# Patient Record
Sex: FEMALE | Race: WHITE | NOT HISPANIC OR LATINO | Employment: OTHER | URBAN - METROPOLITAN AREA
[De-identification: names, ages, dates, MRNs, and addresses within clinical notes are randomized per-mention and may not be internally consistent; named-entity substitution may affect disease eponyms.]

---

## 2017-01-03 RX ORDER — METFORMIN HYDROCHLORIDE 500 MG/1
TABLET, EXTENDED RELEASE ORAL
Qty: 90 TABLET | Refills: 0 | Status: SHIPPED | OUTPATIENT
Start: 2017-01-03 | End: 2017-04-01 | Stop reason: SDUPTHER

## 2017-01-18 ENCOUNTER — DOCUMENTATION ONLY (OUTPATIENT)
Dept: FAMILY MEDICINE | Facility: CLINIC | Age: 82
End: 2017-01-18

## 2017-01-18 NOTE — PROGRESS NOTES
Pre-Visit Chart Review  For Appointment Scheduled on 1-23-17    Health Maintenance Due   Topic Date Due    Influenza Vaccine  08/01/2016    Pneumococcal (65+) (2 of 2 - PPSV23) 01/08/2017    Foot Exam  01/08/2017    Lipid Panel  01/08/2017    Hemoglobin A1c  01/25/2017

## 2017-01-20 DIAGNOSIS — R10.13 DYSPEPSIA: ICD-10-CM

## 2017-01-20 DIAGNOSIS — R12 HEARTBURN: ICD-10-CM

## 2017-01-20 RX ORDER — PANTOPRAZOLE SODIUM 40 MG/1
TABLET, DELAYED RELEASE ORAL
Qty: 90 TABLET | Refills: 1 | Status: SHIPPED | OUTPATIENT
Start: 2017-01-20 | End: 2017-07-24 | Stop reason: SDUPTHER

## 2017-01-20 NOTE — TELEPHONE ENCOUNTER
Please inform patient that I refilled the protonix prescription and that she is due for a follow-up appointment (last visit was over a year ago).  Thanks  LIAM

## 2017-01-23 ENCOUNTER — OFFICE VISIT (OUTPATIENT)
Dept: FAMILY MEDICINE | Facility: CLINIC | Age: 82
End: 2017-01-23
Payer: MEDICARE

## 2017-01-23 ENCOUNTER — LAB VISIT (OUTPATIENT)
Dept: LAB | Facility: HOSPITAL | Age: 82
End: 2017-01-23
Attending: FAMILY MEDICINE
Payer: MEDICARE

## 2017-01-23 VITALS
SYSTOLIC BLOOD PRESSURE: 124 MMHG | WEIGHT: 142.19 LBS | DIASTOLIC BLOOD PRESSURE: 74 MMHG | RESPIRATION RATE: 12 BRPM | OXYGEN SATURATION: 97 % | HEIGHT: 63 IN | HEART RATE: 75 BPM | BODY MASS INDEX: 25.2 KG/M2 | TEMPERATURE: 98 F

## 2017-01-23 DIAGNOSIS — J01.00 ACUTE MAXILLARY SINUSITIS, RECURRENCE NOT SPECIFIED: Primary | ICD-10-CM

## 2017-01-23 DIAGNOSIS — E11.9 CONTROLLED TYPE 2 DIABETES MELLITUS WITHOUT COMPLICATION, UNSPECIFIED LONG TERM INSULIN USE STATUS: ICD-10-CM

## 2017-01-23 DIAGNOSIS — I10 GOOD HYPERTENSION CONTROL: ICD-10-CM

## 2017-01-23 DIAGNOSIS — E11.59 HYPERTENSION ASSOCIATED WITH DIABETES: ICD-10-CM

## 2017-01-23 DIAGNOSIS — E78.5 HYPERLIPIDEMIA ASSOCIATED WITH TYPE 2 DIABETES MELLITUS: ICD-10-CM

## 2017-01-23 DIAGNOSIS — I15.2 HYPERTENSION ASSOCIATED WITH DIABETES: ICD-10-CM

## 2017-01-23 DIAGNOSIS — E78.5 HYPERLIPIDEMIA, UNSPECIFIED HYPERLIPIDEMIA TYPE: ICD-10-CM

## 2017-01-23 DIAGNOSIS — E11.69 HYPERLIPIDEMIA ASSOCIATED WITH TYPE 2 DIABETES MELLITUS: ICD-10-CM

## 2017-01-23 LAB
ANION GAP SERPL CALC-SCNC: 11 MMOL/L
BUN SERPL-MCNC: 13 MG/DL
CALCIUM SERPL-MCNC: 9.4 MG/DL
CHLORIDE SERPL-SCNC: 99 MMOL/L
CHOLEST/HDLC SERPL: 2.8 {RATIO}
CO2 SERPL-SCNC: 27 MMOL/L
CREAT SERPL-MCNC: 0.8 MG/DL
EST. GFR  (AFRICAN AMERICAN): >60 ML/MIN/1.73 M^2
EST. GFR  (NON AFRICAN AMERICAN): >60 ML/MIN/1.73 M^2
GLUCOSE SERPL-MCNC: 139 MG/DL
HDL/CHOLESTEROL RATIO: 35.5 %
HDLC SERPL-MCNC: 138 MG/DL
HDLC SERPL-MCNC: 49 MG/DL
LDLC SERPL CALC-MCNC: 67.6 MG/DL
NONHDLC SERPL-MCNC: 89 MG/DL
POTASSIUM SERPL-SCNC: 4.3 MMOL/L
SODIUM SERPL-SCNC: 137 MMOL/L
TRIGL SERPL-MCNC: 107 MG/DL

## 2017-01-23 PROCEDURE — 80061 LIPID PANEL: CPT

## 2017-01-23 PROCEDURE — 80048 BASIC METABOLIC PNL TOTAL CA: CPT

## 2017-01-23 PROCEDURE — 83036 HEMOGLOBIN GLYCOSYLATED A1C: CPT

## 2017-01-23 PROCEDURE — 36415 COLL VENOUS BLD VENIPUNCTURE: CPT | Mod: PO

## 2017-01-23 PROCEDURE — 99999 PR PBB SHADOW E&M-EST. PATIENT-LVL III: CPT | Mod: PBBFAC,,, | Performed by: FAMILY MEDICINE

## 2017-01-23 PROCEDURE — 99214 OFFICE O/P EST MOD 30 MIN: CPT | Mod: S$PBB,,, | Performed by: FAMILY MEDICINE

## 2017-01-23 RX ORDER — NITROFURANTOIN 25; 75 MG/1; MG/1
100 CAPSULE ORAL 2 TIMES DAILY
COMMUNITY
Start: 2017-01-16 | End: 2017-01-26

## 2017-01-23 RX ORDER — AZITHROMYCIN 250 MG/1
TABLET, FILM COATED ORAL
Qty: 6 TABLET | Refills: 0 | Status: SHIPPED | OUTPATIENT
Start: 2017-01-23 | End: 2017-01-28

## 2017-01-23 NOTE — PROGRESS NOTES
Subjective:       Patient ID: Genny Watson is a 81 y.o. female.    Chief Complaint: Diabetes; Hypertension; Sinus Problem; and Cough    HPI Comments: 81 year old female in for hypertension, diabetes, hyperlipidemia check.  She was seen in the urgent care last week with a uti and given an injection of probably rocephin and followed by macrobid which she is still taking.  She subsequently developed a cough and congestion with left maxillary and jaw pain and pressure and ears stopped up.  She has no fever but feels flushed and ill.  Her cough is non productive but she is taking mucinex.    Past Medical History:    ALLERGIC RHINITIS                               4/30/2012     Arthritis                                                     Breast cancer                                                   Comment:right, s/p right mastectomy>20yr ago    Cataract                                                      Diabetes mellitus type II                                     Diverticulitis                                                Diverticulosis                                                Esophageal mass                                                 Comment:hiatal hernia with testing    Fuchs' corneal dystrophy                                      Glaucoma                                                      Hernia, hiatal                                                Hyperlipidemia                                                Hypertension                                                  Pulmonary embolism                                            Seizures                                                      Skin cancer of face                             12/2014         Comment:Dr M Weil     UTI (lower urinary tract infection)                           Past Surgical History:    HYSTERECTOMY                                                   MASTECTOMY                                                        Comment:right >20 years ago    APPENDECTOMY                                                   BREAST SURGERY                                                 EYE SURGERY                                                    CORNEAL TRANSPLANT                                             angeogram                                                      cardio stent                                     1/2015          Comment:Dr Dodson    COLONOSCOPY                                      around 20*    COLONOSCOPY                                     N/A 1/13/2016       Comment:Procedure: COLONOSCOPY;  Surgeon: Mario Bhakta MD;  Location: [facility];  Service:                Endoscopy;  Laterality: N/A;    Pneumococcal (65+)(2 of 2 - PPSV23) due on 01/08/2017-DEFERRED DUE TO ACUTE ILLNESS  Foot Exam due on 01/08/2017  Lipid Panel due on 01/08/2017  Hemoglobin A1c due on 01/25/2017      Diabetes   Associated symptoms include fatigue. Pertinent negatives for diabetes include no chest pain.   Hypertension   Pertinent negatives include no chest pain, palpitations or shortness of breath.   Sinus Problem   Associated symptoms include congestion, coughing, ear pain, sinus pressure and a sore throat. Pertinent negatives include no chills, diaphoresis or shortness of breath.   Cough   Associated symptoms include ear pain, postnasal drip, rhinorrhea and a sore throat. Pertinent negatives include no chest pain, chills, fever or shortness of breath.     Review of Systems   Constitutional: Positive for fatigue. Negative for chills, diaphoresis and fever.   HENT: Positive for congestion, ear pain, hearing loss, postnasal drip, rhinorrhea, sinus pressure and sore throat. Negative for ear discharge.    Respiratory: Positive for cough. Negative for chest tightness and shortness of breath.    Cardiovascular: Negative for chest pain and palpitations.   Genitourinary: Negative for dysuria (resolved now), frequency and urgency.        Objective:      Physical Exam   Constitutional: She is oriented to person, place, and time. She appears well-developed and well-nourished. No distress.   Good blood pressure control  Patient is normal weight with bmi of 25.2.   Weight is increased by 0.2lb since last visit of 7/25/16.     HENT:   Head: Normocephalic and atraumatic.   Right Ear: Hearing and external ear normal. Tympanic membrane is bulging. Tympanic membrane is not injected and not erythematous. Tympanic membrane mobility is abnormal.   Left Ear: Hearing, external ear and ear canal normal. Tympanic membrane is bulging. Tympanic membrane is not injected and not erythematous. Tympanic membrane mobility is abnormal.   Nose: Mucosal edema and rhinorrhea present. Right sinus exhibits no maxillary sinus tenderness and no frontal sinus tenderness. Left sinus exhibits maxillary sinus tenderness. Left sinus exhibits no frontal sinus tenderness.   Mouth/Throat: Posterior oropharyngeal edema and posterior oropharyngeal erythema present. No oropharyngeal exudate or tonsillar abscesses.   Cardiovascular: Normal rate, regular rhythm and normal heart sounds.  Exam reveals no gallop and no friction rub.    No murmur heard.  Pulses:       Dorsalis pedis pulses are 2+ on the right side, and 2+ on the left side.        Posterior tibial pulses are 2+ on the right side, and 2+ on the left side.   Pulmonary/Chest: Effort normal and breath sounds normal. No respiratory distress. She has no wheezes. She has no rales. She exhibits no tenderness.   Musculoskeletal:        Right foot: There is normal range of motion and no deformity.        Left foot: There is deformity (slight hammertoe of second toe-old fracture). There is normal range of motion.   Feet:   Right Foot:   Protective Sensation: 10 sites tested. 10 sites sensed.   Skin Integrity: Negative for ulcer, blister, skin breakdown, erythema, warmth, callus or dry skin.   Left Foot:   Protective Sensation: 10 sites  tested. 10 sites sensed.   Skin Integrity: Negative for ulcer, blister, skin breakdown, erythema, warmth, callus or dry skin.   Neurological: She is alert and oriented to person, place, and time.   Skin: She is not diaphoretic.   Psychiatric: She has a normal mood and affect. Her behavior is normal. Judgment and thought content normal.   Nursing note and vitals reviewed.      Assessment:       1. Acute maxillary sinusitis, recurrence not specified    2. Good hypertension control    3. Controlled type 2 diabetes mellitus without complication, unspecified long term insulin use status    4. Hyperlipidemia, unspecified hyperlipidemia type    5. Hypertension associated with diabetes    6. Hyperlipidemia associated with type 2 diabetes mellitus    7. BMI 25.0-25.9,adult        Plan:       1. Acute maxillary sinusitis, recurrence not specified  - azithromycin (Z-KASSANDRA) 250 MG tablet; Take 2 tablets by mouth on day 1; Take 1 tablet by mouth on days 2-5  Dispense: 6 tablet; Refill: 0    2. Good hypertension control  No changes needed    3. Controlled type 2 diabetes mellitus without complication, unspecified long term insulin use status  Await lab2  - Hemoglobin A1c; Future  - Basic metabolic panel; Future    4. Hyperlipidemia, unspecified hyperlipidemia type  Await lab   - Lipid panel; Future    5. Hypertension associated with diabetes    6. Hyperlipidemia associated with type 2 diabetes mellitus    7. BMI 25.0-25.9,adult  No changes needed

## 2017-01-23 NOTE — MR AVS SNAPSHOT
[facility]  [address]  [place] [address]  Phone: [phone]  Fax: [phone]                  Genny Watson   2017 11:00 AM   Office Visit    Description:  Female : 1935   Provider:  Josh Berman MD   Department:  [place] [facility]           Reason for Visit     Diabetes     Hypertension     Sinus Problem     Cough           Diagnoses this Visit        Comments    Acute maxillary sinusitis, recurrence not specified    -  Primary     Good hypertension control         Controlled type 2 diabetes mellitus without complication, unspecified long term insulin use status         Hyperlipidemia, unspecified hyperlipidemia type                To Do List           Future Appointments        Provider Department Dept Phone    2017 2:30 PM LAB, [place] [facility] - [facility] [phone]    3[facility]30[facility]2017 [facility]:00 [facility] [phone]      Goals (5 Years of Data)     None       These Medications        Disp Refills Start End    azithromycin (Z-KASSANDRA) 250 MG tablet 6 tablet 0 2017    Take 2 tablets by mouth on day 1; Take 1 tablet by mouth on days 2-5    Pharmacy: [id number] - [place], [place] - [address] [facility] #: [phone]         [facility]     [facility] On Call Nurse Care Line -  Assistance  Registered nurses in the [facility] On Call Center provide clinical advisement, health education, appointment booking, and other advisory services.  Call for this free service at [phone].             Medications           Message regarding Medications     Verify the changes and/or additions to your medication regime listed below are the same as discussed with your clinician today.  If any of these changes or additions are incorrect, please notify your healthcare provider.        START taking these NEW medications        Refills    azithromycin (Z-KASSANDRA) 250 MG tablet 0    Sig: Take 2 tablets by  mouth on day 1; Take 1 tablet by mouth on days 2-5    Class: Normal      STOP taking these medications     blood sugar diagnostic Strp 1 strip by Misc.(Non-Drug; Combo Route) route once daily. One Touch Ultra test strips    cloNIDine (CATAPRES) 0.1 MG tablet Take 1 tablet (0.1 mg total) by mouth every 4 (four) hours as needed (blood pressure over 180/105).    docusate sodium (COLACE) 100 MG capsule Take 100 mg by mouth daily as needed for Constipation.    sodium chloride 5% (MOUNA 128) 5 % ophthalmic solution Place 1 drop into the left eye as needed.           Verify that the below list of medications is an accurate representation of the medications you are currently taking.  If none reported, the list may be blank. If incorrect, please contact your healthcare provider. Carry this list with you in case of emergency.           Current Medications     amlodipine (NORVASC) 10 MG tablet Take 1 tablet (10 mg total) by mouth once daily.    aspirin (ECOTRIN) 81 MG EC tablet Take 81 mg by mouth once daily.    atorvastatin (LIPITOR) 80 MG tablet TAKE 1 TABLET DAILY    bimatoprost (LUMIGAN) 0.01 % Drop Place 1 drop into both eyes every evening.     clopidogrel (PLAVIX) 75 mg tablet TAKE 1 TABLET DAILY    dextromethorphan-guaifenesin (MUCINEX DM) 60-1,200 mg TM12 Take 1 tablet by mouth 2 (two) times daily.    Lactobacillus acidophilus 10 billion cell Cap Take 1 capsule by mouth once daily.    lisinopril (PRINIVIL,ZESTRIL) 40 MG tablet TAKE 1 TABLET DAILY    metformin (GLUCOPHAGE-XR) 500 MG 24 hr tablet TAKE 1 TABLET EVERY EVENING    metoprolol succinate (TOPROL-XL) 100 MG 24 hr tablet TAKE 1 TABLET DAILY    nitrofurantoin, macrocrystal-monohydrate, (MACROBID) 100 MG capsule Take 100 mg by mouth 2 (two) times daily.     pantoprazole (PROTONIX) 40 MG tablet TAKE 1 TABLET DAILY    prednisoLONE acetate (PRED FORTE) 1 % DrpS Place 1 drop into the right eye once daily.     sodium chloride (OCEAN) 0.65 % nasal spray 2 sprays by Nasal  "route as needed for Congestion.    timolol 0.5 % ophthalmic solution Place 1 drop into the right eye 2 (two) times daily.    azithromycin (Z-KASSANDRA) 250 MG tablet Take 2 tablets by mouth on day 1; Take 1 tablet by mouth on days 2-5           Clinical Reference Information           Vital Signs - Last Recorded  Most recent update: 1/23/2017 12:18 PM by Josh Berman MD    BP Pulse Temp Resp Ht Wt    124/74 (BP Location: Left arm, Patient Position: Sitting, BP Method: Manual) 75 98.3 °F (36.8 °C) (Oral) 12 5' 3" (1.6 m) 64.5 kg (142 lb 3.2 oz)    SpO2 BMI             97% 25.19 kg/m2         Blood Pressure          Most Recent Value    BP  124/74      Allergies as of 1/23/2017     Sulfa (Sulfonamide Antibiotics)      Immunizations Administered on Date of Encounter - 1/23/2017     None      Orders Placed During Today's Visit     Future Labs/Procedures Expected by Expires    Basic metabolic panel  1/23/2017 1/24/2018    Hemoglobin A1c  1/23/2017 3/24/2018    Lipid panel  1/23/2017 3/24/2018      "

## 2017-01-24 LAB
ESTIMATED AVG GLUCOSE: 134 MG/DL
HBA1C MFR BLD HPLC: 6.3 %

## 2017-02-22 DIAGNOSIS — I10 ESSENTIAL HYPERTENSION: ICD-10-CM

## 2017-02-22 RX ORDER — AMLODIPINE BESYLATE 10 MG/1
TABLET ORAL
Qty: 90 TABLET | Refills: 0 | Status: SHIPPED | OUTPATIENT
Start: 2017-02-22 | End: 2017-05-22 | Stop reason: SDUPTHER

## 2017-03-06 RX ORDER — ATORVASTATIN CALCIUM 80 MG/1
TABLET, FILM COATED ORAL
Qty: 90 TABLET | Refills: 2 | Status: SHIPPED | OUTPATIENT
Start: 2017-03-06 | End: 2017-12-01 | Stop reason: SDUPTHER

## 2017-03-20 DIAGNOSIS — I10 ESSENTIAL HYPERTENSION: ICD-10-CM

## 2017-03-20 RX ORDER — LISINOPRIL 40 MG/1
TABLET ORAL
Qty: 90 TABLET | Refills: 2 | Status: SHIPPED | OUTPATIENT
Start: 2017-03-20 | End: 2017-12-15 | Stop reason: SDUPTHER

## 2017-03-30 ENCOUNTER — OFFICE VISIT (OUTPATIENT)
Dept: CARDIOLOGY | Facility: CLINIC | Age: 82
End: 2017-03-30
Payer: MEDICARE

## 2017-03-30 VITALS
BODY MASS INDEX: 25.98 KG/M2 | WEIGHT: 146.63 LBS | SYSTOLIC BLOOD PRESSURE: 174 MMHG | HEART RATE: 80 BPM | DIASTOLIC BLOOD PRESSURE: 83 MMHG | RESPIRATION RATE: 18 BRPM | HEIGHT: 63 IN | OXYGEN SATURATION: 98 %

## 2017-03-30 DIAGNOSIS — E11.59 TYPE 2 DIABETES MELLITUS WITH OTHER CIRCULATORY COMPLICATION, UNSPECIFIED LONG TERM INSULIN USE STATUS: ICD-10-CM

## 2017-03-30 DIAGNOSIS — R94.39 ABNORMAL NUCLEAR STRESS TEST: ICD-10-CM

## 2017-03-30 DIAGNOSIS — I10 ESSENTIAL HYPERTENSION: ICD-10-CM

## 2017-03-30 DIAGNOSIS — R94.31 ABNORMAL EKG: ICD-10-CM

## 2017-03-30 DIAGNOSIS — Z95.5 S/P DRUG ELUTING CORONARY STENT PLACEMENT: Primary | ICD-10-CM

## 2017-03-30 DIAGNOSIS — E78.00 PURE HYPERCHOLESTEROLEMIA: ICD-10-CM

## 2017-03-30 PROBLEM — M41.115 JUVENILE IDIOPATHIC SCOLIOSIS OF THORACOLUMBAR REGION: Status: ACTIVE | Noted: 2017-03-30

## 2017-03-30 PROCEDURE — 99213 OFFICE O/P EST LOW 20 MIN: CPT | Mod: PBBFAC,PO | Performed by: INTERNAL MEDICINE

## 2017-03-30 PROCEDURE — 99999 PR PBB SHADOW E&M-EST. PATIENT-LVL III: CPT | Mod: PBBFAC,,, | Performed by: INTERNAL MEDICINE

## 2017-03-30 PROCEDURE — 99214 OFFICE O/P EST MOD 30 MIN: CPT | Mod: S$PBB,,, | Performed by: INTERNAL MEDICINE

## 2017-03-30 RX ORDER — CEFDINIR 300 MG/1
300 CAPSULE ORAL 2 TIMES DAILY
COMMUNITY
Start: 2017-03-28 | End: 2017-07-18 | Stop reason: ALTCHOICE
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
## 2017-03-30 NOTE — MR AVS SNAPSHOT
[place] [facility]   [address]  [place] [address]  Phone: [phone]                  Genny Watson   3/30/2017 1:00 PM   Office Visit    Description:  Female : 1935   Provider:  Zoran Sloan MD   Department:  [place] [facility]           Reason for Visit     Follow-up           Diagnoses this Visit        Comments    S/P drug eluting coronary stent placement    -  Primary     Pure hypercholesterolemia         Type 2 diabetes mellitus with other circulatory complication, unspecified long term insulin use status         Essential hypertension         Abnormal EKG         Abnormal nuclear stress test         BMI 25.0-25.9,adult                To Do List           Goals (5 Years of Data)     None      Follow-Up and Disposition     Return in about 1 year (around 3/30/2018).    Follow-up and Disposition History      [facility] On Call     [facility] On Call Nurse Care Line -  Assistance  Unless otherwise directed by your provider, please contact [facility], our nurse care line that is available for  assistance.     Registered nurses in the [facility] On Call Center provide: appointment scheduling, clinical advisement, health education, and other advisory services.  Call: [phone] (toll free)               Medications           Message regarding Medications     Verify the changes and/or additions to your medication regime listed below are the same as discussed with your clinician today.  If any of these changes or additions are incorrect, please notify your healthcare provider.        STOP taking these medications     sodium chloride (OCEAN) 0.65 % nasal spray 2 sprays by Nasal route as needed for Congestion.           Verify that the below list of medications is an accurate representation of the medications you are currently taking.  If none reported, the list may be blank. If incorrect, please contact your healthcare provider. Carry this list with you  "in case of emergency.           Current Medications     amlodipine (NORVASC) 10 MG tablet TAKE 1 TABLET DAILY    aspirin (ECOTRIN) 81 MG EC tablet Take 81 mg by mouth once daily.    atorvastatin (LIPITOR) 80 MG tablet TAKE 1 TABLET DAILY    bimatoprost (LUMIGAN) 0.01 % Drop Place 1 drop into both eyes every evening.     cefdinir (OMNICEF) 300 MG capsule Take 300 mg by mouth 2 (two) times daily.    clopidogrel (PLAVIX) 75 mg tablet TAKE 1 TABLET DAILY    Lactobacillus acidophilus 10 billion cell Cap Take 1 capsule by mouth once daily.    lisinopril (PRINIVIL,ZESTRIL) 40 MG tablet TAKE 1 TABLET DAILY    metformin (GLUCOPHAGE-XR) 500 MG 24 hr tablet TAKE 1 TABLET EVERY EVENING    metoprolol succinate (TOPROL-XL) 100 MG 24 hr tablet TAKE 1 TABLET DAILY    pantoprazole (PROTONIX) 40 MG tablet TAKE 1 TABLET DAILY    prednisoLONE acetate (PRED FORTE) 1 % DrpS Place 1 drop into the right eye once daily.     timolol 0.5 % ophthalmic solution Place 1 drop into the right eye 2 (two) times daily.    dextromethorphan-guaifenesin (MUCINEX DM) 60-1,200 mg TM12 Take 1 tablet by mouth 2 (two) times daily.           Clinical Reference Information           Your Vitals Were     BP Pulse Resp Height Weight SpO2    174/83 80 18 5' 3" (1.6 m) 66.5 kg (146 lb 9.7 oz) 98%    BMI                25.97 kg/m2          Blood Pressure          Most Recent Value    Left Arm BP - Sitting  174/83    Reason for not completing BP on both arms  mastectomy    BP  (!)  174/83      Allergies as of 3/30/2017     Sulfa (Sulfonamide Antibiotics)      Immunizations Administered on Date of Encounter - 3/30/2017     None      Language Assistance Services     ATTENTION: Language assistance services are available, free of charge. Please call [phone].      ATENCIÓN: Si gela autumn, tiene a longoria disposición servicios gratuitos de asistencia lingüística. Llame al [phone].     ADIA Ý: N?u b?n nói Ti?ng Vi?t, có các d?ch v? h? tr? ngôn ng? mi?n phí dành " sally wahl?n. G?i s? [phone].         [facility] complies with applicable Federal civil rights laws and does not discriminate on the basis of race, color, national origin, age, disability, or sex.

## 2017-04-01 RX ORDER — METFORMIN HYDROCHLORIDE 500 MG/1
TABLET, EXTENDED RELEASE ORAL
Qty: 90 TABLET | Refills: 0 | Status: SHIPPED | OUTPATIENT
Start: 2017-04-01 | End: 2017-06-30 | Stop reason: SDUPTHER

## 2017-04-08 RX ORDER — CLOPIDOGREL BISULFATE 75 MG/1
TABLET ORAL
Qty: 90 TABLET | Refills: 3 | Status: SHIPPED | OUTPATIENT
Start: 2017-04-08 | End: 2018-04-03 | Stop reason: SDUPTHER

## 2017-05-22 DIAGNOSIS — I10 ESSENTIAL HYPERTENSION: ICD-10-CM

## 2017-05-22 RX ORDER — AMLODIPINE BESYLATE 10 MG/1
TABLET ORAL
Qty: 90 TABLET | Refills: 3 | Status: SHIPPED | OUTPATIENT
Start: 2017-05-22 | End: 2018-05-17 | Stop reason: SDUPTHER

## 2017-06-22 ENCOUNTER — DOCUMENTATION ONLY (OUTPATIENT)
Dept: FAMILY MEDICINE | Facility: CLINIC | Age: 82
End: 2017-06-22

## 2017-06-22 NOTE — PROGRESS NOTES
Pre-Visit Chart Review  For Appointment Scheduled on 7-18-17    Health Maintenance Due   Topic Date Due    Zoster Vaccine  09/19/1997    Pneumococcal (65+) (2 of 2 - PPSV23) 01/08/2017

## 2017-06-30 RX ORDER — METFORMIN HYDROCHLORIDE 500 MG/1
TABLET, EXTENDED RELEASE ORAL
Qty: 90 TABLET | Refills: 0 | Status: SHIPPED | OUTPATIENT
Start: 2017-06-30 | End: 2017-09-28 | Stop reason: SDUPTHER

## 2017-07-18 ENCOUNTER — LAB VISIT (OUTPATIENT)
Dept: LAB | Facility: HOSPITAL | Age: 82
End: 2017-07-18
Attending: FAMILY MEDICINE
Payer: MEDICARE

## 2017-07-18 ENCOUNTER — OFFICE VISIT (OUTPATIENT)
Dept: FAMILY MEDICINE | Facility: CLINIC | Age: 82
End: 2017-07-18
Payer: MEDICARE

## 2017-07-18 VITALS
BODY MASS INDEX: 26.17 KG/M2 | HEIGHT: 63 IN | TEMPERATURE: 48 F | WEIGHT: 147.69 LBS | OXYGEN SATURATION: 95 % | DIASTOLIC BLOOD PRESSURE: 86 MMHG | HEART RATE: 80 BPM | RESPIRATION RATE: 16 BRPM | SYSTOLIC BLOOD PRESSURE: 138 MMHG

## 2017-07-18 DIAGNOSIS — E11.9 CONTROLLED TYPE 2 DIABETES MELLITUS WITHOUT COMPLICATION, WITHOUT LONG-TERM CURRENT USE OF INSULIN: ICD-10-CM

## 2017-07-18 DIAGNOSIS — I10 GOOD HYPERTENSION CONTROL: Primary | ICD-10-CM

## 2017-07-18 DIAGNOSIS — Z23 IMMUNIZATION DUE: ICD-10-CM

## 2017-07-18 DIAGNOSIS — I15.2 HYPERTENSION ASSOCIATED WITH DIABETES: ICD-10-CM

## 2017-07-18 DIAGNOSIS — E11.59 HYPERTENSION ASSOCIATED WITH DIABETES: ICD-10-CM

## 2017-07-18 DIAGNOSIS — S80.01XA CONTUSION OF RIGHT KNEE, INITIAL ENCOUNTER: ICD-10-CM

## 2017-07-18 LAB
ANION GAP SERPL CALC-SCNC: 10 MMOL/L
BUN SERPL-MCNC: 13 MG/DL
CALCIUM SERPL-MCNC: 9.8 MG/DL
CHLORIDE SERPL-SCNC: 102 MMOL/L
CO2 SERPL-SCNC: 29 MMOL/L
CREAT SERPL-MCNC: 0.8 MG/DL
EST. GFR  (AFRICAN AMERICAN): >60 ML/MIN/1.73 M^2
EST. GFR  (NON AFRICAN AMERICAN): >60 ML/MIN/1.73 M^2
GLUCOSE SERPL-MCNC: 124 MG/DL
POTASSIUM SERPL-SCNC: 4.1 MMOL/L
SODIUM SERPL-SCNC: 141 MMOL/L

## 2017-07-18 PROCEDURE — 1159F MED LIST DOCD IN RCRD: CPT | Mod: ,,, | Performed by: FAMILY MEDICINE

## 2017-07-18 PROCEDURE — 36415 COLL VENOUS BLD VENIPUNCTURE: CPT | Mod: PO

## 2017-07-18 PROCEDURE — 99214 OFFICE O/P EST MOD 30 MIN: CPT | Mod: S$PBB,,, | Performed by: FAMILY MEDICINE

## 2017-07-18 PROCEDURE — 80048 BASIC METABOLIC PNL TOTAL CA: CPT

## 2017-07-18 PROCEDURE — 99999 PR PBB SHADOW E&M-EST. PATIENT-LVL IV: CPT | Mod: PBBFAC,,, | Performed by: FAMILY MEDICINE

## 2017-07-18 PROCEDURE — 1125F AMNT PAIN NOTED PAIN PRSNT: CPT | Mod: ,,, | Performed by: FAMILY MEDICINE

## 2017-07-18 PROCEDURE — 83036 HEMOGLOBIN GLYCOSYLATED A1C: CPT

## 2017-07-18 NOTE — PROGRESS NOTES
Subjective:       Patient ID: Genny Watson is a 81 y.o. female.    Chief Complaint: Follow-up (6m)    81-year-old female coming in for a follow-up on diabetes and hypertension.  Her lipid levels are up to date but she is due for a BMP and A1c level.  She is also due for a Pneumovax 23 which she has agreed to take but declines to do a DEXA and zoster.  She had a fall 3 days ago coming out of a restaurant when her foot caught on a role in the rug causing her to land on her right knee.  She bruised the knee but has no significant injuries had no head injury and had no loss of consciousness.  She otherwise is feeling well and had no complaints.    Past Medical History:  4/30/2012: ALLERGIC RHINITIS  No date: Arthritis  No date: Breast cancer      Comment: right, s/p right mastectomy>20yr ago  No date: Cataract  No date: Diabetes mellitus type II  No date: Diverticulitis  No date: Diverticulosis  No date: Esophageal mass      Comment: hiatal hernia with testing  No date: Fuchs' corneal dystrophy  No date: Glaucoma  No date: Hernia, hiatal  No date: Hyperlipidemia  No date: Hypertension  No date: Macular degeneration  No date: Pulmonary embolism  No date: Seizures  12/2014: Skin cancer of face      Comment: Dr M Weil   No date: UTI (lower urinary tract infection)    Past Surgical History:  No date: angeogram  No date: APPENDECTOMY  No date: BREAST SURGERY  1/2015: cardio stent       Comment: Dr Dodson  around 2003: COLONOSCOPY  1/13/2016: COLONOSCOPY N/A      Comment: Procedure: COLONOSCOPY;  Surgeon: Mario Bhakta MD;  Location: [facility];  Service:                Endoscopy;  Laterality: N/A;  No date: CORNEAL TRANSPLANT  No date: EYE SURGERY  No date: HYSTERECTOMY  No date: MASTECTOMY      Comment: right >20 years ago      Current Outpatient Prescriptions:     amlodipine (NORVASC) 10 MG tablet, TAKE 1 TABLET DAILY (NEED APPOINTMENT FOR REFILLS), Disp: 90 tablet, Rfl: 3    aspirin  (ECOTRIN) 81 MG EC tablet, Take 81 mg by mouth once daily., Disp: , Rfl:     atorvastatin (LIPITOR) 80 MG tablet, TAKE 1 TABLET DAILY, Disp: 90 tablet, Rfl: 2    bimatoprost (LUMIGAN) 0.01 % Drop, Place 1 drop into both eyes every evening. , Disp: , Rfl:     clopidogrel (PLAVIX) 75 mg tablet, TAKE 1 TABLET DAILY, Disp: 90 tablet, Rfl: 3    Lactobacillus acidophilus 10 billion cell Cap, Take 1 capsule by mouth once daily. 100 million active cultures, Disp: , Rfl:     lisinopril (PRINIVIL,ZESTRIL) 40 MG tablet, TAKE 1 TABLET DAILY, Disp: 90 tablet, Rfl: 2    metformin (GLUCOPHAGE-XR) 500 MG 24 hr tablet, TAKE 1 TABLET EVERY EVENING, Disp: 90 tablet, Rfl: 0    metoprolol succinate (TOPROL-XL) 100 MG 24 hr tablet, TAKE 1 TABLET DAILY, Disp: 90 tablet, Rfl: 3    pantoprazole (PROTONIX) 40 MG tablet, TAKE 1 TABLET DAILY, Disp: 90 tablet, Rfl: 1    prednisoLONE acetate (PRED FORTE) 1 % DrpS, Place 1 drop into the right eye once daily. , Disp: , Rfl:     timolol 0.5 % ophthalmic solution, Place 1 drop into the right eye 2 (two) times daily., Disp: , Rfl:     VIT C/E/ZN/COPPR/LUTEIN/ZEAXAN (PRESERVISION AREDS 2 ORAL), Take 1 capsule by mouth 2 (two) times daily., Disp: , Rfl:     Zoster Vaccine due on 09/19/1997  DEXA SCAN due on 03/05/2016  Pneumococcal (65+)(2 of 2 - PPSV23) due on 01/08/2017  Hemoglobin A1c due on 07/23/2017        Review of Systems   Eyes: Negative for visual disturbance.   Respiratory: Negative for chest tightness and shortness of breath.    Cardiovascular: Negative for chest pain and palpitations.   Endocrine: Negative for polydipsia and polyuria.   Musculoskeletal: Positive for arthralgias.       Objective:      Physical Exam   Constitutional: She is oriented to person, place, and time. She appears well-developed and well-nourished. No distress.   Good blood pressure control  Normal weight with BMI 26.2.  She is increased 5.5 pounds from her last visit January 23, 2017   HENT:   Head:  Normocephalic and atraumatic.   Right Ear: External ear normal.   Left Ear: External ear normal.   Nose: Nose normal.   Mouth/Throat: Oropharynx is clear and moist. No oropharyngeal exudate.   Eyes: EOM are normal. No scleral icterus.   Neck: Normal range of motion. Neck supple. No JVD present. No tracheal deviation present. No thyromegaly present.   Cardiovascular: Normal rate, regular rhythm and normal heart sounds.  Exam reveals no gallop and no friction rub.    No murmur heard.  Pulses:       Dorsalis pedis pulses are 2+ on the right side, and 2+ on the left side.        Posterior tibial pulses are 2+ on the right side, and 2+ on the left side.   Pulmonary/Chest: Effort normal and breath sounds normal. No respiratory distress. She has no wheezes. She has no rales. She exhibits no tenderness.   Abdominal: Soft. Bowel sounds are normal. She exhibits no distension and no mass. There is no tenderness. There is no rebound and no guarding. No hernia.   Musculoskeletal:        Right knee: Tenderness found. Medial joint line and lateral joint line tenderness noted.        Left knee: Normal.        Right foot: There is normal range of motion and no deformity.        Left foot: There is normal range of motion and no deformity.   Bruising over her anterior right knee with no significant effusion or swelling and no decrease range of motion.  Mildly tender over the bruised area   Feet:   Right Foot:   Protective Sensation: 10 sites tested. 10 sites sensed.   Skin Integrity: Negative for ulcer, blister, skin breakdown, erythema, warmth, callus or dry skin.   Left Foot:   Protective Sensation: 10 sites tested. 10 sites sensed.   Skin Integrity: Negative for ulcer, blister, skin breakdown, erythema, warmth, callus or dry skin.   Lymphadenopathy:     She has no cervical adenopathy.   Neurological: She is alert and oriented to person, place, and time.   Skin: She is not diaphoretic.        Psychiatric: She has a normal mood and  affect. Her behavior is normal.   Nursing note and vitals reviewed.      Assessment:       1. Good hypertension control    2. Controlled type 2 diabetes mellitus without complication, without long-term current use of insulin    3. Immunization due    4. Hypertension associated with diabetes    5. Contusion of right knee, initial encounter    6. BMI 26.0-26.9,adult        Plan:       1. Good hypertension control  No changes needed    2. Controlled type 2 diabetes mellitus without complication, without long-term current use of insulin  - Hemoglobin A1c; Future  - Basic metabolic panel; Future    3. Immunization due  - (In Office Administered) Pneumococcal Polysaccharide Vaccine (23 Valent) (SQ/IM)    4. Hypertension associated with diabetes    5. Contusion of right knee, initial encounter    6. BMI 26.0-26.9,adult           Patient readiness: acceptance and barriers:none    During the course of the visit the patient was educated and counseled about the following:     Diabetes:  Discussed general issues about diabetes pathophysiology and management.  Addressed ADA diet.  Discussed foot care.  Hypertension:   Medication: no change.  Dietary sodium restriction.  Regular aerobic exercise.    Goals: Diabetes: Maintain Hemoglobin A1C below 7 and Hypertension: Reduce Blood Pressure    Did patient meet goals/outcomes: Yes    The following self management tools provided: blood pressure log  blood glucose log    Patient Instructions (the written plan) was given to the patient/family.     Time spent with patient: 30 minutes

## 2017-07-19 LAB
ESTIMATED AVG GLUCOSE: 128 MG/DL
HBA1C MFR BLD HPLC: 6.1 %

## 2017-07-24 DIAGNOSIS — R12 HEARTBURN: ICD-10-CM

## 2017-07-24 DIAGNOSIS — R10.13 DYSPEPSIA: ICD-10-CM

## 2017-07-24 RX ORDER — METOPROLOL SUCCINATE 100 MG/1
TABLET, EXTENDED RELEASE ORAL
Qty: 90 TABLET | Refills: 3 | Status: SHIPPED | OUTPATIENT
Start: 2017-07-24 | End: 2018-01-22 | Stop reason: SDUPTHER

## 2017-07-24 RX ORDER — PANTOPRAZOLE SODIUM 40 MG/1
TABLET, DELAYED RELEASE ORAL
Qty: 90 TABLET | Refills: 0 | Status: SHIPPED | OUTPATIENT
Start: 2017-07-24 | End: 2017-10-22 | Stop reason: SDUPTHER

## 2017-07-24 NOTE — TELEPHONE ENCOUNTER
Please inform the patient that I refilled the prescription for protonix and she is due for a follow-up visit (last seen over a year ago) for continued evaluation and management.  Thanks  LIAM

## 2017-09-28 RX ORDER — METFORMIN HYDROCHLORIDE 500 MG/1
TABLET, EXTENDED RELEASE ORAL
Qty: 90 TABLET | Refills: 0 | Status: SHIPPED | OUTPATIENT
Start: 2017-09-28 | End: 2017-12-21 | Stop reason: SDUPTHER

## 2017-10-22 ENCOUNTER — TELEPHONE (OUTPATIENT)
Dept: GASTROENTEROLOGY | Facility: CLINIC | Age: 82
End: 2017-10-22

## 2017-10-22 DIAGNOSIS — R12 HEARTBURN: ICD-10-CM

## 2017-10-22 DIAGNOSIS — R10.13 DYSPEPSIA: ICD-10-CM

## 2017-10-23 RX ORDER — PANTOPRAZOLE SODIUM 40 MG/1
TABLET, DELAYED RELEASE ORAL
Qty: 30 TABLET | Refills: 0 | Status: SHIPPED | OUTPATIENT
Start: 2017-10-23 | End: 2017-10-24 | Stop reason: SDUPTHER

## 2017-10-23 NOTE — TELEPHONE ENCOUNTER
Please inform the patient that I did sent in an one time 30 day refill for protonix. Patient is due for a follow-up visit (last seen in 2015) for continued evaluation and management. Similar letter was sent to the patient on 1/20/17.  Thanks  LIAM

## 2017-10-24 ENCOUNTER — LAB VISIT (OUTPATIENT)
Dept: LAB | Facility: HOSPITAL | Age: 82
End: 2017-10-24
Attending: NURSE PRACTITIONER
Payer: MEDICARE

## 2017-10-24 ENCOUNTER — OFFICE VISIT (OUTPATIENT)
Dept: GASTROENTEROLOGY | Facility: CLINIC | Age: 82
End: 2017-10-24
Payer: MEDICARE

## 2017-10-24 VITALS
WEIGHT: 144.38 LBS | HEART RATE: 84 BPM | DIASTOLIC BLOOD PRESSURE: 93 MMHG | BODY MASS INDEX: 25.58 KG/M2 | HEIGHT: 63 IN | SYSTOLIC BLOOD PRESSURE: 169 MMHG

## 2017-10-24 DIAGNOSIS — Z51.81 ENCOUNTER FOR MONITORING LONG-TERM PROTON PUMP INHIBITOR THERAPY: ICD-10-CM

## 2017-10-24 DIAGNOSIS — R03.0 ELEVATED BLOOD PRESSURE READING: ICD-10-CM

## 2017-10-24 DIAGNOSIS — Z79.899 ENCOUNTER FOR MONITORING LONG-TERM PROTON PUMP INHIBITOR THERAPY: ICD-10-CM

## 2017-10-24 DIAGNOSIS — Z51.81 ENCOUNTER FOR MONITORING LONG-TERM PROTON PUMP INHIBITOR THERAPY: Primary | ICD-10-CM

## 2017-10-24 DIAGNOSIS — Z87.19 HISTORY OF DIVERTICULOSIS: ICD-10-CM

## 2017-10-24 DIAGNOSIS — Z87.19 HISTORY OF HEMORRHOIDS: ICD-10-CM

## 2017-10-24 DIAGNOSIS — K44.9 HIATAL HERNIA: ICD-10-CM

## 2017-10-24 DIAGNOSIS — K22.70 BARRETT'S ESOPHAGUS WITHOUT DYSPLASIA: ICD-10-CM

## 2017-10-24 DIAGNOSIS — Z79.01 ANTICOAGULANT LONG-TERM USE: ICD-10-CM

## 2017-10-24 DIAGNOSIS — K62.89 RECTAL PAIN: ICD-10-CM

## 2017-10-24 DIAGNOSIS — Z79.899 ENCOUNTER FOR MONITORING LONG-TERM PROTON PUMP INHIBITOR THERAPY: Primary | ICD-10-CM

## 2017-10-24 LAB
ALBUMIN SERPL BCP-MCNC: 4.1 G/DL
ALP SERPL-CCNC: 94 U/L
ALT SERPL W/O P-5'-P-CCNC: 32 U/L
AST SERPL-CCNC: 34 U/L
BASOPHILS # BLD AUTO: 0 K/UL
BASOPHILS NFR BLD: 0.2 %
BILIRUB DIRECT SERPL-MCNC: 0.2 MG/DL
BILIRUB SERPL-MCNC: 0.5 MG/DL
DIFFERENTIAL METHOD: ABNORMAL
EOSINOPHIL # BLD AUTO: 0.1 K/UL
EOSINOPHIL NFR BLD: 1.7 %
ERYTHROCYTE [DISTWIDTH] IN BLOOD BY AUTOMATED COUNT: 13.2 %
HCT VFR BLD AUTO: 40.9 %
HGB BLD-MCNC: 13.8 G/DL
LYMPHOCYTES # BLD AUTO: 2.3 K/UL
LYMPHOCYTES NFR BLD: 30.3 %
MAGNESIUM SERPL-MCNC: 1.8 MG/DL
MCH RBC QN AUTO: 29.4 PG
MCHC RBC AUTO-ENTMCNC: 33.7 G/DL
MCV RBC AUTO: 87 FL
MONOCYTES # BLD AUTO: 0.7 K/UL
MONOCYTES NFR BLD: 8.6 %
NEUTROPHILS # BLD AUTO: 4.6 K/UL
NEUTROPHILS NFR BLD: 59.2 %
PLATELET # BLD AUTO: 242 K/UL
PMV BLD AUTO: 8.4 FL
PROT SERPL-MCNC: 7.9 G/DL
RBC # BLD AUTO: 4.69 M/UL
VIT B12 SERPL-MCNC: >2000 PG/ML
WBC # BLD AUTO: 7.7 K/UL

## 2017-10-24 PROCEDURE — 82607 VITAMIN B-12: CPT

## 2017-10-24 PROCEDURE — 36415 COLL VENOUS BLD VENIPUNCTURE: CPT

## 2017-10-24 PROCEDURE — 85025 COMPLETE CBC W/AUTO DIFF WBC: CPT

## 2017-10-24 PROCEDURE — 80076 HEPATIC FUNCTION PANEL: CPT

## 2017-10-24 PROCEDURE — 99214 OFFICE O/P EST MOD 30 MIN: CPT | Mod: S$PBB,,, | Performed by: NURSE PRACTITIONER

## 2017-10-24 PROCEDURE — 99999 PR PBB SHADOW E&M-EST. PATIENT-LVL IV: CPT | Mod: PBBFAC,,, | Performed by: NURSE PRACTITIONER

## 2017-10-24 PROCEDURE — 83735 ASSAY OF MAGNESIUM: CPT

## 2017-10-24 PROCEDURE — 99214 OFFICE O/P EST MOD 30 MIN: CPT | Mod: PBBFAC,PO | Performed by: NURSE PRACTITIONER

## 2017-10-24 RX ORDER — PANTOPRAZOLE SODIUM 40 MG/1
40 TABLET, DELAYED RELEASE ORAL DAILY
Qty: 90 TABLET | Refills: 3 | Status: SHIPPED | OUTPATIENT
Start: 2017-10-24 | End: 2018-10-27 | Stop reason: SDUPTHER

## 2017-10-24 RX ORDER — HYDROCORTISONE ACETATE 25 MG/1
25 SUPPOSITORY RECTAL 2 TIMES DAILY PRN
Qty: 24 SUPPOSITORY | Refills: 2 | Status: SHIPPED | OUTPATIENT
Start: 2017-10-24 | End: 2017-11-03

## 2017-10-24 RX ORDER — CYCLOBENZAPRINE HCL 5 MG
TABLET ORAL
COMMUNITY
Start: 2017-08-10 | End: 2018-01-22

## 2017-10-24 RX ORDER — TIMOLOL MALEATE 5 MG/ML
1 SOLUTION/ DROPS OPHTHALMIC 2 TIMES DAILY
COMMUNITY
Start: 2017-08-03 | End: 2020-07-20 | Stop reason: ALTCHOICE

## 2017-10-24 NOTE — PROGRESS NOTES
Subjective:       Patient ID: Genny Watson is a 82 y.o.  female Body mass index is 25.58 kg/m².    Chief Complaint: Follow-up ( med refill)  This patient is established.    Patient is here with daughter, whom assisted with history.      Gastroesophageal Reflux   She complains of heartburn (rarely) and water brash (rarely). She reports no abdominal pain, no belching, no chest pain, no choking, no coughing, no dysphagia, no early satiety, no globus sensation, no hoarse voice, no nausea or no sore throat. This is a chronic problem. Episode onset: since January 2015 hospitalized for angiogram and hemorrhage, also pulmonary embolism; sees Dr. Dodson (cardiologist) The problem occurs rarely. The problem has been resolved (controlled overall with PPI). The heartburn does not wake her from sleep. The heartburn does not limit her activity. The heartburn changes with position. The symptoms are aggravated by bending, lying down and certain foods (fried foods, spicy foods, acidic foods). Pertinent negatives include no fatigue, melena or weight loss. Risk factors include hiatal hernia (denies NSAID use besides aspirin). She has tried a histamine-2 antagonist, an antacid and head elevation (protonix 40 mg once daily; takes zantac prn, gaviscon prn for breakthrough which is rare) for the symptoms. The treatment provided significant relief. Past procedures include an abdominal ultrasound and an EGD.     Review of Systems   Constitutional: Negative for appetite change, chills, diaphoresis, fatigue, unexpected weight change and weight loss.   HENT: Negative for congestion, drooling, hoarse voice, mouth sores, sore throat, trouble swallowing and voice change.    Eyes: Negative for visual disturbance.   Respiratory: Negative for cough, choking, chest tightness and shortness of breath.    Cardiovascular: Negative for chest pain and palpitations.        Reports she took her blood pressure medication today, and her  blood pressure this morning was 111/85. Reports when she comes in for visits her blood pressure is always high.   Gastrointestinal: Positive for heartburn (rarely) and rectal pain (occasional; relates to hemorrhoids). Negative for abdominal distention, abdominal pain, anal bleeding, blood in stool, dysphagia, melena and nausea.   Musculoskeletal: Negative for back pain.   Neurological: Negative for dizziness, weakness and headaches.       Past Medical History:   Diagnosis Date    ALLERGIC RHINITIS 4/30/2012    Arthritis     Breast cancer     right, s/p right mastectomy>20yr ago    Cataract     Diabetes mellitus type II     Diverticulitis     Diverticulosis     Esophageal mass     hiatal hernia with testing    Fuchs' corneal dystrophy     Glaucoma     Hernia, hiatal     Hyperlipidemia     Hypertension     Macular degeneration     Pulmonary embolism     Seizures     Skin cancer of face 12/2014    Dr M Weil     UTI (lower urinary tract infection)      Past Surgical History:   Procedure Laterality Date    angeogram      APPENDECTOMY      BREAST SURGERY      cardio stent   1/2015    Dr Dodson    COLONOSCOPY  around 2003    COLONOSCOPY N/A 1/13/2016    Procedure: COLONOSCOPY;  Surgeon: Mario Moya MD;  Location: [facility];  Service: Endoscopy;  Laterality: N/A; repeat in 5 years for surveillance    CORNEAL TRANSPLANT      EYE SURGERY      HYSTERECTOMY      MASTECTOMY      right >20 years ago    UPPER GASTROINTESTINAL ENDOSCOPY  01/13/2016    Dr. Moya     Family History   Problem Relation Age of Onset    Cancer Mother      liver    Mental illness Mother     Early death Father     Heart disease Father     Diabetes Daughter     Early death Paternal Uncle     Heart disease Paternal Uncle     Celiac disease Sister     No Known Problems Son     Breast cancer Neg Hx     Ovarian cancer Neg Hx     Colon cancer Neg Hx     Colon polyps Neg Hx     Esophageal cancer Neg Hx      Stomach cancer Neg Hx      Wt Readings from Last 10 Encounters:   10/24/17 65.5 kg (144 lb 6.4 oz)   07/18/17 67 kg (147 lb 11.3 oz)   03/30/17 66.5 kg (146 lb 9.7 oz)   01/23/17 64.5 kg (142 lb 3.2 oz)   09/28/16 65.5 kg (144 lb 6.4 oz)   07/25/16 64.4 kg (141 lb 15.6 oz)   02/17/16 62.6 kg (138 lb)   02/11/16 62.8 kg (138 lb 7.2 oz)   01/13/16 63 kg (139 lb)   01/08/16 63.4 kg (139 lb 12.4 oz)     Lab Results   Component Value Date    WBC 8.10 04/11/2015    HGB 14.3 04/11/2015    HCT 42.6 04/11/2015    MCV 89 04/11/2015     04/11/2015     CMP  Sodium   Date Value Ref Range Status   07/18/2017 141 136 - 145 mmol/L Final     Potassium   Date Value Ref Range Status   07/18/2017 4.1 3.5 - 5.1 mmol/L Final     Chloride   Date Value Ref Range Status   07/18/2017 102 95 - 110 mmol/L Final     CO2   Date Value Ref Range Status   07/18/2017 29 23 - 29 mmol/L Final     Glucose   Date Value Ref Range Status   07/18/2017 124 (H) 70 - 110 mg/dL Final     BUN, Bld   Date Value Ref Range Status   07/18/2017 13 8 - 23 mg/dL Final     Creatinine   Date Value Ref Range Status   07/18/2017 0.8 0.5 - 1.4 mg/dL Final     Calcium   Date Value Ref Range Status   07/18/2017 9.8 8.7 - 10.5 mg/dL Final     Total Protein   Date Value Ref Range Status   04/11/2015 7.8 6.0 - 8.4 g/dL Final     Albumin   Date Value Ref Range Status   04/11/2015 4.3 3.5 - 5.2 g/dL Final     Total Bilirubin   Date Value Ref Range Status   04/11/2015 0.7 0.1 - 1.0 mg/dL Final     Comment:     For infants and newborns, interpretation of results should be based  on gestational age, weight and in agreement with clinical  observations.  Premature Infant recommended reference ranges:  Up to 24 hours.............<8.0 mg/dL  Up to 48 hours............<12.0 mg/dL  3-5 days..................<15.0 mg/dL  6-29 days.................<15.0 mg/dL       Alkaline Phosphatase   Date Value Ref Range Status   04/11/2015 76 55 - 135 U/L Final     AST   Date Value Ref Range  "Status   04/11/2015 31 10 - 40 U/L Final     ALT   Date Value Ref Range Status   04/11/2015 28 10 - 44 U/L Final     Anion Gap   Date Value Ref Range Status   07/18/2017 10 8 - 16 mmol/L Final     eGFR if    Date Value Ref Range Status   07/18/2017 >60.0 >60 mL/min/1.73 m^2 Final     eGFR if non    Date Value Ref Range Status   07/18/2017 >60.0 >60 mL/min/1.73 m^2 Final     Comment:     Calculation used to obtain the estimated glomerular filtration  rate (eGFR) is the CKD-EPI equation. Since race is unknown   in our information system, the eGFR values for   -American and Non--American patients are given   for each creatinine result.                   Ref Range 4/11/15     Magnesium 1.6-2.6 mg/dL 1.8 1.8        Lab Results   Component Value Date    AMYLASE 110 12/01/2015     Lab Results   Component Value Date    LIPASE 52 12/01/2015     Reviewed prior medical records including radiology report of 12/14/15 abdominal ultrasound, 1/31/2015 esophagram, 1/30/15 ct chest, & endoscopy history (see surgical history).    1/13/16 EGD was reviewed and procedure report states:   "Impression:          - Normal upper third of esophagus and middle third                        of esophagus.                       - [place]-colored mucosa suggestive of short-segment                        Gimenez's esophagus. Biopsied.                       - Large hiatus hernia.                       - 9 cm hiatus hernia.                       - Gastritis. Biopsied.                       - Mucosal changes in the duodenum. Biopsied.                       - Normal 2nd part of the duodenum. Biopsied.  Recommendation:      - Patient has a contact number available for                        emergencies. The signs and symptoms of potential                        delayed complications were discussed with the                        patient. Return to normal activities tomorrow.                        Written " "discharge instructions were provided to the                        patient.                       - Resume previous diet.                       - Continue present medications.                       - No aspirin, ibuprofen, naproxen, or other                        non-steroidal anti-inflammatory drugs.                       - Await pathology results.                       - Discharge patient to home (ambulatory).                       - Perform a colonoscopy today.                       - Return to nurse practitioner after studies are                        complete.".  Biopsy results:   "1. Duodenum, second portion, biopsy:  Unremarkable small bowel mucosa with adequate villi.  Negative for active inflammation.  Negative for dysplasia.  2. Duodenal bulb, biopsy:  Gastric mucosa with chronic inflammation.  Negative for active inflammation.  Negative for dysplasia.  3. Stomach, antrum and body, biopsy:  Chronic nonactive gastritis.  NEGATIVE for H. pylori species by immunostain with appropriately reactive controls.  Negative for dysplasia.  4. Stomach, cardia, biopsy:  Chronic nonactive gastritis.  NEGATIVE for H. pylori species by immunostain with appropriately reactive controls.  Negative for dysplasia.  5. Esophagus, distal, biopsy:  Gastric-type mucosa with intestinal metaplasia.  Negative for dysplasia."    1/13/16 Colonoscopy was reviewed and procedure report states:   " Impression:          - Non-bleeding internal hemorrhoids.                       - Diverticulosis in the sigmoid colon and in the                        descending colon.                       - One 3 mm polyp in the ascending colon. Resected                        and retrieved.                       - The rectum, transverse colon, cecum, appendiceal                        orifice and ileocecal valve are normal.                       - The examined portion of the ileum was normal.  Recommendation:      - Patient has a contact number available " "for                        emergencies. The signs and symptoms of potential                        delayed complications were discussed with the                        patient. Return to normal activities tomorrow.                        Written discharge instructions were provided to the                        patient.                       - High fiber diet.                       - Continue present medications.                       - Resume aspirin today and Plavix (clopidogrel)                        today at prior doses.                       - Await pathology results.                       - Repeat colonoscopy (date not yet determined) for                        surveillance. If adenomatous, then repeat                        colonoscopy in 5 years. If not, then patient will                        likely not need further colonoscopy secondary to age.                       - Discharge patient to home (ambulatory).                       - Return to nurse practitioner in 6 weeks. ".  Biopsy results:   "6. Polyp, ascending colon, polypectomy:  Tubular adenoma, 1 fragment."  Objective:      Physical Exam   Constitutional: She is oriented to person, place, and time. She appears well-developed and well-nourished. No distress.   HENT:   Head: Atraumatic.   Mouth/Throat: Uvula is midline, oropharynx is clear and moist and mucous membranes are normal. No oral lesions. No oropharyngeal exudate.   Eyes: Conjunctivae are normal. Pupils are equal, round, and reactive to light. No scleral icterus.   Neck: Full passive range of motion without pain.   Cardiovascular: Normal rate and regular rhythm.    Pulmonary/Chest: Effort normal and breath sounds normal. No respiratory distress. She has no wheezes. She has no rhonchi. She has no rales.   Abdominal: Soft. Normal appearance and bowel sounds are normal. She exhibits no distension, no abdominal bruit, no ascites and no mass. There is no hepatosplenomegaly. There is no " tenderness. There is no rigidity, no rebound, no guarding, no CVA tenderness, no tenderness at McBurney's point and negative Go's sign. No hernia.   Patient declined rectal exam.   Neurological: She is alert and oriented to person, place, and time.   Skin: Skin is warm and dry. No rash noted. She is not diaphoretic. No erythema. No pallor.   Non-jaundiced   Psychiatric: She has a normal mood and affect. Her behavior is normal.   Vitals reviewed.      Assessment:       1. Encounter for monitoring long-term proton pump inhibitor therapy    2. 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 Hiatal hernia    4. History of hemorrhoids    5. Rectal pain    6. History of diverticulosis    7. Anticoagulant long-term use    8. Elevated blood pressure reading        Plan:       Encounter for monitoring long-term proton pump inhibitor therapy  -     Vitamin B12; Future; Expected date: 10/24/2017  -     Magnesium; Future; Expected date: 10/24/2017  -     CBC auto differential; Future; Expected date: 10/24/2017  -     Hepatic function panel; Future; Expected date: 10/24/2017  - REFILL    pantoprazole (PROTONIX) 40 MG tablet; Take 1 tablet (40 mg total) by mouth once daily.  Dispense: 90 tablet; Refill: 3  - discussed with patient about long term use of reflux medications and the risk of using these medications long term. Some research studies (not all) have shown decrease absorption of calcium when taking PPI's and H2 blockers, but those same research studies showed that adequate dietary (milk and cheese) and/or supplement of calcium and vitamin d supplement induces enough acid secretion for calcium absorption. Also, discussed about the risk vs benefit of untreated GERD such as progression of girard's esophagus.  - recommend annual monitoring with blood work to include CMP, CBC, vitamin B12, and magnesium.    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  Dispense: 90 tablet; Refill: 3,  take in the morning before breakfast, discussed about long term use of medication and discuss risk with taking PPI's long term, and to take OTC calcium and vitamin d supplements as directed such as (Caltrate +D), pt verbalized understanding  -CONTINUE lifestyle modifications to help control reflux including: avoid large meals, avoid eating within 2-3 hours of bedtime (avoid late night eating & lying down soon after eating), & elevate head of bed if nocturnal symptoms are present.   - avoid known foods which trigger reflux symptoms & to minimize/avoid high-fat foods, chocolate, caffeine, citrus, alcohol, & tomato products.  - avoid/limit use of NSAID's, since they can cause GI upset, bleeding, and/or ulcers. If needed, take with food  - discussed diagnosis in detail with patient and the need for long term reflux medication and surveillance EGDs (currently due); patient verbalized understanding and reports she will call to schedule EGD when ready    Hiatal hernia  -  REFILL   pantoprazole (PROTONIX) 40 MG tablet; Take 1 tablet (40 mg total) by mouth once daily.  Dispense: 90 tablet; Refill: 3  -discussed diagnosis with patient & that it is usually managed by controlling reflux symptoms, surgery is an option, but usually performed if reflux is uncontrolled by medication management, if symptoms persist despite medication management refer to general surgery to discuss about surgical options, patient verbalized understanding    History of hemorrhoids & Rectal pain  -  START   hydrocortisone (ANUSOL-HC) 25 mg suppository; Place 1 suppository (25 mg total) rectally 2 (two) times daily as needed for Hemorrhoids.  Dispense: 24 suppository; Refill: 2  - avoid constipation and straining with bowel movements; try using an OTC stool softener as directed and increase fiber in diet (20-30 grams daily)/OTC fiber supplement such as metamucil (take as directed)  - recommend SRIRAM  baths  - possible referral to general surgery if symptoms persist    History of diverticulosis  - discussed the diagnosis of diverticulosis and diverticulitis, & to prevent diverticulitis, high fiber diet is recommended.  - Recommended daily exercise, adequate water intake (six 8-oz glasses of water daily), and high fiber diet. OTC fiber supplements are recommended if diet does not reach daily fiber goal (25 grams daily), such as Metamucil, Citrucel, or FiberCon (take as directed, separate from other oral medications by >2 hours).  - Advised to avoid/minimize popcorn, corn, seeds, and nuts.    Elevated BP  - instructed patient and daughter to monitor blood pressure at home and follow-up with PCP &/or cardiologist  - patient denies headache, chest pain, shortness of breath, or blurred vision, educated patient & daughter that if blood pressure remains elevated or symptoms occur to go to ER.    Anticoagulant long-term use  - patient is on blood-thinner(s)- Plavix and aspirin, informed patient that they will likely need to be held/bridge therapy, will confirm with cardiologist/PCP for recommendations on blood thinners.    Return in about 6 months (around 4/24/2018), or if symptoms worsen or fail to improve.    If no improvement in symptoms or symptoms worsen, call/follow-up at clinic or go to ER

## 2017-10-24 NOTE — PATIENT INSTRUCTIONS
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 This means that there have been changes to the lining of the esophagus near the stomach. The changes may have been caused by the acid reflux that happens with GERD (gastroesophageal reflux disease). The changed lining is not cancerous, but may increase your chances of developing cancer later on.      When you have GERD  The esophagus is the tube that carries food and liquid from the mouth to the stomach. Your lower esophageal sphincter (LES) is a one-way valve at the top of the stomach. It keeps food and stomach acid from flowing backward. If the LES is weakened, food and stomach acid flow back (reflux) into your esophagus. If this happens often, the condition is called GERD.  Changes in the lining  The stomach is kept safe from its own acid by a special lining. The esophagus isnt meant to contact stomach acid. With GERD, acid flows back into the esophagus often. This damages the esophagus. In response to the damage, new tissue forms that is not normal. This is Gimenez esophagus. The new tissue may keep changing. This is why it is more likely to become cancer in the future.  Preventing further damage  Your healthcare provider may suggest regular tests to keep track of changes in the esophagus. This usually includes an endoscopy, when a flexible lighted scope is placed through the mouth into the esophagus. Biopsies (tissue samples) can be taken of the abnormal areas. You are usually sedated with an IV medicine for comfort. He or she may also suggest ways for you to control GERD. This includes lifestyle changes, medicine, or even surgery. This should help keep your Gimenez esophagus from getting worse.  Symptoms of GERD  Symptoms include the following:  · Heartburn  · Sour-tasting fluid backing up into your mouth  · Frequent burping or belching  · Symptoms that get worse after you eat, bend over, or lie down  · Coughing repeatedly to clear your  throat  · Hoarseness   Date Last Reviewed: 6/1/2016  © 1905-5801 [employer]. [address] [place] [address], [place] [address]. All rights reserved. This information is not intended as a substitute for professional medical care. Always follow your healthcare professional's instructions.        GERD (Adult)    The esophagus is a tube that carries food from the mouth to the stomach. A valve at the lower end of the esophagus prevents stomach acid from flowing upward. When this valve doesn't work properly, stomach contents may repeatedly flow back up (reflux) into the esophagus. This is called gastroesophageal reflux disease (GERD). GERD can irritate the esophagus. It can cause problems with swallowing or breathing. In severe cases, GERD can cause recurrent pneumonia or other serious problems.  Symptoms of reflux include burning, pressure or sharp pain in the upper abdomen or mid to lower chest. The pain can spread to the neck, back, or shoulder. There may be belching, an acid taste in the back of the throat, chronic cough, or sore throat or hoarseness. GERD symptoms often occur during the day after a big meal. They can also occur at night when lying down.   Home care  Lifestyle changes can help reduce symptoms. If needed, medicines may be prescribed. Symptoms often improve with treatment, but if treatment is stopped, the symptoms often return after a few months. So most persons with GERD will need to continue treatment.  Lifestyle changes  · Limit or avoid fatty, fried, and spicy foods, as well as coffee, chocolate, mint, and foods with high acid content such as tomatoes and citrus fruit and juices (orange, grapefruit, lemon).  · Dont eat large meals, especially at night. Frequent, smaller meals are best. Do not lie down right after eating. And dont eat anything 3 hours before going to bed.  · Avoid drinking alcohol and smoking. As much as possible, stay away from second hand smoke.  · If you are overweight,  "losing weight will reduce symptoms.   · Avoid wearing tight clothing around your stomach area.  · If your symptoms occur during sleep, use a foam wedge to elevate your upper body (not just your head.) Or, place 4" blocks under the head of your bed.  Medicines  If needed, medicines can help relieve the symptoms of GERD and prevent damage to the esophagus. Discuss a medicine plan with your healthcare provider. This may include one or more of the following medicines:  · Antacids to help neutralize the normal acids in your stomach.  · Acid blockers (H2 blockers) to decrease acid production.  · Acid inhibitors (PPIs) to decrease acid production in a different way than the blockers. They may work better, but can take a little longer to take effect.  Take an antacid 30-60 minutes after eating and at bedtime, but not at the same time as an acid blocker.  Try not to take medicines such as ibuprofen and aspirin. If you are taking aspirin for your heart or other medical reasons, talk to your healthcare provider about stopping it.  Follow-up care  Follow up with your healthcare provider or as advised by our staff.  When to seek medical advice  Call your healthcare provider if any of the following occur:  · Stomach pain gets worse or moves to the lower right abdomen (appendix area)  · Chest pain appears or gets worse, or spreads to the back, neck, shoulder, or arm  · Frequent vomiting (cant keep down liquids)  · Blood in the stool or vomit (red or black in color)  · Feeling weak or dizzy  · Fever of 100.4ºF (38ºC) or higher, or as directed by your healthcare provider  Date Last Reviewed: 6/23/2015 © 2000-2017 The [employer]. [address] [place] [address], [place] [address]. All rights reserved. This information is not intended as a substitute for professional medical care. Always follow your healthcare professional's instructions.        Hemorrhoids    Hemorrhoids are swollen and inflamed veins inside the rectum and near " the anus. The rectum is the last several inches of the colon. The anus is the passage between the rectum and the outside of the body.  Causes  The veins can become swollen due to increased pressure in them. This is most often caused by:  · Chronic constipation or diarrhea  · Straining when having a bowel movement  · Sitting too long on the toilet  · A low-fiber diet  · Pregnancy  Symptoms  · Bleeding from the rectum (this may be noticeable after bowel movements)  · Lump near the anus  · Itching around the anus  · Pain around the anus  There are different types of hemorrhoids. Depending on the type you have and the severity, you may be able to treat yourself at home. In some cases, a procedure may be the best treatment option. Your healthcare provider can tell you more about this, if needed.  Home care  General care  · To get relief from pain or itching, try:  ¨ Topical products. Your healthcare provider may prescribe or recommend creams, ointments, or pads that can be applied to the hemorrhoid. Use these exactly as directed.  ¨ Medicines. Your healthcare provider may recommend stool softeners, suppositories, or laxatives to help manage constipation. Use these exactly as directed.  ¨ Sitz baths. A sitz bath involves sitting in a few inches of warm bath water. Be careful not to make the water so hot that you burn yourself--test it before sitting in it. Soak for about 10 to 15 minutes a few times a day. This may help relieve pain.  Tips to help prevent hemorrhoids  · Eat more fiber. Fiber adds bulk to stool and absorbs water as it moves through your colon. This makes stool softer and easier to pass.  ¨ Increase the fiber in your diet with more fiber-rich foods. These include fresh fruit, vegetables, and whole grains.  ¨ Take a fiber supplement or bulking agent, if advised to by your provider. These include products such as psyllium or methylcellulose.  · Drink plenty of water, if directed to by your provider. This can  help keep stool soft.  · Be more active. Frequent exercise aids digestion and helps prevent constipation. It may also help make bowel movements more regular.  · Dont strain during bowel movements. This can make hemorrhoids more likely. Also, dont sit on the toilet for long periods of time.  Follow-up care  Follow up with your healthcare provider, or as advised. If a culture or imaging tests were done, you will be notified of the results when they are ready. This may take a few days or longer.  When to seek medical advice  Call your healthcare provider right away if any of these occur:  · Increased bleeding from the rectum  · Increased pain around the rectum or anus  · Weakness or dizziness  Call 911  Call 911 or return to the emergency department right away if any of these occur:  · Trouble breathing or swallowing  · Fainting or loss of consciousness  · Unusually fast heart rate  · Vomiting blood  · Large amounts of blood in stool  Date Last Reviewed: 6/22/2015  © 7454-1532 [employer]. [address] [place] [address], [place] [address]. All rights reserved. This information is not intended as a substitute for professional medical care. Always follow your healthcare professional's instructions.

## 2017-10-25 ENCOUNTER — TELEPHONE (OUTPATIENT)
Dept: GASTROENTEROLOGY | Facility: CLINIC | Age: 82
End: 2017-10-25

## 2017-10-25 NOTE — TELEPHONE ENCOUNTER
Please call to inform & review the results with the patient- blood work showed normal electrolytes except for elevated vitamin B12; caution with B12 supplements and Recommend follow-up with Primary Care Provider for continued evaluation and management. Normal liver function levels and stable blood counts (no anemia).    Continue with previous recommendations.  Please release results to patient's mychart once you have discussed results and recommendations with patient.  Thanks,  Netta SUNG

## 2017-12-01 RX ORDER — ATORVASTATIN CALCIUM 80 MG/1
TABLET, FILM COATED ORAL
Qty: 90 TABLET | Refills: 1 | Status: SHIPPED | OUTPATIENT
Start: 2017-12-01 | End: 2018-05-14 | Stop reason: SDUPTHER

## 2017-12-15 DIAGNOSIS — I10 ESSENTIAL HYPERTENSION: ICD-10-CM

## 2017-12-15 RX ORDER — LISINOPRIL 40 MG/1
TABLET ORAL
Qty: 90 TABLET | Refills: 2 | Status: SHIPPED | OUTPATIENT
Start: 2017-12-15 | End: 2018-09-11 | Stop reason: SDUPTHER

## 2017-12-21 DIAGNOSIS — E11.9 CONTROLLED TYPE 2 DIABETES MELLITUS WITHOUT COMPLICATION, WITHOUT LONG-TERM CURRENT USE OF INSULIN: Primary | ICD-10-CM

## 2017-12-21 RX ORDER — METFORMIN HYDROCHLORIDE 500 MG/1
500 TABLET, EXTENDED RELEASE ORAL NIGHTLY
Qty: 90 TABLET | Refills: 0 | Status: SHIPPED | OUTPATIENT
Start: 2017-12-21 | End: 2018-03-21 | Stop reason: SDUPTHER

## 2018-01-08 ENCOUNTER — LAB VISIT (OUTPATIENT)
Dept: LAB | Facility: HOSPITAL | Age: 83
End: 2018-01-08
Attending: FAMILY MEDICINE
Payer: MEDICARE

## 2018-01-08 DIAGNOSIS — E11.9 CONTROLLED TYPE 2 DIABETES MELLITUS WITHOUT COMPLICATION, WITHOUT LONG-TERM CURRENT USE OF INSULIN: ICD-10-CM

## 2018-01-08 LAB
ANION GAP SERPL CALC-SCNC: 7 MMOL/L
BUN SERPL-MCNC: 22 MG/DL
CALCIUM SERPL-MCNC: 9.7 MG/DL
CHLORIDE SERPL-SCNC: 102 MMOL/L
CHOLEST SERPL-MCNC: 131 MG/DL
CHOLEST/HDLC SERPL: 2.5 {RATIO}
CO2 SERPL-SCNC: 31 MMOL/L
CREAT SERPL-MCNC: 0.8 MG/DL
EST. GFR  (AFRICAN AMERICAN): >60 ML/MIN/1.73 M^2
EST. GFR  (NON AFRICAN AMERICAN): >60 ML/MIN/1.73 M^2
ESTIMATED AVG GLUCOSE: 128 MG/DL
GLUCOSE SERPL-MCNC: 131 MG/DL
HBA1C MFR BLD HPLC: 6.1 %
HDLC SERPL-MCNC: 52 MG/DL
HDLC SERPL: 39.7 %
LDLC SERPL CALC-MCNC: 56.8 MG/DL
NONHDLC SERPL-MCNC: 79 MG/DL
POTASSIUM SERPL-SCNC: 4.2 MMOL/L
SODIUM SERPL-SCNC: 140 MMOL/L
TRIGL SERPL-MCNC: 111 MG/DL

## 2018-01-08 PROCEDURE — 80048 BASIC METABOLIC PNL TOTAL CA: CPT

## 2018-01-08 PROCEDURE — 83036 HEMOGLOBIN GLYCOSYLATED A1C: CPT

## 2018-01-08 PROCEDURE — 36415 COLL VENOUS BLD VENIPUNCTURE: CPT | Mod: PO

## 2018-01-08 PROCEDURE — 80061 LIPID PANEL: CPT

## 2018-01-16 ENCOUNTER — DOCUMENTATION ONLY (OUTPATIENT)
Dept: FAMILY MEDICINE | Facility: CLINIC | Age: 83
End: 2018-01-16

## 2018-01-16 NOTE — PROGRESS NOTES
Pre-Visit Chart Review  For Appointment Scheduled on 1-22-18    Health Maintenance Due   Topic Date Due    Zoster Vaccine  09/19/1997    DEXA SCAN  03/05/2016    Influenza Vaccine  08/01/2017

## 2018-01-17 DIAGNOSIS — Z78.0 ASYMPTOMATIC MENOPAUSAL STATE: Primary | ICD-10-CM

## 2018-01-22 ENCOUNTER — OFFICE VISIT (OUTPATIENT)
Dept: FAMILY MEDICINE | Facility: CLINIC | Age: 83
End: 2018-01-22
Attending: FAMILY MEDICINE
Payer: MEDICARE

## 2018-01-22 VITALS
BODY MASS INDEX: 25.75 KG/M2 | HEIGHT: 63 IN | SYSTOLIC BLOOD PRESSURE: 132 MMHG | DIASTOLIC BLOOD PRESSURE: 78 MMHG | TEMPERATURE: 98 F | WEIGHT: 145.31 LBS | HEART RATE: 73 BPM | OXYGEN SATURATION: 97 % | RESPIRATION RATE: 16 BRPM

## 2018-01-22 DIAGNOSIS — E11.69 HYPERLIPIDEMIA ASSOCIATED WITH TYPE 2 DIABETES MELLITUS: ICD-10-CM

## 2018-01-22 DIAGNOSIS — E78.5 HYPERLIPIDEMIA, UNSPECIFIED HYPERLIPIDEMIA TYPE: ICD-10-CM

## 2018-01-22 DIAGNOSIS — I10 GOOD HYPERTENSION CONTROL: ICD-10-CM

## 2018-01-22 DIAGNOSIS — I15.2 HYPERTENSION ASSOCIATED WITH DIABETES: ICD-10-CM

## 2018-01-22 DIAGNOSIS — E11.59 HYPERTENSION ASSOCIATED WITH DIABETES: ICD-10-CM

## 2018-01-22 DIAGNOSIS — E11.9 CONTROLLED TYPE 2 DIABETES MELLITUS WITHOUT COMPLICATION, WITHOUT LONG-TERM CURRENT USE OF INSULIN: Primary | ICD-10-CM

## 2018-01-22 DIAGNOSIS — I25.10 CORONARY ARTERY DISEASE INVOLVING NATIVE CORONARY ARTERY OF NATIVE HEART WITHOUT ANGINA PECTORIS: ICD-10-CM

## 2018-01-22 DIAGNOSIS — Z95.5 S/P DRUG ELUTING CORONARY STENT PLACEMENT: ICD-10-CM

## 2018-01-22 DIAGNOSIS — Z78.0 MENOPAUSE: ICD-10-CM

## 2018-01-22 DIAGNOSIS — E78.5 HYPERLIPIDEMIA ASSOCIATED WITH TYPE 2 DIABETES MELLITUS: ICD-10-CM

## 2018-01-22 PROCEDURE — 99214 OFFICE O/P EST MOD 30 MIN: CPT | Mod: PBBFAC,PO | Performed by: FAMILY MEDICINE

## 2018-01-22 PROCEDURE — 99999 PR PBB SHADOW E&M-EST. PATIENT-LVL IV: CPT | Mod: PBBFAC,,, | Performed by: FAMILY MEDICINE

## 2018-01-22 PROCEDURE — 99214 OFFICE O/P EST MOD 30 MIN: CPT | Mod: S$PBB,,, | Performed by: FAMILY MEDICINE

## 2018-01-22 RX ORDER — METOPROLOL SUCCINATE 100 MG/1
100 TABLET, EXTENDED RELEASE ORAL DAILY
Qty: 30 TABLET | Refills: 3 | Status: SHIPPED | OUTPATIENT
Start: 2018-01-22 | End: 2018-07-23 | Stop reason: SDUPTHER

## 2018-01-22 NOTE — PATIENT INSTRUCTIONS
Controlling High Blood Pressure  High blood pressure (hypertension) is often called the silent killer. This is because many people who have it dont know it. High blood pressure is defined as 140/90 mm Hg or higher. Know your blood pressure and remember to check it regularly. Doing so can save your life. Here are some things you can do to help control your blood pressure.    Choose heart-healthy foods  · Select low-salt, low-fat foods. Limit sodium intake to 2,400 mg per day or the amount suggested by your healthcare provider.  · Limit canned, dried, cured, packaged, and fast foods. These can contain a lot of salt.  · Eat 8 to 10 servings of fruits and vegetables every day.  · Choose lean meats, fish, or chicken.  · Eat whole-grain pasta, brown rice, and beans.  · Eat 2 to 3 servings of low-fat or fat-free dairy products.  · Ask your doctor about the DASH eating plan. This plan helps reduce blood pressure.  · When you go to a restaurant, ask that your meal be prepared with no added salt.  Maintain a healthy weight  · Ask your healthcare provider how many calories to eat a day. Then stick to that number.  · Ask your healthcare provider what weight range is healthiest for you. If you are overweight, a weight loss of only 3% to 5% of your body weight can help lower blood pressure. Generally, a good weight loss goal is to lose 10% of your body weight in a year.  · Limit snacks and sweets.  · Get regular exercise.  Get up and get active  · Choose activities you enjoy. Find ones you can do with friends or family. This includes bicycling, dancing, walking, and jogging.  · Park farther away from building entrances.  · Use stairs instead of the elevator.  · When you can, walk or bike instead of driving.  · Sylvester leaves, garden, or do household repairs.  · Be active at a moderate to vigorous level of physical activity for at least 40 minutes for a minimum of 3 to 4 days a week.   Manage stress  · Make time to relax and enjoy  life. Find time to laugh.  · Communicate your concerns with your loved ones and your healthcare provider.  · Visit with family and friends, and keep up with hobbies.  Limit alcohol and quit smoking  · Men should have no more than 2 drinks per day.  · Women should have no more than 1 drink per day.  · Talk with your healthcare provider about quitting smoking. Smoking significantly increases your risk for heart disease and stroke. Ask your healthcare provider about community smoking cessation programs and other options.  Medicines  If lifestyle changes arent enough, your healthcare provider may prescribe high blood pressure medicine. Take all medicines as prescribed. If you have any questions about your medicines, ask your healthcare provider before stopping or changing them.   Date Last Reviewed: 4/27/2016  © 3460-7510 [employer]. [address] [place] [address], [place] [address]. All rights reserved. This information is not intended as a substitute for professional medical care. Always follow your healthcare professional's instructions.

## 2018-01-22 NOTE — PROGRESS NOTES
Subjective:       Patient ID: Genny Watson is a 82 y.o. female.    Chief Complaint: Hypertension and Diabetes    82-year-old female coming in for follow-up on diabetes and hypertension.  She has no active complaints at this time and feels well.  She had her lab recently with the A1c is 6.1.  Her total cholesterol is 131, triglycerides 111, HDL 52, and LDL 56.8.  She is due for a DEXA scan but otherwise is up-to-date on health maintenance.  History includes hypertension with stents and she has no complaints of chest pain or shortness of breath on exertion.  She has a remote history of pulmonary embolism, breast cancer with right mastectomy, diverticulitis, seizures and Fuchs corneal dystrophy with corneal transplants.  She's recently been told she has macular degeneration but no treatment is needed yet other than vitamin supplementation.    Past Medical History:  4/30/2012: ALLERGIC RHINITIS  No date: Arthritis  No date: Breast cancer      Comment: right, s/p right mastectomy>20yr ago  No date: Cataract  No date: Diabetes mellitus type II  No date: Diverticulitis  No date: Diverticulosis  No date: Esophageal mass      Comment: hiatal hernia with testing  No date: Fuchs' corneal dystrophy  No date: Glaucoma  No date: Hernia, hiatal  No date: Hyperlipidemia  No date: Hypertension  No date: Macular degeneration  No date: Pulmonary embolism  No date: Seizures  12/2014: Skin cancer of face      Comment: Dr M Weil   No date: UTI (lower urinary tract infection)    Past Surgical History:  No date: angeogram  No date: APPENDECTOMY  No date: BREAST SURGERY  1/2015: cardio stent       Comment: Dr Dodson  around 2003: COLONOSCOPY  1/13/2016: COLONOSCOPY N/A      Comment: Procedure: COLONOSCOPY;  Surgeon: Mario Bhakta MD;  Location: [facility];  Service:                Endoscopy;  Laterality: N/A; repeat in 5 years                for surveillance  No date: CORNEAL TRANSPLANT  No date: EYE SURGERY  No  date: HYSTERECTOMY  No date: MASTECTOMY      Comment: right >20 years ago  01/13/2016: UPPER GASTROINTESTINAL ENDOSCOPY      Comment: Dr. Moya      Current Outpatient Prescriptions:     amlodipine (NORVASC) 10 MG tablet, TAKE 1 TABLET DAILY (NEED APPOINTMENT FOR REFILLS), Disp: 90 tablet, Rfl: 3    aspirin (ECOTRIN) 81 MG EC tablet, Take 81 mg by mouth once daily., Disp: , Rfl:     atorvastatin (LIPITOR) 80 MG tablet, TAKE 1 TABLET DAILY, Disp: 90 tablet, Rfl: 1    bimatoprost (LUMIGAN) 0.01 % Drop, Place 1 drop into both eyes every evening. , Disp: , Rfl:     clopidogrel (PLAVIX) 75 mg tablet, TAKE 1 TABLET DAILY, Disp: 90 tablet, Rfl: 3    Lactobacillus acidophilus 10 billion cell Cap, Take 1 capsule by mouth once daily. 100 million active cultures, Disp: , Rfl:     lisinopril (PRINIVIL,ZESTRIL) 40 MG tablet, TAKE 1 TABLET DAILY, Disp: 90 tablet, Rfl: 2    metFORMIN (GLUCOPHAGE-XR) 500 MG 24 hr tablet, Take 1 tablet (500 mg total) by mouth every evening., Disp: 90 tablet, Rfl: 0    metoprolol succinate (TOPROL XL) 100 MG 24 hr tablet, Take 1 tablet (100 mg total) by mouth once daily., Disp: 30 tablet, Rfl: 3    pantoprazole (PROTONIX) 40 MG tablet, Take 1 tablet (40 mg total) by mouth once daily., Disp: 90 tablet, Rfl: 3    prednisoLONE acetate (PRED FORTE) 1 % DrpS, Place 1 drop into the right eye once daily. , Disp: , Rfl:     timolol maleate 0.5% (TIMOPTIC) 0.5 % Drop, Place 1 drop into the right eye 2 (two) times daily. , Disp: , Rfl:     VIT C/E/ZN/COPPR/LUTEIN/ZEAXAN (PRESERVISION AREDS 2 ORAL), Take 1 capsule by mouth 2 (two) times daily., Disp: , Rfl:     Zoster Vaccine due on 09/19/1997  DEXA SCAN due on 03/05/2016        Review of Systems   Constitutional: Negative for chills, diaphoresis and fever.   HENT: Negative for congestion, postnasal drip, rhinorrhea, sinus pain and sinus pressure.    Eyes: Negative for visual disturbance.   Respiratory: Negative for chest tightness and shortness  of breath.    Cardiovascular: Negative for chest pain and palpitations.   Gastrointestinal: Negative for constipation, diarrhea, nausea and vomiting.   Endocrine: Negative for polydipsia and polyuria.   Genitourinary: Negative for dysuria, frequency, hematuria and urgency.   Musculoskeletal: Negative for arthralgias and neck pain.   Skin: Negative for color change and rash.       Objective:      Physical Exam   Constitutional: She is oriented to person, place, and time. She appears well-developed and well-nourished. No distress.   Good blood pressure control  Normal weight with BMI 25.7 she is down 2.4 pounds from her last visit July 18, 2017   HENT:   Head: Normocephalic and atraumatic.   Eyes: EOM are normal. Pupils are equal, round, and reactive to light. No scleral icterus.   Neck: Normal range of motion. Neck supple. No JVD present. No tracheal deviation present. No thyromegaly present.   Cardiovascular: Normal rate, regular rhythm and normal heart sounds.  Exam reveals no gallop and no friction rub.    No murmur heard.  Pulmonary/Chest: Effort normal and breath sounds normal. No respiratory distress. She has no wheezes. She has no rales. She exhibits no tenderness.   Abdominal: Soft. Bowel sounds are normal. She exhibits no distension and no mass. There is no tenderness. There is no rebound and no guarding. No hernia.   Lymphadenopathy:     She has no cervical adenopathy.   Neurological: She is alert and oriented to person, place, and time.   Skin: She is not diaphoretic.   Psychiatric: She has a normal mood and affect. Her behavior is normal. Thought content normal.   Nursing note and vitals reviewed.      Assessment:       1. Controlled type 2 diabetes mellitus without complication, without long-term current use of insulin    2. Good hypertension control    3. Hyperlipidemia, unspecified hyperlipidemia type    4. Hypertension associated with diabetes    5. Hyperlipidemia associated with type 2 diabetes  mellitus    6. Coronary artery disease involving native coronary artery of native heart without angina pectoris    7. S/P drug eluting coronary stent placement LCX 1/26/15    8. Menopause        Plan:       1. Controlled type 2 diabetes mellitus without complication, without long-term current use of insulin  Lab Results   Component Value Date    HGBA1C 6.1 (H) 01/08/2018     No changes needed    2. Good hypertension control  No changes needed    3. Hyperlipidemia, unspecified hyperlipidemia type  Lab Results   Component Value Date    CHOL 131 01/08/2018    CHOL 138 01/23/2017    CHOL 157 01/08/2016     Lab Results   Component Value Date    HDL 52 01/08/2018    HDL 49 01/23/2017    HDL 60 01/08/2016     Lab Results   Component Value Date    LDLCALC 56.8 (L) 01/08/2018    LDLCALC 67.6 01/23/2017    LDLCALC 71.6 01/08/2016     Lab Results   Component Value Date    TRIG 111 01/08/2018    TRIG 107 01/23/2017    TRIG 127 01/08/2016     Lab Results   Component Value Date    CHOLHDL 39.7 01/08/2018    CHOLHDL 35.5 01/23/2017    CHOLHDL 38.2 01/08/2016     No changes needed    4. Hypertension associated with diabetes    5. Hyperlipidemia associated with type 2 diabetes mellitus    6. Coronary artery disease involving native coronary artery of native heart without angina pectoris  Asymptomatic, no changes needed    7. S/P drug eluting coronary stent placement LCX 1/26/15    8. Menopause  - DXA Bone Density Spine And Hip; Future         Patient readiness: acceptance and barriers:none    During the course of the visit the patient was educated and counseled about the following:     Diabetes:  Discussed general issues about diabetes pathophysiology and management.  Addressed ADA diet.  Encouraged aerobic exercise.  Reminded to get yearly retinal exam.  Hypertension:   Medication: no change.  Dietary sodium restriction.  Regular aerobic exercise.    Goals: Diabetes: Maintain Hemoglobin A1C below 7 and Hypertension: Reduce Blood  Pressure    Did patient meet goals/outcomes: Yes    The following self management tools provided: blood pressure log  blood glucose log    Patient Instructions (the written plan) was given to the patient/family.     Time spent with patient: 30 minutes

## 2018-02-05 ENCOUNTER — HOSPITAL ENCOUNTER (OUTPATIENT)
Dept: RADIOLOGY | Facility: CLINIC | Age: 83
Discharge: HOME | End: 2018-02-05
Attending: FAMILY MEDICINE
Payer: MEDICARE

## 2018-02-05 DIAGNOSIS — Z78.0 MENOPAUSE: ICD-10-CM

## 2018-02-05 PROCEDURE — 77080 DXA BONE DENSITY AXIAL: CPT | Mod: TC,PO

## 2018-02-05 PROCEDURE — 77080 DXA BONE DENSITY AXIAL: CPT | Mod: 26,,, | Performed by: RADIOLOGY

## 2018-03-21 DIAGNOSIS — E11.9 CONTROLLED TYPE 2 DIABETES MELLITUS WITHOUT COMPLICATION, WITHOUT LONG-TERM CURRENT USE OF INSULIN: ICD-10-CM

## 2018-03-21 RX ORDER — METFORMIN HYDROCHLORIDE 500 MG/1
TABLET, EXTENDED RELEASE ORAL
Qty: 90 TABLET | Refills: 0 | Status: SHIPPED | OUTPATIENT
Start: 2018-03-21 | End: 2018-06-19 | Stop reason: SDUPTHER

## 2018-04-03 RX ORDER — CLOPIDOGREL BISULFATE 75 MG/1
TABLET ORAL
Qty: 90 TABLET | Refills: 0 | Status: SHIPPED | OUTPATIENT
Start: 2018-04-03 | End: 2018-07-03 | Stop reason: SDUPTHER

## 2018-05-11 DIAGNOSIS — I10 HYPERTENSION, UNSPECIFIED TYPE: Primary | ICD-10-CM

## 2018-05-12 RX ORDER — ATORVASTATIN CALCIUM 80 MG/1
TABLET, FILM COATED ORAL
Qty: 90 TABLET | Refills: 1 | Status: CANCELLED | OUTPATIENT
Start: 2018-05-12

## 2018-05-14 ENCOUNTER — OFFICE VISIT (OUTPATIENT)
Dept: CARDIOLOGY | Facility: CLINIC | Age: 83
End: 2018-05-14
Payer: MEDICARE

## 2018-05-14 VITALS
WEIGHT: 144.38 LBS | RESPIRATION RATE: 16 BRPM | OXYGEN SATURATION: 97 % | DIASTOLIC BLOOD PRESSURE: 92 MMHG | BODY MASS INDEX: 25.58 KG/M2 | HEART RATE: 83 BPM | HEIGHT: 63 IN | SYSTOLIC BLOOD PRESSURE: 166 MMHG

## 2018-05-14 DIAGNOSIS — Z95.5 S/P DRUG ELUTING CORONARY STENT PLACEMENT: Primary | ICD-10-CM

## 2018-05-14 DIAGNOSIS — R94.39 ABNORMAL NUCLEAR STRESS TEST: ICD-10-CM

## 2018-05-14 DIAGNOSIS — E11.59 TYPE 2 DIABETES MELLITUS WITH OTHER CIRCULATORY COMPLICATION, WITHOUT LONG-TERM CURRENT USE OF INSULIN: ICD-10-CM

## 2018-05-14 DIAGNOSIS — R94.31 ABNORMAL EKG: ICD-10-CM

## 2018-05-14 DIAGNOSIS — I10 HYPERTENSION, UNSPECIFIED TYPE: ICD-10-CM

## 2018-05-14 DIAGNOSIS — E78.00 PURE HYPERCHOLESTEROLEMIA: ICD-10-CM

## 2018-05-14 PROCEDURE — 93005 ELECTROCARDIOGRAM TRACING: CPT | Mod: PBBFAC,PO | Performed by: INTERNAL MEDICINE

## 2018-05-14 PROCEDURE — 93010 ELECTROCARDIOGRAM REPORT: CPT | Mod: S$PBB,,, | Performed by: INTERNAL MEDICINE

## 2018-05-14 PROCEDURE — 99215 OFFICE O/P EST HI 40 MIN: CPT | Mod: S$PBB,,, | Performed by: INTERNAL MEDICINE

## 2018-05-14 PROCEDURE — 99999 PR PBB SHADOW E&M-EST. PATIENT-LVL III: CPT | Mod: PBBFAC,,, | Performed by: INTERNAL MEDICINE

## 2018-05-14 PROCEDURE — 99213 OFFICE O/P EST LOW 20 MIN: CPT | Mod: PBBFAC,PO | Performed by: INTERNAL MEDICINE

## 2018-05-14 RX ORDER — ATORVASTATIN CALCIUM 80 MG/1
80 TABLET, FILM COATED ORAL DAILY
Qty: 90 TABLET | Refills: 3 | Status: SHIPPED | OUTPATIENT
Start: 2018-05-14 | End: 2019-05-12 | Stop reason: SDUPTHER
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
## 2018-05-17 DIAGNOSIS — I10 ESSENTIAL HYPERTENSION: ICD-10-CM

## 2018-05-17 RX ORDER — AMLODIPINE BESYLATE 10 MG/1
TABLET ORAL
Qty: 90 TABLET | Refills: 3 | Status: SHIPPED | OUTPATIENT
Start: 2018-05-17 | End: 2019-05-12 | Stop reason: SDUPTHER

## 2018-06-19 DIAGNOSIS — E11.9 CONTROLLED TYPE 2 DIABETES MELLITUS WITHOUT COMPLICATION, WITHOUT LONG-TERM CURRENT USE OF INSULIN: ICD-10-CM

## 2018-06-19 RX ORDER — METFORMIN HYDROCHLORIDE 500 MG/1
TABLET, EXTENDED RELEASE ORAL
Qty: 90 TABLET | Refills: 0 | Status: SHIPPED | OUTPATIENT
Start: 2018-06-19 | End: 2018-09-17 | Stop reason: SDUPTHER

## 2018-06-19 NOTE — TELEPHONE ENCOUNTER
Has appt for diabetic checkup July 23.  Orders in for bmp, a1c, microalbumin she can do a few days ahead of time if she wishes

## 2018-07-03 RX ORDER — CLOPIDOGREL BISULFATE 75 MG/1
TABLET ORAL
Qty: 90 TABLET | Refills: 3 | Status: SHIPPED | OUTPATIENT
Start: 2018-07-03 | End: 2019-06-28 | Stop reason: SDUPTHER

## 2018-07-09 ENCOUNTER — LAB VISIT (OUTPATIENT)
Dept: LAB | Facility: HOSPITAL | Age: 83
End: 2018-07-09
Attending: FAMILY MEDICINE
Payer: MEDICARE

## 2018-07-09 DIAGNOSIS — E11.9 CONTROLLED TYPE 2 DIABETES MELLITUS WITHOUT COMPLICATION, WITHOUT LONG-TERM CURRENT USE OF INSULIN: ICD-10-CM

## 2018-07-09 LAB
ANION GAP SERPL CALC-SCNC: 11 MMOL/L
BUN SERPL-MCNC: 17 MG/DL
CALCIUM SERPL-MCNC: 9.8 MG/DL
CHLORIDE SERPL-SCNC: 102 MMOL/L
CO2 SERPL-SCNC: 26 MMOL/L
CREAT SERPL-MCNC: 0.7 MG/DL
EST. GFR  (AFRICAN AMERICAN): >60 ML/MIN/1.73 M^2
EST. GFR  (NON AFRICAN AMERICAN): >60 ML/MIN/1.73 M^2
ESTIMATED AVG GLUCOSE: 131 MG/DL
GLUCOSE SERPL-MCNC: 123 MG/DL
HBA1C MFR BLD HPLC: 6.2 %
POTASSIUM SERPL-SCNC: 4.3 MMOL/L
SODIUM SERPL-SCNC: 139 MMOL/L

## 2018-07-09 PROCEDURE — 83036 HEMOGLOBIN GLYCOSYLATED A1C: CPT

## 2018-07-09 PROCEDURE — 36415 COLL VENOUS BLD VENIPUNCTURE: CPT | Mod: PO

## 2018-07-09 PROCEDURE — 80048 BASIC METABOLIC PNL TOTAL CA: CPT

## 2018-07-19 ENCOUNTER — PATIENT OUTREACH (OUTPATIENT)
Dept: ADMINISTRATIVE | Facility: HOSPITAL | Age: 83
End: 2018-07-19

## 2018-07-21 RX ORDER — METOPROLOL SUCCINATE 100 MG/1
TABLET, EXTENDED RELEASE ORAL
Qty: 90 TABLET | Refills: 3 | Status: CANCELLED | OUTPATIENT
Start: 2018-07-21

## 2018-07-23 ENCOUNTER — OFFICE VISIT (OUTPATIENT)
Dept: FAMILY MEDICINE | Facility: CLINIC | Age: 83
End: 2018-07-23
Attending: FAMILY MEDICINE
Payer: MEDICARE

## 2018-07-23 VITALS
HEIGHT: 63 IN | HEART RATE: 86 BPM | WEIGHT: 143.94 LBS | DIASTOLIC BLOOD PRESSURE: 69 MMHG | SYSTOLIC BLOOD PRESSURE: 130 MMHG | BODY MASS INDEX: 25.5 KG/M2 | RESPIRATION RATE: 18 BRPM | TEMPERATURE: 98 F

## 2018-07-23 DIAGNOSIS — I10 HYPERTENSION, ESSENTIAL: ICD-10-CM

## 2018-07-23 DIAGNOSIS — I10 GOOD HYPERTENSION CONTROL: ICD-10-CM

## 2018-07-23 DIAGNOSIS — I15.2 HYPERTENSION ASSOCIATED WITH DIABETES: ICD-10-CM

## 2018-07-23 DIAGNOSIS — E11.9 CONTROLLED TYPE 2 DIABETES MELLITUS WITHOUT COMPLICATION, WITHOUT LONG-TERM CURRENT USE OF INSULIN: Primary | ICD-10-CM

## 2018-07-23 DIAGNOSIS — E11.59 HYPERTENSION ASSOCIATED WITH DIABETES: ICD-10-CM

## 2018-07-23 PROCEDURE — 99999 PR PBB SHADOW E&M-EST. PATIENT-LVL III: CPT | Mod: PBBFAC,,, | Performed by: FAMILY MEDICINE

## 2018-07-23 PROCEDURE — 99214 OFFICE O/P EST MOD 30 MIN: CPT | Mod: S$PBB,,, | Performed by: FAMILY MEDICINE

## 2018-07-23 PROCEDURE — 99213 OFFICE O/P EST LOW 20 MIN: CPT | Mod: PBBFAC,PO | Performed by: FAMILY MEDICINE

## 2018-07-23 RX ORDER — METOPROLOL SUCCINATE 100 MG/1
100 TABLET, EXTENDED RELEASE ORAL DAILY
Qty: 90 TABLET | Refills: 3 | Status: SHIPPED | OUTPATIENT
Start: 2018-07-23 | End: 2019-07-08 | Stop reason: SDUPTHER

## 2018-09-11 DIAGNOSIS — I10 ESSENTIAL HYPERTENSION: ICD-10-CM

## 2018-09-11 RX ORDER — LISINOPRIL 40 MG/1
TABLET ORAL
Qty: 90 TABLET | Refills: 2 | Status: SHIPPED | OUTPATIENT
Start: 2018-09-11 | End: 2019-06-08 | Stop reason: SDUPTHER

## 2018-09-17 DIAGNOSIS — E11.9 CONTROLLED TYPE 2 DIABETES MELLITUS WITHOUT COMPLICATION, WITHOUT LONG-TERM CURRENT USE OF INSULIN: ICD-10-CM

## 2018-09-17 RX ORDER — METFORMIN HYDROCHLORIDE 500 MG/1
TABLET, EXTENDED RELEASE ORAL
Qty: 90 TABLET | Refills: 0 | Status: SHIPPED | OUTPATIENT
Start: 2018-09-17 | End: 2018-12-16 | Stop reason: SDUPTHER

## 2018-10-27 ENCOUNTER — TELEPHONE (OUTPATIENT)
Dept: GASTROENTEROLOGY | Facility: CLINIC | Age: 83
End: 2018-10-27

## 2018-10-27 DIAGNOSIS — K22.70 BARRETT'S ESOPHAGUS WITHOUT DYSPLASIA: ICD-10-CM

## 2018-10-27 DIAGNOSIS — Z79.899 ENCOUNTER FOR MONITORING LONG-TERM PROTON PUMP INHIBITOR THERAPY: ICD-10-CM

## 2018-10-27 DIAGNOSIS — K44.9 HIATAL HERNIA: ICD-10-CM

## 2018-10-27 DIAGNOSIS — Z51.81 ENCOUNTER FOR MONITORING LONG-TERM PROTON PUMP INHIBITOR THERAPY: ICD-10-CM

## 2018-10-29 RX ORDER — PANTOPRAZOLE SODIUM 40 MG/1
TABLET, DELAYED RELEASE ORAL
Qty: 90 TABLET | Refills: 0 | Status: SHIPPED | OUTPATIENT
Start: 2018-10-29 | End: 2019-01-22 | Stop reason: SDUPTHER

## 2018-10-29 NOTE — TELEPHONE ENCOUNTER
Please inform the patient that I refilled the prescription for protonix and she is overdue for a follow-up visit (last seen a year ago; history of girard's esophagus) for continued evaluation and management.  Thanks  LIAM

## 2018-10-29 NOTE — TELEPHONE ENCOUNTER
Spoke with pt. Informed of message per LOURDES Madera NP. Pt verbalized understanding. Pt did not wish to schedule appointment at this time. Pt will discuss with daughter then call back.

## 2018-12-10 ENCOUNTER — DOCUMENTATION ONLY (OUTPATIENT)
Dept: FAMILY MEDICINE | Facility: CLINIC | Age: 83
End: 2018-12-10

## 2018-12-10 NOTE — PROGRESS NOTES
Pre-Visit Chart Review  For Appointment Scheduled on 1-22-19    Health Maintenance Due   Topic Date Due    Zoster Vaccine  09/19/1997    Influenza Vaccine  08/01/2018

## 2018-12-16 DIAGNOSIS — E11.9 CONTROLLED TYPE 2 DIABETES MELLITUS WITHOUT COMPLICATION, WITHOUT LONG-TERM CURRENT USE OF INSULIN: ICD-10-CM

## 2018-12-17 RX ORDER — METFORMIN HYDROCHLORIDE 500 MG/1
TABLET, EXTENDED RELEASE ORAL
Qty: 90 TABLET | Refills: 0 | Status: SHIPPED | OUTPATIENT
Start: 2018-12-17 | End: 2019-05-14 | Stop reason: SDUPTHER

## 2019-01-02 ENCOUNTER — LAB VISIT (OUTPATIENT)
Dept: LAB | Facility: HOSPITAL | Age: 84
End: 2019-01-02
Attending: FAMILY MEDICINE
Payer: MEDICARE

## 2019-01-02 DIAGNOSIS — E11.9 CONTROLLED TYPE 2 DIABETES MELLITUS WITHOUT COMPLICATION, WITHOUT LONG-TERM CURRENT USE OF INSULIN: ICD-10-CM

## 2019-01-02 LAB
ANION GAP SERPL CALC-SCNC: 9 MMOL/L
BUN SERPL-MCNC: 18 MG/DL
CALCIUM SERPL-MCNC: 9.9 MG/DL
CHLORIDE SERPL-SCNC: 100 MMOL/L
CHOLEST SERPL-MCNC: 143 MG/DL
CHOLEST/HDLC SERPL: 2.5 {RATIO}
CO2 SERPL-SCNC: 29 MMOL/L
CREAT SERPL-MCNC: 0.7 MG/DL
EST. GFR  (AFRICAN AMERICAN): >60 ML/MIN/1.73 M^2
EST. GFR  (NON AFRICAN AMERICAN): >60 ML/MIN/1.73 M^2
ESTIMATED AVG GLUCOSE: 128 MG/DL
GLUCOSE SERPL-MCNC: 143 MG/DL
HBA1C MFR BLD HPLC: 6.1 %
HDLC SERPL-MCNC: 57 MG/DL
HDLC SERPL: 39.9 %
LDLC SERPL CALC-MCNC: 58.8 MG/DL
NONHDLC SERPL-MCNC: 86 MG/DL
POTASSIUM SERPL-SCNC: 3.7 MMOL/L
SODIUM SERPL-SCNC: 138 MMOL/L
TRIGL SERPL-MCNC: 136 MG/DL

## 2019-01-02 PROCEDURE — 36415 COLL VENOUS BLD VENIPUNCTURE: CPT | Mod: PO

## 2019-01-02 PROCEDURE — 80048 BASIC METABOLIC PNL TOTAL CA: CPT

## 2019-01-02 PROCEDURE — 83036 HEMOGLOBIN GLYCOSYLATED A1C: CPT

## 2019-01-02 PROCEDURE — 80061 LIPID PANEL: CPT

## 2019-01-22 ENCOUNTER — LAB VISIT (OUTPATIENT)
Dept: LAB | Facility: HOSPITAL | Age: 84
End: 2019-01-22
Attending: NURSE PRACTITIONER
Payer: MEDICARE

## 2019-01-22 ENCOUNTER — OFFICE VISIT (OUTPATIENT)
Dept: GASTROENTEROLOGY | Facility: CLINIC | Age: 84
End: 2019-01-22
Payer: MEDICARE

## 2019-01-22 ENCOUNTER — OFFICE VISIT (OUTPATIENT)
Dept: FAMILY MEDICINE | Facility: CLINIC | Age: 84
End: 2019-01-22
Attending: FAMILY MEDICINE
Payer: MEDICARE

## 2019-01-22 VITALS
SYSTOLIC BLOOD PRESSURE: 136 MMHG | RESPIRATION RATE: 20 BRPM | WEIGHT: 141.56 LBS | OXYGEN SATURATION: 98 % | DIASTOLIC BLOOD PRESSURE: 80 MMHG | BODY MASS INDEX: 25.08 KG/M2 | HEIGHT: 63 IN | TEMPERATURE: 98 F | HEART RATE: 74 BPM

## 2019-01-22 VITALS
RESPIRATION RATE: 20 BRPM | WEIGHT: 144.38 LBS | HEIGHT: 63 IN | HEART RATE: 75 BPM | SYSTOLIC BLOOD PRESSURE: 140 MMHG | BODY MASS INDEX: 25.58 KG/M2 | DIASTOLIC BLOOD PRESSURE: 63 MMHG

## 2019-01-22 DIAGNOSIS — I25.10 CORONARY ARTERY DISEASE INVOLVING NATIVE CORONARY ARTERY OF NATIVE HEART WITHOUT ANGINA PECTORIS: ICD-10-CM

## 2019-01-22 DIAGNOSIS — Z95.5 S/P DRUG ELUTING CORONARY STENT PLACEMENT: ICD-10-CM

## 2019-01-22 DIAGNOSIS — Z51.81 ENCOUNTER FOR MONITORING LONG-TERM PROTON PUMP INHIBITOR THERAPY: Primary | ICD-10-CM

## 2019-01-22 DIAGNOSIS — K44.9 HIATAL HERNIA: ICD-10-CM

## 2019-01-22 DIAGNOSIS — Z79.899 ENCOUNTER FOR MONITORING LONG-TERM PROTON PUMP INHIBITOR THERAPY: Primary | ICD-10-CM

## 2019-01-22 DIAGNOSIS — E11.69 HYPERLIPIDEMIA ASSOCIATED WITH TYPE 2 DIABETES MELLITUS: ICD-10-CM

## 2019-01-22 DIAGNOSIS — E11.59 HYPERTENSION ASSOCIATED WITH DIABETES: ICD-10-CM

## 2019-01-22 DIAGNOSIS — Z79.899 ENCOUNTER FOR MONITORING LONG-TERM PROTON PUMP INHIBITOR THERAPY: ICD-10-CM

## 2019-01-22 DIAGNOSIS — R03.0 ELEVATED BLOOD PRESSURE READING: ICD-10-CM

## 2019-01-22 DIAGNOSIS — I15.2 HYPERTENSION ASSOCIATED WITH DIABETES: ICD-10-CM

## 2019-01-22 DIAGNOSIS — Z51.81 ENCOUNTER FOR MONITORING LONG-TERM PROTON PUMP INHIBITOR THERAPY: ICD-10-CM

## 2019-01-22 DIAGNOSIS — E78.5 HYPERLIPIDEMIA ASSOCIATED WITH TYPE 2 DIABETES MELLITUS: ICD-10-CM

## 2019-01-22 DIAGNOSIS — K22.70 BARRETT'S ESOPHAGUS WITHOUT DYSPLASIA: ICD-10-CM

## 2019-01-22 DIAGNOSIS — E11.9 TYPE 2 DIABETES MELLITUS WITHOUT COMPLICATION, WITHOUT LONG-TERM CURRENT USE OF INSULIN: Primary | ICD-10-CM

## 2019-01-22 LAB
ALBUMIN SERPL BCP-MCNC: 3.9 G/DL
ALP SERPL-CCNC: 95 U/L
ALT SERPL W/O P-5'-P-CCNC: 29 U/L
AST SERPL-CCNC: 42 U/L
BILIRUB DIRECT SERPL-MCNC: 0.3 MG/DL
BILIRUB SERPL-MCNC: 0.6 MG/DL
MAGNESIUM SERPL-MCNC: 2 MG/DL
PROT SERPL-MCNC: 7.3 G/DL
VIT B12 SERPL-MCNC: 713 PG/ML

## 2019-01-22 PROCEDURE — 99999 PR PBB SHADOW E&M-EST. PATIENT-LVL V: ICD-10-PCS | Mod: PBBFAC,,, | Performed by: NURSE PRACTITIONER

## 2019-01-22 PROCEDURE — 83735 ASSAY OF MAGNESIUM: CPT

## 2019-01-22 PROCEDURE — 99999 PR PBB SHADOW E&M-EST. PATIENT-LVL IV: CPT | Mod: PBBFAC,,, | Performed by: FAMILY MEDICINE

## 2019-01-22 PROCEDURE — 36415 COLL VENOUS BLD VENIPUNCTURE: CPT

## 2019-01-22 PROCEDURE — 99999 PR PBB SHADOW E&M-EST. PATIENT-LVL IV: ICD-10-PCS | Mod: PBBFAC,,, | Performed by: FAMILY MEDICINE

## 2019-01-22 PROCEDURE — 99214 OFFICE O/P EST MOD 30 MIN: CPT | Mod: PBBFAC,PO | Performed by: FAMILY MEDICINE

## 2019-01-22 PROCEDURE — 82607 VITAMIN B-12: CPT

## 2019-01-22 PROCEDURE — 80076 HEPATIC FUNCTION PANEL: CPT

## 2019-01-22 PROCEDURE — 99214 OFFICE O/P EST MOD 30 MIN: CPT | Mod: S$PBB,,, | Performed by: NURSE PRACTITIONER

## 2019-01-22 PROCEDURE — 99214 OFFICE O/P EST MOD 30 MIN: CPT | Mod: S$PBB,,, | Performed by: FAMILY MEDICINE

## 2019-01-22 PROCEDURE — 99215 OFFICE O/P EST HI 40 MIN: CPT | Mod: PBBFAC,27,PO | Performed by: NURSE PRACTITIONER

## 2019-01-22 PROCEDURE — 99214 PR OFFICE/OUTPT VISIT, EST, LEVL IV, 30-39 MIN: ICD-10-PCS | Mod: S$PBB,,, | Performed by: FAMILY MEDICINE

## 2019-01-22 PROCEDURE — 99999 PR PBB SHADOW E&M-EST. PATIENT-LVL V: CPT | Mod: PBBFAC,,, | Performed by: NURSE PRACTITIONER

## 2019-01-22 PROCEDURE — 99214 PR OFFICE/OUTPT VISIT, EST, LEVL IV, 30-39 MIN: ICD-10-PCS | Mod: S$PBB,,, | Performed by: NURSE PRACTITIONER

## 2019-01-22 RX ORDER — PANTOPRAZOLE SODIUM 40 MG/1
40 TABLET, DELAYED RELEASE ORAL DAILY
Qty: 90 TABLET | Refills: 3 | Status: SHIPPED | OUTPATIENT
Start: 2019-01-22 | End: 2020-01-27 | Stop reason: SDUPTHER

## 2019-01-22 NOTE — PROGRESS NOTES
Subjective:       Patient ID: Genny Watson is a 83 y.o.  female Body mass index is 25.58 kg/m².    Chief Complaint: Follow-up (med refill)  This patient is established with Dr. Moya & myself.    Patient is here with daughter, whom assisted with history.      Gastroesophageal Reflux   She complains of heartburn (rarely) and water brash (rarely). She reports no abdominal pain, no belching, no chest pain, no choking, no coughing, no dysphagia, no early satiety, no globus sensation, no hoarse voice, no nausea or no sore throat. This is a chronic problem. Episode onset: since January 2015 hospitalized for angiogram and hemorrhage, also pulmonary embolism; sees Dr. Dodson (cardiologist) The problem occurs rarely. The problem has been resolved (controlled overall with PPI). The heartburn does not wake her from sleep. The heartburn does not limit her activity. The heartburn changes with position. The symptoms are aggravated by bending, lying down and certain foods (fried foods, spicy foods, acidic foods). Pertinent negatives include no fatigue, melena or weight loss. Risk factors include hiatal hernia and Gimenez's esophagus (denies NSAID use besides aspirin). She has tried a histamine-2 antagonist, an antacid and head elevation (protonix 40 mg once daily; PAST: zantac prn, gaviscon prn) for the symptoms. The treatment provided significant relief. Past procedures include an abdominal ultrasound and an EGD.     Review of Systems   Constitutional: Negative for appetite change, chills, diaphoresis, fatigue, unexpected weight change and weight loss.   HENT: Negative for hoarse voice, sore throat, trouble swallowing and voice change.    Eyes: Negative for visual disturbance.   Respiratory: Negative for cough, choking and shortness of breath.    Cardiovascular: Negative for chest pain.   Gastrointestinal: Positive for heartburn (rarely). Negative for abdominal distention, abdominal pain, anal bleeding,  blood in stool, dysphagia, melena, nausea and rectal pain.   Musculoskeletal: Negative for back pain.   Neurological: Negative for dizziness and weakness.       Past Medical History:   Diagnosis Date    ALLERGIC RHINITIS 4/30/2012    Arthritis     Gimenez's esophagus     Breast cancer     right, s/p right mastectomy>20yr ago    Cataract     Diabetes mellitus type II     Diverticulitis     Diverticulosis     Fuchs' corneal dystrophy     Glaucoma     Hernia, hiatal     Hyperlipidemia     Hypertension     Macular degeneration     Pulmonary embolism     Seizures     Skin cancer of face 12/2014    Dr M Weil     UTI (lower urinary tract infection)      Past Surgical History:   Procedure Laterality Date    angeogram      APPENDECTOMY      BREAST SURGERY      cardio stent   1/2015    Dr Dodson    COLONOSCOPY  around 2003    COLONOSCOPY N/A 1/13/2016    [facility]    CORNEAL TRANSPLANT      ESOPHAGOGASTRODUODENOSCOPY (EGD) N/A 1/13/2016    [facility]    EYE SURGERY      HYSTERECTOMY      ovaries removed    MASTECTOMY      right >20 years ago    STOMACH SURGERY      UPPER GASTROINTESTINAL ENDOSCOPY  01/13/2016    Dr. Bhakta     Family History   Problem Relation Age of Onset    Mental illness Mother     Liver cancer Mother     Early death Father     Heart disease Father     Diabetes Daughter     Early death Paternal Uncle     Heart disease Paternal Uncle     Celiac disease Sister     No Known Problems Son     Breast cancer Neg Hx     Ovarian cancer Neg Hx     Colon cancer Neg Hx     Colon polyps Neg Hx     Esophageal cancer Neg Hx     Stomach cancer Neg Hx     Ulcerative colitis Neg Hx      Wt Readings from Last 10 Encounters:   01/22/19 65.5 kg (144 lb 6.4 oz)   01/22/19 64.2 kg (141 lb 8.6 oz)   07/23/18 65.3 kg (143 lb 15.4 oz)   05/14/18 65.5 kg (144 lb 6.4 oz)   01/22/18 65.9 kg (145 lb 4.5 oz)   10/24/17  65.5 kg (144 lb 6.4 oz)   07/18/17 67 kg (147 lb 11.3 oz)   03/30/17 66.5 kg (146 lb 9.7 oz)   01/23/17 64.5 kg (142 lb 3.2 oz)   09/28/16 65.5 kg (144 lb 6.4 oz)     Lab Results   Component Value Date    WBC 7.70 10/24/2017    HGB 13.8 10/24/2017    HCT 40.9 10/24/2017    MCV 87 10/24/2017     10/24/2017     CMP  Sodium   Date Value Ref Range Status   01/02/2019 138 136 - 145 mmol/L Final     Potassium   Date Value Ref Range Status   01/02/2019 3.7 3.5 - 5.1 mmol/L Final     Chloride   Date Value Ref Range Status   01/02/2019 100 95 - 110 mmol/L Final     CO2   Date Value Ref Range Status   01/02/2019 29 23 - 29 mmol/L Final     Glucose   Date Value Ref Range Status   01/02/2019 143 (H) 70 - 110 mg/dL Final     BUN, Bld   Date Value Ref Range Status   01/02/2019 18 8 - 23 mg/dL Final     Creatinine   Date Value Ref Range Status   01/02/2019 0.7 0.5 - 1.4 mg/dL Final     Calcium   Date Value Ref Range Status   01/02/2019 9.9 8.7 - 10.5 mg/dL Final     Total Protein   Date Value Ref Range Status   10/24/2017 7.9 6.0 - 8.4 g/dL Final     Albumin   Date Value Ref Range Status   10/24/2017 4.1 3.5 - 5.2 g/dL Final     Total Bilirubin   Date Value Ref Range Status   10/24/2017 0.5 0.1 - 1.0 mg/dL Final     Comment:     For infants and newborns, interpretation of results should be based  on gestational age, weight and in agreement with clinical  observations.  Premature Infant recommended reference ranges:  Up to 24 hours.............<8.0 mg/dL  Up to 48 hours............<12.0 mg/dL  3-5 days..................<15.0 mg/dL  6-29 days.................<15.0 mg/dL       Alkaline Phosphatase   Date Value Ref Range Status   10/24/2017 94 55 - 135 U/L Final     AST   Date Value Ref Range Status   10/24/2017 34 10 - 40 U/L Final     ALT   Date Value Ref Range Status   10/24/2017 32 10 - 44 U/L Final     Anion Gap   Date Value Ref Range Status   01/02/2019 9 8 - 16 mmol/L Final     eGFR if    Date Value Ref  "Range Status   01/02/2019 >60.0 >60 mL/min/1.73 m^2 Final     eGFR if non    Date Value Ref Range Status   01/02/2019 >60.0 >60 mL/min/1.73 m^2 Final     Comment:     Calculation used to obtain the estimated glomerular filtration  rate (eGFR) is the CKD-EPI equation.          Lab Results   Component Value Date    AMYLASE 110 12/01/2015     Lab Results   Component Value Date    LIPASE 52 12/01/2015     Lab Results   Component Value Date    ETICMVNE05 >2000 (H) 10/24/2017     10/24/17 magnesium level WNL    Reviewed prior medical records including radiology report of 12/14/15 abdominal ultrasound, 1/31/2015 esophagram, 1/30/15 cta chest, & endoscopy history (see surgical history).    1/13/16 EGD was reviewed and procedure report states:   "Impression:          - Normal upper third of esophagus and middle third                        of esophagus.                       - [place]-colored mucosa suggestive of short-segment                        Gimenez's esophagus. Biopsied.                       - Large hiatus hernia.                       - 9 cm hiatus hernia.                       - Gastritis. Biopsied.                       - Mucosal changes in the duodenum. Biopsied.                       - Normal 2nd part of the duodenum. Biopsied.  Recommendation:      - Patient has a contact number available for                        emergencies. The signs and symptoms of potential                        delayed complications were discussed with the                        patient. Return to normal activities tomorrow.                        Written discharge instructions were provided to the                        patient.                       - Resume previous diet.                       - Continue present medications.                       - No aspirin, ibuprofen, naproxen, or other                        non-steroidal anti-inflammatory drugs.                       - Await pathology results.                   " "    - Discharge patient to home (ambulatory).                       - Perform a colonoscopy today.                       - Return to nurse practitioner after studies are                        complete.".  Biopsy results:   "1. Duodenum, second portion, biopsy:  Unremarkable small bowel mucosa with adequate villi.  Negative for active inflammation.  Negative for dysplasia.  2. Duodenal bulb, biopsy:  Gastric mucosa with chronic inflammation.  Negative for active inflammation.  Negative for dysplasia.  3. Stomach, antrum and body, biopsy:  Chronic nonactive gastritis.  NEGATIVE for H. pylori species by immunostain with appropriately reactive controls.  Negative for dysplasia.  4. Stomach, cardia, biopsy:  Chronic nonactive gastritis.  NEGATIVE for H. pylori species by immunostain with appropriately reactive controls.  Negative for dysplasia.  5. Esophagus, distal, biopsy:  Gastric-type mucosa with intestinal metaplasia.  Negative for dysplasia."    1/13/16 Colonoscopy was reviewed and procedure report states:   " Impression:          - Non-bleeding internal hemorrhoids.                       - Diverticulosis in the sigmoid colon and in the                        descending colon.                       - One 3 mm polyp in the ascending colon. Resected                        and retrieved.                       - The rectum, transverse colon, cecum, appendiceal                        orifice and ileocecal valve are normal.                       - The examined portion of the ileum was normal.  Recommendation:      - Patient has a contact number available for                        emergencies. The signs and symptoms of potential                        delayed complications were discussed with the                        patient. Return to normal activities tomorrow.                        Written discharge instructions were provided to the                        patient.                       - High fiber diet.           " "            - Continue present medications.                       - Resume aspirin today and Plavix (clopidogrel)                        today at prior doses.                       - Await pathology results.                       - Repeat colonoscopy (date not yet determined) for                        surveillance. If adenomatous, then repeat                        colonoscopy in 5 years. If not, then patient will                        likely not need further colonoscopy secondary to age.                       - Discharge patient to home (ambulatory).                       - Return to nurse practitioner in 6 weeks. ".  Biopsy results:   "6. Polyp, ascending colon, polypectomy:  Tubular adenoma, 1 fragment."  Objective:      Physical Exam   Constitutional: She is oriented to person, place, and time. She appears well-developed and well-nourished. No distress.   HENT:   Mouth/Throat: Uvula is midline, oropharynx is clear and moist and mucous membranes are normal. No oral lesions. No oropharyngeal exudate.   Eyes: Conjunctivae are normal. Pupils are equal, round, and reactive to light. No scleral icterus.   Neck: Full passive range of motion without pain.   Cardiovascular: Normal rate.   Pulmonary/Chest: Effort normal and breath sounds normal. No respiratory distress.   Abdominal: Soft. Normal appearance and bowel sounds are normal. She exhibits no distension, no abdominal bruit and no mass. There is no tenderness. There is no rigidity, no rebound, no guarding, no CVA tenderness, no tenderness at McBurney's point and negative Go's sign.   Patient declined rectal exam.   Neurological: She is alert and oriented to person, place, and time.   Skin: Skin is warm and dry. No rash noted. She is not diaphoretic. No erythema. No pallor.   Non-jaundiced   Psychiatric: She has a normal mood and affect. Her behavior is normal.   Vitals reviewed.      Assessment:       1. Encounter for monitoring long-term proton pump " inhibitor therapy    2. 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 Hiatal hernia    4. Elevated blood pressure reading        Plan:       Encounter for monitoring long-term proton pump inhibitor therapy  - REFILL    pantoprazole (PROTONIX) 40 MG tablet; Take 1 tablet (40 mg total) by mouth once daily. Take 30-60 minutes before breakfast  Dispense: 90 tablet; Refill: 3  -     Vitamin B12; Future; Expected date: 01/22/2019  -     Magnesium; Future; Expected date: 01/22/2019  -     Hepatic function panel; Future; Expected date: 01/22/2019  - discussed with patient about long term use of reflux medications, the risk and benefits of using these medications long term, the risk of untreated GERD such as progression of girard's esophagus, and recommend a diet high in calcium and/or taking OTC calcium and vitamin d supplements as directed (such as Citracal +D), pt verbalized understanding.  - recommend annual monitoring with blood work to include CMP, CBC, vitamin B12, and magnesium.    Girard's esophagus without dysplasia  -  REFILL   pantoprazole (PROTONIX) 40 MG tablet; Take 1 tablet (40 mg total) by mouth once daily. Take 30-60 minutes before breakfast  Dispense: 90 tablet; Refill: 3 take in the morning before breakfast, discussed about long term use of medication and discuss risk with taking PPI's long term, and to take OTC calcium and vitamin d supplements as directed such as (Caltrate +D), pt verbalized understanding  -CONTINUE lifestyle modifications to help control reflux including: avoid large meals, avoid eating within 2-3 hours of bedtime (avoid late night eating & lying down soon after eating), & elevate head of bed if nocturnal symptoms are present.   - avoid known foods which trigger reflux symptoms & to minimize/avoid high-fat foods, chocolate, caffeine, citrus, alcohol, & tomato products.  - avoid/limit use of NSAID's, since they can cause GI upset, bleeding, and/or ulcers. If needed, take with food  -  discussed diagnosis in detail with patient and the need for long term reflux medication and surveillance EGDs (currently due); - recommend scheduling EGD, discussed procedure with patient, patient verbalized understanding and patient refused EGD    Hiatal hernia  - REFILL    pantoprazole (PROTONIX) 40 MG tablet; Take 1 tablet (40 mg total) by mouth once daily. Take 30-60 minutes before breakfast  Dispense: 90 tablet; Refill: 3  -discussed diagnosis with patient & that it is usually managed by controlling reflux symptoms, surgery is an option, but usually performed if reflux is uncontrolled by medication management and lifestyle/dietary modifications; if symptoms persist despite medication management and lifestyle/dietary modifications, we can refer to general surgery to consult about surgical options, patient verbalized understanding    Elevated blood pressure reading  - requested staff to repeat BP  - instructed patient to monitor blood pressure at home and follow-up with PCP &/or cardiologist  - If blood pressure remains elevated and/or experiencing symptoms of headache, chest pain, shortness of breath, and/or blurred vision, recommend going to ER for further evaluation and management    Follow-up in about 3 months (around 4/22/2019), or if symptoms worsen or fail to improve.    If no improvement in symptoms or symptoms worsen, call/follow-up at clinic or go to ER

## 2019-01-22 NOTE — PATIENT INSTRUCTIONS
Controlling High Blood Pressure  High blood pressure (hypertension) is often called the silent killer. This is because many people who have it dont know it. High blood pressure is defined as 140/90 mm Hg or higher. Know your blood pressure and remember to check it regularly. Doing so can save your life. Here are some things you can do to help control your blood pressure.    Choose heart-healthy foods  · Select low-salt, low-fat foods. Limit sodium intake to 2,400 mg per day or the amount suggested by your healthcare provider.  · Limit canned, dried, cured, packaged, and fast foods. These can contain a lot of salt.  · Eat 8 to 10 servings of fruits and vegetables every day.  · Choose lean meats, fish, or chicken.  · Eat whole-grain pasta, brown rice, and beans.  · Eat 2 to 3 servings of low-fat or fat-free dairy products.  · Ask your doctor about the DASH eating plan. This plan helps reduce blood pressure.  · When you go to a restaurant, ask that your meal be prepared with no added salt.  Maintain a healthy weight  · Ask your healthcare provider how many calories to eat a day. Then stick to that number.  · Ask your healthcare provider what weight range is healthiest for you. If you are overweight, a weight loss of only 3% to 5% of your body weight can help lower blood pressure. Generally, a good weight loss goal is to lose 10% of your body weight in a year.  · Limit snacks and sweets.  · Get regular exercise.  Get up and get active  · Choose activities you enjoy. Find ones you can do with friends or family. This includes bicycling, dancing, walking, and jogging.  · Park farther away from building entrances.  · Use stairs instead of the elevator.  · When you can, walk or bike instead of driving.  · [place] leaves, garden, or do household repairs.  · Be active at a moderate to vigorous level of physical activity for at least 40 minutes for a minimum of 3 to 4 days a week.   Manage stress  · Make time to relax and enjoy  life. Find time to laugh.  · Communicate your concerns with your loved ones and your healthcare provider.  · Visit with family and friends, and keep up with hobbies.  Limit alcohol and quit smoking  · Men should have no more than 2 drinks per day.  · Women should have no more than 1 drink per day.  · Talk with your healthcare provider about quitting smoking. Smoking significantly increases your risk for heart disease and stroke. Ask your healthcare provider about community smoking cessation programs and other options.  Medicines  If lifestyle changes arent enough, your healthcare provider may prescribe high blood pressure medicine. Take all medicines as prescribed. If you have any questions about your medicines, ask your healthcare provider before stopping or changing them.   Date Last Reviewed: 4/27/2016  © 6954-8222 [employer]. [address] [place] [address], [place] [address]. All rights reserved. This information is not intended as a substitute for professional medical care. Always follow your healthcare professional's instructions.

## 2019-01-22 NOTE — PROGRESS NOTES
Subjective:       Patient ID: Genny Watson is a 83 y.o. female.    Chief Complaint: Diabetes    83-year-old female coming in to recheck diabetes, hypertension, and hyperlipidemia.  She has a history of coronary artery disease with stents including drug-eluting stents and a history of mastectomy for right breast cancer more than 20 years ago.  Her lab was done recently on January 2nd in the A1c and cholesterol results were satisfactory.  Health maintenance is otherwise up-to-date.  She has no active complaints and feels well.    Past Medical History:  4/30/2012: ALLERGIC RHINITIS  No date: Arthritis  No date: Breast cancer      Comment:  right, s/p right mastectomy>20yr ago  No date: Cataract  No date: Diabetes mellitus type II  No date: Diverticulitis  No date: Diverticulosis  No date: Esophageal mass      Comment:  hiatal hernia with testing  No date: Fuchs' corneal dystrophy  No date: Glaucoma  No date: Hernia, hiatal  No date: Hyperlipidemia  No date: Hypertension  No date: Macular degeneration  No date: Pulmonary embolism  No date: Seizures  12/2014: Skin cancer of face      Comment:  Dr M Weil   No date: UTI (lower urinary tract infection)    Past Surgical History:  No date: angeogram  No date: APPENDECTOMY  No date: BREAST SURGERY  1/2015: cardio stent       Comment:  Dr Dodson  around 2003: COLONOSCOPY  1/13/2016: COLONOSCOPY; N/A      Comment:  Performed by Mario Bhakta MD at [facility] ENDO  No date: CORNEAL TRANSPLANT  1/13/2016: ESOPHAGOGASTRODUODENOSCOPY (EGD); N/A      Comment:  Performed by Mario Bhakta MD at [facility] ENDO  No date: EYE SURGERY  No date: HYSTERECTOMY  No date: MASTECTOMY      Comment:  right >20 years ago  01/13/2016: UPPER GASTROINTESTINAL ENDOSCOPY      Comment:  Dr. Bhakta    Current Outpatient Medications on File Prior to Visit:  amLODIPine (NORVASC) 10 MG tablet, TAKE 1 TABLET DAILY (NEED APPOINTMENT FOR REFILLS), Disp: 90 tablet, Rfl: 3  aspirin (ECOTRIN) 81 MG EC  tablet, Take 81 mg by mouth once daily., Disp: , Rfl:   atorvastatin (LIPITOR) 80 MG tablet, Take 1 tablet (80 mg total) by mouth once daily., Disp: 90 tablet, Rfl: 3  bimatoprost (LUMIGAN) 0.01 % Drop, Place 1 drop into both eyes every evening. , Disp: , Rfl:   clopidogrel (PLAVIX) 75 mg tablet, TAKE 1 TABLET DAILY (NEED OFFICE VISIT BEFORE NEXT REFILL, LAST SEEN 3/2017, THIS WILL BE THE LAST PRESCRIPTION IF VISIT IS NOT MADE), Disp: 90 tablet, Rfl: 3  Lactobacillus acidophilus 10 billion cell Cap, Take 1 capsule by mouth once daily. 100 million active cultures, Disp: , Rfl:   lisinopril (PRINIVIL,ZESTRIL) 40 MG tablet, TAKE 1 TABLET DAILY, Disp: 90 tablet, Rfl: 2  metFORMIN (GLUCOPHAGE-XR) 500 MG 24 hr tablet, TAKE 1 TABLET EVERY EVENING, Disp: 90 tablet, Rfl: 0  metoprolol succinate (TOPROL XL) 100 MG 24 hr tablet, Take 1 tablet (100 mg total) by mouth once daily., Disp: 90 tablet, Rfl: 3  pantoprazole (PROTONIX) 40 MG tablet, TAKE 1 TABLET DAILY, Disp: 90 tablet, Rfl: 0  prednisoLONE acetate (PRED FORTE) 1 % DrpS, Place 1 drop into the right eye once daily. , Disp: , Rfl:   timolol maleate 0.5% (TIMOPTIC) 0.5 % Drop, Place 1 drop into the right eye 2 (two) times daily. , Disp: , Rfl:   VIT C/E/ZN/COPPR/LUTEIN/ZEAXAN (PRESERVISION AREDS 2 ORAL), Take 1 capsule by mouth 2 (two) times daily., Disp: , Rfl:     No current facility-administered medications on file prior to visit.     Zoster Vaccine due on 09/19/1997        Review of Systems   Constitutional: Negative for activity change, appetite change, chills, diaphoresis (No episodes of hypoglycemia in the last six months) and fever.   Respiratory: Negative for chest tightness and shortness of breath.    Cardiovascular: Negative for chest pain and palpitations.   Gastrointestinal: Negative for abdominal pain, constipation, diarrhea, nausea and vomiting.   Endocrine: Negative for cold intolerance, heat intolerance, polydipsia and polyuria.       Objective:       Physical Exam   Constitutional: She is oriented to person, place, and time. She appears well-developed and well-nourished. No distress.   Good blood pressure control  Normal weight with a BMI of 25.1 she is down 2.4 lb from her last visit July 23, 2018 with me   HENT:   Head: Normocephalic and atraumatic.   Eyes: EOM are normal. Pupils are equal, round, and reactive to light. No scleral icterus.   Neck: Normal range of motion. Neck supple. No JVD present. No tracheal deviation present. No thyromegaly present.   Cardiovascular: Normal rate, regular rhythm and normal heart sounds. Exam reveals no gallop and no friction rub.   No murmur heard.  Pulses:       Dorsalis pedis pulses are 1+ on the right side, and 1+ on the left side.        Posterior tibial pulses are 1+ on the right side, and 1+ on the left side.   Pulmonary/Chest: Effort normal and breath sounds normal. No stridor. No respiratory distress. She has no wheezes. She has no rales. She exhibits no tenderness.   Musculoskeletal:        Right foot: There is normal range of motion and no deformity.        Left foot: There is normal range of motion and no deformity.   Feet:   Right Foot:   Protective Sensation: 8 sites tested. 7 sites sensed.   Skin Integrity: Negative for ulcer, blister, skin breakdown, erythema, warmth, callus or dry skin.   Left Foot:   Protective Sensation: 8 sites tested. 8 sites sensed.   Skin Integrity: Negative for ulcer, blister, skin breakdown, erythema, warmth, callus or dry skin.   Lymphadenopathy:     She has no cervical adenopathy.   Neurological: She is alert and oriented to person, place, and time.   Proprioception intact 4/5 toes each foot with impairment of the 3rd toe on the right foot and 4th toe on the left foot   Skin: Capillary refill takes less than 2 seconds. She is not diaphoretic.   Psychiatric: She has a normal mood and affect. Her behavior is normal. Judgment and thought content normal.   Nursing note and vitals  reviewed.      Assessment:       1. Type 2 diabetes mellitus without complication, without long-term current use of insulin    2. Coronary artery disease involving native coronary artery of native heart without angina pectoris    3. S/P drug eluting coronary stent placement LCX 1/26/15    4. Hypertension associated with diabetes    5. Hyperlipidemia associated with type 2 diabetes mellitus    6. BMI 25.0-25.9,adult        Plan:       1. Type 2 diabetes mellitus without complication, without long-term current use of insulin  Lab Results   Component Value Date    HGBA1C 6.1 (H) 01/02/2019         2. Coronary artery disease involving native coronary artery of native heart without angina pectoris  Asymptomatic    3. S/P drug eluting coronary stent placement LCX 1/26/15  On Plavix    4. Hypertension associated with diabetes  Good control with no changes needed    5. Hyperlipidemia associated with type 2 diabetes mellitus  Lab Results   Component Value Date    CHOL 143 01/02/2019    CHOL 131 01/08/2018    CHOL 138 01/23/2017     Lab Results   Component Value Date    HDL 57 01/02/2019    HDL 52 01/08/2018    HDL 49 01/23/2017     Lab Results   Component Value Date    LDLCALC 58.8 (L) 01/02/2019    LDLCALC 56.8 (L) 01/08/2018    LDLCALC 67.6 01/23/2017     Lab Results   Component Value Date    TRIG 136 01/02/2019    TRIG 111 01/08/2018    TRIG 107 01/23/2017     Lab Results   Component Value Date    CHOLHDL 39.9 01/02/2019    CHOLHDL 39.7 01/08/2018    CHOLHDL 35.5 01/23/2017     Excellent control    6. BMI 25.0-25.9,adult  Good weight with no changes needed  The patient's BMI has been recorded in the chart. The patient has been provided educational materials regarding the benefits of attaining and maintaining a normal weight. We will continue to address and follow this issue during follow up visits.           Patient readiness: acceptance and barriers:none    During the course of the visit the patient was educated and  counseled about the following:     Diabetes:  Discussed general issues about diabetes pathophysiology and management.  Addressed ADA diet.  Encouraged aerobic exercise.  Discussed foot care.  Reminded to get yearly retinal exam.  Hypertension:   Medication: no change.  Dietary sodium restriction.  Regular aerobic exercise.    Goals: Diabetes: Maintain Hemoglobin A1C below 7 and Hypertension: Reduce Blood Pressure    Did patient meet goals/outcomes: Yes    The following self management tools provided: blood pressure log  blood glucose log    Patient Instructions (the written plan) was given to the patient/family.     Time spent with patient: 30 minutes

## 2019-01-22 NOTE — PATIENT INSTRUCTIONS
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 This means that there have been changes to the lining of the esophagus near the stomach. The changes may have been caused by the acid reflux that happens with GERD (gastroesophageal reflux disease). The changed lining is not cancerous, but may increase your chances of developing cancer later on.      When you have GERD  The esophagus is the tube that carries food and liquid from the mouth to the stomach. Your lower esophageal sphincter (LES) is a one-way valve at the top of the stomach. It keeps food and stomach acid from flowing backward. If the LES is weakened, food and stomach acid flow back (reflux) into your esophagus. If this happens often, the condition is called GERD.  Changes in the lining  The stomach is kept safe from its own acid by a special lining. The esophagus isnt meant to contact stomach acid. With GERD, acid flows back into the esophagus often. This damages the esophagus. In response to the damage, new tissue forms that is not normal. This is Gimenez esophagus. The new tissue may keep changing. This is why it is more likely to become cancer in the future.  Preventing further damage  Your healthcare provider may suggest regular tests to keep track of changes in the esophagus. This usually includes an endoscopy, when a flexible lighted scope is placed through the mouth into the esophagus. Biopsies (tissue samples) can be taken of the abnormal areas. You are usually sedated with an IV medicine for comfort. He or she may also suggest ways for you to control GERD. This includes lifestyle changes, medicine, or even surgery. This should help keep your Gimenez esophagus from getting worse.  Symptoms of GERD  Symptoms include the following:  · Heartburn  · Sour-tasting fluid backing up into your mouth  · Frequent burping or belching  · Symptoms that get worse after you eat, bend over, or lie down  · Coughing repeatedly to clear your  throat  · Hoarseness   Date Last Reviewed: 6/1/2016  © 9069-3908 [employer], [employer]. [address] [place] [address], [place] [address]. All rights reserved. This information is not intended as a substitute for professional medical care. Always follow your healthcare professional's instructions.        GERD (Adult)    The esophagus is a tube that carries food from the mouth to the stomach. A valve at the lower end of the esophagus prevents stomach acid from flowing upward. When this valve doesn't work properly, stomach contents may repeatedly flow back up (reflux) into the esophagus. This is called gastroesophageal reflux disease (GERD). GERD can irritate the esophagus. It can cause problems with swallowing or breathing. In severe cases, GERD can cause recurrent pneumonia or other serious problems.  Symptoms of reflux include burning, pressure or sharp pain in the upper abdomen or mid to lower chest. The pain can spread to the neck, back, or shoulder. There may be belching, an acid taste in the back of the throat, chronic cough, or sore throat or hoarseness. GERD symptoms often occur during the day after a big meal. They can also occur at night when lying down.   Home care  Lifestyle changes can help reduce symptoms. If needed, medicines may be prescribed. Symptoms often improve with treatment, but if treatment is stopped, the symptoms often return after a few months. So most persons with GERD will need to continue treatment.  Lifestyle changes  · Limit or avoid fatty, fried, and spicy foods, as well as coffee, chocolate, mint, and foods with high acid content such as tomatoes and citrus fruit and juices (orange, grapefruit, lemon).  · Dont eat large meals, especially at night. Frequent, smaller meals are best. Do not lie down right after eating. And dont eat anything 3 hours before going to bed.  · Avoid drinking alcohol and smoking. As much as possible, stay away from second hand smoke.  · If you are overweight,  "losing weight will reduce symptoms.   · Avoid wearing tight clothing around your stomach area.  · If your symptoms occur during sleep, use a foam wedge to elevate your upper body (not just your head.) Or, place 4" blocks under the head of your bed.  Medicines  If needed, medicines can help relieve the symptoms of GERD and prevent damage to the esophagus. Discuss a medicine plan with your healthcare provider. This may include one or more of the following medicines:  · Antacids to help neutralize the normal acids in your stomach.  · Acid blockers (H2 blockers) to decrease acid production.  · Acid inhibitors (PPIs) to decrease acid production in a different way than the blockers. They may work better, but can take a little longer to take effect.  Take an antacid 30-60 minutes after eating and at bedtime, but not at the same time as an acid blocker.  Try not to take medicines such as ibuprofen and aspirin. If you are taking aspirin for your heart or other medical reasons, talk to your healthcare provider about stopping it.  Follow-up care  Follow up with your healthcare provider or as advised by our staff.  When to seek medical advice  Call your healthcare provider if any of the following occur:  · Stomach pain gets worse or moves to the lower right abdomen (appendix area)  · Chest pain appears or gets worse, or spreads to the back, neck, shoulder, or arm  · Frequent vomiting (cant keep down liquids)  · Blood in the stool or vomit (red or black in color)  · Feeling weak or dizzy  · Fever of 100.4ºF (38ºC) or higher, or as directed by your healthcare provider  Date Last Reviewed: 6/23/2015  © 4003-4859 [employer]. [address] [place] [address], [place] [address]. All rights reserved. This information is not intended as a substitute for professional medical care. Always follow your healthcare professional's instructions.        "

## 2019-01-25 ENCOUNTER — TELEPHONE (OUTPATIENT)
Dept: GASTROENTEROLOGY | Facility: CLINIC | Age: 84
End: 2019-01-25

## 2019-01-25 DIAGNOSIS — R74.01 ELEVATED AST (SGOT): Primary | ICD-10-CM

## 2019-01-25 NOTE — TELEPHONE ENCOUNTER
Please call to inform & review the results with the patient- BLOOD WORK SHOWED NORMAL MAGNESIUM LEVEL; ELEVATED VITAMIN B12 LEVEL (CAUTION WITH VITAMIN B SUPPLEMENT AND Recommend follow-up with Primary Care Provider for continued evaluation and management of this finding). Liver function levels showed mild elevation in AST at 42 otherwise normal findings. Recommend to repeat levels in 4-6 weeks and avoid/minimze alcohol and tylenol products.  Continue with previous recommendations. If no improvement in symptoms or symptoms worsen, call/follow-up at clinic or go to ER.  Please release results to patient's mychart once you have discussed results and recommendations with patient.  Thanks,  Netta SUNG

## 2019-01-25 NOTE — TELEPHONE ENCOUNTER
Spoke with pt. Informed of results & recommendations per LOURDES Madera NP. Pt refused to make lab appointment & stated she will go to lab in about a month for repeat labs. Pt verbalized understanding.

## 2019-02-26 ENCOUNTER — LAB VISIT (OUTPATIENT)
Dept: LAB | Facility: HOSPITAL | Age: 84
End: 2019-02-26
Attending: NURSE PRACTITIONER
Payer: MEDICARE

## 2019-02-26 DIAGNOSIS — R74.01 ELEVATED AST (SGOT): ICD-10-CM

## 2019-02-26 LAB
ALBUMIN SERPL BCP-MCNC: 4.1 G/DL
ALP SERPL-CCNC: 106 U/L
ALT SERPL W/O P-5'-P-CCNC: 28 U/L
AST SERPL-CCNC: 29 U/L
BILIRUB DIRECT SERPL-MCNC: 0.2 MG/DL
BILIRUB SERPL-MCNC: 0.5 MG/DL
PROT SERPL-MCNC: 8 G/DL

## 2019-02-26 PROCEDURE — 80074 ACUTE HEPATITIS PANEL: CPT

## 2019-02-26 PROCEDURE — 36415 COLL VENOUS BLD VENIPUNCTURE: CPT

## 2019-02-26 PROCEDURE — 80076 HEPATIC FUNCTION PANEL: CPT

## 2019-02-27 ENCOUNTER — TELEPHONE (OUTPATIENT)
Dept: GASTROENTEROLOGY | Facility: CLINIC | Age: 84
End: 2019-02-27

## 2019-02-27 LAB
HAV IGM SERPL QL IA: NEGATIVE
HBV CORE IGM SERPL QL IA: NEGATIVE
HBV SURFACE AG SERPL QL IA: NEGATIVE
HCV AB SERPL QL IA: NEGATIVE

## 2019-02-27 NOTE — TELEPHONE ENCOUNTER
----- Message from JOHNNIE Dodd sent at 2/27/2019 10:21 AM CST -----  Please call to inform & review the results with the patient- liver function levels are back to normal levels and negative hepatitis panel.  Continue with previous recommendations.  Thanks,  Netta BLAIR-C

## 2019-02-27 NOTE — TELEPHONE ENCOUNTER
Spoke with pt and informed of results and domo commendations per Lyn Madera NP. Pt verbalized understanding.

## 2019-04-11 ENCOUNTER — TELEPHONE (OUTPATIENT)
Dept: CARDIOLOGY | Facility: CLINIC | Age: 84
End: 2019-04-11

## 2019-04-11 NOTE — TELEPHONE ENCOUNTER
----- Message from Donna Redding sent at 4/11/2019  9:46 AM CDT -----  Contact: self  Type:  Patient Returning Call    Who Called:  self  Who Left Message for Patient:  Pramod  Does the patient know what this is regarding?:  yes  Best Call Back Number:  [phone]  Additional Information:  Patient is calling back regarding rescheduling her 05/13/19 appointment. Please call patient. Thanks!

## 2019-04-11 NOTE — TELEPHONE ENCOUNTER
Call placed to MsNell Walter in regards to message left. I offered her an appt on 4/29/19 @ 1:30p with Andra Wade NP. She accepted. No further issues noted.

## 2019-04-29 ENCOUNTER — OFFICE VISIT (OUTPATIENT)
Dept: CARDIOLOGY | Facility: CLINIC | Age: 84
End: 2019-04-29
Payer: MEDICARE

## 2019-04-29 VITALS
DIASTOLIC BLOOD PRESSURE: 98 MMHG | SYSTOLIC BLOOD PRESSURE: 169 MMHG | RESPIRATION RATE: 16 BRPM | BODY MASS INDEX: 25.35 KG/M2 | WEIGHT: 143.06 LBS | HEIGHT: 63 IN | OXYGEN SATURATION: 98 % | HEART RATE: 84 BPM

## 2019-04-29 DIAGNOSIS — E11.59 HYPERTENSION ASSOCIATED WITH DIABETES: Primary | ICD-10-CM

## 2019-04-29 DIAGNOSIS — I15.2 HYPERTENSION ASSOCIATED WITH DIABETES: Primary | ICD-10-CM

## 2019-04-29 DIAGNOSIS — I10 HYPERTENSION, UNSPECIFIED TYPE: ICD-10-CM

## 2019-04-29 DIAGNOSIS — I10 HYPERTENSION, UNSPECIFIED TYPE: Primary | ICD-10-CM

## 2019-04-29 DIAGNOSIS — I25.10 CORONARY ARTERY DISEASE INVOLVING NATIVE CORONARY ARTERY OF NATIVE HEART WITHOUT ANGINA PECTORIS: ICD-10-CM

## 2019-04-29 DIAGNOSIS — E11.69 HYPERLIPIDEMIA ASSOCIATED WITH TYPE 2 DIABETES MELLITUS: ICD-10-CM

## 2019-04-29 DIAGNOSIS — E78.5 HYPERLIPIDEMIA ASSOCIATED WITH TYPE 2 DIABETES MELLITUS: ICD-10-CM

## 2019-04-29 PROCEDURE — 99999 PR PBB SHADOW E&M-EST. PATIENT-LVL IV: CPT | Mod: PBBFAC,,, | Performed by: NURSE PRACTITIONER

## 2019-04-29 PROCEDURE — 99214 OFFICE O/P EST MOD 30 MIN: CPT | Mod: PBBFAC,PO,25 | Performed by: NURSE PRACTITIONER

## 2019-04-29 PROCEDURE — 99999 PR PBB SHADOW E&M-EST. PATIENT-LVL IV: ICD-10-PCS | Mod: PBBFAC,,, | Performed by: NURSE PRACTITIONER

## 2019-04-29 PROCEDURE — 93010 ELECTROCARDIOGRAM REPORT: CPT | Mod: S$PBB,,, | Performed by: INTERNAL MEDICINE

## 2019-04-29 PROCEDURE — 99212 OFFICE O/P EST SF 10 MIN: CPT | Mod: S$PBB,,, | Performed by: NURSE PRACTITIONER

## 2019-04-29 PROCEDURE — 93010 EKG 12-LEAD: ICD-10-PCS | Mod: S$PBB,,, | Performed by: INTERNAL MEDICINE

## 2019-04-29 PROCEDURE — 93005 ELECTROCARDIOGRAM TRACING: CPT | Mod: PBBFAC,PO | Performed by: INTERNAL MEDICINE

## 2019-04-29 PROCEDURE — 99212 PR OFFICE/OUTPT VISIT, EST, LEVL II, 10-19 MIN: ICD-10-PCS | Mod: S$PBB,,, | Performed by: NURSE PRACTITIONER

## 2019-04-29 NOTE — PROGRESS NOTES
Subjective:    Patient ID:  Genny Watson is a 83 y.o. female who presents for  Hypertension (annual visit)      HPI  Ms Royal presents for annual visit. She has a hx of CAD and HTN. She is hypertensive today in the office but states it is always high here. At home earlier her BP was 130/70. She excercises  by taking care of her home, mopping, seeping, cleaning and laundry. She keeps track of her diet and tends to cook at home as she is also a diabetic. Denies any CP, palpitations, cough, SOB She also is up to date on her Flu and Pneumonia vaccine.    Review of Systems   Constitution: Negative.   HENT: Negative.    Eyes: Negative.    Cardiovascular: Negative.    Respiratory: Negative.    Skin: Negative.    Musculoskeletal: Negative.    Gastrointestinal: Negative.    Genitourinary: Negative.    Neurological: Negative.    Psychiatric/Behavioral: Negative.         Objective:    Physical Exam   Constitutional: She is oriented to person, place, and time. She appears well-developed and well-nourished.   Looks younger then stated age.   HENT:   Head: Normocephalic.   Eyes: Pupils are equal, round, and reactive to light. Conjunctivae are normal.   Neck: Normal range of motion. Neck supple. No JVD present. No thyromegaly present.   Cardiovascular: Normal rate, regular rhythm, normal heart sounds and intact distal pulses.   No murmur heard.  Pulmonary/Chest: Effort normal and breath sounds normal.   Abdominal: Soft. Bowel sounds are normal.   Musculoskeletal: Normal range of motion. She exhibits no edema.   Neurological: She is alert and oriented to person, place, and time.   Skin: Skin is warm and dry.   Psychiatric: She has a normal mood and affect. Her behavior is normal. Thought content normal.         Assessment:       1. Hypertension associated with diabetes    2. Hyperlipidemia associated with type 2 diabetes mellitus    3. Coronary artery disease involving native coronary artery of native heart without  angina pectoris    4. Hypertension, unspecified type         Plan:    Continue current medications  Have her seen back in one year with Dr Sloan earlier if she should have any problems arise.

## 2019-05-12 DIAGNOSIS — I10 ESSENTIAL HYPERTENSION: ICD-10-CM

## 2019-05-14 DIAGNOSIS — E11.9 CONTROLLED TYPE 2 DIABETES MELLITUS WITHOUT COMPLICATION, WITHOUT LONG-TERM CURRENT USE OF INSULIN: ICD-10-CM

## 2019-05-14 RX ORDER — METFORMIN HYDROCHLORIDE 500 MG/1
TABLET, EXTENDED RELEASE ORAL
Qty: 90 TABLET | Refills: 0 | Status: SHIPPED | OUTPATIENT
Start: 2019-05-14 | End: 2019-08-10 | Stop reason: SDUPTHER

## 2019-05-14 RX ORDER — ATORVASTATIN CALCIUM 80 MG/1
TABLET, FILM COATED ORAL
Qty: 90 TABLET | Refills: 3 | Status: SHIPPED | OUTPATIENT
Start: 2019-05-14 | End: 2020-05-08

## 2019-05-14 RX ORDER — AMLODIPINE BESYLATE 10 MG/1
10 TABLET ORAL DAILY
Qty: 90 TABLET | Refills: 3 | Status: SHIPPED | OUTPATIENT
Start: 2019-05-14 | End: 2020-05-08

## 2019-06-08 DIAGNOSIS — I10 ESSENTIAL HYPERTENSION: ICD-10-CM

## 2019-06-10 RX ORDER — LISINOPRIL 40 MG/1
TABLET ORAL
Qty: 90 TABLET | Refills: 0 | Status: SHIPPED | OUTPATIENT
Start: 2019-06-10 | End: 2019-09-09 | Stop reason: ALTCHOICE

## 2019-06-28 RX ORDER — CLOPIDOGREL BISULFATE 75 MG/1
TABLET ORAL
Qty: 90 TABLET | Refills: 3 | Status: SHIPPED | OUTPATIENT
Start: 2019-06-28 | End: 2020-06-22

## 2019-07-08 DIAGNOSIS — I10 HYPERTENSION, ESSENTIAL: ICD-10-CM

## 2019-07-08 RX ORDER — METOPROLOL SUCCINATE 100 MG/1
TABLET, EXTENDED RELEASE ORAL
Qty: 90 TABLET | Refills: 0 | Status: SHIPPED | OUTPATIENT
Start: 2019-07-08 | End: 2019-10-06 | Stop reason: SDUPTHER

## 2019-07-12 ENCOUNTER — DOCUMENTATION ONLY (OUTPATIENT)
Dept: FAMILY MEDICINE | Facility: CLINIC | Age: 84
End: 2019-07-12

## 2019-07-12 NOTE — PROGRESS NOTES
Pre-Visit Chart Review  For Appointment Scheduled on 7-22-19    Health Maintenance Due   Topic Date Due    Hemoglobin A1c  07/02/2019

## 2019-07-22 ENCOUNTER — LAB VISIT (OUTPATIENT)
Dept: LAB | Facility: HOSPITAL | Age: 84
End: 2019-07-22
Attending: FAMILY MEDICINE
Payer: MEDICARE

## 2019-07-22 ENCOUNTER — OFFICE VISIT (OUTPATIENT)
Dept: FAMILY MEDICINE | Facility: CLINIC | Age: 84
End: 2019-07-22
Attending: FAMILY MEDICINE
Payer: MEDICARE

## 2019-07-22 VITALS
SYSTOLIC BLOOD PRESSURE: 128 MMHG | TEMPERATURE: 98 F | RESPIRATION RATE: 20 BRPM | DIASTOLIC BLOOD PRESSURE: 80 MMHG | WEIGHT: 142.44 LBS | HEIGHT: 63 IN | OXYGEN SATURATION: 96 % | HEART RATE: 81 BPM | BODY MASS INDEX: 25.24 KG/M2

## 2019-07-22 DIAGNOSIS — I15.2 HYPERTENSION ASSOCIATED WITH DIABETES: ICD-10-CM

## 2019-07-22 DIAGNOSIS — E11.59 HYPERTENSION ASSOCIATED WITH DIABETES: ICD-10-CM

## 2019-07-22 DIAGNOSIS — E11.69 HYPERLIPIDEMIA ASSOCIATED WITH TYPE 2 DIABETES MELLITUS: ICD-10-CM

## 2019-07-22 DIAGNOSIS — E11.9 TYPE 2 DIABETES MELLITUS WITHOUT COMPLICATION, WITHOUT LONG-TERM CURRENT USE OF INSULIN: Primary | ICD-10-CM

## 2019-07-22 DIAGNOSIS — E11.9 TYPE 2 DIABETES MELLITUS WITHOUT COMPLICATION, WITHOUT LONG-TERM CURRENT USE OF INSULIN: ICD-10-CM

## 2019-07-22 DIAGNOSIS — E78.5 HYPERLIPIDEMIA ASSOCIATED WITH TYPE 2 DIABETES MELLITUS: ICD-10-CM

## 2019-07-22 DIAGNOSIS — Z86.711 HISTORY OF PULMONARY EMBOLISM: ICD-10-CM

## 2019-07-22 DIAGNOSIS — Z85.3 HISTORY OF RIGHT BREAST CANCER: ICD-10-CM

## 2019-07-22 PROCEDURE — 80048 BASIC METABOLIC PNL TOTAL CA: CPT

## 2019-07-22 PROCEDURE — 99999 PR PBB SHADOW E&M-EST. PATIENT-LVL IV: CPT | Mod: PBBFAC,,, | Performed by: FAMILY MEDICINE

## 2019-07-22 PROCEDURE — 99214 OFFICE O/P EST MOD 30 MIN: CPT | Mod: PBBFAC,PO | Performed by: FAMILY MEDICINE

## 2019-07-22 PROCEDURE — 99213 OFFICE O/P EST LOW 20 MIN: CPT | Mod: S$PBB,,, | Performed by: FAMILY MEDICINE

## 2019-07-22 PROCEDURE — 99999 PR PBB SHADOW E&M-EST. PATIENT-LVL IV: ICD-10-PCS | Mod: PBBFAC,,, | Performed by: FAMILY MEDICINE

## 2019-07-22 PROCEDURE — 99213 PR OFFICE/OUTPT VISIT, EST, LEVL III, 20-29 MIN: ICD-10-PCS | Mod: S$PBB,,, | Performed by: FAMILY MEDICINE

## 2019-07-22 PROCEDURE — 83036 HEMOGLOBIN GLYCOSYLATED A1C: CPT

## 2019-07-22 PROCEDURE — 36415 COLL VENOUS BLD VENIPUNCTURE: CPT | Mod: PO

## 2019-07-22 RX ORDER — TRAVOPROST 0.04 MG/ML
1 SOLUTION/ DROPS OPHTHALMIC NIGHTLY
COMMUNITY
Start: 2019-05-21 | End: 2023-09-03

## 2019-07-22 NOTE — PATIENT INSTRUCTIONS
Controlling High Blood Pressure  High blood pressure (hypertension) is often called the silent killer. This is because many people who have it dont know it. High blood pressure is defined as 140/90 mm Hg or higher. Know your blood pressure and remember to check it regularly. Doing so can save your life. Here are some things you can do to help control your blood pressure.    Choose heart-healthy foods  · Select low-salt, low-fat foods. Limit sodium intake to 2,400 mg per day or the amount suggested by your healthcare provider.  · Limit canned, dried, cured, packaged, and fast foods. These can contain a lot of salt.  · Eat 8 to 10 servings of fruits and vegetables every day.  · Choose lean meats, fish, or chicken.  · Eat whole-grain pasta, brown rice, and beans.  · Eat 2 to 3 servings of low-fat or fat-free dairy products.  · Ask your doctor about the DASH eating plan. This plan helps reduce blood pressure.  · When you go to a restaurant, ask that your meal be prepared with no added salt.  Maintain a healthy weight  · Ask your healthcare provider how many calories to eat a day. Then stick to that number.  · Ask your healthcare provider what weight range is healthiest for you. If you are overweight, a weight loss of only 3% to 5% of your body weight can help lower blood pressure. Generally, a good weight loss goal is to lose 10% of your body weight in a year.  · Limit snacks and sweets.  · Get regular exercise.  Get up and get active  · Choose activities you enjoy. Find ones you can do with friends or family. This includes bicycling, dancing, walking, and jogging.  · Park farther away from building entrances.  · Use stairs instead of the elevator.  · When you can, walk or bike instead of driving.  · [place] leaves, garden, or do household repairs.  · Be active at a moderate to vigorous level of physical activity for at least 40 minutes for a minimum of 3 to 4 days a week.   Manage stress  · Make time to relax and enjoy  life. Find time to laugh.  · Communicate your concerns with your loved ones and your healthcare provider.  · Visit with family and friends, and keep up with hobbies.  Limit alcohol and quit smoking  · Men should have no more than 2 drinks per day.  · Women should have no more than 1 drink per day.  · Talk with your healthcare provider about quitting smoking. Smoking significantly increases your risk for heart disease and stroke. Ask your healthcare provider about community smoking cessation programs and other options.  Medicines  If lifestyle changes arent enough, your healthcare provider may prescribe high blood pressure medicine. Take all medicines as prescribed. If you have any questions about your medicines, ask your healthcare provider before stopping or changing them.   Date Last Reviewed: 4/27/2016  © 5359-6044 [employer]. [address] [place] [address], [place] [address]. All rights reserved. This information is not intended as a substitute for professional medical care. Always follow your healthcare professional's instructions.

## 2019-07-22 NOTE — PROGRESS NOTES
Subjective:       Patient ID: Genny Watson is a 83 y.o. female.    Chief Complaint: Diabetes and Hypertension    83-year-old female coming in for follow-up on hypertension and diabetes.  She has no active complaints at this time.  Her blood pressure when she checks it at home is typically in the 120-130 range over 70s.  She has had no episodes of hypoglycemia and is due for an A1c.    Past Medical History:  4/30/2012: ALLERGIC RHINITIS  No date: Arthritis  No date: Gimenez's esophagus  No date: Breast cancer      Comment:  right, s/p right mastectomy>20yr ago  No date: Cataract  No date: Diabetes mellitus type II  No date: Diverticulitis  No date: Diverticulosis  No date: Fuchs' corneal dystrophy  No date: Glaucoma  No date: Hernia, hiatal  No date: Hyperlipidemia  No date: Hypertension  No date: Macular degeneration  No date: Pulmonary embolism  No date: Seizures  12/2014: Skin cancer of face      Comment:  Dr M Weil   No date: UTI (lower urinary tract infection)    Past Surgical History:  No date: angeogram  No date: APPENDECTOMY  No date: BREAST SURGERY  1/2015: cardio stent       Comment:  Dr Dodson  around 2003: COLONOSCOPY  1/13/2016: COLONOSCOPY; N/A      Comment:  Performed by Mario Bhakta MD at [facility] END[facility]  No date: CORNEAL TRANSPLANT  1/13/2016: ESOPHAGOGASTRODUODENOSCOPY (EGD); N/A      Comment:  Performed by Mario Bhakta MD at [facility] ENDO  No date: EYE SURGERY  No date: HYSTERECTOMY      Comment:  ovaries removed  No date: MASTECTOMY      Comment:  right >20 years ago  No date: STOMACH SURGERY  01/13/2016: UPPER GASTROINTESTINAL ENDOSCOPY      Comment:  Dr. Bhakta    Current Outpatient Medications on File Prior to Visit:  amLODIPine (NORVASC) 10 MG tablet, Take 1 tablet (10 mg total) by mouth once daily., Disp: 90 tablet, Rfl: 3  aspirin (ECOTRIN) 81 MG EC tablet, Take 81 mg by mouth once daily., Disp: , Rfl:   atorvastatin (LIPITOR) 80 MG tablet, TAKE 1 TABLET DAILY, Disp: 90 tablet,  Rfl: 3  clopidogrel (PLAVIX) 75 mg tablet, TAKE 1 TABLET DAILY (NEED OFFICE VISIT BEFORE NEXT REFILL, LAST SEEN 3/2017, THIS WILL BE THE LAST PRESCRIPTION IF VISIT IS NOT MADE), Disp: 90 tablet, Rfl: 3  Lactobacillus acidophilus 10 billion cell Cap, Take 1 capsule by mouth once daily. 100 million active cultures, Disp: , Rfl:   lisinopril (PRINIVIL,ZESTRIL) 40 MG tablet, TAKE 1 TABLET DAILY, Disp: 90 tablet, Rfl: 0  metFORMIN (GLUCOPHAGE-XR) 500 MG 24 hr tablet, TAKE 1 TABLET EVERY EVENING, Disp: 90 tablet, Rfl: 0  pantoprazole (PROTONIX) 40 MG tablet, Take 1 tablet (40 mg total) by mouth once daily. Take 30-60 minutes before breakfast, Disp: 90 tablet, Rfl: 3  prednisoLONE acetate (PRED FORTE) 1 % DrpS, Place 1 drop into the right eye once daily. , Disp: , Rfl:   timolol maleate 0.5% (TIMOPTIC) 0.5 % Drop, Place 1 drop into the right eye 2 (two) times daily. , Disp: , Rfl:   TOPROL  mg 24 hr tablet, TAKE 1 TABLET DAILY, Disp: 90 tablet, Rfl: 0  TRAVATAN Z 0.004 % ophthalmic solution, Place 1 drop into both eyes every evening. , Disp: , Rfl:   VIT C/E/ZN/COPPR/LUTEIN/ZEAXAN (PRESERVISION AREDS 2 ORAL), Take 1 capsule by mouth 2 (two) times daily., Disp: , Rfl:   (DISCONTINUED) bimatoprost (LUMIGAN) 0.01 % Drop, Place 1 drop into both eyes every evening. , Disp: , Rfl:     No current facility-administered medications on file prior to visit.         Review of Systems   Respiratory: Negative for cough, chest tightness and shortness of breath.    Cardiovascular: Negative for chest pain and palpitations.   Gastrointestinal: Negative for nausea and vomiting.   Endocrine: Negative for polydipsia and polyuria.   Neurological: Negative for dizziness, light-headedness and headaches.       Objective:      Physical Exam   Constitutional: She is oriented to person, place, and time. She appears well-developed and well-nourished. No distress.   Good blood pressure control  Normal weight with a BMI of 25.2 she is up 0.9 lb  from her January 22, 2019 visit   HENT:   Head: Normocephalic and atraumatic.   Right Ear: External ear normal.   Left Ear: External ear normal.   Nose: Nose normal.   Mouth/Throat: Oropharynx is clear and moist. No oropharyngeal exudate.   Eyes: Pupils are equal, round, and reactive to light. EOM are normal. No scleral icterus.   Neck: Normal range of motion. Neck supple. No JVD present. No tracheal deviation present. No thyromegaly present.   Cardiovascular: Normal rate, regular rhythm and normal heart sounds. Exam reveals no gallop and no friction rub.   No murmur heard.  Pulses:       Dorsalis pedis pulses are 2+ on the right side, and 2+ on the left side.        Posterior tibial pulses are 2+ on the right side, and 2+ on the left side.   Pulmonary/Chest: Effort normal and breath sounds normal. No stridor. No respiratory distress. She has no wheezes. She has no rales. She exhibits no tenderness.   Musculoskeletal:        Right foot: There is normal range of motion and no deformity.        Left foot: There is normal range of motion and no deformity.   Feet:   Right Foot:   Protective Sensation: 8 sites tested. 8 sites sensed.   Skin Integrity: Negative for ulcer, blister, skin breakdown, erythema, warmth, callus or dry skin.   Left Foot:   Protective Sensation: 8 sites tested. 8 sites sensed.   Skin Integrity: Negative for ulcer, blister, skin breakdown, erythema, warmth, callus or dry skin.   Lymphadenopathy:     She has no cervical adenopathy.   Neurological: She is alert and oriented to person, place, and time. No cranial nerve deficit.   Proprioception intact all 10 toes   Skin: Skin is warm. Capillary refill takes less than 2 seconds. No rash noted. She is not diaphoretic. No erythema.   Psychiatric: She has a normal mood and affect. Her behavior is normal. Judgment and thought content normal.   Nursing note and vitals reviewed.      Assessment:       1. Type 2 diabetes mellitus without complication, without  long-term current use of insulin    2. Hypertension associated with diabetes    3. Hyperlipidemia associated with type 2 diabetes mellitus    4. History of pulmonary embolism    5. History of right breast cancer        Plan:       1. Type 2 diabetes mellitus without complication, without long-term current use of insulin  Lab will be done today  - Basic metabolic panel; Future  - Hemoglobin A1c; Future    2. Hypertension associated with diabetes  Good control with no changes needed  - Basic metabolic panel; Future    3. Hyperlipidemia associated with type 2 diabetes mellitus  Lab Results   Component Value Date    CHOL 143 01/02/2019    CHOL 131 01/08/2018    CHOL 138 01/23/2017     Lab Results   Component Value Date    HDL 57 01/02/2019    HDL 52 01/08/2018    HDL 49 01/23/2017     Lab Results   Component Value Date    LDLCALC 58.8 (L) 01/02/2019    LDLCALC 56.8 (L) 01/08/2018    LDLCALC 67.6 01/23/2017     Lab Results   Component Value Date    TRIG 136 01/02/2019    TRIG 111 01/08/2018    TRIG 107 01/23/2017     Lab Results   Component Value Date    CHOLHDL 39.9 01/02/2019    CHOLHDL 39.7 01/08/2018    CHOLHDL 35.5 01/23/2017     Good control with no changes needed    4. History of pulmonary embolism  No sign or symptoms of recurrence    5. History of right breast cancer  Greater than 20 years ago

## 2019-07-23 LAB
ANION GAP SERPL CALC-SCNC: 9 MMOL/L (ref 8–16)
BUN SERPL-MCNC: 17 MG/DL (ref 8–23)
CALCIUM SERPL-MCNC: 9.7 MG/DL (ref 8.7–10.5)
CHLORIDE SERPL-SCNC: 102 MMOL/L (ref 95–110)
CO2 SERPL-SCNC: 29 MMOL/L (ref 23–29)
CREAT SERPL-MCNC: 0.8 MG/DL (ref 0.5–1.4)
EST. GFR  (AFRICAN AMERICAN): >60 ML/MIN/1.73 M^2
EST. GFR  (NON AFRICAN AMERICAN): >60 ML/MIN/1.73 M^2
ESTIMATED AVG GLUCOSE: 137 MG/DL (ref 68–131)
GLUCOSE SERPL-MCNC: 124 MG/DL (ref 70–110)
HBA1C MFR BLD HPLC: 6.4 % (ref 4–5.6)
POTASSIUM SERPL-SCNC: 3.8 MMOL/L (ref 3.5–5.1)
SODIUM SERPL-SCNC: 140 MMOL/L (ref 136–145)

## 2019-08-05 LAB
LEFT EYE DM RETINOPATHY: NEGATIVE
RIGHT EYE DM RETINOPATHY: NEGATIVE

## 2019-08-10 DIAGNOSIS — E11.9 CONTROLLED TYPE 2 DIABETES MELLITUS WITHOUT COMPLICATION, WITHOUT LONG-TERM CURRENT USE OF INSULIN: ICD-10-CM

## 2019-08-12 RX ORDER — METFORMIN HYDROCHLORIDE 500 MG/1
TABLET, EXTENDED RELEASE ORAL
Qty: 90 TABLET | Refills: 1 | Status: SHIPPED | OUTPATIENT
Start: 2019-08-12 | End: 2020-02-05

## 2019-08-13 ENCOUNTER — PATIENT OUTREACH (OUTPATIENT)
Dept: ADMINISTRATIVE | Facility: HOSPITAL | Age: 84
End: 2019-08-13

## 2019-09-09 DIAGNOSIS — I10 ESSENTIAL HYPERTENSION: ICD-10-CM

## 2019-09-09 RX ORDER — LISINOPRIL 40 MG/1
TABLET ORAL
Qty: 90 TABLET | Refills: 4 | OUTPATIENT
Start: 2019-09-09

## 2019-09-09 RX ORDER — LOSARTAN POTASSIUM 100 MG/1
100 TABLET ORAL DAILY
Qty: 90 TABLET | Refills: 3 | Status: SHIPPED | OUTPATIENT
Start: 2019-09-09 | End: 2020-02-10 | Stop reason: SDUPTHER

## 2019-09-09 NOTE — TELEPHONE ENCOUNTER
A recent [employer] research project has indicated a possible link between long term ACE inhibitor use and increased risk of lung cancer.  I would suggest switching lisinopril 40mg to an ARB agent such as losartan 100mg that would avoid the possible risk.

## 2019-09-30 ENCOUNTER — EXTERNAL CHRONIC CARE MANAGEMENT (OUTPATIENT)
Dept: PRIMARY CARE CLINIC | Facility: CLINIC | Age: 84
End: 2019-09-30
Payer: MEDICARE

## 2019-09-30 PROCEDURE — 99490 PR CHRONIC CARE MGMT, 1ST 20 MIN: ICD-10-PCS | Mod: S$PBB,,, | Performed by: FAMILY MEDICINE

## 2019-09-30 PROCEDURE — 99490 CHRNC CARE MGMT STAFF 1ST 20: CPT | Mod: PBBFAC,PO | Performed by: FAMILY MEDICINE

## 2019-09-30 PROCEDURE — 99490 CHRNC CARE MGMT STAFF 1ST 20: CPT | Mod: S$PBB,,, | Performed by: FAMILY MEDICINE

## 2019-10-06 DIAGNOSIS — I10 HYPERTENSION, ESSENTIAL: ICD-10-CM

## 2019-10-07 RX ORDER — METOPROLOL SUCCINATE 100 MG/1
TABLET, EXTENDED RELEASE ORAL
Qty: 90 TABLET | Refills: 2 | Status: SHIPPED | OUTPATIENT
Start: 2019-10-07 | End: 2020-07-06

## 2019-10-31 ENCOUNTER — EXTERNAL CHRONIC CARE MANAGEMENT (OUTPATIENT)
Dept: PRIMARY CARE CLINIC | Facility: CLINIC | Age: 84
End: 2019-10-31
Payer: MEDICARE

## 2019-10-31 PROCEDURE — 99490 CHRNC CARE MGMT STAFF 1ST 20: CPT | Mod: S$PBB,,, | Performed by: FAMILY MEDICINE

## 2019-10-31 PROCEDURE — 99490 PR CHRONIC CARE MGMT, 1ST 20 MIN: ICD-10-PCS | Mod: S$PBB,,, | Performed by: FAMILY MEDICINE

## 2019-10-31 PROCEDURE — 99490 CHRNC CARE MGMT STAFF 1ST 20: CPT | Mod: PBBFAC,PO | Performed by: FAMILY MEDICINE

## 2019-11-30 ENCOUNTER — EXTERNAL CHRONIC CARE MANAGEMENT (OUTPATIENT)
Dept: PRIMARY CARE CLINIC | Facility: CLINIC | Age: 84
End: 2019-11-30
Payer: MEDICARE

## 2019-11-30 PROCEDURE — 99490 PR CHRONIC CARE MGMT, 1ST 20 MIN: ICD-10-PCS | Mod: S$PBB,,, | Performed by: FAMILY MEDICINE

## 2019-11-30 PROCEDURE — 99490 CHRNC CARE MGMT STAFF 1ST 20: CPT | Mod: S$PBB,,, | Performed by: FAMILY MEDICINE

## 2019-11-30 PROCEDURE — 99490 CHRNC CARE MGMT STAFF 1ST 20: CPT | Mod: PBBFAC,PO | Performed by: FAMILY MEDICINE

## 2019-12-31 ENCOUNTER — EXTERNAL CHRONIC CARE MANAGEMENT (OUTPATIENT)
Dept: PRIMARY CARE CLINIC | Facility: CLINIC | Age: 84
End: 2019-12-31
Payer: MEDICARE

## 2019-12-31 PROCEDURE — 99490 PR CHRONIC CARE MGMT, 1ST 20 MIN: ICD-10-PCS | Mod: S$PBB,,, | Performed by: FAMILY MEDICINE

## 2019-12-31 PROCEDURE — 99490 CHRNC CARE MGMT STAFF 1ST 20: CPT | Mod: S$PBB,,, | Performed by: FAMILY MEDICINE

## 2019-12-31 PROCEDURE — 99490 CHRNC CARE MGMT STAFF 1ST 20: CPT | Mod: PBBFAC,PO | Performed by: FAMILY MEDICINE

## 2020-01-20 ENCOUNTER — DOCUMENTATION ONLY (OUTPATIENT)
Dept: FAMILY MEDICINE | Facility: CLINIC | Age: 85
End: 2020-01-20

## 2020-01-20 ENCOUNTER — OFFICE VISIT (OUTPATIENT)
Dept: FAMILY MEDICINE | Facility: CLINIC | Age: 85
End: 2020-01-20
Payer: MEDICARE

## 2020-01-20 VITALS
TEMPERATURE: 98 F | HEART RATE: 78 BPM | HEIGHT: 63 IN | BODY MASS INDEX: 24.3 KG/M2 | OXYGEN SATURATION: 98 % | WEIGHT: 137.13 LBS | SYSTOLIC BLOOD PRESSURE: 126 MMHG | DIASTOLIC BLOOD PRESSURE: 70 MMHG

## 2020-01-20 DIAGNOSIS — E11.69 HYPERLIPIDEMIA ASSOCIATED WITH TYPE 2 DIABETES MELLITUS: ICD-10-CM

## 2020-01-20 DIAGNOSIS — E11.9 TYPE 2 DIABETES MELLITUS WITHOUT COMPLICATION, WITHOUT LONG-TERM CURRENT USE OF INSULIN: ICD-10-CM

## 2020-01-20 DIAGNOSIS — E78.5 HYPERLIPIDEMIA ASSOCIATED WITH TYPE 2 DIABETES MELLITUS: ICD-10-CM

## 2020-01-20 DIAGNOSIS — I15.2 HYPERTENSION ASSOCIATED WITH DIABETES: Primary | ICD-10-CM

## 2020-01-20 DIAGNOSIS — I25.10 CORONARY ARTERY DISEASE INVOLVING NATIVE CORONARY ARTERY OF NATIVE HEART WITHOUT ANGINA PECTORIS: ICD-10-CM

## 2020-01-20 DIAGNOSIS — E11.59 HYPERTENSION ASSOCIATED WITH DIABETES: Primary | ICD-10-CM

## 2020-01-20 DIAGNOSIS — Z95.5 S/P DRUG ELUTING CORONARY STENT PLACEMENT: ICD-10-CM

## 2020-01-20 PROCEDURE — 99214 OFFICE O/P EST MOD 30 MIN: CPT | Mod: S$PBB,,, | Performed by: PHYSICIAN ASSISTANT

## 2020-01-20 PROCEDURE — 1126F AMNT PAIN NOTED NONE PRSNT: CPT | Mod: ,,, | Performed by: PHYSICIAN ASSISTANT

## 2020-01-20 PROCEDURE — 99215 OFFICE O/P EST HI 40 MIN: CPT | Mod: PBBFAC,PO | Performed by: PHYSICIAN ASSISTANT

## 2020-01-20 PROCEDURE — 1159F PR MEDICATION LIST DOCUMENTED IN MEDICAL RECORD: ICD-10-PCS | Mod: ,,, | Performed by: PHYSICIAN ASSISTANT

## 2020-01-20 PROCEDURE — 1159F MED LIST DOCD IN RCRD: CPT | Mod: ,,, | Performed by: PHYSICIAN ASSISTANT

## 2020-01-20 PROCEDURE — 99999 PR PBB SHADOW E&M-EST. PATIENT-LVL V: ICD-10-PCS | Mod: PBBFAC,,, | Performed by: PHYSICIAN ASSISTANT

## 2020-01-20 PROCEDURE — 1126F PR PAIN SEVERITY QUANTIFIED, NO PAIN PRESENT: ICD-10-PCS | Mod: ,,, | Performed by: PHYSICIAN ASSISTANT

## 2020-01-20 PROCEDURE — 99999 PR PBB SHADOW E&M-EST. PATIENT-LVL V: CPT | Mod: PBBFAC,,, | Performed by: PHYSICIAN ASSISTANT

## 2020-01-20 PROCEDURE — 99214 PR OFFICE/OUTPT VISIT, EST, LEVL IV, 30-39 MIN: ICD-10-PCS | Mod: S$PBB,,, | Performed by: PHYSICIAN ASSISTANT

## 2020-01-20 NOTE — PROGRESS NOTES
Subjective:       Patient ID: Genny Watson is a 84 y.o. female.    Chief Complaint: Follow-up (6 months)    Patient is new to me.  Patient presents for routine follow-up of hypertension, type 2 diabetes, hyperlipidemia, coronary artery disease.  All of patient's chronic medical conditions are currently stable.  Patient has no acute complaints at this time.  She is scheduled for routine visit with her cardiologist.  Patient is up-to-date on all routine health maintenance except for shingles vaccine.   Patient monitors her blood pressure at home states this morning's reading was 126/70.  Blood pressure today in office is 186/90.  I rechecked her blood pressure at the end of the visit it was 164/84.  Patient monitors her blood pressure daily at home and states that her pressures typically very elevated at the doctor's office.  She is compliant with her medications.   Patients patient medical/surgical, social and family histories have been reviewed       Review of Systems   Constitutional: Negative for fatigue.   Respiratory: Negative for chest tightness and shortness of breath.    Cardiovascular: Negative for chest pain, palpitations and leg swelling.   Neurological: Negative for dizziness, light-headedness and headaches.       Objective:      Physical Exam   Constitutional: She appears well-developed and well-nourished. She is cooperative. No distress.   HENT:   Head: Normocephalic and atraumatic.   Cardiovascular: Normal rate, regular rhythm and normal heart sounds.   Pulmonary/Chest: Effort normal and breath sounds normal.   Musculoskeletal:        Right lower leg: She exhibits no edema.        Left lower leg: She exhibits no edema.   Neurological: She is alert.   Skin: Skin is warm and dry.       Assessment:       1. Hypertension associated with diabetes    2. Type 2 diabetes mellitus without complication, without long-term current use of insulin    3. Hyperlipidemia associated with type 2 diabetes  mellitus    4. Coronary artery disease involving native coronary artery of native heart without angina pectoris    5. S/P drug eluting coronary stent placement LCX 1/26/15        Plan:       Genny was seen today for follow-up.    Diagnoses and all orders for this visit:    Hypertension associated with diabetes  -     Hemoglobin A1c; Future  -     Lipid panel; Future  -     CBC auto differential; Future  -     Comprehensive metabolic panel; Future  -     Urinalysis; Future  Continue home log and bring to follow up visit   Type 2 diabetes mellitus without complication, without long-term current use of insulin,   Continue current meds     Hyperlipidemia associated with type 2 diabetes mellitus  Continue current meds   Coronary artery disease involving native coronary artery of native heart without angina pectoris  S/P drug eluting coronary stent placement LCX 1/26/15  Follow up as scheduled with cardiology          Follow up for labs one week prior, Follow-Up with PCP in 6 mo .   DISCLAIMER: This note was prepared with [employer] voice recognition transcription software. Garbled syntax, mangled pronouns, and other bizarre constructions may be attributed to that software system   Patient readiness: acceptance and barriers:none    During the course of the visit the patient was educated and counseled about the following:     Diabetes:  Labs: fasting blood sugar, fasting lipid panel and hemoglobin A1C.  Hypertension:   Medication: no change.    Goals: Diabetes: Maintain Hemoglobin A1C below 7 and Hypertension: Reduce Blood Pressure    Did patient meet goals/outcomes: Yes    The following self management tools provided: blood pressure log  blood glucose log    Patient Instructions (the written plan) was given to the patient/family.     Time spent with patient: 20 minutes    Barriers to medications present (no )    Adverse reactions to current medications (no)    Over the counter medications reviewed (Yes)

## 2020-01-20 NOTE — PROGRESS NOTES
Pre-Visit Chart Review  For Appointment Scheduled on (1/20/20)    Health Maintenance Due   Topic Date Due    Lipid Panel  01/02/2020    Hemoglobin A1c  01/22/2020

## 2020-01-24 ENCOUNTER — TELEPHONE (OUTPATIENT)
Dept: GASTROENTEROLOGY | Facility: CLINIC | Age: 85
End: 2020-01-24

## 2020-01-24 DIAGNOSIS — Z51.81 ENCOUNTER FOR MONITORING LONG-TERM PROTON PUMP INHIBITOR THERAPY: ICD-10-CM

## 2020-01-24 DIAGNOSIS — K44.9 HIATAL HERNIA: ICD-10-CM

## 2020-01-24 DIAGNOSIS — K22.70 BARRETT'S ESOPHAGUS WITHOUT DYSPLASIA: ICD-10-CM

## 2020-01-24 DIAGNOSIS — Z79.899 ENCOUNTER FOR MONITORING LONG-TERM PROTON PUMP INHIBITOR THERAPY: ICD-10-CM

## 2020-01-27 RX ORDER — PANTOPRAZOLE SODIUM 40 MG/1
40 TABLET, DELAYED RELEASE ORAL DAILY
Qty: 90 TABLET | Refills: 0 | Status: SHIPPED | OUTPATIENT
Start: 2020-01-27 | End: 2020-07-20

## 2020-01-27 NOTE — TELEPHONE ENCOUNTER
Please inform the patient that I refilled the prescription for protonix and patient is due for a follow-up visit (last seen a year ago) for continued evaluation and management.  Thanks  ILAM

## 2020-01-27 NOTE — TELEPHONE ENCOUNTER
Spoke with pt and informed of med refill and offered a follow up visit. Pt refused at this time and stated she would call back to schedule.

## 2020-01-31 ENCOUNTER — EXTERNAL CHRONIC CARE MANAGEMENT (OUTPATIENT)
Dept: PRIMARY CARE CLINIC | Facility: CLINIC | Age: 85
End: 2020-01-31
Payer: MEDICARE

## 2020-01-31 PROCEDURE — 99490 CHRNC CARE MGMT STAFF 1ST 20: CPT | Mod: S$PBB,,, | Performed by: FAMILY MEDICINE

## 2020-01-31 PROCEDURE — 99490 PR CHRONIC CARE MGMT, 1ST 20 MIN: ICD-10-PCS | Mod: S$PBB,,, | Performed by: FAMILY MEDICINE

## 2020-01-31 PROCEDURE — 99490 CHRNC CARE MGMT STAFF 1ST 20: CPT | Mod: PBBFAC,PO | Performed by: FAMILY MEDICINE

## 2020-02-03 LAB
LEFT EYE DM RETINOPATHY: NEGATIVE
RIGHT EYE DM RETINOPATHY: NEGATIVE

## 2020-02-04 DIAGNOSIS — E11.9 CONTROLLED TYPE 2 DIABETES MELLITUS WITHOUT COMPLICATION, WITHOUT LONG-TERM CURRENT USE OF INSULIN: ICD-10-CM

## 2020-02-04 NOTE — TELEPHONE ENCOUNTER
Patient was informed lab booked in July needs to be done now.   Patient stated she would call her daughter to chun.

## 2020-02-05 ENCOUNTER — LAB VISIT (OUTPATIENT)
Dept: LAB | Facility: HOSPITAL | Age: 85
End: 2020-02-05
Attending: PHYSICIAN ASSISTANT
Payer: MEDICARE

## 2020-02-05 DIAGNOSIS — I15.2 HYPERTENSION ASSOCIATED WITH DIABETES: ICD-10-CM

## 2020-02-05 DIAGNOSIS — E11.9 TYPE 2 DIABETES MELLITUS WITHOUT COMPLICATION, WITHOUT LONG-TERM CURRENT USE OF INSULIN: Primary | ICD-10-CM

## 2020-02-05 DIAGNOSIS — E11.59 HYPERTENSION ASSOCIATED WITH DIABETES: ICD-10-CM

## 2020-02-05 PROCEDURE — 81001 URINALYSIS AUTO W/SCOPE: CPT

## 2020-02-05 RX ORDER — METFORMIN HYDROCHLORIDE 500 MG/1
TABLET, EXTENDED RELEASE ORAL
Qty: 90 TABLET | Refills: 1 | Status: SHIPPED | OUTPATIENT
Start: 2020-02-05 | End: 2020-07-01 | Stop reason: SDUPTHER

## 2020-02-06 LAB
BACTERIA #/AREA URNS AUTO: ABNORMAL /HPF
BILIRUB UR QL STRIP: NEGATIVE
CLARITY UR REFRACT.AUTO: ABNORMAL
COLOR UR AUTO: YELLOW
GLUCOSE UR QL STRIP: NEGATIVE
HGB UR QL STRIP: ABNORMAL
KETONES UR QL STRIP: NEGATIVE
LEUKOCYTE ESTERASE UR QL STRIP: ABNORMAL
MICROSCOPIC COMMENT: ABNORMAL
NITRITE UR QL STRIP: NEGATIVE
NON-SQ EPI CELLS #/AREA URNS AUTO: 3 /HPF
PH UR STRIP: 7 [PH] (ref 5–8)
PROT UR QL STRIP: NEGATIVE
RBC #/AREA URNS AUTO: 4 /HPF (ref 0–4)
SP GR UR STRIP: 1 (ref 1–1.03)
SQUAMOUS #/AREA URNS AUTO: 1 /HPF
URN SPEC COLLECT METH UR: ABNORMAL
WBC #/AREA URNS AUTO: >100 /HPF (ref 0–5)
WBC CLUMPS UR QL AUTO: ABNORMAL

## 2020-02-07 DIAGNOSIS — N39.0 URINARY TRACT INFECTION WITHOUT HEMATURIA, SITE UNSPECIFIED: Primary | ICD-10-CM

## 2020-02-07 RX ORDER — NITROFURANTOIN 25; 75 MG/1; MG/1
100 CAPSULE ORAL 2 TIMES DAILY
Qty: 14 CAPSULE | Refills: 0 | Status: SHIPPED | OUTPATIENT
Start: 2020-02-07 | End: 2020-02-14

## 2020-02-10 DIAGNOSIS — I10 ESSENTIAL HYPERTENSION: ICD-10-CM

## 2020-02-10 RX ORDER — LOSARTAN POTASSIUM 100 MG/1
100 TABLET ORAL DAILY
Qty: 90 TABLET | Refills: 1 | Status: SHIPPED | OUTPATIENT
Start: 2020-02-10 | End: 2020-07-20 | Stop reason: RX

## 2020-02-29 ENCOUNTER — EXTERNAL CHRONIC CARE MANAGEMENT (OUTPATIENT)
Dept: PRIMARY CARE CLINIC | Facility: CLINIC | Age: 85
End: 2020-02-29
Payer: MEDICARE

## 2020-02-29 PROCEDURE — 99490 CHRNC CARE MGMT STAFF 1ST 20: CPT | Mod: PBBFAC,PO | Performed by: FAMILY MEDICINE

## 2020-02-29 PROCEDURE — 99490 PR CHRONIC CARE MGMT, 1ST 20 MIN: ICD-10-PCS | Mod: S$PBB,,, | Performed by: FAMILY MEDICINE

## 2020-02-29 PROCEDURE — 99490 CHRNC CARE MGMT STAFF 1ST 20: CPT | Mod: S$PBB,,, | Performed by: FAMILY MEDICINE

## 2020-03-31 ENCOUNTER — EXTERNAL CHRONIC CARE MANAGEMENT (OUTPATIENT)
Dept: PRIMARY CARE CLINIC | Facility: CLINIC | Age: 85
End: 2020-03-31
Payer: MEDICARE

## 2020-03-31 PROCEDURE — 99490 CHRNC CARE MGMT STAFF 1ST 20: CPT | Mod: S$PBB,,, | Performed by: FAMILY MEDICINE

## 2020-03-31 PROCEDURE — 99490 PR CHRONIC CARE MGMT, 1ST 20 MIN: ICD-10-PCS | Mod: S$PBB,,, | Performed by: FAMILY MEDICINE

## 2020-03-31 PROCEDURE — 99490 CHRNC CARE MGMT STAFF 1ST 20: CPT | Mod: PBBFAC,PO | Performed by: FAMILY MEDICINE

## 2020-04-30 ENCOUNTER — EXTERNAL CHRONIC CARE MANAGEMENT (OUTPATIENT)
Dept: PRIMARY CARE CLINIC | Facility: CLINIC | Age: 85
End: 2020-04-30
Payer: MEDICARE

## 2020-04-30 PROCEDURE — 99490 PR CHRONIC CARE MGMT, 1ST 20 MIN: ICD-10-PCS | Mod: S$PBB,,, | Performed by: FAMILY MEDICINE

## 2020-04-30 PROCEDURE — 99490 CHRNC CARE MGMT STAFF 1ST 20: CPT | Mod: PBBFAC,PO | Performed by: FAMILY MEDICINE

## 2020-04-30 PROCEDURE — 99490 CHRNC CARE MGMT STAFF 1ST 20: CPT | Mod: S$PBB,,, | Performed by: FAMILY MEDICINE

## 2020-05-08 DIAGNOSIS — I10 ESSENTIAL HYPERTENSION: ICD-10-CM

## 2020-05-08 RX ORDER — ATORVASTATIN CALCIUM 80 MG/1
TABLET, FILM COATED ORAL
Qty: 90 TABLET | Refills: 0 | Status: SHIPPED | OUTPATIENT
Start: 2020-05-08 | End: 2020-08-06

## 2020-05-08 RX ORDER — AMLODIPINE BESYLATE 10 MG/1
10 TABLET ORAL NIGHTLY
Qty: 90 TABLET | Refills: 0 | Status: SHIPPED | OUTPATIENT
Start: 2020-05-08 | End: 2020-08-10 | Stop reason: SDUPTHER

## 2020-05-31 ENCOUNTER — EXTERNAL CHRONIC CARE MANAGEMENT (OUTPATIENT)
Dept: PRIMARY CARE CLINIC | Facility: CLINIC | Age: 85
End: 2020-05-31
Payer: MEDICARE

## 2020-05-31 PROCEDURE — 99490 PR CHRONIC CARE MGMT, 1ST 20 MIN: ICD-10-PCS | Mod: S$PBB,,, | Performed by: FAMILY MEDICINE

## 2020-05-31 PROCEDURE — 99490 CHRNC CARE MGMT STAFF 1ST 20: CPT | Mod: PBBFAC,PO | Performed by: FAMILY MEDICINE

## 2020-05-31 PROCEDURE — 99490 CHRNC CARE MGMT STAFF 1ST 20: CPT | Mod: S$PBB,,, | Performed by: FAMILY MEDICINE

## 2020-06-02 ENCOUNTER — TELEPHONE (OUTPATIENT)
Dept: ADMINISTRATIVE | Facility: HOSPITAL | Age: 85
End: 2020-06-02

## 2020-06-02 NOTE — TELEPHONE ENCOUNTER
The metformin recall is for the extended release form made by the company [employer].  One lot was found to be contaminated with NDMA but all lots have been recalled.  So far, only the [employer] brand has been recalled.  Anyone using metformin extended release should check with their pharmacy to see if their prescription was part of the recall.  The company name should be on the bottle from the pharmacy.  The pharmacy should be able to replace it with another brand.

## 2020-06-02 NOTE — TELEPHONE ENCOUNTER
Phone Number: [phone]             MRN: [id number]   Pt: Walter, Genny   Phone: [phone]   Pharmacy: [facility] [address] [place] [id number] PHONE: [phone]   Lab: [facility] Afternoon; The pt see's Dr. Berman for PCP. Pt is asking about recent drug recall on Metformin XR d/t the risk of excessive NDMA. Pt would like to discuss further and discuss the option to changing medications if needed. Please contact pt at the number listed above. If needed you may reach via [employer] or at the number listed below.     Thank you,     Heather Ordoñez LPN   Care Coordinator   [facility]   [phone] ext [phone]

## 2020-06-15 ENCOUNTER — OFFICE VISIT (OUTPATIENT)
Dept: CARDIOLOGY | Facility: CLINIC | Age: 85
End: 2020-06-15
Payer: MEDICARE

## 2020-06-15 VITALS
WEIGHT: 138.44 LBS | HEART RATE: 96 BPM | BODY MASS INDEX: 24.53 KG/M2 | SYSTOLIC BLOOD PRESSURE: 180 MMHG | DIASTOLIC BLOOD PRESSURE: 93 MMHG | OXYGEN SATURATION: 94 % | HEIGHT: 63 IN

## 2020-06-15 DIAGNOSIS — Z95.5 S/P DRUG ELUTING CORONARY STENT PLACEMENT: Primary | ICD-10-CM

## 2020-06-15 DIAGNOSIS — T50.2X5A DIURETIC-INDUCED HYPOKALEMIA: ICD-10-CM

## 2020-06-15 DIAGNOSIS — E87.6 DIURETIC-INDUCED HYPOKALEMIA: ICD-10-CM

## 2020-06-15 DIAGNOSIS — Z00.00 ANNUAL PHYSICAL EXAM: ICD-10-CM

## 2020-06-15 DIAGNOSIS — I10 WHITE COAT SYNDROME WITH DIAGNOSIS OF HYPERTENSION: ICD-10-CM

## 2020-06-15 DIAGNOSIS — E11.59 TYPE 2 DIABETES MELLITUS WITH OTHER CIRCULATORY COMPLICATION, WITHOUT LONG-TERM CURRENT USE OF INSULIN: ICD-10-CM

## 2020-06-15 DIAGNOSIS — Z00.00 ANNUAL PHYSICAL EXAM: Primary | ICD-10-CM

## 2020-06-15 PROCEDURE — 93010 EKG 12-LEAD: ICD-10-PCS | Mod: S$PBB,,, | Performed by: INTERNAL MEDICINE

## 2020-06-15 PROCEDURE — 99214 OFFICE O/P EST MOD 30 MIN: CPT | Mod: PBBFAC,PO,25 | Performed by: INTERNAL MEDICINE

## 2020-06-15 PROCEDURE — 99999 PR PBB SHADOW E&M-EST. PATIENT-LVL IV: CPT | Mod: PBBFAC,,, | Performed by: INTERNAL MEDICINE

## 2020-06-15 PROCEDURE — 93005 ELECTROCARDIOGRAM TRACING: CPT | Mod: PBBFAC,PO | Performed by: INTERNAL MEDICINE

## 2020-06-15 PROCEDURE — 93010 ELECTROCARDIOGRAM REPORT: CPT | Mod: S$PBB,,, | Performed by: INTERNAL MEDICINE

## 2020-06-15 PROCEDURE — 99999 PR PBB SHADOW E&M-EST. PATIENT-LVL IV: ICD-10-PCS | Mod: PBBFAC,,, | Performed by: INTERNAL MEDICINE

## 2020-06-15 PROCEDURE — 99215 PR OFFICE/OUTPT VISIT, EST, LEVL V, 40-54 MIN: ICD-10-PCS | Mod: S$PBB,,, | Performed by: INTERNAL MEDICINE

## 2020-06-15 PROCEDURE — 99215 OFFICE O/P EST HI 40 MIN: CPT | Mod: S$PBB,,, | Performed by: INTERNAL MEDICINE
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
## 2020-06-15 NOTE — PATIENT INSTRUCTIONS
Discharge Instructions: Eating a High-Potassium Diet  Your healthcare provider has told you to eat a high-potassium diet. This may be because you have low levels of potassium in your blood. Or it may be because you have high blood pressure. Potassium is found in many foods. These include dairy products, nuts, seeds, and beans. Its also found in many fruits and vegetables in high amounts.  Guidelines for a high-potassium diet  · Eat fruits and vegetables in their fresh or raw form most often.  · Check labels for ingredients that have potassium. This includes potassium chloride. Add these items to your diet.  · Try salt substitutes. Many of these have potassium.  · Avoid licorice. This includes licorice root and teas that have licorice. These can john your body of potassium.  Eat plenty of the following high-potassium foods:  · Fruits. Good choices are apricots (canned and fresh), bananas, cantaloupe, honeydew melon, kiwi, nectarines, oranges, orange juice, and pears. Dried fruits include apricots, dates, figs, and prunes. Prune juice also has potassium.  · Vegetables. Good choices are asparagus, avocado, artichoke, broccoli, bamboo shoots, beets, brussels sprouts, cabbage, celery, chard, okra, potatoes (white and sweet), pumpkin, rutabaga, spinach (cooked), squash, tomatoes. Also good are tomato sauce, tomato juice, and vegetable juice cocktail.  · Chicken, fish, clams, and crab  · Milk, chocolate milk, buttermilk, and soy milk  · Legumes. These include black-eyed peas, chickpeas, lentils, lima beans, navy beans, red kidney beans, soybeans, and split peas.  · Nuts and seeds. Try almonds, Brazil nuts, cashews, peanuts, peanut butter, pecans, pumpkin seeds, sunflower seeds, and walnuts.  · Breads and cereals. These include bran and whole-grain products.  · Others foods include chocolate, cocoa, coconut milk, and molasses  Follow-up  Make a follow-up appointment for a repeat test. Our staff can help you do this.  When  to call your healthcare provider  Call your healthcare provider right away or go to the ER if you have any of the following:  · Vomiting  · Fatigue  · Diarrhea  · Rapid, irregular heartbeat  · Shortness of breath  · Chest pain  · Muscle cramps, spasms, or twitching  · Weakness  · Paralysis   Date Last Reviewed: 6/20/2015  © 9001-1695 [employer]. [address] [place] [address]. All rights reserved. This information is not intended as a substitute for professional medical care. Always follow your healthcare professional's instructions.

## 2020-06-15 NOTE — PROGRESS NOTES
" Patient ID:  Genny Watson is a 84 y.o. female who presents for {Misc; evaluation/follow-up:68281::"follow-up"} of Annual Exam      Health literacy: {DESC; LOW/MEDIUM/HIGH:42103} High  Activities: none  Nicotine: never  Alcohol: none  Illicit drugs: none  Cardiac symptoms: none  Home BP:occasional  Medication compliance: yes  Diet: regular, diabetic, Mediterranean regular  Caffeine: none  Labs:   Last Echo: 02/2016  Last stress test: 01/2015  Cardiovascular angiogram:   ECG:   Fundoscopic exam:     No flowsheet data found.      HPI    ROS     Objective:    Physical Exam      Assessment:       1. Annual physical exam         Plan:         Annual physical exam  -     EKG 12-lead                  "

## 2020-06-30 ENCOUNTER — EXTERNAL CHRONIC CARE MANAGEMENT (OUTPATIENT)
Dept: PRIMARY CARE CLINIC | Facility: CLINIC | Age: 85
End: 2020-06-30
Payer: MEDICARE

## 2020-06-30 ENCOUNTER — TELEPHONE (OUTPATIENT)
Dept: FAMILY MEDICINE | Facility: CLINIC | Age: 85
End: 2020-06-30

## 2020-06-30 PROCEDURE — 99490 PR CHRONIC CARE MGMT, 1ST 20 MIN: ICD-10-PCS | Mod: S$PBB,,, | Performed by: FAMILY MEDICINE

## 2020-06-30 PROCEDURE — 99490 CHRNC CARE MGMT STAFF 1ST 20: CPT | Mod: S$PBB,,, | Performed by: FAMILY MEDICINE

## 2020-06-30 PROCEDURE — 99490 CHRNC CARE MGMT STAFF 1ST 20: CPT | Mod: PBBFAC,PO | Performed by: FAMILY MEDICINE

## 2020-06-30 NOTE — TELEPHONE ENCOUNTER
Patient notified to check with the pharmacy where the medication came from to see if lot number part of recall.

## 2020-06-30 NOTE — TELEPHONE ENCOUNTER
----- Message from Angie Mejia sent at 6/30/2020 12:42 PM CDT -----  Type: Needs Medical Advice  Who Called:  pt  Symptoms (please be specific):    How long has patient had these symptoms:    Pharmacy name and phone #:    Best Call Back Number:   Additional Information: requesting a call back about recall of metFORMIN (GLUCOPHAGE-XR) 500 MG XR 24hr tablet [[id number]]

## 2020-07-01 DIAGNOSIS — E11.9 CONTROLLED TYPE 2 DIABETES MELLITUS WITHOUT COMPLICATION, WITHOUT LONG-TERM CURRENT USE OF INSULIN: ICD-10-CM

## 2020-07-01 RX ORDER — METFORMIN HYDROCHLORIDE 500 MG/1
500 TABLET, EXTENDED RELEASE ORAL NIGHTLY
Qty: 90 TABLET | Refills: 1 | Status: SHIPPED | OUTPATIENT
Start: 2020-07-01 | End: 2021-01-07

## 2020-07-13 ENCOUNTER — LAB VISIT (OUTPATIENT)
Dept: LAB | Facility: HOSPITAL | Age: 85
End: 2020-07-13
Attending: FAMILY MEDICINE
Payer: MEDICARE

## 2020-07-13 DIAGNOSIS — E11.9 TYPE 2 DIABETES MELLITUS WITHOUT COMPLICATION, WITHOUT LONG-TERM CURRENT USE OF INSULIN: ICD-10-CM

## 2020-07-13 LAB
ANION GAP SERPL CALC-SCNC: 12 MMOL/L (ref 8–16)
BUN SERPL-MCNC: 18 MG/DL (ref 8–23)
CALCIUM SERPL-MCNC: 10.1 MG/DL (ref 8.7–10.5)
CHLORIDE SERPL-SCNC: 100 MMOL/L (ref 95–110)
CO2 SERPL-SCNC: 28 MMOL/L (ref 23–29)
CREAT SERPL-MCNC: 0.8 MG/DL (ref 0.5–1.4)
EST. GFR  (AFRICAN AMERICAN): >60 ML/MIN/1.73 M^2
EST. GFR  (NON AFRICAN AMERICAN): >60 ML/MIN/1.73 M^2
ESTIMATED AVG GLUCOSE: 137 MG/DL (ref 68–131)
GLUCOSE SERPL-MCNC: 144 MG/DL (ref 70–110)
HBA1C MFR BLD HPLC: 6.4 % (ref 4–5.6)
POTASSIUM SERPL-SCNC: 3.7 MMOL/L (ref 3.5–5.1)
SODIUM SERPL-SCNC: 140 MMOL/L (ref 136–145)

## 2020-07-13 PROCEDURE — 80048 BASIC METABOLIC PNL TOTAL CA: CPT

## 2020-07-13 PROCEDURE — 83036 HEMOGLOBIN GLYCOSYLATED A1C: CPT

## 2020-07-13 PROCEDURE — 36415 COLL VENOUS BLD VENIPUNCTURE: CPT | Mod: PO

## 2020-07-20 ENCOUNTER — OFFICE VISIT (OUTPATIENT)
Dept: FAMILY MEDICINE | Facility: CLINIC | Age: 85
End: 2020-07-20
Attending: FAMILY MEDICINE
Payer: MEDICARE

## 2020-07-20 VITALS
DIASTOLIC BLOOD PRESSURE: 84 MMHG | TEMPERATURE: 97 F | HEART RATE: 86 BPM | RESPIRATION RATE: 24 BRPM | BODY MASS INDEX: 26.41 KG/M2 | HEIGHT: 60 IN | WEIGHT: 134.5 LBS | OXYGEN SATURATION: 99 % | SYSTOLIC BLOOD PRESSURE: 148 MMHG

## 2020-07-20 DIAGNOSIS — I10 ESSENTIAL HYPERTENSION: Primary | ICD-10-CM

## 2020-07-20 DIAGNOSIS — E11.59 HYPERTENSION ASSOCIATED WITH DIABETES: ICD-10-CM

## 2020-07-20 DIAGNOSIS — Z85.3 HISTORY OF RIGHT BREAST CANCER: ICD-10-CM

## 2020-07-20 DIAGNOSIS — Z95.5 S/P DRUG ELUTING CORONARY STENT PLACEMENT: ICD-10-CM

## 2020-07-20 DIAGNOSIS — I15.2 HYPERTENSION ASSOCIATED WITH DIABETES: ICD-10-CM

## 2020-07-20 DIAGNOSIS — I25.10 CORONARY ARTERY DISEASE INVOLVING NATIVE CORONARY ARTERY OF NATIVE HEART WITHOUT ANGINA PECTORIS: ICD-10-CM

## 2020-07-20 DIAGNOSIS — Z86.711 HISTORY OF PULMONARY EMBOLISM: ICD-10-CM

## 2020-07-20 DIAGNOSIS — E11.59 TYPE 2 DIABETES MELLITUS WITH OTHER CIRCULATORY COMPLICATION, WITHOUT LONG-TERM CURRENT USE OF INSULIN: ICD-10-CM

## 2020-07-20 DIAGNOSIS — E11.69 HYPERLIPIDEMIA ASSOCIATED WITH TYPE 2 DIABETES MELLITUS: ICD-10-CM

## 2020-07-20 DIAGNOSIS — I10 WHITE COAT SYNDROME WITH DIAGNOSIS OF HYPERTENSION: ICD-10-CM

## 2020-07-20 DIAGNOSIS — E78.5 HYPERLIPIDEMIA ASSOCIATED WITH TYPE 2 DIABETES MELLITUS: ICD-10-CM

## 2020-07-20 PROCEDURE — 99214 OFFICE O/P EST MOD 30 MIN: CPT | Mod: S$PBB,,, | Performed by: FAMILY MEDICINE

## 2020-07-20 PROCEDURE — 99214 PR OFFICE/OUTPT VISIT, EST, LEVL IV, 30-39 MIN: ICD-10-PCS | Mod: S$PBB,,, | Performed by: FAMILY MEDICINE

## 2020-07-20 PROCEDURE — 99999 PR PBB SHADOW E&M-EST. PATIENT-LVL IV: CPT | Mod: PBBFAC,,, | Performed by: FAMILY MEDICINE

## 2020-07-20 PROCEDURE — 99999 PR PBB SHADOW E&M-EST. PATIENT-LVL IV: ICD-10-PCS | Mod: PBBFAC,,, | Performed by: FAMILY MEDICINE

## 2020-07-20 PROCEDURE — 99214 OFFICE O/P EST MOD 30 MIN: CPT | Mod: PBBFAC,PO | Performed by: FAMILY MEDICINE

## 2020-07-20 RX ORDER — OLMESARTAN MEDOXOMIL 40 MG/1
40 TABLET ORAL DAILY
Qty: 90 TABLET | Refills: 3 | Status: SHIPPED | OUTPATIENT
Start: 2020-07-20 | End: 2020-11-05 | Stop reason: ALTCHOICE

## 2020-07-20 NOTE — PROGRESS NOTES
Subjective:       Patient ID: Genny Watson is a 84 y.o. female.    Chief Complaint: Follow-up and Fall    84-year-old female coming in for physical exam and follow-up of multiple medical problems.  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  Her last upper and lower endoscopy were in January of 2016 Dr. Bhakta wanted the repeat them in five years.  The patient indicates that she will not agree to repeat endoscopies.  She is having problems obtaining her losartan due to shortages    Past Medical History:  4/30/2012: ALLERGIC RHINITIS  No date: Arthritis  No date: Gimenez's esophagus  No date: Breast cancer      Comment:  right, s/p right mastectomy>20yr ago  No date: Cataract  No date: Diabetes mellitus type II  No date: Diverticulitis  No date: Diverticulosis  No date: Fuchs' corneal dystrophy  No date: Glaucoma  No date: Hernia, hiatal  No date: Hyperlipidemia  No date: Hypertension  No date: Macular degeneration  No date: Pulmonary embolism  No date: Seizures  12/2014: Skin cancer of face      Comment:  Dr M Weil   No date: UTI (lower urinary tract infection)    Past Surgical History:  No date: angeogram  No date: APPENDECTOMY  No date: BREAST SURGERY  1/2015: cardio stent       Comment:  Dr Dodson  around 2003: COLONOSCOPY  1/13/2016: COLONOSCOPY; N/A      Comment:  Procedure: COLONOSCOPY;  Surgeon: Mario Bhakta MD;                Location: [facility];  Service: Endoscopy;  Laterality:                N/A; repeat in 5 years for surveillance  No date: CORNEAL TRANSPLANT  No date: EYE SURGERY  No date: HYSTERECTOMY      Comment:   ovaries removed  No date: MASTECTOMY      Comment:  right >20 years ago  No date: STOMACH SURGERY  01/13/2016: UPPER GASTROINTESTINAL ENDOSCOPY      Comment:  Dr. Moya    Review of patient's family history indicates:  Problem: Mental illness      Relation: Mother          Age of Onset: (Not Specified)  Problem: Liver cancer      Relation: Mother          Age of Onset: (Not Specified)  Problem: Early death      Relation: Father          Age of Onset: (Not Specified)  Problem: Heart disease      Relation: Father          Age of Onset: (Not Specified)  Problem: Diabetes      Relation: Daughter          Age of Onset: (Not Specified)  Problem: Early death      Relation: Paternal Uncle          Age of Onset: (Not Specified)  Problem: Heart disease      Relation: Paternal Uncle          Age of Onset: (Not Specified)  Problem: Celiac disease      Relation: Sister          Age of Onset: (Not Specified)  Problem: No Known Problems      Relation: Son          Age of Onset: (Not Specified)  Problem: Breast cancer      Relation: Neg Hx          Age of Onset: (Not Specified)  Problem: Ovarian cancer      Relation: Neg Hx          Age of Onset: (Not Specified)  Problem: Colon cancer      Relation: Neg Hx          Age of Onset: (Not Specified)  Problem: Colon polyps      Relation: Neg Hx          Age of Onset: (Not Specified)  Problem: Esophageal cancer      Relation: Neg Hx          Age of Onset: (Not Specified)  Problem: Stomach cancer      Relation: Neg Hx          Age of Onset: (Not Specified)  Problem: Ulcerative colitis      Relation: Neg Hx          Age of Onset: (Not Specified)    Social History    Tobacco Use      Smoking status: Never Smoker      Smokeless tobacco: Never Used    Alcohol use: No    Drug use: No    Current Outpatient Medications on File Prior to Visit:  amLODIPine (NORVASC) 10 MG tablet, Take 1 tablet (10 mg total) by mouth every evening. Need office visit before next refill, last seen 5/2019. This will  be the LAST Rx if visit is not made., Disp: 90 tablet, Rfl: 0  aspirin (ECOTRIN) 81 MG EC tablet, Take 81 mg by mouth once daily., Disp: , Rfl:   atorvastatin (LIPITOR) 80 MG tablet, TAKE 1 TABLET DAILY, Disp: 90 tablet, Rfl: 0  clopidogreL (PLAVIX) 75 mg tablet, Take 1 tablet (75 mg total) by mouth once. NOT for 1 dose for 1 dose, Disp: 90 tablet, Rfl: 3  metFORMIN (GLUCOPHAGE-XR) 500 MG XR 24hr tablet, Take 1 tablet (500 mg total) by mouth every evening., Disp: 90 tablet, Rfl: 1  prednisoLONE acetate (PRED FORTE) 1 % DrpS, Place 1 drop into the right eye once daily. , Disp: , Rfl:   TOPROL  mg 24 hr tablet, TAKE 1 TABLET DAILY, Disp: 90 tablet, Rfl: 3  TRAVATAN Z 0.004 % ophthalmic solution, Place 1 drop into both eyes every evening. , Disp: , Rfl:   VIT C/E/ZN/COPPR/LUTEIN/ZEAXAN (PRESERVISION AREDS 2 ORAL), Take 1 capsule by mouth 2 (two) times daily., Disp: , Rfl:   (DISCONTINUED) losartan (COZAAR) 100 MG tablet, Take 1 tablet (100 mg total) by mouth once daily., Disp: 90 tablet, Rfl: 1  (DISCONTINUED) FLUZONE HIGH-DOSE 2019-20, PF, 180 mcg/0.5 mL Syrg, ADM 0.5ML IM UTD, Disp: , Rfl:   (DISCONTINUED) Lactobacillus acidophilus 10 billion cell Cap, Take 1 capsule by mouth once daily. 100 million active cultures, Disp: , Rfl:   (DISCONTINUED) pantoprazole (PROTONIX) 40 MG tablet, Take 1 tablet (40 mg total) by mouth once daily. Take 30-60 minutes before breakfast (Patient not taking: Reported on 7/20/2020), Disp: 90 tablet, Rfl: 0  (DISCONTINUED) timolol maleate 0.5% (TIMOPTIC) 0.5 % Drop, Place 1 drop into the right eye 2 (two) times daily. , Disp: , Rfl:     No current facility-administered medications on file prior to visit.     Shingles Vaccine(1 of 2) due on 09/19/1987  Foot Exam due on 07/22/2020  Eye Exam due on 08/05/2020    Review of Systems   Constitutional: Negative for chills, diaphoresis, fatigue, fever and unexpected weight change.   HENT: Negative for congestion, ear pain, hearing loss,  postnasal drip, sinus pressure, sneezing, sore throat, tinnitus and trouble swallowing.    Eyes: Positive for visual disturbance. Negative for itching.   Respiratory: Negative for cough, chest tightness, shortness of breath and wheezing.    Cardiovascular: Positive for chest pain (She was visiting her son's home and film trying to sit on the toilet which was much lower than her home toilet.  She sustained bruises to the lower posterior chest wall and posterior elbows/proximal forearms). Negative for palpitations and leg swelling.   Gastrointestinal: Negative for abdominal pain, blood in stool, constipation, diarrhea, nausea and vomiting.   Genitourinary: Negative for dysuria, frequency and hematuria.   Musculoskeletal: Negative for arthralgias, back pain, joint swelling and myalgias.   Neurological: Negative for dizziness and headaches.   Hematological: Negative for adenopathy.   Psychiatric/Behavioral: Negative for sleep disturbance. The patient is not nervous/anxious.        Objective:      Physical Exam  Vitals signs and nursing note reviewed.   Constitutional:       General: She is not in acute distress.     Appearance: Normal appearance. She is well-developed and normal weight. She is not ill-appearing, toxic-appearing or diaphoretic.      Comments: Fair blood pressure control with white coat hypertension  Normal weight with a BMI of 26.1 she is down 8 lb from her July 22, 2019 visit   HENT:      Head: Normocephalic and atraumatic.      Right Ear: Tympanic membrane, ear canal and external ear normal. There is no impacted cerumen.      Left Ear: Tympanic membrane, ear canal and external ear normal. There is no impacted cerumen.      Nose: Nose normal. No congestion or rhinorrhea.      Mouth/Throat:      Mouth: Mucous membranes are moist.      Pharynx: Oropharynx is clear. No oropharyngeal exudate or posterior oropharyngeal erythema.   Eyes:      General: No scleral icterus.        Right eye: No discharge.          Left eye: No discharge.      Extraocular Movements: Extraocular movements intact.      Conjunctiva/sclera: Conjunctivae normal.      Pupils: Pupils are equal, round, and reactive to light.   Neck:      Musculoskeletal: Normal range of motion and neck supple. No neck rigidity or muscular tenderness.      Thyroid: No thyromegaly.      Vascular: No carotid bruit or JVD.   Cardiovascular:      Rate and Rhythm: Normal rate and regular rhythm.      Pulses:           Dorsalis pedis pulses are 1+ on the right side and 1+ on the left side.        Posterior tibial pulses are 1+ on the right side and 1+ on the left side.      Heart sounds: Normal heart sounds. No murmur. No friction rub. No gallop.    Pulmonary:      Effort: Pulmonary effort is normal. No respiratory distress.      Breath sounds: Normal breath sounds. No stridor. No wheezing, rhonchi or rales.   Chest:      Chest wall: No tenderness.   Abdominal:      General: Bowel sounds are normal. There is no distension.      Palpations: Abdomen is soft. There is no mass.      Tenderness: There is no abdominal tenderness. There is no guarding or rebound.      Hernia: No hernia is present.   Musculoskeletal: Normal range of motion.         General: No swelling, tenderness, deformity or signs of injury.      Right foot: Normal range of motion. No deformity, bunion, Charcot foot, foot drop or prominent metatarsal heads.      Left foot: Normal range of motion. No deformity, bunion, Charcot foot, foot drop or prominent metatarsal heads.   Feet:      Right foot:      Protective Sensation: 8 sites tested. 8 sites sensed.      Skin integrity: Skin integrity normal.      Toenail Condition: Right toenails are normal.      Left foot:      Protective Sensation: 8 sites tested. 8 sites sensed.      Skin integrity: Skin integrity normal.      Toenail Condition: Left toenails are normal.   Lymphadenopathy:      Cervical: No cervical adenopathy.   Skin:     General: Skin is warm and dry.       Capillary Refill: Capillary refill takes less than 2 seconds.      Coloration: Skin is not jaundiced or pale.      Findings: No bruising, erythema, lesion or rash.          Neurological:      General: No focal deficit present.      Mental Status: She is alert and oriented to person, place, and time.      Cranial Nerves: No cranial nerve deficit.      Sensory: No sensory deficit.      Motor: No weakness.      Coordination: Coordination normal.      Gait: Gait normal.      Deep Tendon Reflexes: Reflexes are normal and symmetric. Reflexes normal.   Psychiatric:         Mood and Affect: Mood normal.         Behavior: Behavior normal.         Thought Content: Thought content normal.         Judgment: Judgment normal.         Assessment:       1. Essential hypertension    2. Type 2 diabetes mellitus with other circulatory complication, without long-term current use of insulin    3. Hypertension associated with diabetes    4. Hyperlipidemia associated with type 2 diabetes mellitus    5. Coronary artery disease involving native coronary artery of native heart without angina pectoris    6. S/P drug eluting coronary stent placement LCX 1/26/15    7. History of pulmonary embolism    8. History of right breast cancer    9. White coat syndrome with diagnosis of hypertension    10. BMI 26.0-26.9,adult        Plan:       1. Essential hypertension  Change to Benicar as her pharmacy cannot obtain losartan  - olmesartan (BENICAR) 40 MG tablet; Take 1 tablet (40 mg total) by mouth once daily.  Dispense: 90 tablet; Refill: 3    2. Type 2 diabetes mellitus with other circulatory complication, without long-term current use of insulin  Lab Results   Component Value Date    HGBA1C 6.4 (H) 07/13/2020     Good control, no changes needed  - Comprehensive metabolic panel; Future  - Lipid Panel; Future  - Hemoglobin A1C; Future    3. Hypertension associated with diabetes  Fair control with white coat hypertension  - Comprehensive metabolic  panel; Future  - Lipid Panel; Future    4. Hyperlipidemia associated with type 2 diabetes mellitus  Lab Results   Component Value Date    CHOL 141 02/05/2020    CHOL 143 01/02/2019    CHOL 131 01/08/2018     Lab Results   Component Value Date    HDL 58 02/05/2020    HDL 57 01/02/2019    HDL 52 01/08/2018     Lab Results   Component Value Date    LDLCALC 63.2 02/05/2020    LDLCALC 58.8 (L) 01/02/2019    LDLCALC 56.8 (L) 01/08/2018     Lab Results   Component Value Date    TRIG 99 02/05/2020    TRIG 136 01/02/2019    TRIG 111 01/08/2018     Lab Results   Component Value Date    CHOLHDL 41.1 02/05/2020    CHOLHDL 39.9 01/02/2019    CHOLHDL 39.7 01/08/2018     Recheck due in six months  - Comprehensive metabolic panel; Future  - Lipid Panel; Future    5. Coronary artery disease involving native coronary artery of native heart without angina pectoris  Asymptomatic    6. S/P drug eluting coronary stent placement LCX 1/26/15    7. History of pulmonary embolism  Asymptomatic    8. History of right breast cancer  Status post right mastectomy    9. White coat syndrome with diagnosis of hypertension    10. BMI 26.0-26.9,adult  Good weight no changes needed

## 2020-07-20 NOTE — PATIENT INSTRUCTIONS
Controlling High Blood Pressure  High blood pressure (hypertension) is often called the silent killer. This is because many people who have it dont know it. High blood pressure is defined as 140/90 mm Hg or higher. Know your blood pressure and remember to check it regularly. Doing so can save your life. Here are some things you can do to help control your blood pressure.    Choose heart-healthy foods  · Select low-salt, low-fat foods. Limit sodium intake to 2,400 mg per day or the amount suggested by your healthcare provider.  · Limit canned, dried, cured, packaged, and fast foods. These can contain a lot of salt.  · Eat 8 to 10 servings of fruits and vegetables every day.  · Choose lean meats, fish, or chicken.  · Eat whole-grain pasta, brown rice, and beans.  · Eat 2 to 3 servings of low-fat or fat-free dairy products.  · Ask your doctor about the DASH eating plan. This plan helps reduce blood pressure.  · When you go to a restaurant, ask that your meal be prepared with no added salt.  Maintain a healthy weight  · Ask your healthcare provider how many calories to eat a day. Then stick to that number.  · Ask your healthcare provider what weight range is healthiest for you. If you are overweight, a weight loss of only 3% to 5% of your body weight can help lower blood pressure. Generally, a good weight loss goal is to lose 10% of your body weight in a year.  · Limit snacks and sweets.  · Get regular exercise.  Get up and get active  · Choose activities you enjoy. Find ones you can do with friends or family. This includes bicycling, dancing, walking, and jogging.  · Park farther away from building entrances.  · Use stairs instead of the elevator.  · When you can, walk or bike instead of driving.  · [place] leaves, garden, or do household repairs.  · Be active at a moderate to vigorous level of physical activity for at least 40 minutes for a minimum of 3 to 4 days a week.   Manage stress  · Make time to relax and enjoy  life. Find time to laugh.  · Communicate your concerns with your loved ones and your healthcare provider.  · Visit with family and friends, and keep up with hobbies.  Limit alcohol and quit smoking  · Men should have no more than 2 drinks per day.  · Women should have no more than 1 drink per day.  · Talk with your healthcare provider about quitting smoking. Smoking significantly increases your risk for heart disease and stroke. Ask your healthcare provider about community smoking cessation programs and other options.  Medicines  If lifestyle changes arent enough, your healthcare provider may prescribe high blood pressure medicine. Take all medicines as prescribed. If you have any questions about your medicines, ask your healthcare provider before stopping or changing them.   Date Last Reviewed: 4/27/2016  © 6025-4461 [employer]. [address] [place] [address], [place] [address]. All rights reserved. This information is not intended as a substitute for professional medical care. Always follow your healthcare professional's instructions.

## 2020-07-31 ENCOUNTER — EXTERNAL CHRONIC CARE MANAGEMENT (OUTPATIENT)
Dept: PRIMARY CARE CLINIC | Facility: CLINIC | Age: 85
End: 2020-07-31
Payer: MEDICARE

## 2020-07-31 PROCEDURE — 99490 CHRNC CARE MGMT STAFF 1ST 20: CPT | Mod: PBBFAC,PO | Performed by: FAMILY MEDICINE

## 2020-07-31 PROCEDURE — 99490 PR CHRONIC CARE MGMT, 1ST 20 MIN: ICD-10-PCS | Mod: S$PBB,,, | Performed by: FAMILY MEDICINE

## 2020-07-31 PROCEDURE — 99490 CHRNC CARE MGMT STAFF 1ST 20: CPT | Mod: S$PBB,,, | Performed by: FAMILY MEDICINE

## 2020-08-06 ENCOUNTER — PATIENT OUTREACH (OUTPATIENT)
Dept: ADMINISTRATIVE | Facility: HOSPITAL | Age: 85
End: 2020-08-06

## 2020-08-10 DIAGNOSIS — I10 ESSENTIAL HYPERTENSION: ICD-10-CM

## 2020-08-10 RX ORDER — AMLODIPINE BESYLATE 10 MG/1
10 TABLET ORAL NIGHTLY
Qty: 90 TABLET | Refills: 3 | Status: SHIPPED | OUTPATIENT
Start: 2020-08-10 | End: 2021-08-02

## 2020-08-31 ENCOUNTER — EXTERNAL CHRONIC CARE MANAGEMENT (OUTPATIENT)
Dept: PRIMARY CARE CLINIC | Facility: CLINIC | Age: 85
End: 2020-08-31
Payer: MEDICARE

## 2020-08-31 PROCEDURE — 99490 CHRNC CARE MGMT STAFF 1ST 20: CPT | Mod: S$PBB,,, | Performed by: FAMILY MEDICINE

## 2020-08-31 PROCEDURE — 99490 PR CHRONIC CARE MGMT, 1ST 20 MIN: ICD-10-PCS | Mod: S$PBB,,, | Performed by: FAMILY MEDICINE

## 2020-08-31 PROCEDURE — 99490 CHRNC CARE MGMT STAFF 1ST 20: CPT | Mod: PBBFAC,PO | Performed by: FAMILY MEDICINE

## 2020-09-30 ENCOUNTER — EXTERNAL CHRONIC CARE MANAGEMENT (OUTPATIENT)
Dept: PRIMARY CARE CLINIC | Facility: CLINIC | Age: 85
End: 2020-09-30
Payer: MEDICARE

## 2020-09-30 PROCEDURE — 99490 CHRNC CARE MGMT STAFF 1ST 20: CPT | Mod: S$PBB,,, | Performed by: FAMILY MEDICINE

## 2020-09-30 PROCEDURE — 99490 PR CHRONIC CARE MGMT, 1ST 20 MIN: ICD-10-PCS | Mod: S$PBB,,, | Performed by: FAMILY MEDICINE

## 2020-09-30 PROCEDURE — 99490 CHRNC CARE MGMT STAFF 1ST 20: CPT | Mod: PBBFAC,PO | Performed by: FAMILY MEDICINE

## 2020-10-06 ENCOUNTER — HOSPITAL ENCOUNTER (EMERGENCY)
Facility: HOSPITAL | Age: 85
Discharge: HOME | End: 2020-10-06
Attending: EMERGENCY MEDICINE
Payer: MEDICARE

## 2020-10-06 VITALS
WEIGHT: 132.25 LBS | BODY MASS INDEX: 25.62 KG/M2 | DIASTOLIC BLOOD PRESSURE: 76 MMHG | RESPIRATION RATE: 16 BRPM | TEMPERATURE: 98 F | OXYGEN SATURATION: 100 % | HEART RATE: 79 BPM | SYSTOLIC BLOOD PRESSURE: 150 MMHG

## 2020-10-06 DIAGNOSIS — I95.1 ORTHOSTATIC HYPOTENSION: Primary | ICD-10-CM

## 2020-10-06 DIAGNOSIS — R42 DIZZINESS: ICD-10-CM

## 2020-10-06 DIAGNOSIS — N39.0 URINARY TRACT INFECTION WITHOUT HEMATURIA, SITE UNSPECIFIED: ICD-10-CM

## 2020-10-06 LAB
ALBUMIN SERPL BCP-MCNC: 4.1 G/DL (ref 3.5–5.2)
ALP SERPL-CCNC: 96 U/L (ref 55–135)
ALT SERPL W/O P-5'-P-CCNC: 24 U/L (ref 10–44)
ANION GAP SERPL CALC-SCNC: 14 MMOL/L (ref 8–16)
AST SERPL-CCNC: 25 U/L (ref 10–40)
BACTERIA #/AREA URNS HPF: ABNORMAL /HPF
BASOPHILS # BLD AUTO: 0.06 K/UL (ref 0–0.2)
BASOPHILS NFR BLD: 0.6 % (ref 0–1.9)
BILIRUB SERPL-MCNC: 0.3 MG/DL (ref 0.1–1)
BILIRUB UR QL STRIP: NEGATIVE
BUN SERPL-MCNC: 24 MG/DL (ref 8–23)
CALCIUM SERPL-MCNC: 9.9 MG/DL (ref 8.7–10.5)
CHLORIDE SERPL-SCNC: 104 MMOL/L (ref 95–110)
CLARITY UR: ABNORMAL
CO2 SERPL-SCNC: 19 MMOL/L (ref 23–29)
COLOR UR: YELLOW
CREAT SERPL-MCNC: 0.8 MG/DL (ref 0.5–1.4)
DIFFERENTIAL METHOD: ABNORMAL
EOSINOPHIL # BLD AUTO: 0.9 K/UL (ref 0–0.5)
EOSINOPHIL NFR BLD: 8.5 % (ref 0–8)
ERYTHROCYTE [DISTWIDTH] IN BLOOD BY AUTOMATED COUNT: 13.1 % (ref 11.5–14.5)
EST. GFR  (AFRICAN AMERICAN): >60 ML/MIN/1.73 M^2
EST. GFR  (NON AFRICAN AMERICAN): >60 ML/MIN/1.73 M^2
GLUCOSE SERPL-MCNC: 169 MG/DL (ref 70–110)
GLUCOSE UR QL STRIP: NEGATIVE
HCT VFR BLD AUTO: 39 % (ref 37–48.5)
HGB BLD-MCNC: 12.2 G/DL (ref 12–16)
HGB UR QL STRIP: ABNORMAL
IMM GRANULOCYTES # BLD AUTO: 0.03 K/UL (ref 0–0.04)
IMM GRANULOCYTES NFR BLD AUTO: 0.3 % (ref 0–0.5)
KETONES UR QL STRIP: NEGATIVE
LEUKOCYTE ESTERASE UR QL STRIP: ABNORMAL
LYMPHOCYTES # BLD AUTO: 3.1 K/UL (ref 1–4.8)
LYMPHOCYTES NFR BLD: 30.4 % (ref 18–48)
MCH RBC QN AUTO: 27.1 PG (ref 27–31)
MCHC RBC AUTO-ENTMCNC: 31.3 G/DL (ref 32–36)
MCV RBC AUTO: 87 FL (ref 82–98)
MICROSCOPIC COMMENT: ABNORMAL
MONOCYTES # BLD AUTO: 1 K/UL (ref 0.3–1)
MONOCYTES NFR BLD: 9.9 % (ref 4–15)
NEUTROPHILS # BLD AUTO: 5.2 K/UL (ref 1.8–7.7)
NEUTROPHILS NFR BLD: 50.3 % (ref 38–73)
NITRITE UR QL STRIP: NEGATIVE
NRBC BLD-RTO: 0 /100 WBC
PH UR STRIP: 6 [PH] (ref 5–8)
PLATELET # BLD AUTO: 308 K/UL (ref 150–350)
PMV BLD AUTO: 9.6 FL (ref 9.2–12.9)
POTASSIUM SERPL-SCNC: 3.9 MMOL/L (ref 3.5–5.1)
PROT SERPL-MCNC: 7.8 G/DL (ref 6–8.4)
PROT UR QL STRIP: NEGATIVE
RBC # BLD AUTO: 4.51 M/UL (ref 4–5.4)
RBC #/AREA URNS HPF: 6 /HPF (ref 0–4)
SODIUM SERPL-SCNC: 137 MMOL/L (ref 136–145)
SP GR UR STRIP: 1.01 (ref 1–1.03)
SQUAMOUS #/AREA URNS HPF: 23 /HPF
URN SPEC COLLECT METH UR: ABNORMAL
UROBILINOGEN UR STRIP-ACNC: NEGATIVE EU/DL
WBC # BLD AUTO: 10.27 K/UL (ref 3.9–12.7)
WBC #/AREA URNS HPF: >100 /HPF (ref 0–5)

## 2020-10-06 PROCEDURE — 93010 ELECTROCARDIOGRAM REPORT: CPT | Mod: ,,, | Performed by: INTERNAL MEDICINE

## 2020-10-06 PROCEDURE — 93005 ELECTROCARDIOGRAM TRACING: CPT

## 2020-10-06 PROCEDURE — 36415 COLL VENOUS BLD VENIPUNCTURE: CPT

## 2020-10-06 PROCEDURE — 81000 URINALYSIS NONAUTO W/SCOPE: CPT

## 2020-10-06 PROCEDURE — 87077 CULTURE AEROBIC IDENTIFY: CPT

## 2020-10-06 PROCEDURE — 85025 COMPLETE CBC W/AUTO DIFF WBC: CPT

## 2020-10-06 PROCEDURE — 93010 EKG 12-LEAD: ICD-10-PCS | Mod: ,,, | Performed by: INTERNAL MEDICINE

## 2020-10-06 PROCEDURE — 99284 EMERGENCY DEPT VISIT MOD MDM: CPT | Mod: 25

## 2020-10-06 PROCEDURE — 87186 SC STD MICRODIL/AGAR DIL: CPT

## 2020-10-06 PROCEDURE — 87086 URINE CULTURE/COLONY COUNT: CPT

## 2020-10-06 PROCEDURE — 80053 COMPREHEN METABOLIC PANEL: CPT

## 2020-10-06 PROCEDURE — 87088 URINE BACTERIA CULTURE: CPT

## 2020-10-06 RX ORDER — CEPHALEXIN 500 MG/1
500 CAPSULE ORAL EVERY 8 HOURS
Qty: 20 CAPSULE | Refills: 0 | Status: SHIPPED | OUTPATIENT
Start: 2020-10-06 | End: 2020-10-11
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
## 2020-10-08 LAB — BACTERIA UR CULT: ABNORMAL

## 2020-10-14 ENCOUNTER — OFFICE VISIT (OUTPATIENT)
Dept: FAMILY MEDICINE | Facility: CLINIC | Age: 85
End: 2020-10-14
Payer: MEDICARE

## 2020-10-14 VITALS
DIASTOLIC BLOOD PRESSURE: 72 MMHG | OXYGEN SATURATION: 98 % | BODY MASS INDEX: 26.01 KG/M2 | HEART RATE: 83 BPM | TEMPERATURE: 97 F | WEIGHT: 132.5 LBS | HEIGHT: 60 IN | SYSTOLIC BLOOD PRESSURE: 136 MMHG

## 2020-10-14 DIAGNOSIS — E66.3 OVERWEIGHT (BMI 25.0-29.9): ICD-10-CM

## 2020-10-14 DIAGNOSIS — E78.5 HYPERLIPIDEMIA ASSOCIATED WITH TYPE 2 DIABETES MELLITUS: ICD-10-CM

## 2020-10-14 DIAGNOSIS — E11.9 TYPE 2 DIABETES MELLITUS WITHOUT COMPLICATION, WITHOUT LONG-TERM CURRENT USE OF INSULIN: ICD-10-CM

## 2020-10-14 DIAGNOSIS — R42 DIZZINESS: Primary | ICD-10-CM

## 2020-10-14 DIAGNOSIS — I15.2 HYPERTENSION ASSOCIATED WITH DIABETES: ICD-10-CM

## 2020-10-14 DIAGNOSIS — J30.2 SEASONAL ALLERGIC RHINITIS, UNSPECIFIED TRIGGER: ICD-10-CM

## 2020-10-14 DIAGNOSIS — E11.69 HYPERLIPIDEMIA ASSOCIATED WITH TYPE 2 DIABETES MELLITUS: ICD-10-CM

## 2020-10-14 DIAGNOSIS — E11.59 HYPERTENSION ASSOCIATED WITH DIABETES: ICD-10-CM

## 2020-10-14 DIAGNOSIS — N30.00 ACUTE CYSTITIS WITHOUT HEMATURIA: ICD-10-CM

## 2020-10-14 DIAGNOSIS — I95.1 ORTHOSTATIC HYPOTENSION: ICD-10-CM

## 2020-10-14 PROCEDURE — 99214 OFFICE O/P EST MOD 30 MIN: CPT | Mod: PBBFAC,PO | Performed by: NURSE PRACTITIONER

## 2020-10-14 PROCEDURE — 99999 PR PBB SHADOW E&M-EST. PATIENT-LVL IV: CPT | Mod: PBBFAC,,, | Performed by: NURSE PRACTITIONER

## 2020-10-14 PROCEDURE — 99999 PR PBB SHADOW E&M-EST. PATIENT-LVL IV: ICD-10-PCS | Mod: PBBFAC,,, | Performed by: NURSE PRACTITIONER

## 2020-10-14 PROCEDURE — 99214 PR OFFICE/OUTPT VISIT, EST, LEVL IV, 30-39 MIN: ICD-10-PCS | Mod: S$PBB,,, | Performed by: NURSE PRACTITIONER

## 2020-10-14 PROCEDURE — 99214 OFFICE O/P EST MOD 30 MIN: CPT | Mod: S$PBB,,, | Performed by: NURSE PRACTITIONER

## 2020-10-14 RX ORDER — LORATADINE 10 MG/1
10 TABLET ORAL DAILY
Qty: 30 TABLET | Refills: 11 | Status: SHIPPED | OUTPATIENT
Start: 2020-10-14 | End: 2021-01-05 | Stop reason: SDUPTHER

## 2020-10-14 RX ORDER — FLUTICASONE PROPIONATE 50 MCG
1 SPRAY, SUSPENSION (ML) NASAL DAILY
Qty: 9.9 ML | Refills: 1 | Status: SHIPPED | OUTPATIENT
Start: 2020-10-14 | End: 2020-10-28

## 2020-10-14 NOTE — PATIENT INSTRUCTIONS
Taking Your Blood Pressure  Blood pressure is the force of blood against the artery wall as it moves from the heart through the blood vessels. You can take your own blood pressure reading using a digital monitor. Take your readings the same each time, using the same arm. Take readings as often as your healthcare provider instructs.  About blood pressure monitors  Blood pressure monitors are designed for certain ages and cases. You can find monitors for older adults, for pregnant women, and for children. Make sure the one you choose is the right one for your age and situation.  The American Heart Association recommends an automatic cuff monitor that fits on your upper arm (bicep). The cuff should fit your arm size. A cuff thats too large or too small will not give an accurate reading. Measure around your upper arm to find your size.  Monitors that attach to your finger or wrist are not as accurate as monitors for your upper arm.  Ask your healthcare provider for help in choosing a monitor. Bring your monitor to your next provider visit if you need help in using it the correct way.  The steps below are general instructions for using an automatic digital monitor.  Step 1. Relax    · Take your blood pressure at the same time every day, such as in the morning or evening, or at the time your healthcare provider recommends.  · Wait at least a half-hour after smoking, eating, or exercising. Don't drink coffee, tea, soda, or other caffeinated beverages before checking your blood pressure.  · Sit comfortably at a table with both feet on the floor. Do not cross your legs or feet. Place the monitor near you.  · Rest for a few minutes before you begin.  Step 2. Wrap the cuff    · Place your arm on the table, palm up. Your arm should be at the level of your heart. Wrap the cuff around your upper arm, just above your elbow. Its best done on bare skin, not over clothing. Most cuffs will indicate where the brachial artery (the  blood vessel in the middle of the arm at the inner side of the elbow) should line up with the cuff. Look in your monitor's instruction booklet for an illustration. You can also bring your cuff to your healthcare provider and have them show you how to correctly place the cuff.  Step 3. Inflate the cuff    · Push the button that starts the pump.  · The cuff will tighten, then loosen.  · The numbers will change. When they stop changing, your blood pressure reading will appear.  · Take 2 or 3 readings one minute apart.  Step 4. Write down the results of each reading    · Write down your blood pressure numbers for each reading. Note the date and time. Keep your results in one place, such as a notebook. Even if your monitor has a built-in memory, keep a hard copy of the readings.  · Remove the cuff from your arm. Turn off the machine.  · Bring your blood pressure records with your healthcare providers at each visit.  · If you start a new blood pressure medicine, note the day you started the new medicine. Also note the day if you change the dose of your medicine. This information goes on your blood pressure recording sheet. This will help your healthcare provider monitor how well the medicine changes are working.  · Ask your healthcare provider what numbers should prompt you to call him or her. Also ask what numbers should prompt you to get help right away.  Date Last Reviewed: 11/1/2016 © 2000-2017 [employer]. [address] [place] [address]. All rights reserved. This information is not intended as a substitute for professional medical care. Always follow your healthcare professional's instructions.        Allergic Rhinitis  Allergic rhinitis is an allergic reaction that affects the nose, and often the eyes. Its often known as nasal allergies. Nasal allergies are often due to things in the environment that are breathed in. Depending what you are sensitive to, nasal allergies may occur only  during certain seasons. Or they may occur year round. Common indoor allergens include house dust mites, mold, cockroaches, and pet dander. Outdoor allergens include pollen from trees, grasses, and weeds.   Symptoms include a drippy, stuffy, and itchy nose. They also include sneezing and red and itchy eyes. You may feel tired more often. Severe allergies may also affect your breathing and trigger a condition called asthma.   Tests can be done to see what allergens are affecting you. You may be referred to an allergy specialist for testing and further evaluation.  Home care  Your healthcare provider may prescribe medicines to help relieve allergy symptoms. These may include oral medicines, nasal sprays, or eye drops.  Ask your provider for advice on how to avoid substances that you are allergic to. Below are a few tips for each type of allergen.  Pet dander:  · Do not have pets with fur and feathers.  · If you can't avoid having a pet, keep it out of your bedroom and off upholstered furniture.  Pollen:  · When pollen counts are high, keep windows of your car and home closed. If possible, use an air conditioner instead.  · Wear a filter mask when mowing or doing yard work.  House dust mites:  · Wash bedding every week in warm water and detergent and dry on a hot setting.  · Cover the mattress, box spring, and pillows with allergy covers.   · If possible, sleep in a room with no carpet, curtains, or upholstered furniture.  Cockroaches:  · Store food in sealed containers.  · Remove garbage from the home promptly.  · Fix water leaks  Mold:  · Keep humidity low by using a dehumidifier or air conditioner. Keep the dehumidifier and air conditioner clean and free of mold.  · Clean moldy areas with bleach and water.  In general:  · Vacuum once or twice a week. If possible, use a vacuum with a high-efficiency particulate air (HEPA) filter.  · Do not smoke. Avoid cigarette smoke. Cigarette smoke is an irritant that can make  symptoms worse.  Follow-up care  Follow up as advised by the healthcare provider or our staff. If you were referred to an allergy specialist, make this appointment promptly.  When to seek medical advice  Call your healthcare provider right away if the following occur:  · Coughing or wheezing  · Fever greater than 100.4°F (38°C)  · Hives (raised red bumps)  · Continuing symptoms, new symptoms, or worsening symptoms  Call 911 right away if you have:  · Trouble breathing  · Severe swelling of the face or severe itching of the eyes or mouth  Date Last Reviewed: 3/1/2017  © 7245-9466 [employer] [address] [place] [address], [place] [address]. All rights reserved. This information is not intended as a substitute for professional medical care. Always follow your healthcare professional's instructions.

## 2020-10-14 NOTE — PROGRESS NOTES
Subjective:       Patient ID: Genny Watson is a 85 y.o. female.    Chief Complaint: Dizziness    HPI  Patient presents today for ER follow up. Patient is new to me. She was seen at [facility] ER for Orthostatic hypotension on 10/6/20. PMHx of HTN, HLD, DM, and seizures. She reports medication change to Benicar a month ago and is worried symptoms may be attributed to medication. ER Orthostatics were positive. Patient has evidence of a urinary tract infection on UA. .  Keflex will be prescribed for her UTI.  BP medication was changed on 07/20/20 by PCP- to Benicar from losartan because her pharmacy did not have it. Patient has an history of white coat syndrome.BP today 136/72. Patient reports home SBP readings 90-100s. She did not bring reading to visit.   Patient reports the dizziness has resolved; last episode was in the ER. She reports completing Keflex medication.  Past Medical History:   Diagnosis Date    ALLERGIC RHINITIS 4/30/2012    Arthritis     Gimenez's esophagus     Breast cancer     right, s/p right mastectomy>20yr ago    Cataract     Diabetes mellitus type II     Diverticulitis     Diverticulosis     Fuchs' corneal dystrophy     Glaucoma     Hernia, hiatal     Hyperlipidemia     Hypertension     Macular degeneration     Pulmonary embolism     Seizures     Skin cancer of face 12/2014    Dr M Weil     UTI (lower urinary tract infection)        Review of patient's allergies indicates:   Allergen Reactions    Sulfa (sulfonamide antibiotics)      Other reaction(s): Itching         Current Outpatient Medications:     amLODIPine (NORVASC) 10 MG tablet, Take 1 tablet (10 mg total) by mouth every evening. Need office visit before next refill, last seen 5/2019. This will be the LAST Rx if visit is not made. (Patient taking differently: Take 10 mg by mouth every evening. ), Disp: 90 tablet, Rfl: 3    aspirin (ECOTRIN) 81 MG EC tablet, Take 81 mg by mouth once  daily., Disp: , Rfl:     atorvastatin (LIPITOR) 80 MG tablet, Take 1 tablet (80 mg total) by mouth once daily., Disp: 90 tablet, Rfl: 3    clopidogreL (PLAVIX) 75 mg tablet, Take 1 tablet (75 mg total) by mouth once. NOT for 1 dose for 1 dose (Patient taking differently: Take 75 mg by mouth once. ), Disp: 90 tablet, Rfl: 3    metFORMIN (GLUCOPHAGE-XR) 500 MG XR 24hr tablet, Take 1 tablet (500 mg total) by mouth every evening., Disp: 90 tablet, Rfl: 1    olmesartan (BENICAR) 40 MG tablet, Take 1 tablet (40 mg total) by mouth once daily., Disp: 90 tablet, Rfl: 3    prednisoLONE acetate (PRED FORTE) 1 % DrpS, Place 1 drop into the right eye once daily. , Disp: , Rfl:     TOPROL  mg 24 hr tablet, TAKE 1 TABLET DAILY (Patient taking differently: Take 100 mg by mouth once daily. ), Disp: 90 tablet, Rfl: 3    TRAVATAN Z 0.004 % ophthalmic solution, Place 1 drop into both eyes every evening. , Disp: , Rfl:     VIT C/E/ZN/COPPR/LUTEIN/ZEAXAN (PRESERVISION AREDS 2 ORAL), Take 1 capsule by mouth 2 (two) times daily., Disp: , Rfl:     fluticasone propionate (FLONASE) 50 mcg/actuation nasal spray, 1 spray (50 mcg total) by Each Nostril route once daily. for 14 days, Disp: 9.9 mL, Rfl: 1    loratadine (CLARITIN) 10 mg tablet, Take 1 tablet (10 mg total) by mouth once daily., Disp: 30 tablet, Rfl: 11    Review of Systems   Constitutional: Negative for unexpected weight change.   HENT: Negative for trouble swallowing.    Eyes: Negative for visual disturbance.   Respiratory: Negative for shortness of breath.    Cardiovascular: Negative for leg swelling.   Gastrointestinal: Negative for blood in stool.   Genitourinary: Negative for hematuria.   Skin: Negative for rash.   Allergic/Immunologic: Negative for immunocompromised state.   Hematological: Does not bruise/bleed easily.   Psychiatric/Behavioral: Negative for agitation. The patient is not nervous/anxious.        Objective:      /72 (BP Location: Left arm,  "Patient Position: Sitting)   Pulse 83   Temp 97.2 °F (36.2 °C)   Ht 5' 0.25" (1.53 m)   Wt 60.1 kg (132 lb 7.9 oz)   SpO2 98%   BMI 25.66 kg/m²   Physical Exam  Constitutional:       Appearance: She is well-developed.   Eyes:      Pupils: Pupils are equal, round, and reactive to light.   Neck:      Musculoskeletal: Normal range of motion.   Cardiovascular:      Rate and Rhythm: Normal rate and regular rhythm.      Heart sounds: Normal heart sounds.   Pulmonary:      Effort: Pulmonary effort is normal.      Breath sounds: Normal breath sounds.   Abdominal:      General: Bowel sounds are normal.      Palpations: Abdomen is soft.   Musculoskeletal: Normal range of motion.   Skin:     General: Skin is warm and dry.   Neurological:      Mental Status: She is alert and oriented to person, place, and time.   Psychiatric:         Behavior: Behavior normal.         Thought Content: Thought content normal.         Judgment: Judgment normal.         Assessment:       1. Dizziness    2. Orthostatic hypotension    3. Hypertension associated with diabetes    4. Hyperlipidemia associated with type 2 diabetes mellitus    5. Type 2 diabetes mellitus without complication, without long-term current use of insulin    6. Acute cystitis without hematuria    7. Seasonal allergic rhinitis, unspecified trigger        Plan:       Dizziness  Has resolved  Orthostatic hypotension  Has resloved  Hypertension associated with diabetes  Good reading today, continue medication  One week BP  BP Readings from Last 3 Encounters:   10/14/20 136/72   10/06/20 (!) 150/76   07/20/20 (!) 148/84     Hyperlipidemia associated with type 2 diabetes mellitus  On, Lipitor  Type 2 diabetes mellitus without complication, without long-term current use of insulin  Stable, continue medication  Follow the ADA diet  Hemoglobin A1C   Date Value Ref Range Status   07/13/2020 6.4 (H) 4.0 - 5.6 % Final     Comment:     ADA Screening Guidelines:  5.7-6.4%  Consistent " "with prediabetes  >or=6.5%  Consistent with diabetes  High levels of fetal hemoglobin interfere with the HbA1C  assay. Heterozygous hemoglobin variants (HbS, HgC, etc)do  not significantly interfere with this assay.   However, presence of multiple variants may affect accuracy.     02/05/2020 6.5 (H) 4.0 - 5.6 % Final     Comment:     ADA Screening Guidelines:  5.7-6.4%  Consistent with prediabetes  >or=6.5%  Consistent with diabetes  High levels of fetal hemoglobin interfere with the HbA1C  assay. Heterozygous hemoglobin variants (HbS, HgC, etc)do  not significantly interfere with this assay.   However, presence of multiple variants may affect accuracy.     07/22/2019 6.4 (H) 4.0 - 5.6 % Final     Comment:     ADA Screening Guidelines:  5.7-6.4%  Consistent with prediabetes  >or=6.5%  Consistent with diabetes  High levels of fetal hemoglobin interfere with the HbA1C  assay. Heterozygous hemoglobin variants (HbS, HgC, etc)do  not significantly interfere with this assay.   However, presence of multiple variants may affect accuracy.       Acute cystitis without hematuria  -     Cancel: Urine culture  -     Urine culture; Future; Expected date: 10/14/2020    Seasonal allergic rhinitis, unspecified trigger  -     loratadine (CLARITIN) 10 mg tablet; Take 1 tablet (10 mg total) by mouth once daily.  Dispense: 30 tablet; Refill: 11  -     fluticasone propionate (FLONASE) 50 mcg/actuation nasal spray; 1 spray (50 mcg total) by Each Nostril route once daily. for 14 days  Dispense: 9.9 mL; Refill: 1  Overweight (BMI 25.0-29.9)    Counseled patient on his ideal body weight, health consequences of being obese and current recommendations including weekly exercise and a heart healthy diet.  Current BMI is:Estimated body mass index is 25.66 kg/m² as calculated from the following:    Height as of this encounter: 5' 0.25" (1.53 m).    Weight as of this encounter: 60.1 kg (132 lb 7.9 oz)..  Patient is aware that ideal BMI < 25 or " Weight in (lb) to have BMI = 25: 128.8.        Patient readiness: acceptance and barriers:none    During the course of the visit the patient was educated and counseled about the following:     Hypertension:   Dietary sodium restriction.  Regular aerobic exercise.    Goals: Hypertension: Reduce Blood Pressure    Did patient meet goals/outcomes: Yes    The following self management tools provided: blood pressure log    Patient Instructions (the written plan) was given to the patient/family.     Time spent with patient: 15 minutes    Barriers to medications present (no )    Adverse reactions to current medications (no)    Over the counter medications reviewed (Yes)

## 2020-10-16 ENCOUNTER — TELEPHONE (OUTPATIENT)
Dept: FAMILY MEDICINE | Facility: CLINIC | Age: 85
End: 2020-10-16

## 2020-10-16 NOTE — TELEPHONE ENCOUNTER
----- Message from Angel YEPEZ Tim sent at 10/16/2020  1:53 PM CDT -----  Regarding: results  Contact: patient  Type:  Test Results    Who Called:  patient  Name of Test (Lab/Mammo/Etc):  urine culture  Date of Test:  10/14/20  Ordering Provider:  Jeremie Quintanilla  Where the test was performed:  [facility] Call Back Number:  [phone]  Additional Information:  n/a

## 2020-10-16 NOTE — TELEPHONE ENCOUNTER
----- Message from Angel YEPEZ Frisard sent at 10/16/2020  1:50 PM CDT -----  Regarding: B/P Readings  Contact: patient  Patient called in and wanted to forward the below blood pressure readings:    Thursday 10/15/20  AM  137/67  Thursday 10/15/20  PM  133/71    Friday 10/16/20  AM  133/72  Friday 10/16/20  PM  136/68    Patient call back number is [phone].  These readings are after medication.

## 2020-10-31 ENCOUNTER — EXTERNAL CHRONIC CARE MANAGEMENT (OUTPATIENT)
Dept: PRIMARY CARE CLINIC | Facility: CLINIC | Age: 85
End: 2020-10-31
Payer: MEDICARE

## 2020-10-31 PROCEDURE — 99490 CHRNC CARE MGMT STAFF 1ST 20: CPT | Mod: S$PBB,,, | Performed by: FAMILY MEDICINE

## 2020-10-31 PROCEDURE — 99490 PR CHRONIC CARE MGMT, 1ST 20 MIN: ICD-10-PCS | Mod: S$PBB,,, | Performed by: FAMILY MEDICINE

## 2020-10-31 PROCEDURE — 99490 CHRNC CARE MGMT STAFF 1ST 20: CPT | Mod: PBBFAC,PO | Performed by: FAMILY MEDICINE

## 2020-11-05 ENCOUNTER — TELEPHONE (OUTPATIENT)
Dept: ADMINISTRATIVE | Facility: HOSPITAL | Age: 85
End: 2020-11-05

## 2020-11-05 DIAGNOSIS — I10 ESSENTIAL HYPERTENSION: ICD-10-CM

## 2020-11-05 RX ORDER — LOSARTAN POTASSIUM 100 MG/1
100 TABLET ORAL DAILY
Qty: 90 TABLET | Refills: 1 | Status: SHIPPED | OUTPATIENT
Start: 2020-11-05 | End: 2021-02-02

## 2020-11-05 NOTE — TELEPHONE ENCOUNTER
Patient reports bp sitting at 131/76- while standing 108/67 p70- still getting dizzy spells with change in position- she says this has been going on since taken off Losartan due to back order and changed to Olmesartan.

## 2020-11-05 NOTE — TELEPHONE ENCOUNTER
Spoke with patient who stated Losartan was available at her pharmacy and would like to switch back. Please send in new RX for her Losartan. Thanks

## 2020-11-05 NOTE — TELEPHONE ENCOUNTER
Phone Number: [phone]             MRN: [id number]   Patient: Genny Watson   Phone: [phone]   Pharmacy: [facility]     Good Afternoon;     Pt see's Dr. Berman as PCP. Pt was seen in office on 10/14/20 Jeremie Quintanilla NP for Dizziness. Pt reported today this is still going on and wanted to advise Dr. Berman. Pt states it most noticeable in the evenings and when moving from seated to standing position. Pt feels this is related to Benicar that they started taking in Sept - October. Pt states since recent ER visit they have done much better with drinking water and staying hydrated. Pt reports BP last night was 123/67. Please call pt back at number listed above to discuss. If needed you may reach me via [facility] or at the number listed below. Thanks     Heather Ordoñez LPN   Care Coordinator   [facility]   [phone] ext [phone]

## 2020-11-14 ENCOUNTER — IMMUNIZATION (OUTPATIENT)
Dept: FAMILY MEDICINE | Facility: CLINIC | Age: 85
End: 2020-11-14
Payer: MEDICARE

## 2020-11-14 PROCEDURE — G0008 ADMIN INFLUENZA VIRUS VAC: HCPCS | Mod: PBBFAC,PO

## 2020-11-14 PROCEDURE — 90694 VACC AIIV4 NO PRSRV 0.5ML IM: CPT | Mod: PBBFAC,PO

## 2020-11-30 ENCOUNTER — EXTERNAL CHRONIC CARE MANAGEMENT (OUTPATIENT)
Dept: PRIMARY CARE CLINIC | Facility: CLINIC | Age: 85
End: 2020-11-30
Payer: MEDICARE

## 2020-11-30 PROCEDURE — 99490 CHRNC CARE MGMT STAFF 1ST 20: CPT | Mod: S$PBB,,, | Performed by: FAMILY MEDICINE

## 2020-11-30 PROCEDURE — 99490 PR CHRONIC CARE MGMT, 1ST 20 MIN: ICD-10-PCS | Mod: S$PBB,,, | Performed by: FAMILY MEDICINE

## 2020-11-30 PROCEDURE — G2058 CCM ADD 20MIN: HCPCS | Mod: PBBFAC,PO | Performed by: FAMILY MEDICINE

## 2020-11-30 PROCEDURE — G2058 PR CHRON CARE MGMT, EA ADDTL 20 MINS: ICD-10-PCS | Mod: S$PBB,,, | Performed by: FAMILY MEDICINE

## 2020-11-30 PROCEDURE — G2058 CCM ADD 20MIN: HCPCS | Mod: S$PBB,,, | Performed by: FAMILY MEDICINE

## 2020-11-30 PROCEDURE — 99490 CHRNC CARE MGMT STAFF 1ST 20: CPT | Mod: PBBFAC,PO | Performed by: FAMILY MEDICINE

## 2020-12-31 ENCOUNTER — EXTERNAL CHRONIC CARE MANAGEMENT (OUTPATIENT)
Dept: PRIMARY CARE CLINIC | Facility: CLINIC | Age: 85
End: 2020-12-31
Payer: MEDICARE

## 2020-12-31 PROCEDURE — 99490 PR CHRONIC CARE MGMT, 1ST 20 MIN: ICD-10-PCS | Mod: S$PBB,,, | Performed by: FAMILY MEDICINE

## 2020-12-31 PROCEDURE — 99490 CHRNC CARE MGMT STAFF 1ST 20: CPT | Mod: S$PBB,,, | Performed by: FAMILY MEDICINE

## 2020-12-31 PROCEDURE — 99490 CHRNC CARE MGMT STAFF 1ST 20: CPT | Mod: PBBFAC,PO | Performed by: FAMILY MEDICINE

## 2021-01-05 DIAGNOSIS — J30.2 SEASONAL ALLERGIC RHINITIS, UNSPECIFIED TRIGGER: ICD-10-CM

## 2021-01-05 RX ORDER — LORATADINE 10 MG/1
10 TABLET ORAL DAILY
Qty: 30 TABLET | Refills: 11 | Status: SHIPPED | OUTPATIENT
Start: 2021-01-05 | End: 2023-09-03

## 2021-01-07 DIAGNOSIS — E11.9 CONTROLLED TYPE 2 DIABETES MELLITUS WITHOUT COMPLICATION, WITHOUT LONG-TERM CURRENT USE OF INSULIN: ICD-10-CM

## 2021-01-07 RX ORDER — METFORMIN HYDROCHLORIDE 500 MG/1
TABLET, EXTENDED RELEASE ORAL
Qty: 90 TABLET | Refills: 0 | Status: SHIPPED | OUTPATIENT
Start: 2021-01-07 | End: 2021-04-07

## 2021-01-11 ENCOUNTER — LAB VISIT (OUTPATIENT)
Dept: LAB | Facility: HOSPITAL | Age: 86
End: 2021-01-11
Attending: FAMILY MEDICINE
Payer: MEDICARE

## 2021-01-11 DIAGNOSIS — E11.59 HYPERTENSION ASSOCIATED WITH DIABETES: ICD-10-CM

## 2021-01-11 DIAGNOSIS — E11.69 HYPERLIPIDEMIA ASSOCIATED WITH TYPE 2 DIABETES MELLITUS: ICD-10-CM

## 2021-01-11 DIAGNOSIS — E78.5 HYPERLIPIDEMIA ASSOCIATED WITH TYPE 2 DIABETES MELLITUS: ICD-10-CM

## 2021-01-11 DIAGNOSIS — I15.2 HYPERTENSION ASSOCIATED WITH DIABETES: ICD-10-CM

## 2021-01-11 DIAGNOSIS — E11.59 TYPE 2 DIABETES MELLITUS WITH OTHER CIRCULATORY COMPLICATION, WITHOUT LONG-TERM CURRENT USE OF INSULIN: ICD-10-CM

## 2021-01-11 LAB
ALBUMIN SERPL BCP-MCNC: 4 G/DL (ref 3.5–5.2)
ALP SERPL-CCNC: 92 U/L (ref 55–135)
ALT SERPL W/O P-5'-P-CCNC: 18 U/L (ref 10–44)
ANION GAP SERPL CALC-SCNC: 12 MMOL/L (ref 8–16)
AST SERPL-CCNC: 26 U/L (ref 10–40)
BILIRUB SERPL-MCNC: 0.5 MG/DL (ref 0.1–1)
BUN SERPL-MCNC: 16 MG/DL (ref 8–23)
CALCIUM SERPL-MCNC: 9.5 MG/DL (ref 8.7–10.5)
CHLORIDE SERPL-SCNC: 102 MMOL/L (ref 95–110)
CHOLEST SERPL-MCNC: 136 MG/DL (ref 120–199)
CHOLEST/HDLC SERPL: 2.4 {RATIO} (ref 2–5)
CO2 SERPL-SCNC: 26 MMOL/L (ref 23–29)
CREAT SERPL-MCNC: 0.8 MG/DL (ref 0.5–1.4)
EST. GFR  (AFRICAN AMERICAN): >60 ML/MIN/1.73 M^2
EST. GFR  (NON AFRICAN AMERICAN): >60 ML/MIN/1.73 M^2
ESTIMATED AVG GLUCOSE: 140 MG/DL (ref 68–131)
GLUCOSE SERPL-MCNC: 145 MG/DL (ref 70–110)
HBA1C MFR BLD HPLC: 6.5 % (ref 4–5.6)
HDLC SERPL-MCNC: 57 MG/DL (ref 40–75)
HDLC SERPL: 41.9 % (ref 20–50)
LDLC SERPL CALC-MCNC: 53.2 MG/DL (ref 63–159)
NONHDLC SERPL-MCNC: 79 MG/DL
POTASSIUM SERPL-SCNC: 4.1 MMOL/L (ref 3.5–5.1)
PROT SERPL-MCNC: 7.5 G/DL (ref 6–8.4)
SODIUM SERPL-SCNC: 140 MMOL/L (ref 136–145)
TRIGL SERPL-MCNC: 129 MG/DL (ref 30–150)

## 2021-01-11 PROCEDURE — 80053 COMPREHEN METABOLIC PANEL: CPT

## 2021-01-11 PROCEDURE — 36415 COLL VENOUS BLD VENIPUNCTURE: CPT | Mod: PO

## 2021-01-11 PROCEDURE — 80061 LIPID PANEL: CPT

## 2021-01-11 PROCEDURE — 83036 HEMOGLOBIN GLYCOSYLATED A1C: CPT

## 2021-01-12 DIAGNOSIS — E11.59 TYPE 2 DIABETES MELLITUS WITH OTHER CIRCULATORY COMPLICATION, WITHOUT LONG-TERM CURRENT USE OF INSULIN: Primary | ICD-10-CM

## 2021-01-31 ENCOUNTER — EXTERNAL CHRONIC CARE MANAGEMENT (OUTPATIENT)
Dept: PRIMARY CARE CLINIC | Facility: CLINIC | Age: 86
End: 2021-01-31
Payer: MEDICARE

## 2021-01-31 PROCEDURE — 99490 CHRNC CARE MGMT STAFF 1ST 20: CPT | Mod: PBBFAC,PO | Performed by: FAMILY MEDICINE

## 2021-01-31 PROCEDURE — 99490 CHRNC CARE MGMT STAFF 1ST 20: CPT | Mod: S$PBB,,, | Performed by: FAMILY MEDICINE

## 2021-01-31 PROCEDURE — 99490 PR CHRONIC CARE MGMT, 1ST 20 MIN: ICD-10-PCS | Mod: S$PBB,,, | Performed by: FAMILY MEDICINE

## 2021-02-02 ENCOUNTER — OFFICE VISIT (OUTPATIENT)
Dept: FAMILY MEDICINE | Facility: CLINIC | Age: 86
End: 2021-02-02
Attending: FAMILY MEDICINE
Payer: MEDICARE

## 2021-02-02 VITALS
BODY MASS INDEX: 27.14 KG/M2 | DIASTOLIC BLOOD PRESSURE: 74 MMHG | WEIGHT: 138.25 LBS | HEIGHT: 60 IN | TEMPERATURE: 98 F | SYSTOLIC BLOOD PRESSURE: 124 MMHG | HEART RATE: 82 BPM | OXYGEN SATURATION: 98 %

## 2021-02-02 DIAGNOSIS — E11.59 HYPERTENSION ASSOCIATED WITH DIABETES: ICD-10-CM

## 2021-02-02 DIAGNOSIS — E11.69 HYPERLIPIDEMIA ASSOCIATED WITH TYPE 2 DIABETES MELLITUS: ICD-10-CM

## 2021-02-02 DIAGNOSIS — Z85.3 HISTORY OF RIGHT BREAST CANCER: ICD-10-CM

## 2021-02-02 DIAGNOSIS — I95.1 ORTHOSTATIC HYPOTENSION: Primary | ICD-10-CM

## 2021-02-02 DIAGNOSIS — Z86.711 HISTORY OF PULMONARY EMBOLISM: ICD-10-CM

## 2021-02-02 DIAGNOSIS — I15.2 HYPERTENSION ASSOCIATED WITH DIABETES: ICD-10-CM

## 2021-02-02 DIAGNOSIS — E11.59 TYPE 2 DIABETES MELLITUS WITH OTHER CIRCULATORY COMPLICATION, WITHOUT LONG-TERM CURRENT USE OF INSULIN: ICD-10-CM

## 2021-02-02 DIAGNOSIS — I10 ESSENTIAL HYPERTENSION: ICD-10-CM

## 2021-02-02 DIAGNOSIS — I25.10 CORONARY ARTERY DISEASE INVOLVING NATIVE CORONARY ARTERY OF NATIVE HEART WITHOUT ANGINA PECTORIS: ICD-10-CM

## 2021-02-02 DIAGNOSIS — Z95.5 S/P DRUG ELUTING CORONARY STENT PLACEMENT: ICD-10-CM

## 2021-02-02 DIAGNOSIS — E78.5 HYPERLIPIDEMIA ASSOCIATED WITH TYPE 2 DIABETES MELLITUS: ICD-10-CM

## 2021-02-02 PROCEDURE — 99214 PR OFFICE/OUTPT VISIT, EST, LEVL IV, 30-39 MIN: ICD-10-PCS | Mod: S$PBB,,, | Performed by: FAMILY MEDICINE

## 2021-02-02 PROCEDURE — 99214 OFFICE O/P EST MOD 30 MIN: CPT | Mod: S$PBB,,, | Performed by: FAMILY MEDICINE

## 2021-02-02 PROCEDURE — 99214 OFFICE O/P EST MOD 30 MIN: CPT | Mod: PBBFAC,PO | Performed by: FAMILY MEDICINE

## 2021-02-02 PROCEDURE — 99999 PR PBB SHADOW E&M-EST. PATIENT-LVL IV: ICD-10-PCS | Mod: PBBFAC,,, | Performed by: FAMILY MEDICINE

## 2021-02-02 PROCEDURE — 99999 PR PBB SHADOW E&M-EST. PATIENT-LVL IV: CPT | Mod: PBBFAC,,, | Performed by: FAMILY MEDICINE

## 2021-02-02 RX ORDER — LOSARTAN POTASSIUM 50 MG/1
50 TABLET ORAL DAILY
Qty: 90 TABLET | Refills: 1 | Status: SHIPPED | OUTPATIENT
Start: 2021-02-02 | End: 2021-05-17

## 2021-02-28 ENCOUNTER — EXTERNAL CHRONIC CARE MANAGEMENT (OUTPATIENT)
Dept: PRIMARY CARE CLINIC | Facility: CLINIC | Age: 86
End: 2021-02-28
Payer: MEDICARE

## 2021-02-28 PROCEDURE — 99490 CHRNC CARE MGMT STAFF 1ST 20: CPT | Mod: S$PBB,,, | Performed by: FAMILY MEDICINE

## 2021-02-28 PROCEDURE — 99490 CHRNC CARE MGMT STAFF 1ST 20: CPT | Mod: PBBFAC,PO | Performed by: FAMILY MEDICINE

## 2021-02-28 PROCEDURE — 99490 PR CHRONIC CARE MGMT, 1ST 20 MIN: ICD-10-PCS | Mod: S$PBB,,, | Performed by: FAMILY MEDICINE

## 2021-03-31 ENCOUNTER — EXTERNAL CHRONIC CARE MANAGEMENT (OUTPATIENT)
Dept: PRIMARY CARE CLINIC | Facility: CLINIC | Age: 86
End: 2021-03-31
Payer: MEDICARE

## 2021-03-31 PROCEDURE — 99490 CHRNC CARE MGMT STAFF 1ST 20: CPT | Mod: PBBFAC,PO | Performed by: FAMILY MEDICINE

## 2021-03-31 PROCEDURE — 99490 CHRNC CARE MGMT STAFF 1ST 20: CPT | Mod: S$PBB,,, | Performed by: FAMILY MEDICINE

## 2021-03-31 PROCEDURE — 99490 PR CHRONIC CARE MGMT, 1ST 20 MIN: ICD-10-PCS | Mod: S$PBB,,, | Performed by: FAMILY MEDICINE

## 2021-04-07 DIAGNOSIS — E11.9 CONTROLLED TYPE 2 DIABETES MELLITUS WITHOUT COMPLICATION, WITHOUT LONG-TERM CURRENT USE OF INSULIN: ICD-10-CM

## 2021-04-07 RX ORDER — METFORMIN HYDROCHLORIDE 500 MG/1
TABLET, EXTENDED RELEASE ORAL
Qty: 90 TABLET | Refills: 1 | Status: SHIPPED | OUTPATIENT
Start: 2021-04-07 | End: 2021-10-05

## 2021-04-30 ENCOUNTER — EXTERNAL CHRONIC CARE MANAGEMENT (OUTPATIENT)
Dept: PRIMARY CARE CLINIC | Facility: CLINIC | Age: 86
End: 2021-04-30
Payer: MEDICARE

## 2021-04-30 PROCEDURE — 99490 PR CHRONIC CARE MGMT, 1ST 20 MIN: ICD-10-PCS | Mod: S$PBB,,, | Performed by: FAMILY MEDICINE

## 2021-04-30 PROCEDURE — 99490 CHRNC CARE MGMT STAFF 1ST 20: CPT | Mod: S$PBB,,, | Performed by: FAMILY MEDICINE

## 2021-04-30 PROCEDURE — 99490 CHRNC CARE MGMT STAFF 1ST 20: CPT | Mod: PBBFAC,PO | Performed by: FAMILY MEDICINE

## 2021-05-03 LAB
LEFT EYE DM RETINOPATHY: NEGATIVE
RIGHT EYE DM RETINOPATHY: NEGATIVE

## 2021-05-10 ENCOUNTER — PES CALL (OUTPATIENT)
Dept: ADMINISTRATIVE | Facility: CLINIC | Age: 86
End: 2021-05-10

## 2021-05-16 ENCOUNTER — HOSPITAL ENCOUNTER (EMERGENCY)
Facility: HOSPITAL | Age: 86
Discharge: HOME | End: 2021-05-16
Attending: EMERGENCY MEDICINE
Payer: MEDICARE

## 2021-05-16 VITALS
DIASTOLIC BLOOD PRESSURE: 68 MMHG | HEART RATE: 97 BPM | RESPIRATION RATE: 18 BRPM | WEIGHT: 138 LBS | HEIGHT: 60 IN | BODY MASS INDEX: 27.09 KG/M2 | OXYGEN SATURATION: 99 % | SYSTOLIC BLOOD PRESSURE: 142 MMHG | TEMPERATURE: 98 F

## 2021-05-16 DIAGNOSIS — R42 DIZZINESS: Primary | ICD-10-CM

## 2021-05-16 PROCEDURE — 99282 EMERGENCY DEPT VISIT SF MDM: CPT

## 2021-05-17 ENCOUNTER — PES CALL (OUTPATIENT)
Dept: ADMINISTRATIVE | Facility: CLINIC | Age: 86
End: 2021-05-17

## 2021-05-17 ENCOUNTER — TELEPHONE (OUTPATIENT)
Dept: FAMILY MEDICINE | Facility: CLINIC | Age: 86
End: 2021-05-17

## 2021-05-17 ENCOUNTER — OFFICE VISIT (OUTPATIENT)
Dept: FAMILY MEDICINE | Facility: CLINIC | Age: 86
End: 2021-05-17
Payer: MEDICARE

## 2021-05-17 VITALS
HEIGHT: 60 IN | BODY MASS INDEX: 26.61 KG/M2 | SYSTOLIC BLOOD PRESSURE: 128 MMHG | HEART RATE: 90 BPM | DIASTOLIC BLOOD PRESSURE: 68 MMHG | TEMPERATURE: 98 F | OXYGEN SATURATION: 96 % | WEIGHT: 135.56 LBS

## 2021-05-17 DIAGNOSIS — I10 ESSENTIAL HYPERTENSION: ICD-10-CM

## 2021-05-17 DIAGNOSIS — R42 EPISODIC LIGHTHEADEDNESS: Primary | ICD-10-CM

## 2021-05-17 PROCEDURE — 99214 OFFICE O/P EST MOD 30 MIN: CPT | Mod: S$PBB,,, | Performed by: PHYSICIAN ASSISTANT

## 2021-05-17 PROCEDURE — 99999 PR PBB SHADOW E&M-EST. PATIENT-LVL IV: CPT | Mod: PBBFAC,,, | Performed by: PHYSICIAN ASSISTANT

## 2021-05-17 PROCEDURE — 99999 PR PBB SHADOW E&M-EST. PATIENT-LVL IV: ICD-10-PCS | Mod: PBBFAC,,, | Performed by: PHYSICIAN ASSISTANT

## 2021-05-17 PROCEDURE — 99214 PR OFFICE/OUTPT VISIT, EST, LEVL IV, 30-39 MIN: ICD-10-PCS | Mod: S$PBB,,, | Performed by: PHYSICIAN ASSISTANT

## 2021-05-17 PROCEDURE — 99214 OFFICE O/P EST MOD 30 MIN: CPT | Mod: PBBFAC,PO | Performed by: PHYSICIAN ASSISTANT

## 2021-05-17 RX ORDER — LOSARTAN POTASSIUM 50 MG/1
25 TABLET ORAL DAILY
Qty: 90 TABLET | Refills: 1
Start: 2021-05-17 | End: 2021-07-16

## 2021-05-18 ENCOUNTER — TELEPHONE (OUTPATIENT)
Dept: FAMILY MEDICINE | Facility: CLINIC | Age: 86
End: 2021-05-18

## 2021-05-18 DIAGNOSIS — N39.0 URINARY TRACT INFECTION WITHOUT HEMATURIA, SITE UNSPECIFIED: Primary | ICD-10-CM

## 2021-05-20 DIAGNOSIS — R31.9 HEMATURIA OF UNKNOWN CAUSE: ICD-10-CM

## 2021-05-26 ENCOUNTER — HOSPITAL ENCOUNTER (OUTPATIENT)
Dept: RADIOLOGY | Facility: HOSPITAL | Age: 86
Discharge: HOME | End: 2021-05-26
Attending: PHYSICIAN ASSISTANT
Payer: MEDICARE

## 2021-05-26 ENCOUNTER — PATIENT OUTREACH (OUTPATIENT)
Dept: ADMINISTRATIVE | Facility: OTHER | Age: 86
End: 2021-05-26

## 2021-05-26 DIAGNOSIS — R31.9 HEMATURIA OF UNKNOWN CAUSE: ICD-10-CM

## 2021-05-26 LAB
CREAT SERPL-MCNC: 0.5 MG/DL (ref 0.5–1.4)
SAMPLE: NORMAL

## 2021-05-26 PROCEDURE — 74178 CT ABD&PLV WO CNTR FLWD CNTR: CPT | Mod: 26,,, | Performed by: RADIOLOGY

## 2021-05-26 PROCEDURE — 74178 CT ABD&PLV WO CNTR FLWD CNTR: CPT | Mod: TC

## 2021-05-26 PROCEDURE — 25500020 PHARM REV CODE 255

## 2021-05-26 PROCEDURE — 74178 CT UROGRAM ABD PELVIS W WO: ICD-10-PCS | Mod: 26,,, | Performed by: RADIOLOGY

## 2021-05-26 RX ADMIN — IOHEXOL 125 ML: 350 INJECTION, SOLUTION INTRAVENOUS at 11:05

## 2021-05-27 ENCOUNTER — OFFICE VISIT (OUTPATIENT)
Dept: UROLOGY | Facility: CLINIC | Age: 86
End: 2021-05-27
Payer: MEDICARE

## 2021-05-27 VITALS
RESPIRATION RATE: 18 BRPM | WEIGHT: 135.56 LBS | BODY MASS INDEX: 26.61 KG/M2 | DIASTOLIC BLOOD PRESSURE: 72 MMHG | HEART RATE: 86 BPM | SYSTOLIC BLOOD PRESSURE: 129 MMHG | HEIGHT: 60 IN

## 2021-05-27 DIAGNOSIS — E11.59 TYPE 2 DIABETES MELLITUS WITH OTHER CIRCULATORY COMPLICATION, WITHOUT LONG-TERM CURRENT USE OF INSULIN: ICD-10-CM

## 2021-05-27 DIAGNOSIS — Z95.5 S/P DRUG ELUTING CORONARY STENT PLACEMENT: ICD-10-CM

## 2021-05-27 DIAGNOSIS — E11.59 HYPERTENSION ASSOCIATED WITH DIABETES: ICD-10-CM

## 2021-05-27 DIAGNOSIS — Z86.711 HISTORY OF PULMONARY EMBOLISM: ICD-10-CM

## 2021-05-27 DIAGNOSIS — I15.2 HYPERTENSION ASSOCIATED WITH DIABETES: ICD-10-CM

## 2021-05-27 DIAGNOSIS — R31.29 MICROHEMATURIA: Primary | ICD-10-CM

## 2021-05-27 DIAGNOSIS — I25.10 CORONARY ARTERY DISEASE INVOLVING NATIVE CORONARY ARTERY OF NATIVE HEART WITHOUT ANGINA PECTORIS: ICD-10-CM

## 2021-05-27 LAB
BACTERIA #/AREA URNS HPF: ABNORMAL /HPF
BILIRUB SERPL-MCNC: ABNORMAL MG/DL
BLOOD URINE, POC: ABNORMAL
CLARITY, POC UA: CLEAR
COLOR, POC UA: YELLOW
GLUCOSE UR QL STRIP: ABNORMAL
KETONES UR QL STRIP: ABNORMAL
LEUKOCYTE ESTERASE URINE, POC: ABNORMAL
MICROSCOPIC COMMENT: ABNORMAL
NITRITE, POC UA: ABNORMAL
PH, POC UA: 7
PROTEIN, POC: 30
RBC #/AREA URNS HPF: 5 /HPF (ref 0–4)
SPECIFIC GRAVITY, POC UA: 1.01
UROBILINOGEN, POC UA: 0.2
WBC #/AREA URNS HPF: 35 /HPF (ref 0–5)

## 2021-05-27 PROCEDURE — 51701 INSERT BLADDER CATHETER: CPT | Mod: PBBFAC,PN | Performed by: STUDENT IN AN ORGANIZED HEALTH CARE EDUCATION/TRAINING PROGRAM

## 2021-05-27 PROCEDURE — 81000 URINALYSIS NONAUTO W/SCOPE: CPT | Performed by: STUDENT IN AN ORGANIZED HEALTH CARE EDUCATION/TRAINING PROGRAM

## 2021-05-27 PROCEDURE — 81002 URINALYSIS NONAUTO W/O SCOPE: CPT | Mod: PBBFAC,PN | Performed by: STUDENT IN AN ORGANIZED HEALTH CARE EDUCATION/TRAINING PROGRAM

## 2021-05-27 PROCEDURE — 99999 PR PBB SHADOW E&M-EST. PATIENT-LVL IV: CPT | Mod: PBBFAC,,, | Performed by: STUDENT IN AN ORGANIZED HEALTH CARE EDUCATION/TRAINING PROGRAM

## 2021-05-27 PROCEDURE — 51701 INSERT BLADDER CATHETER: CPT | Mod: S$PBB,,, | Performed by: STUDENT IN AN ORGANIZED HEALTH CARE EDUCATION/TRAINING PROGRAM

## 2021-05-27 PROCEDURE — 99999 PR PBB SHADOW E&M-EST. PATIENT-LVL IV: ICD-10-PCS | Mod: PBBFAC,,, | Performed by: STUDENT IN AN ORGANIZED HEALTH CARE EDUCATION/TRAINING PROGRAM

## 2021-05-27 PROCEDURE — 99204 PR OFFICE/OUTPT VISIT, NEW, LEVL IV, 45-59 MIN: ICD-10-PCS | Mod: S$PBB,25,, | Performed by: STUDENT IN AN ORGANIZED HEALTH CARE EDUCATION/TRAINING PROGRAM

## 2021-05-27 PROCEDURE — 99214 OFFICE O/P EST MOD 30 MIN: CPT | Mod: PBBFAC,PN | Performed by: STUDENT IN AN ORGANIZED HEALTH CARE EDUCATION/TRAINING PROGRAM

## 2021-05-27 PROCEDURE — 51701 PR INSERTION OF NON-INDWELLING BLADDER CATHETERIZATION FOR RESIDUAL UR: ICD-10-PCS | Mod: S$PBB,,, | Performed by: STUDENT IN AN ORGANIZED HEALTH CARE EDUCATION/TRAINING PROGRAM

## 2021-05-27 PROCEDURE — 99204 OFFICE O/P NEW MOD 45 MIN: CPT | Mod: S$PBB,25,, | Performed by: STUDENT IN AN ORGANIZED HEALTH CARE EDUCATION/TRAINING PROGRAM

## 2021-05-27 PROCEDURE — 87086 URINE CULTURE/COLONY COUNT: CPT | Performed by: STUDENT IN AN ORGANIZED HEALTH CARE EDUCATION/TRAINING PROGRAM

## 2021-05-28 DIAGNOSIS — D50.9 MICROCYTIC ANEMIA: Primary | ICD-10-CM

## 2021-05-29 ENCOUNTER — LAB VISIT (OUTPATIENT)
Dept: LAB | Facility: HOSPITAL | Age: 86
End: 2021-05-29
Attending: PHYSICAL MEDICINE & REHABILITATION
Payer: MEDICARE

## 2021-05-29 DIAGNOSIS — D50.9 MICROCYTIC ANEMIA: ICD-10-CM

## 2021-05-29 LAB — BACTERIA UR CULT: NORMAL

## 2021-05-29 PROCEDURE — 85045 AUTOMATED RETICULOCYTE COUNT: CPT | Performed by: PHYSICIAN ASSISTANT

## 2021-05-29 PROCEDURE — 85025 COMPLETE CBC W/AUTO DIFF WBC: CPT | Mod: PO | Performed by: PHYSICIAN ASSISTANT

## 2021-05-29 PROCEDURE — 36415 COLL VENOUS BLD VENIPUNCTURE: CPT | Mod: PO | Performed by: PHYSICIAN ASSISTANT

## 2021-05-29 PROCEDURE — 82728 ASSAY OF FERRITIN: CPT | Performed by: PHYSICIAN ASSISTANT

## 2021-05-29 PROCEDURE — 83540 ASSAY OF IRON: CPT | Performed by: PHYSICIAN ASSISTANT

## 2021-05-31 ENCOUNTER — EXTERNAL CHRONIC CARE MANAGEMENT (OUTPATIENT)
Dept: PRIMARY CARE CLINIC | Facility: CLINIC | Age: 86
End: 2021-05-31
Payer: MEDICARE

## 2021-05-31 PROCEDURE — 99490 CHRNC CARE MGMT STAFF 1ST 20: CPT | Mod: PBBFAC,PO | Performed by: FAMILY MEDICINE

## 2021-05-31 PROCEDURE — 99490 CHRNC CARE MGMT STAFF 1ST 20: CPT | Mod: S$PBB,,, | Performed by: FAMILY MEDICINE

## 2021-05-31 PROCEDURE — 99490 PR CHRONIC CARE MGMT, 1ST 20 MIN: ICD-10-PCS | Mod: S$PBB,,, | Performed by: FAMILY MEDICINE

## 2021-06-01 LAB
BASOPHILS # BLD AUTO: 0.06 K/UL (ref 0–0.2)
BASOPHILS NFR BLD: 0.7 % (ref 0–1.9)
DIFFERENTIAL METHOD: ABNORMAL
EOSINOPHIL # BLD AUTO: 0.2 K/UL (ref 0–0.5)
EOSINOPHIL NFR BLD: 2 % (ref 0–8)
ERYTHROCYTE [DISTWIDTH] IN BLOOD BY AUTOMATED COUNT: 18.6 % (ref 11.5–14.5)
FERRITIN SERPL-MCNC: 7 NG/ML (ref 20–300)
HCT VFR BLD AUTO: 31.2 % (ref 37–48.5)
HGB BLD-MCNC: 8.9 G/DL (ref 12–16)
IMM GRANULOCYTES # BLD AUTO: 0.02 K/UL (ref 0–0.04)
IMM GRANULOCYTES NFR BLD AUTO: 0.2 % (ref 0–0.5)
IRON SERPL-MCNC: 20 UG/DL (ref 30–160)
LYMPHOCYTES # BLD AUTO: 2.4 K/UL (ref 1–4.8)
LYMPHOCYTES NFR BLD: 28.3 % (ref 18–48)
MCH RBC QN AUTO: 20.2 PG (ref 27–31)
MCHC RBC AUTO-ENTMCNC: 28.5 G/DL (ref 32–36)
MCV RBC AUTO: 71 FL (ref 82–98)
MONOCYTES # BLD AUTO: 1 K/UL (ref 0.3–1)
MONOCYTES NFR BLD: 11.4 % (ref 4–15)
NEUTROPHILS # BLD AUTO: 4.8 K/UL (ref 1.8–7.7)
NEUTROPHILS NFR BLD: 57.4 % (ref 38–73)
NRBC BLD-RTO: 0 /100 WBC
PLATELET # BLD AUTO: 390 K/UL (ref 150–450)
PMV BLD AUTO: 9.5 FL (ref 9.2–12.9)
RBC # BLD AUTO: 4.41 M/UL (ref 4–5.4)
RETICS/RBC NFR AUTO: 1.5 % (ref 0.5–2.5)
SATURATED IRON: 4 % (ref 20–50)
TOTAL IRON BINDING CAPACITY: 485 UG/DL (ref 250–450)
TRANSFERRIN SERPL-MCNC: 328 MG/DL (ref 200–375)
WBC # BLD AUTO: 8.36 K/UL (ref 3.9–12.7)

## 2021-06-02 ENCOUNTER — TELEPHONE (OUTPATIENT)
Dept: UROLOGY | Facility: CLINIC | Age: 86
End: 2021-06-02

## 2021-06-02 DIAGNOSIS — D50.9 IRON DEFICIENCY ANEMIA, UNSPECIFIED IRON DEFICIENCY ANEMIA TYPE: Primary | ICD-10-CM

## 2021-06-07 ENCOUNTER — TELEPHONE (OUTPATIENT)
Dept: CARDIOLOGY | Facility: HOSPITAL | Age: 86
End: 2021-06-07

## 2021-06-07 ENCOUNTER — OFFICE VISIT (OUTPATIENT)
Dept: CARDIOLOGY | Facility: CLINIC | Age: 86
End: 2021-06-07
Payer: MEDICARE

## 2021-06-07 VITALS
DIASTOLIC BLOOD PRESSURE: 76 MMHG | OXYGEN SATURATION: 98 % | HEART RATE: 84 BPM | WEIGHT: 133.19 LBS | BODY MASS INDEX: 26.01 KG/M2 | SYSTOLIC BLOOD PRESSURE: 130 MMHG

## 2021-06-07 DIAGNOSIS — R94.31 ABNORMAL EKG: ICD-10-CM

## 2021-06-07 DIAGNOSIS — E11.69 HYPERLIPIDEMIA ASSOCIATED WITH TYPE 2 DIABETES MELLITUS: ICD-10-CM

## 2021-06-07 DIAGNOSIS — E11.59 HYPERTENSION ASSOCIATED WITH DIABETES: ICD-10-CM

## 2021-06-07 DIAGNOSIS — E11.59 TYPE 2 DIABETES MELLITUS WITH OTHER CIRCULATORY COMPLICATION, WITHOUT LONG-TERM CURRENT USE OF INSULIN: ICD-10-CM

## 2021-06-07 DIAGNOSIS — E78.5 HYPERLIPIDEMIA ASSOCIATED WITH TYPE 2 DIABETES MELLITUS: ICD-10-CM

## 2021-06-07 DIAGNOSIS — I10 HYPERTENSION, ESSENTIAL: ICD-10-CM

## 2021-06-07 DIAGNOSIS — I25.10 CORONARY ARTERY DISEASE INVOLVING NATIVE CORONARY ARTERY OF NATIVE HEART WITHOUT ANGINA PECTORIS: ICD-10-CM

## 2021-06-07 DIAGNOSIS — I15.2 HYPERTENSION ASSOCIATED WITH DIABETES: ICD-10-CM

## 2021-06-07 DIAGNOSIS — R42 INTERMITTENT LIGHTHEADEDNESS: ICD-10-CM

## 2021-06-07 PROCEDURE — 93000 EKG 12-LEAD: ICD-10-PCS | Mod: S$GLB,,, | Performed by: SPECIALIST

## 2021-06-07 PROCEDURE — 99214 PR OFFICE/OUTPT VISIT, EST, LEVL IV, 30-39 MIN: ICD-10-PCS | Mod: S$GLB,,, | Performed by: NURSE PRACTITIONER

## 2021-06-07 PROCEDURE — 99214 OFFICE O/P EST MOD 30 MIN: CPT | Mod: S$GLB,,, | Performed by: NURSE PRACTITIONER

## 2021-06-07 PROCEDURE — 93000 ELECTROCARDIOGRAM COMPLETE: CPT | Mod: S$GLB,,, | Performed by: SPECIALIST

## 2021-06-07 RX ORDER — METOPROLOL SUCCINATE 50 MG/1
50 TABLET, EXTENDED RELEASE ORAL DAILY
Qty: 30 TABLET | Refills: 3 | Status: SHIPPED | OUTPATIENT
Start: 2021-06-07 | End: 2021-09-12

## 2021-06-07 RX ORDER — ASCORBIC ACID 500 MG
500 TABLET ORAL DAILY
COMMUNITY

## 2021-06-08 ENCOUNTER — CLINICAL SUPPORT (OUTPATIENT)
Dept: CARDIOLOGY | Facility: HOSPITAL | Age: 86
End: 2021-06-08
Attending: PHYSICIAN ASSISTANT
Payer: MEDICARE

## 2021-06-08 VITALS — HEIGHT: 60 IN | BODY MASS INDEX: 26.15 KG/M2 | WEIGHT: 133.19 LBS

## 2021-06-08 DIAGNOSIS — R42 EPISODIC LIGHTHEADEDNESS: ICD-10-CM

## 2021-06-08 PROCEDURE — 93227 XTRNL ECG REC<48 HR R&I: CPT | Mod: ,,, | Performed by: INTERNAL MEDICINE

## 2021-06-08 PROCEDURE — 93227 HOLTER MONITOR - 24 HOUR (CUPID ONLY): ICD-10-PCS | Mod: ,,, | Performed by: INTERNAL MEDICINE

## 2021-06-08 PROCEDURE — 93306 ECHO (CUPID ONLY): ICD-10-PCS | Mod: 26,,, | Performed by: INTERNAL MEDICINE

## 2021-06-08 PROCEDURE — 93306 TTE W/DOPPLER COMPLETE: CPT | Mod: 26,,, | Performed by: INTERNAL MEDICINE

## 2021-06-08 PROCEDURE — 93226 XTRNL ECG REC<48 HR SCAN A/R: CPT

## 2021-06-08 PROCEDURE — 93306 TTE W/DOPPLER COMPLETE: CPT

## 2021-06-09 LAB
AORTIC ROOT ANNULUS: 2.96 CM
AORTIC VALVE CUSP SEPERATION: 1.71 CM
AV INDEX (PROSTH): 0.74
AV MEAN GRADIENT: 4 MMHG
AV PEAK GRADIENT: 6 MMHG
AV VALVE AREA: 2.67 CM2
AV VELOCITY RATIO: 72.35
BSA FOR ECHO PROCEDURE: 1.6 M2
CV ECHO LV RWT: 0.49 CM
DOP CALC AO PEAK VEL: 1.25 M/S
DOP CALC AO VTI: 30.88 CM
DOP CALC LVOT AREA: 3.6 CM2
DOP CALC LVOT DIAMETER: 2.14 CM
DOP CALC LVOT PEAK VEL: 90.44 M/S
DOP CALC LVOT STROKE VOLUME: 82.47 CM3
DOP CALCLVOT PEAK VEL VTI: 22.94 CM
E WAVE DECELERATION TIME: 108.69 MSEC
E/A RATIO: 0.87
E/E' RATIO: 14.8 M/S
ECHO LV POSTERIOR WALL: 0.99 CM (ref 0.6–1.1)
EJECTION FRACTION: 60 %
FRACTIONAL SHORTENING: 28 % (ref 28–44)
INTERVENTRICULAR SEPTUM: 0.99 CM (ref 0.6–1.1)
IVRT: 87.02 MSEC
LEFT ATRIUM SIZE: 3.15 CM
LEFT INTERNAL DIMENSION IN SYSTOLE: 2.89 CM (ref 2.1–4)
LEFT VENTRICLE MASS INDEX: 81 G/M2
LEFT VENTRICULAR INTERNAL DIMENSION IN DIASTOLE: 4.04 CM (ref 3.5–6)
LEFT VENTRICULAR MASS: 127.25 G
LV LATERAL E/E' RATIO: 12.33 M/S
LV SEPTAL E/E' RATIO: 18.5 M/S
MV PEAK A VEL: 1.28 M/S
MV PEAK E VEL: 1.11 M/S
PISA TR MAX VEL: 2.44 M/S
PV PEAK VELOCITY: 89.96 CM/S
RA PRESSURE: 3 MMHG
RIGHT VENTRICULAR END-DIASTOLIC DIMENSION: 184 CM
TDI LATERAL: 0.09 M/S
TDI SEPTAL: 0.06 M/S
TDI: 0.08 M/S
TR MAX PG: 24 MMHG
TV REST PULMONARY ARTERY PRESSURE: 27 MMHG

## 2021-06-10 ENCOUNTER — TELEPHONE (OUTPATIENT)
Dept: FAMILY MEDICINE | Facility: CLINIC | Age: 86
End: 2021-06-10

## 2021-06-13 PROBLEM — R42 INTERMITTENT LIGHTHEADEDNESS: Status: ACTIVE | Noted: 2021-06-13

## 2021-06-13 LAB
OHS CV EVENT MONITOR DAY: 0
OHS CV HOLTER LENGTH DECIMAL HOURS: 20
OHS CV HOLTER LENGTH HOURS: 20
OHS CV HOLTER LENGTH MINUTES: 0

## 2021-06-14 ENCOUNTER — LAB VISIT (OUTPATIENT)
Dept: LAB | Facility: HOSPITAL | Age: 86
End: 2021-06-14
Attending: PHYSICIAN ASSISTANT
Payer: MEDICARE

## 2021-06-14 ENCOUNTER — TELEPHONE (OUTPATIENT)
Dept: CARDIOLOGY | Facility: CLINIC | Age: 86
End: 2021-06-14

## 2021-06-14 ENCOUNTER — OFFICE VISIT (OUTPATIENT)
Dept: GASTROENTEROLOGY | Facility: CLINIC | Age: 86
End: 2021-06-14
Payer: MEDICARE

## 2021-06-14 ENCOUNTER — TELEPHONE (OUTPATIENT)
Dept: GASTROENTEROLOGY | Facility: CLINIC | Age: 86
End: 2021-06-14

## 2021-06-14 VITALS — HEIGHT: 60 IN | BODY MASS INDEX: 26.15 KG/M2 | WEIGHT: 133.19 LBS

## 2021-06-14 DIAGNOSIS — K22.70 BARRETT'S ESOPHAGUS WITHOUT DYSPLASIA: ICD-10-CM

## 2021-06-14 DIAGNOSIS — D50.9 IRON DEFICIENCY ANEMIA, UNSPECIFIED IRON DEFICIENCY ANEMIA TYPE: ICD-10-CM

## 2021-06-14 DIAGNOSIS — Z79.01 CURRENT USE OF ANTICOAGULANT THERAPY: ICD-10-CM

## 2021-06-14 DIAGNOSIS — I25.10 CORONARY ARTERY DISEASE INVOLVING NATIVE CORONARY ARTERY OF NATIVE HEART WITHOUT ANGINA PECTORIS: ICD-10-CM

## 2021-06-14 DIAGNOSIS — D50.9 IRON DEFICIENCY ANEMIA, UNSPECIFIED IRON DEFICIENCY ANEMIA TYPE: Primary | ICD-10-CM

## 2021-06-14 DIAGNOSIS — R94.31 ABNORMAL EKG: Primary | ICD-10-CM

## 2021-06-14 DIAGNOSIS — Z86.010 HISTORY OF COLON POLYPS: ICD-10-CM

## 2021-06-14 LAB
BASOPHILS # BLD AUTO: 0.06 K/UL (ref 0–0.2)
BASOPHILS NFR BLD: 0.7 % (ref 0–1.9)
DIFFERENTIAL METHOD: ABNORMAL
EOSINOPHIL # BLD AUTO: 0.2 K/UL (ref 0–0.5)
EOSINOPHIL NFR BLD: 1.8 % (ref 0–8)
ERYTHROCYTE [DISTWIDTH] IN BLOOD BY AUTOMATED COUNT: 26.5 % (ref 11.5–14.5)
HCT VFR BLD AUTO: 35.9 % (ref 37–48.5)
HGB BLD-MCNC: 10.1 G/DL (ref 12–16)
IMM GRANULOCYTES # BLD AUTO: 0.01 K/UL (ref 0–0.04)
IMM GRANULOCYTES NFR BLD AUTO: 0.1 % (ref 0–0.5)
LYMPHOCYTES # BLD AUTO: 2.4 K/UL (ref 1–4.8)
LYMPHOCYTES NFR BLD: 28.2 % (ref 18–48)
MCH RBC QN AUTO: 21 PG (ref 27–31)
MCHC RBC AUTO-ENTMCNC: 28.1 G/DL (ref 32–36)
MCV RBC AUTO: 75 FL (ref 82–98)
MONOCYTES # BLD AUTO: 0.9 K/UL (ref 0.3–1)
MONOCYTES NFR BLD: 10.3 % (ref 4–15)
NEUTROPHILS # BLD AUTO: 5 K/UL (ref 1.8–7.7)
NEUTROPHILS NFR BLD: 58.9 % (ref 38–73)
NRBC BLD-RTO: 0 /100 WBC
PLATELET # BLD AUTO: 358 K/UL (ref 150–450)
PMV BLD AUTO: 10.2 FL (ref 9.2–12.9)
RBC # BLD AUTO: 4.82 M/UL (ref 4–5.4)
WBC # BLD AUTO: 8.52 K/UL (ref 3.9–12.7)

## 2021-06-14 PROCEDURE — 85025 COMPLETE CBC W/AUTO DIFF WBC: CPT | Performed by: PHYSICIAN ASSISTANT

## 2021-06-14 PROCEDURE — 99999 PR PBB SHADOW E&M-EST. PATIENT-LVL IV: ICD-10-PCS | Mod: PBBFAC,,, | Performed by: NURSE PRACTITIONER

## 2021-06-14 PROCEDURE — 36415 COLL VENOUS BLD VENIPUNCTURE: CPT | Mod: PO | Performed by: PHYSICIAN ASSISTANT

## 2021-06-14 PROCEDURE — 99214 OFFICE O/P EST MOD 30 MIN: CPT | Mod: PBBFAC,PN | Performed by: NURSE PRACTITIONER

## 2021-06-14 PROCEDURE — 99214 OFFICE O/P EST MOD 30 MIN: CPT | Mod: S$PBB,,, | Performed by: NURSE PRACTITIONER

## 2021-06-14 PROCEDURE — 99999 PR PBB SHADOW E&M-EST. PATIENT-LVL IV: CPT | Mod: PBBFAC,,, | Performed by: NURSE PRACTITIONER

## 2021-06-14 PROCEDURE — 99214 PR OFFICE/OUTPT VISIT, EST, LEVL IV, 30-39 MIN: ICD-10-PCS | Mod: S$PBB,,, | Performed by: NURSE PRACTITIONER

## 2021-06-14 RX ORDER — FERROUS SULFATE 325(65) MG
325 TABLET ORAL
COMMUNITY

## 2021-06-14 RX ORDER — PANTOPRAZOLE SODIUM 40 MG/1
40 TABLET, DELAYED RELEASE ORAL DAILY
Qty: 90 TABLET | Refills: 3 | Status: SHIPPED | OUTPATIENT
Start: 2021-06-14 | End: 2021-12-28 | Stop reason: SDUPTHER

## 2021-06-15 ENCOUNTER — TELEPHONE (OUTPATIENT)
Dept: GASTROENTEROLOGY | Facility: CLINIC | Age: 86
End: 2021-06-15

## 2021-06-28 ENCOUNTER — OFFICE VISIT (OUTPATIENT)
Dept: FAMILY MEDICINE | Facility: CLINIC | Age: 86
End: 2021-06-28
Attending: FAMILY MEDICINE
Payer: MEDICARE

## 2021-06-28 VITALS
TEMPERATURE: 98 F | HEART RATE: 99 BPM | WEIGHT: 133.38 LBS | BODY MASS INDEX: 26.19 KG/M2 | HEIGHT: 60 IN | OXYGEN SATURATION: 97 % | SYSTOLIC BLOOD PRESSURE: 124 MMHG | DIASTOLIC BLOOD PRESSURE: 70 MMHG

## 2021-06-28 DIAGNOSIS — I95.1 ORTHOSTATIC HYPOTENSION: Primary | ICD-10-CM

## 2021-06-28 DIAGNOSIS — D50.9 IRON DEFICIENCY ANEMIA, UNSPECIFIED IRON DEFICIENCY ANEMIA TYPE: ICD-10-CM

## 2021-06-28 PROCEDURE — 99212 OFFICE O/P EST SF 10 MIN: CPT | Mod: S$PBB,,, | Performed by: FAMILY MEDICINE

## 2021-06-28 PROCEDURE — 99999 PR PBB SHADOW E&M-EST. PATIENT-LVL V: ICD-10-PCS | Mod: PBBFAC,,, | Performed by: FAMILY MEDICINE

## 2021-06-28 PROCEDURE — 99215 OFFICE O/P EST HI 40 MIN: CPT | Mod: PBBFAC,PO | Performed by: FAMILY MEDICINE

## 2021-06-28 PROCEDURE — 99999 PR PBB SHADOW E&M-EST. PATIENT-LVL V: CPT | Mod: PBBFAC,,, | Performed by: FAMILY MEDICINE

## 2021-06-28 PROCEDURE — 99212 PR OFFICE/OUTPT VISIT, EST, LEVL II, 10-19 MIN: ICD-10-PCS | Mod: S$PBB,,, | Performed by: FAMILY MEDICINE

## 2021-06-29 ENCOUNTER — LAB VISIT (OUTPATIENT)
Dept: LAB | Facility: HOSPITAL | Age: 86
End: 2021-06-29
Attending: FAMILY MEDICINE
Payer: MEDICARE

## 2021-06-29 DIAGNOSIS — E11.59 TYPE 2 DIABETES MELLITUS WITH OTHER CIRCULATORY COMPLICATION, WITHOUT LONG-TERM CURRENT USE OF INSULIN: ICD-10-CM

## 2021-06-29 LAB
ANION GAP SERPL CALC-SCNC: 10 MMOL/L (ref 8–16)
BUN SERPL-MCNC: 17 MG/DL (ref 8–23)
CALCIUM SERPL-MCNC: 9.9 MG/DL (ref 8.7–10.5)
CHLORIDE SERPL-SCNC: 103 MMOL/L (ref 95–110)
CO2 SERPL-SCNC: 24 MMOL/L (ref 23–29)
CREAT SERPL-MCNC: 0.8 MG/DL (ref 0.5–1.4)
EST. GFR  (AFRICAN AMERICAN): >60 ML/MIN/1.73 M^2
EST. GFR  (NON AFRICAN AMERICAN): >60 ML/MIN/1.73 M^2
ESTIMATED AVG GLUCOSE: 108 MG/DL (ref 68–131)
GLUCOSE SERPL-MCNC: 104 MG/DL (ref 70–110)
HBA1C MFR BLD: 5.4 % (ref 4–5.6)
POTASSIUM SERPL-SCNC: 4.5 MMOL/L (ref 3.5–5.1)
SODIUM SERPL-SCNC: 137 MMOL/L (ref 136–145)

## 2021-06-29 PROCEDURE — 36415 COLL VENOUS BLD VENIPUNCTURE: CPT | Mod: PO | Performed by: FAMILY MEDICINE

## 2021-06-29 PROCEDURE — 80048 BASIC METABOLIC PNL TOTAL CA: CPT | Performed by: FAMILY MEDICINE

## 2021-06-29 PROCEDURE — 83036 HEMOGLOBIN GLYCOSYLATED A1C: CPT | Performed by: FAMILY MEDICINE

## 2021-06-30 ENCOUNTER — EXTERNAL CHRONIC CARE MANAGEMENT (OUTPATIENT)
Dept: PRIMARY CARE CLINIC | Facility: CLINIC | Age: 86
End: 2021-06-30
Payer: MEDICARE

## 2021-06-30 PROCEDURE — 99490 CHRNC CARE MGMT STAFF 1ST 20: CPT | Mod: S$PBB,,, | Performed by: FAMILY MEDICINE

## 2021-06-30 PROCEDURE — 99490 CHRNC CARE MGMT STAFF 1ST 20: CPT | Mod: PBBFAC,PO | Performed by: FAMILY MEDICINE

## 2021-06-30 PROCEDURE — 99490 PR CHRONIC CARE MGMT, 1ST 20 MIN: ICD-10-PCS | Mod: S$PBB,,, | Performed by: FAMILY MEDICINE

## 2021-07-01 ENCOUNTER — TELEPHONE (OUTPATIENT)
Dept: FAMILY MEDICINE | Facility: CLINIC | Age: 86
End: 2021-07-01

## 2021-07-14 DIAGNOSIS — I10 ESSENTIAL HYPERTENSION: ICD-10-CM

## 2021-07-16 RX ORDER — LOSARTAN POTASSIUM 50 MG/1
25 TABLET ORAL DAILY
Qty: 45 TABLET | Refills: 0 | Status: SHIPPED | OUTPATIENT
Start: 2021-07-16 | End: 2021-10-12

## 2021-07-29 ENCOUNTER — TELEPHONE (OUTPATIENT)
Dept: FAMILY MEDICINE | Facility: CLINIC | Age: 86
End: 2021-07-29

## 2021-07-29 DIAGNOSIS — E11.59 TYPE 2 DIABETES MELLITUS WITH OTHER CIRCULATORY COMPLICATION, WITHOUT LONG-TERM CURRENT USE OF INSULIN: Primary | ICD-10-CM

## 2021-07-29 RX ORDER — HYDROCORTISONE ACETATE PRAMOXINE HCL 2.5; 1 G/100G; G/100G
CREAM TOPICAL 3 TIMES DAILY
Qty: 30 G | Refills: 1 | Status: SHIPPED | OUTPATIENT
Start: 2021-07-29 | End: 2022-03-22

## 2021-07-31 ENCOUNTER — EXTERNAL CHRONIC CARE MANAGEMENT (OUTPATIENT)
Dept: PRIMARY CARE CLINIC | Facility: CLINIC | Age: 86
End: 2021-07-31
Payer: MEDICARE

## 2021-07-31 PROCEDURE — 99490 CHRNC CARE MGMT STAFF 1ST 20: CPT | Mod: S$PBB,,, | Performed by: FAMILY MEDICINE

## 2021-07-31 PROCEDURE — 99490 PR CHRONIC CARE MGMT, 1ST 20 MIN: ICD-10-PCS | Mod: S$PBB,,, | Performed by: FAMILY MEDICINE

## 2021-07-31 PROCEDURE — 99490 CHRNC CARE MGMT STAFF 1ST 20: CPT | Mod: PBBFAC,PO | Performed by: FAMILY MEDICINE

## 2021-08-03 ENCOUNTER — TELEPHONE (OUTPATIENT)
Dept: GASTROENTEROLOGY | Facility: CLINIC | Age: 86
End: 2021-08-03

## 2021-08-09 ENCOUNTER — TELEPHONE (OUTPATIENT)
Dept: FAMILY MEDICINE | Facility: CLINIC | Age: 86
End: 2021-08-09

## 2021-08-09 ENCOUNTER — LAB VISIT (OUTPATIENT)
Dept: LAB | Facility: HOSPITAL | Age: 86
End: 2021-08-09
Attending: FAMILY MEDICINE
Payer: MEDICARE

## 2021-08-09 DIAGNOSIS — R35.0 URINARY FREQUENCY: Primary | ICD-10-CM

## 2021-08-09 DIAGNOSIS — R35.0 URINARY FREQUENCY: ICD-10-CM

## 2021-08-09 PROCEDURE — 87088 URINE BACTERIA CULTURE: CPT | Performed by: FAMILY MEDICINE

## 2021-08-09 PROCEDURE — 87186 SC STD MICRODIL/AGAR DIL: CPT | Performed by: FAMILY MEDICINE

## 2021-08-09 PROCEDURE — 87086 URINE CULTURE/COLONY COUNT: CPT | Performed by: FAMILY MEDICINE

## 2021-08-09 PROCEDURE — 87077 CULTURE AEROBIC IDENTIFY: CPT | Performed by: FAMILY MEDICINE

## 2021-08-09 PROCEDURE — 81000 URINALYSIS NONAUTO W/SCOPE: CPT | Mod: PO | Performed by: FAMILY MEDICINE

## 2021-08-10 DIAGNOSIS — N30.01 ACUTE CYSTITIS WITH HEMATURIA: Primary | ICD-10-CM

## 2021-08-10 LAB
BACTERIA #/AREA URNS AUTO: ABNORMAL /HPF
BILIRUB UR QL STRIP: NEGATIVE
CLARITY UR: ABNORMAL
COLOR UR: YELLOW
GLUCOSE UR QL STRIP: NEGATIVE
HGB UR QL STRIP: ABNORMAL
KETONES UR QL STRIP: NEGATIVE
LEUKOCYTE ESTERASE UR QL STRIP: ABNORMAL
MICROSCOPIC COMMENT: ABNORMAL
NITRITE UR QL STRIP: NEGATIVE
PH UR STRIP: 6 [PH] (ref 5–8)
PROT UR QL STRIP: ABNORMAL
RBC #/AREA URNS AUTO: 10 /HPF (ref 0–4)
SP GR UR STRIP: 1.01 (ref 1–1.03)
SQUAMOUS #/AREA URNS AUTO: 3 /HPF
URN SPEC COLLECT METH UR: ABNORMAL
WBC #/AREA URNS AUTO: >100 /HPF (ref 0–5)
WBC CLUMPS UR QL AUTO: ABNORMAL

## 2021-08-10 RX ORDER — AMOXICILLIN AND CLAVULANATE POTASSIUM 500; 125 MG/1; MG/1
1 TABLET, FILM COATED ORAL 2 TIMES DAILY
Qty: 14 TABLET | Refills: 0 | Status: SHIPPED | OUTPATIENT
Start: 2021-08-10 | End: 2021-08-17

## 2021-08-12 LAB — BACTERIA UR CULT: ABNORMAL

## 2021-08-13 ENCOUNTER — TELEPHONE (OUTPATIENT)
Dept: FAMILY MEDICINE | Facility: CLINIC | Age: 86
End: 2021-08-13

## 2021-08-23 ENCOUNTER — PATIENT OUTREACH (OUTPATIENT)
Dept: ADMINISTRATIVE | Facility: HOSPITAL | Age: 86
End: 2021-08-23

## 2021-08-23 DIAGNOSIS — E11.9 TYPE 2 DIABETES MELLITUS WITHOUT COMPLICATION, WITHOUT LONG-TERM CURRENT USE OF INSULIN: Primary | ICD-10-CM

## 2021-08-31 ENCOUNTER — EXTERNAL CHRONIC CARE MANAGEMENT (OUTPATIENT)
Dept: PRIMARY CARE CLINIC | Facility: CLINIC | Age: 86
End: 2021-08-31
Payer: MEDICARE

## 2021-08-31 PROCEDURE — 99490 CHRNC CARE MGMT STAFF 1ST 20: CPT | Mod: PBBFAC,PO | Performed by: FAMILY MEDICINE

## 2021-08-31 PROCEDURE — 99490 CHRNC CARE MGMT STAFF 1ST 20: CPT | Mod: S$PBB,,, | Performed by: FAMILY MEDICINE

## 2021-08-31 PROCEDURE — 99490 PR CHRONIC CARE MGMT, 1ST 20 MIN: ICD-10-PCS | Mod: S$PBB,,, | Performed by: FAMILY MEDICINE

## 2021-09-10 DIAGNOSIS — I10 HYPERTENSION, ESSENTIAL: ICD-10-CM

## 2021-09-12 RX ORDER — METOPROLOL SUCCINATE 50 MG/1
50 TABLET, EXTENDED RELEASE ORAL DAILY
Qty: 90 TABLET | Refills: 3 | Status: SHIPPED | OUTPATIENT
Start: 2021-09-12 | End: 2022-08-22

## 2021-09-27 ENCOUNTER — OFFICE VISIT (OUTPATIENT)
Dept: CARDIOLOGY | Facility: CLINIC | Age: 86
End: 2021-09-27
Payer: MEDICARE

## 2021-09-27 VITALS
SYSTOLIC BLOOD PRESSURE: 148 MMHG | HEART RATE: 92 BPM | OXYGEN SATURATION: 97 % | DIASTOLIC BLOOD PRESSURE: 88 MMHG | HEIGHT: 63 IN | WEIGHT: 128 LBS | BODY MASS INDEX: 22.68 KG/M2

## 2021-09-27 DIAGNOSIS — I25.10 CORONARY ARTERY DISEASE INVOLVING NATIVE CORONARY ARTERY OF NATIVE HEART WITHOUT ANGINA PECTORIS: ICD-10-CM

## 2021-09-27 DIAGNOSIS — R42 INTERMITTENT LIGHTHEADEDNESS: ICD-10-CM

## 2021-09-27 DIAGNOSIS — E11.69 HYPERLIPIDEMIA ASSOCIATED WITH TYPE 2 DIABETES MELLITUS: ICD-10-CM

## 2021-09-27 DIAGNOSIS — I15.2 HYPERTENSION ASSOCIATED WITH DIABETES: ICD-10-CM

## 2021-09-27 DIAGNOSIS — E11.59 HYPERTENSION ASSOCIATED WITH DIABETES: ICD-10-CM

## 2021-09-27 DIAGNOSIS — E78.5 HYPERLIPIDEMIA ASSOCIATED WITH TYPE 2 DIABETES MELLITUS: ICD-10-CM

## 2021-09-27 PROCEDURE — 99214 OFFICE O/P EST MOD 30 MIN: CPT | Mod: S$GLB,,, | Performed by: NURSE PRACTITIONER

## 2021-09-27 PROCEDURE — 99214 PR OFFICE/OUTPT VISIT, EST, LEVL IV, 30-39 MIN: ICD-10-PCS | Mod: S$GLB,,, | Performed by: NURSE PRACTITIONER

## 2021-09-30 ENCOUNTER — EXTERNAL CHRONIC CARE MANAGEMENT (OUTPATIENT)
Dept: PRIMARY CARE CLINIC | Facility: CLINIC | Age: 86
End: 2021-09-30
Payer: MEDICARE

## 2021-09-30 PROCEDURE — 99490 CHRNC CARE MGMT STAFF 1ST 20: CPT | Mod: S$PBB,,, | Performed by: FAMILY MEDICINE

## 2021-09-30 PROCEDURE — 99490 CHRNC CARE MGMT STAFF 1ST 20: CPT | Mod: PBBFAC,PO | Performed by: FAMILY MEDICINE

## 2021-09-30 PROCEDURE — 99490 PR CHRONIC CARE MGMT, 1ST 20 MIN: ICD-10-PCS | Mod: S$PBB,,, | Performed by: FAMILY MEDICINE

## 2021-10-04 DIAGNOSIS — E11.9 CONTROLLED TYPE 2 DIABETES MELLITUS WITHOUT COMPLICATION, WITHOUT LONG-TERM CURRENT USE OF INSULIN: ICD-10-CM

## 2021-10-05 RX ORDER — METFORMIN HYDROCHLORIDE 500 MG/1
TABLET, EXTENDED RELEASE ORAL
Qty: 90 TABLET | Refills: 0 | Status: SHIPPED | OUTPATIENT
Start: 2021-10-05 | End: 2022-01-03

## 2021-10-12 DIAGNOSIS — I10 ESSENTIAL HYPERTENSION: ICD-10-CM

## 2021-10-12 RX ORDER — LOSARTAN POTASSIUM 25 MG/1
25 TABLET ORAL DAILY
Qty: 90 TABLET | Refills: 1 | Status: SHIPPED | OUTPATIENT
Start: 2021-10-12 | End: 2022-03-24

## 2021-10-20 NOTE — TELEPHONE ENCOUNTER
All of the lab that Daxa ordered on January 20 and booked in July is due now.  I need the results.   [facility] to IP Report (EDIP)    Mental Status     Alert and oriented    Safety     Fall Risk    Tele  Yes    Oxygen Needs  room air    Mobility    Independent    Protocols  None    ISAR          Isolation  None    Additional Information:   COVID test in process

## 2021-10-31 ENCOUNTER — EXTERNAL CHRONIC CARE MANAGEMENT (OUTPATIENT)
Dept: PRIMARY CARE CLINIC | Facility: CLINIC | Age: 86
End: 2021-10-31
Payer: MEDICARE

## 2021-10-31 PROCEDURE — 99490 PR CHRONIC CARE MGMT, 1ST 20 MIN: ICD-10-PCS | Mod: S$PBB,,, | Performed by: FAMILY MEDICINE

## 2021-10-31 PROCEDURE — 99490 CHRNC CARE MGMT STAFF 1ST 20: CPT | Mod: S$PBB,,, | Performed by: FAMILY MEDICINE

## 2021-10-31 PROCEDURE — 99490 CHRNC CARE MGMT STAFF 1ST 20: CPT | Mod: PBBFAC,PO | Performed by: FAMILY MEDICINE

## 2021-11-17 RX ORDER — CLOPIDOGREL BISULFATE 75 MG/1
75 TABLET ORAL DAILY
Qty: 90 TABLET | Refills: 0 | Status: SHIPPED | OUTPATIENT
Start: 2021-11-17 | End: 2022-01-31 | Stop reason: SDUPTHER

## 2021-11-24 ENCOUNTER — PATIENT OUTREACH (OUTPATIENT)
Dept: ADMINISTRATIVE | Facility: OTHER | Age: 86
End: 2021-11-24
Payer: MEDICARE

## 2021-11-29 ENCOUNTER — OFFICE VISIT (OUTPATIENT)
Dept: UROLOGY | Facility: CLINIC | Age: 86
End: 2021-11-29
Payer: MEDICARE

## 2021-11-29 VITALS
DIASTOLIC BLOOD PRESSURE: 89 MMHG | SYSTOLIC BLOOD PRESSURE: 144 MMHG | HEART RATE: 122 BPM | BODY MASS INDEX: 22.69 KG/M2 | RESPIRATION RATE: 18 BRPM | HEIGHT: 63 IN | WEIGHT: 128.06 LBS

## 2021-11-29 DIAGNOSIS — I25.10 CORONARY ARTERY DISEASE INVOLVING NATIVE CORONARY ARTERY OF NATIVE HEART WITHOUT ANGINA PECTORIS: ICD-10-CM

## 2021-11-29 DIAGNOSIS — Z86.711 HISTORY OF PULMONARY EMBOLISM: ICD-10-CM

## 2021-11-29 DIAGNOSIS — E78.5 HYPERLIPIDEMIA ASSOCIATED WITH TYPE 2 DIABETES MELLITUS: ICD-10-CM

## 2021-11-29 DIAGNOSIS — I15.2 HYPERTENSION ASSOCIATED WITH DIABETES: ICD-10-CM

## 2021-11-29 DIAGNOSIS — Z95.5 S/P DRUG ELUTING CORONARY STENT PLACEMENT: ICD-10-CM

## 2021-11-29 DIAGNOSIS — E11.59 TYPE 2 DIABETES MELLITUS WITH OTHER CIRCULATORY COMPLICATION, WITHOUT LONG-TERM CURRENT USE OF INSULIN: ICD-10-CM

## 2021-11-29 DIAGNOSIS — E11.69 HYPERLIPIDEMIA ASSOCIATED WITH TYPE 2 DIABETES MELLITUS: ICD-10-CM

## 2021-11-29 DIAGNOSIS — E11.59 HYPERTENSION ASSOCIATED WITH DIABETES: ICD-10-CM

## 2021-11-29 DIAGNOSIS — R33.9 INCOMPLETE BLADDER EMPTYING: Primary | ICD-10-CM

## 2021-11-29 DIAGNOSIS — N39.0 RECURRENT UTI: ICD-10-CM

## 2021-11-29 LAB — POC RESIDUAL URINE VOLUME: 305 ML (ref 0–100)

## 2021-11-29 PROCEDURE — 99214 PR OFFICE/OUTPT VISIT, EST, LEVL IV, 30-39 MIN: ICD-10-PCS | Mod: S$PBB,,, | Performed by: STUDENT IN AN ORGANIZED HEALTH CARE EDUCATION/TRAINING PROGRAM

## 2021-11-29 PROCEDURE — 99213 OFFICE O/P EST LOW 20 MIN: CPT | Mod: PBBFAC,PN | Performed by: STUDENT IN AN ORGANIZED HEALTH CARE EDUCATION/TRAINING PROGRAM

## 2021-11-29 PROCEDURE — 99999 PR PBB SHADOW E&M-EST. PATIENT-LVL III: ICD-10-PCS | Mod: PBBFAC,,, | Performed by: STUDENT IN AN ORGANIZED HEALTH CARE EDUCATION/TRAINING PROGRAM

## 2021-11-29 PROCEDURE — 99214 OFFICE O/P EST MOD 30 MIN: CPT | Mod: S$PBB,,, | Performed by: STUDENT IN AN ORGANIZED HEALTH CARE EDUCATION/TRAINING PROGRAM

## 2021-11-29 PROCEDURE — 51798 US URINE CAPACITY MEASURE: CPT | Mod: PBBFAC,PN | Performed by: STUDENT IN AN ORGANIZED HEALTH CARE EDUCATION/TRAINING PROGRAM

## 2021-11-29 PROCEDURE — 99999 PR PBB SHADOW E&M-EST. PATIENT-LVL III: CPT | Mod: PBBFAC,,, | Performed by: STUDENT IN AN ORGANIZED HEALTH CARE EDUCATION/TRAINING PROGRAM

## 2021-11-29 RX ORDER — ESTRADIOL 0.1 MG/G
1 CREAM VAGINAL
Qty: 8 G | Refills: 11 | Status: SHIPPED | OUTPATIENT
Start: 2021-11-29 | End: 2022-05-10

## 2021-11-30 ENCOUNTER — EXTERNAL CHRONIC CARE MANAGEMENT (OUTPATIENT)
Dept: PRIMARY CARE CLINIC | Facility: CLINIC | Age: 86
End: 2021-11-30
Payer: MEDICARE

## 2021-11-30 PROCEDURE — 99490 CHRNC CARE MGMT STAFF 1ST 20: CPT | Mod: S$PBB,,, | Performed by: FAMILY MEDICINE

## 2021-11-30 PROCEDURE — 99490 PR CHRONIC CARE MGMT, 1ST 20 MIN: ICD-10-PCS | Mod: S$PBB,,, | Performed by: FAMILY MEDICINE

## 2021-11-30 PROCEDURE — 99490 CHRNC CARE MGMT STAFF 1ST 20: CPT | Mod: PBBFAC,PO | Performed by: FAMILY MEDICINE

## 2021-12-28 ENCOUNTER — OFFICE VISIT (OUTPATIENT)
Dept: FAMILY MEDICINE | Facility: CLINIC | Age: 86
End: 2021-12-28
Attending: FAMILY MEDICINE
Payer: MEDICARE

## 2021-12-28 VITALS
BODY MASS INDEX: 22.64 KG/M2 | DIASTOLIC BLOOD PRESSURE: 78 MMHG | OXYGEN SATURATION: 97 % | WEIGHT: 127.75 LBS | HEIGHT: 63 IN | HEART RATE: 85 BPM | RESPIRATION RATE: 18 BRPM | SYSTOLIC BLOOD PRESSURE: 120 MMHG | TEMPERATURE: 98 F

## 2021-12-28 DIAGNOSIS — E11.59 TYPE 2 DIABETES MELLITUS WITH OTHER CIRCULATORY COMPLICATION, WITHOUT LONG-TERM CURRENT USE OF INSULIN: ICD-10-CM

## 2021-12-28 DIAGNOSIS — E78.5 HYPERLIPIDEMIA ASSOCIATED WITH TYPE 2 DIABETES MELLITUS: ICD-10-CM

## 2021-12-28 DIAGNOSIS — E11.69 HYPERLIPIDEMIA ASSOCIATED WITH TYPE 2 DIABETES MELLITUS: ICD-10-CM

## 2021-12-28 DIAGNOSIS — Z95.5 S/P DRUG ELUTING CORONARY STENT PLACEMENT: ICD-10-CM

## 2021-12-28 DIAGNOSIS — I25.10 CORONARY ARTERY DISEASE INVOLVING NATIVE CORONARY ARTERY OF NATIVE HEART WITHOUT ANGINA PECTORIS: ICD-10-CM

## 2021-12-28 DIAGNOSIS — E11.59 HYPERTENSION ASSOCIATED WITH DIABETES: Primary | ICD-10-CM

## 2021-12-28 DIAGNOSIS — K22.70 BARRETT'S ESOPHAGUS WITHOUT DYSPLASIA: ICD-10-CM

## 2021-12-28 DIAGNOSIS — I15.2 HYPERTENSION ASSOCIATED WITH DIABETES: Primary | ICD-10-CM

## 2021-12-28 PROCEDURE — 99999 PR PBB SHADOW E&M-EST. PATIENT-LVL V: ICD-10-PCS | Mod: PBBFAC,,, | Performed by: FAMILY MEDICINE

## 2021-12-28 PROCEDURE — 99214 OFFICE O/P EST MOD 30 MIN: CPT | Mod: S$PBB,,, | Performed by: FAMILY MEDICINE

## 2021-12-28 PROCEDURE — 99214 PR OFFICE/OUTPT VISIT, EST, LEVL IV, 30-39 MIN: ICD-10-PCS | Mod: S$PBB,,, | Performed by: FAMILY MEDICINE

## 2021-12-28 PROCEDURE — 99215 OFFICE O/P EST HI 40 MIN: CPT | Mod: PBBFAC,PN | Performed by: FAMILY MEDICINE

## 2021-12-28 PROCEDURE — 99999 PR PBB SHADOW E&M-EST. PATIENT-LVL V: CPT | Mod: PBBFAC,,, | Performed by: FAMILY MEDICINE

## 2021-12-28 RX ORDER — PANTOPRAZOLE SODIUM 40 MG/1
40 TABLET, DELAYED RELEASE ORAL DAILY
Qty: 90 TABLET | Refills: 3 | Status: SHIPPED | OUTPATIENT
Start: 2021-12-28 | End: 2023-01-06 | Stop reason: SDUPTHER

## 2021-12-31 ENCOUNTER — EXTERNAL CHRONIC CARE MANAGEMENT (OUTPATIENT)
Dept: PRIMARY CARE CLINIC | Facility: CLINIC | Age: 86
End: 2021-12-31
Payer: MEDICARE

## 2021-12-31 PROCEDURE — 99490 CHRNC CARE MGMT STAFF 1ST 20: CPT | Mod: PBBFAC,PO | Performed by: FAMILY MEDICINE

## 2021-12-31 PROCEDURE — 99490 CHRNC CARE MGMT STAFF 1ST 20: CPT | Mod: S$PBB,,, | Performed by: FAMILY MEDICINE

## 2021-12-31 PROCEDURE — 99490 PR CHRONIC CARE MGMT, 1ST 20 MIN: ICD-10-PCS | Mod: S$PBB,,, | Performed by: FAMILY MEDICINE

## 2022-01-31 ENCOUNTER — EXTERNAL CHRONIC CARE MANAGEMENT (OUTPATIENT)
Dept: PRIMARY CARE CLINIC | Facility: CLINIC | Age: 87
End: 2022-01-31
Payer: MEDICARE

## 2022-01-31 PROCEDURE — 99490 PR CHRONIC CARE MGMT, 1ST 20 MIN: ICD-10-PCS | Mod: S$PBB,,, | Performed by: FAMILY MEDICINE

## 2022-01-31 PROCEDURE — 99490 CHRNC CARE MGMT STAFF 1ST 20: CPT | Mod: PBBFAC,PO | Performed by: FAMILY MEDICINE

## 2022-01-31 PROCEDURE — 99490 CHRNC CARE MGMT STAFF 1ST 20: CPT | Mod: S$PBB,,, | Performed by: FAMILY MEDICINE

## 2022-02-01 RX ORDER — CLOPIDOGREL BISULFATE 75 MG/1
75 TABLET ORAL DAILY
Qty: 90 TABLET | Refills: 0 | Status: SHIPPED | OUTPATIENT
Start: 2022-02-01 | End: 2022-05-02

## 2022-02-14 LAB
LEFT EYE DM RETINOPATHY: NEGATIVE
RIGHT EYE DM RETINOPATHY: NEGATIVE

## 2022-02-18 ENCOUNTER — PATIENT OUTREACH (OUTPATIENT)
Dept: ADMINISTRATIVE | Facility: HOSPITAL | Age: 87
End: 2022-02-18
Payer: MEDICARE

## 2022-02-23 ENCOUNTER — TELEPHONE (OUTPATIENT)
Dept: UROLOGY | Facility: CLINIC | Age: 87
End: 2022-02-23
Payer: MEDICARE

## 2022-02-23 NOTE — TELEPHONE ENCOUNTER
----- Message from Abbey Akhtar sent at 2/23/2022  4:12 PM CST -----  Contact: Patient  Type:  Patient Returning Call    Who Called: Patient    Who Left Message for Patient: Mel     Does the patient know what this is regarding?: no    Would the patient rather a call back or a response via [facility]?  [facility]    Best Call Back Number: [phone] (home)     Additional Information:

## 2022-02-28 ENCOUNTER — LAB VISIT (OUTPATIENT)
Dept: LAB | Facility: HOSPITAL | Age: 87
End: 2022-02-28
Attending: FAMILY MEDICINE
Payer: MEDICARE

## 2022-02-28 ENCOUNTER — EXTERNAL CHRONIC CARE MANAGEMENT (OUTPATIENT)
Dept: PRIMARY CARE CLINIC | Facility: CLINIC | Age: 87
End: 2022-02-28
Payer: MEDICARE

## 2022-02-28 DIAGNOSIS — I15.2 HYPERTENSION ASSOCIATED WITH DIABETES: ICD-10-CM

## 2022-02-28 DIAGNOSIS — E11.59 TYPE 2 DIABETES MELLITUS WITH OTHER CIRCULATORY COMPLICATION, WITHOUT LONG-TERM CURRENT USE OF INSULIN: ICD-10-CM

## 2022-02-28 DIAGNOSIS — E11.59 HYPERTENSION ASSOCIATED WITH DIABETES: ICD-10-CM

## 2022-02-28 DIAGNOSIS — E11.69 HYPERLIPIDEMIA ASSOCIATED WITH TYPE 2 DIABETES MELLITUS: ICD-10-CM

## 2022-02-28 DIAGNOSIS — E78.5 HYPERLIPIDEMIA ASSOCIATED WITH TYPE 2 DIABETES MELLITUS: ICD-10-CM

## 2022-02-28 LAB
ALBUMIN SERPL BCP-MCNC: 3.9 G/DL (ref 3.5–5.2)
ALP SERPL-CCNC: 98 U/L (ref 55–135)
ALT SERPL W/O P-5'-P-CCNC: 21 U/L (ref 10–44)
ANION GAP SERPL CALC-SCNC: 14 MMOL/L (ref 8–16)
AST SERPL-CCNC: 24 U/L (ref 10–40)
BILIRUB SERPL-MCNC: 1 MG/DL (ref 0.1–1)
BUN SERPL-MCNC: 13 MG/DL (ref 8–23)
CALCIUM SERPL-MCNC: 9.6 MG/DL (ref 8.7–10.5)
CHLORIDE SERPL-SCNC: 99 MMOL/L (ref 95–110)
CHOLEST SERPL-MCNC: 132 MG/DL (ref 120–199)
CHOLEST/HDLC SERPL: 2.5 {RATIO} (ref 2–5)
CO2 SERPL-SCNC: 25 MMOL/L (ref 23–29)
CREAT SERPL-MCNC: 0.7 MG/DL (ref 0.5–1.4)
EST. GFR  (AFRICAN AMERICAN): >60 ML/MIN/1.73 M^2
EST. GFR  (NON AFRICAN AMERICAN): >60 ML/MIN/1.73 M^2
ESTIMATED AVG GLUCOSE: 128 MG/DL (ref 68–131)
GLUCOSE SERPL-MCNC: 128 MG/DL (ref 70–110)
HBA1C MFR BLD: 6.1 % (ref 4–5.6)
HDLC SERPL-MCNC: 53 MG/DL (ref 40–75)
HDLC SERPL: 40.2 % (ref 20–50)
LDLC SERPL CALC-MCNC: 60.8 MG/DL (ref 63–159)
NONHDLC SERPL-MCNC: 79 MG/DL
POTASSIUM SERPL-SCNC: 3.9 MMOL/L (ref 3.5–5.1)
PROT SERPL-MCNC: 7.3 G/DL (ref 6–8.4)
SODIUM SERPL-SCNC: 138 MMOL/L (ref 136–145)
TRIGL SERPL-MCNC: 91 MG/DL (ref 30–150)

## 2022-02-28 PROCEDURE — 36415 COLL VENOUS BLD VENIPUNCTURE: CPT | Mod: PO | Performed by: FAMILY MEDICINE

## 2022-02-28 PROCEDURE — 99490 CHRNC CARE MGMT STAFF 1ST 20: CPT | Mod: PBBFAC,PO | Performed by: FAMILY MEDICINE

## 2022-02-28 PROCEDURE — 83036 HEMOGLOBIN GLYCOSYLATED A1C: CPT | Performed by: FAMILY MEDICINE

## 2022-02-28 PROCEDURE — 99490 PR CHRONIC CARE MGMT, 1ST 20 MIN: ICD-10-PCS | Mod: S$PBB,,, | Performed by: FAMILY MEDICINE

## 2022-02-28 PROCEDURE — 80061 LIPID PANEL: CPT | Performed by: FAMILY MEDICINE

## 2022-02-28 PROCEDURE — 80053 COMPREHEN METABOLIC PANEL: CPT | Performed by: FAMILY MEDICINE

## 2022-02-28 PROCEDURE — 99490 CHRNC CARE MGMT STAFF 1ST 20: CPT | Mod: S$PBB,,, | Performed by: FAMILY MEDICINE

## 2022-03-18 ENCOUNTER — OFFICE VISIT (OUTPATIENT)
Dept: UROLOGY | Facility: CLINIC | Age: 87
End: 2022-03-18
Payer: MEDICARE

## 2022-03-18 VITALS
BODY MASS INDEX: 22.73 KG/M2 | SYSTOLIC BLOOD PRESSURE: 138 MMHG | HEIGHT: 63 IN | DIASTOLIC BLOOD PRESSURE: 85 MMHG | HEART RATE: 97 BPM | WEIGHT: 128.31 LBS

## 2022-03-18 DIAGNOSIS — N39.0 RECURRENT UTI: ICD-10-CM

## 2022-03-18 DIAGNOSIS — R33.9 INCOMPLETE BLADDER EMPTYING: Primary | ICD-10-CM

## 2022-03-18 LAB
BILIRUB SERPL-MCNC: ABNORMAL MG/DL
BLOOD URINE, POC: ABNORMAL
CLARITY, POC UA: ABNORMAL
COLOR, POC UA: ABNORMAL
GLUCOSE UR QL STRIP: ABNORMAL
KETONES UR QL STRIP: ABNORMAL
LEUKOCYTE ESTERASE URINE, POC: ABNORMAL
NITRITE, POC UA: ABNORMAL
PH, POC UA: 7
POC RESIDUAL URINE VOLUME: 292 ML (ref 0–100)
PROTEIN, POC: ABNORMAL
SPECIFIC GRAVITY, POC UA: 1
UROBILINOGEN, POC UA: ABNORMAL

## 2022-03-18 PROCEDURE — 87086 URINE CULTURE/COLONY COUNT: CPT | Performed by: NURSE PRACTITIONER

## 2022-03-18 PROCEDURE — 99214 OFFICE O/P EST MOD 30 MIN: CPT | Mod: PBBFAC,PN | Performed by: NURSE PRACTITIONER

## 2022-03-18 PROCEDURE — 99999 PR PBB SHADOW E&M-EST. PATIENT-LVL IV: ICD-10-PCS | Mod: PBBFAC,,, | Performed by: NURSE PRACTITIONER

## 2022-03-18 PROCEDURE — 87088 URINE BACTERIA CULTURE: CPT | Performed by: NURSE PRACTITIONER

## 2022-03-18 PROCEDURE — 99213 OFFICE O/P EST LOW 20 MIN: CPT | Mod: S$PBB,,, | Performed by: NURSE PRACTITIONER

## 2022-03-18 PROCEDURE — 87186 SC STD MICRODIL/AGAR DIL: CPT | Performed by: NURSE PRACTITIONER

## 2022-03-18 PROCEDURE — 99213 PR OFFICE/OUTPT VISIT, EST, LEVL III, 20-29 MIN: ICD-10-PCS | Mod: S$PBB,,, | Performed by: NURSE PRACTITIONER

## 2022-03-18 PROCEDURE — 51798 US URINE CAPACITY MEASURE: CPT | Mod: PBBFAC,PN | Performed by: NURSE PRACTITIONER

## 2022-03-18 PROCEDURE — 99999 PR PBB SHADOW E&M-EST. PATIENT-LVL IV: CPT | Mod: PBBFAC,,, | Performed by: NURSE PRACTITIONER

## 2022-03-18 PROCEDURE — 81002 URINALYSIS NONAUTO W/O SCOPE: CPT | Mod: PBBFAC,PN | Performed by: NURSE PRACTITIONER

## 2022-03-18 PROCEDURE — 87077 CULTURE AEROBIC IDENTIFY: CPT | Performed by: NURSE PRACTITIONER

## 2022-03-18 RX ORDER — LOSARTAN POTASSIUM 50 MG/1
TABLET ORAL
COMMUNITY
Start: 2021-07-16 | End: 2022-03-22

## 2022-03-18 NOTE — PROGRESS NOTES
[facility] Note  Staff: PINEDA Vela    PCP: SHARDA Berman    Chief Complaint: Follow-up    Subjective:        HPI: Genny Watson is a 86 y.o. female presents today for routine recheck of LUTS.    Pt was last evaluated by Dr. Harden on 11/29/21 for hematuria symptoms.    Had E coli UTI in August. Unsure if she was having symptoms at that time.   Today her symptoms are frequency. No incontinence. No dysuria. No gross hematuria.   Having a daily bowel movement.   Drinks 4-5 glasses a day of water. Drinks 1 cup of coffee in the am.      Cr 6/29/21: 0.8  PVR today 305 cc      5/27/21  Patient presents for hematuria. She states her urine looked orange, no blood in urine and no clots. She thinks maybe she had bleeding from her hemorrhoids.   She had a UA done on 5/17 which showed 6 RBC, >100 WBC, nitrite negative. Urine culture no growth.   She underwent CT Urogram which showed no hydro, no stones, no filling defects. Her bladder is fairly distended.      She has no significant bothersome urinary complaints.   No history of stones.  She does have UTIs and her symptoms include dysuria. Usually goes to urgent care for this.      She a bowel movement every 3 days.   Drinks 2 bottles of water a day, 1 cup of coffee.   She states she feels like she is emptying her bladder better at home, she has a shy bladder.      UA last ov: +blood and leuks, nitrite negative  PVR last ov: 390 cc (catheterized)    TODAY:  UA today is abnormal  PVR by bladder scan is 292 mL  Continue to be asymptomatic today, overall pt doing well with no complaints.  Has been using the vaginal cream at this time.    REVIEW OF SYSTEMS:  A comprehensive 10 system review was performed and is negative except as noted above in HPI    PMHx:  Past Medical History:   Diagnosis Date    ALLERGIC RHINITIS 4/30/2012    Arthritis     Gimenez's esophagus     Breast cancer     right, s/p right mastectomy>20yr ago    Cataract      Diabetes mellitus type II     Diverticulitis     Diverticulosis     Fuchs' corneal dystrophy     Glaucoma     Hernia, hiatal     Hyperlipidemia     Hypertension     Macular degeneration     Pulmonary embolism     Seizures     Skin cancer of face 12/2014    Dr M Weil     UTI (lower urinary tract infection)        PSHx:  Past Surgical History:   Procedure Laterality Date    angeogram      APPENDECTOMY      BREAST SURGERY      cardio stent   1/2015    Dr Dodson    COLONOSCOPY  around 2003    COLONOSCOPY N/A 1/13/2016    Procedure: COLONOSCOPY;  Surgeon: Mario Moya MD;  Location: [facility];  Service: Endoscopy;  Laterality: N/A; repeat in 5 years for surveillance    CORNEAL TRANSPLANT      EYE SURGERY      HYSTERECTOMY      ovaries removed    MASTECTOMY      right >20 years ago    STOMACH SURGERY      UPPER GASTROINTESTINAL ENDOSCOPY  01/13/2016    Dr. Moya     Allergies:  Sulfa (sulfonamide antibiotics)    Medications: reviewed     Objective:     Vitals:    03/18/22 1452   BP: 138/85   Pulse: 97     Physical Exam  Constitutional:       Appearance: She is well-developed.   HENT:      Head: Normocephalic and atraumatic.   Eyes:      Conjunctiva/sclera: Conjunctivae normal.      Pupils: Pupils are equal, round, and reactive to light.   Cardiovascular:      Rate and Rhythm: Normal rate and regular rhythm.      Heart sounds: Normal heart sounds.   Pulmonary:      Effort: Pulmonary effort is normal.      Breath sounds: Normal breath sounds.   Abdominal:      General: Bowel sounds are normal.      Palpations: Abdomen is soft.   Musculoskeletal:         General: Normal range of motion.      Cervical back: Normal range of motion and neck supple.   Skin:     General: Skin is warm and dry.   Neurological:      Mental Status: She is alert and oriented to person, place, and time.      Deep Tendon Reflexes: Reflexes are normal and symmetric.   Psychiatric:         Behavior: Behavior normal.          Thought Content: Thought content normal.         Judgment: Judgment normal.       Assessment:       1. Incomplete bladder emptying    2. Recurrent UTI          Plan:     1. Urine culture to be processed today.  2. Continue Estrace Cream as previously prescribed at this time.  3. Discussed conservative measures to control urgency and frequency including avoiding bladder irritants, timed voiding, not postponing voiding, and bowel regimen (as distended bowel has extrinsic compressive effect on bladder. Discussed bladder irritants include coffe (even decaf), tea, alcohol, soda, spicy foods, acidic juices (orange, tomato), vinegar, and artificial sweeteners.    F/u with Dr. Harden in 6 months or sooner if new  symptoms develop.  Pt and family verbalized understanding at this time.    [facility]: None    Janay Garcia, JOHNNIE-C

## 2022-03-21 LAB — BACTERIA UR CULT: ABNORMAL

## 2022-03-21 RX ORDER — FLUCONAZOLE 150 MG/1
150 TABLET ORAL DAILY
Qty: 3 TABLET | Refills: 0 | Status: SHIPPED | OUTPATIENT
Start: 2022-03-21 | End: 2022-03-24

## 2022-03-21 RX ORDER — AMOXICILLIN AND CLAVULANATE POTASSIUM 500; 125 MG/1; MG/1
1 TABLET, FILM COATED ORAL 2 TIMES DAILY
Qty: 14 TABLET | Refills: 0 | Status: SHIPPED | OUTPATIENT
Start: 2022-03-21 | End: 2022-03-22

## 2022-03-22 ENCOUNTER — OFFICE VISIT (OUTPATIENT)
Dept: CARDIOLOGY | Facility: CLINIC | Age: 87
End: 2022-03-22
Payer: MEDICARE

## 2022-03-22 VITALS
WEIGHT: 127 LBS | HEART RATE: 97 BPM | HEIGHT: 63 IN | SYSTOLIC BLOOD PRESSURE: 112 MMHG | OXYGEN SATURATION: 96 % | DIASTOLIC BLOOD PRESSURE: 70 MMHG | BODY MASS INDEX: 22.5 KG/M2

## 2022-03-22 DIAGNOSIS — E11.59 HYPERTENSION ASSOCIATED WITH DIABETES: ICD-10-CM

## 2022-03-22 DIAGNOSIS — I15.2 HYPERTENSION ASSOCIATED WITH DIABETES: ICD-10-CM

## 2022-03-22 DIAGNOSIS — I10 WHITE COAT SYNDROME WITH DIAGNOSIS OF HYPERTENSION: ICD-10-CM

## 2022-03-22 DIAGNOSIS — I25.10 CORONARY ARTERY DISEASE INVOLVING NATIVE CORONARY ARTERY OF NATIVE HEART WITHOUT ANGINA PECTORIS: Primary | ICD-10-CM

## 2022-03-22 DIAGNOSIS — E78.5 HYPERLIPIDEMIA ASSOCIATED WITH TYPE 2 DIABETES MELLITUS: ICD-10-CM

## 2022-03-22 DIAGNOSIS — E11.69 HYPERLIPIDEMIA ASSOCIATED WITH TYPE 2 DIABETES MELLITUS: ICD-10-CM

## 2022-03-22 DIAGNOSIS — Z95.5 S/P DRUG ELUTING CORONARY STENT PLACEMENT: ICD-10-CM

## 2022-03-22 DIAGNOSIS — E11.59 TYPE 2 DIABETES MELLITUS WITH OTHER CIRCULATORY COMPLICATION, WITHOUT LONG-TERM CURRENT USE OF INSULIN: ICD-10-CM

## 2022-03-22 PROCEDURE — 99214 PR OFFICE/OUTPT VISIT, EST, LEVL IV, 30-39 MIN: ICD-10-PCS | Mod: S$GLB,,, | Performed by: NURSE PRACTITIONER

## 2022-03-22 PROCEDURE — 99214 OFFICE O/P EST MOD 30 MIN: CPT | Mod: S$GLB,,, | Performed by: NURSE PRACTITIONER

## 2022-03-22 NOTE — PROGRESS NOTES
Subjective:    Patient ID:  Genny Watson is a 86 y.o. female     HPI:  Patient presents today for follow-up appointment.  Patient denies any chest pain, dizziness, shortness of breath, palpitations, or syncope.  Patient has been doing well and taking medications as ordered.  Denies any falls or head injuries.  Denies any blood in the stool or in the urine.        Review of patient's allergies indicates:   Allergen Reactions    Sulfa (sulfonamide antibiotics)      Other reaction(s): Itching       Past Medical History:   Diagnosis Date    ALLERGIC RHINITIS 4/30/2012    Arthritis     Gimenez's esophagus     Breast cancer     right, s/p right mastectomy>20yr ago    Cataract     Diabetes mellitus type II     Diverticulitis     Diverticulosis     Fuchs' corneal dystrophy     Glaucoma     Hernia, hiatal     Hyperlipidemia     Hypertension     Macular degeneration     Pulmonary embolism     Seizures     Skin cancer of face 12/2014    Dr M Weil     UTI (lower urinary tract infection)      Past Surgical History:   Procedure Laterality Date    angeogram      APPENDECTOMY      BREAST SURGERY      cardio stent   1/2015    Dr Dodson    COLONOSCOPY  around 2003    COLONOSCOPY N/A 1/13/2016    Procedure: COLONOSCOPY;  Surgeon: Mario Bhakta MD;  Location: [facility];  Service: Endoscopy;  Laterality: N/A; repeat in 5 years for surveillance    CORNEAL TRANSPLANT      EYE SURGERY      HYSTERECTOMY      ovaries removed    MASTECTOMY      right >20 years ago    STOMACH SURGERY      UPPER GASTROINTESTINAL ENDOSCOPY  01/13/2016    Dr. Bhakta     Social History     Tobacco Use    Smoking status: Never Smoker    Smokeless tobacco: Never Used   Substance Use Topics    Alcohol use: No    Drug use: No     Family History   Problem Relation Age of Onset    Mental illness Mother     Liver cancer Mother     Early death Father     Heart disease Father     Diabetes Daughter     Early death  Paternal Uncle     Heart disease Paternal Uncle     Celiac disease Sister     No Known Problems Son     Breast cancer Neg Hx     Ovarian cancer Neg Hx     Colon cancer Neg Hx     Colon polyps Neg Hx     Esophageal cancer Neg Hx     Stomach cancer Neg Hx     Ulcerative colitis Neg Hx         Review of Systems:   Constitution: Negative for diaphoresis and fever.   HEENT: Negative for nosebleeds.    Cardiovascular: Negative for chest pain       No dyspnea on exertion       No leg swelling        No palpitations  Respiratory: Negative for shortness of breath and wheezing.    Hematologic/Lymphatic: Negative for bleeding problem. Does not bruise/bleed easily.   Skin: Negative for color change and rash.   Musculoskeletal: Negative for falls and myalgias.   Gastrointestinal: Negative for hematemesis and hematochezia.   Genitourinary: Negative for hematuria.   Neurological: Negative for dizziness and light-headedness.   Psychiatric/Behavioral: Negative for altered mental status and memory loss.          Objective:        Vitals:    03/22/22 1043   BP: 112/70   Pulse: 97       Lab Results   Component Value Date    WBC 8.52 06/14/2021    HGB 10.1 (L) 06/14/2021    HCT 35.9 (L) 06/14/2021     06/14/2021    CHOL 132 02/28/2022    TRIG 91 02/28/2022    HDL 53 02/28/2022    ALT 21 02/28/2022    AST 24 02/28/2022     02/28/2022    K 3.9 02/28/2022    CL 99 02/28/2022    CREATININE 0.7 02/28/2022    BUN 13 02/28/2022    CO2 25 02/28/2022    TSH 1.057 04/11/2015    INR 1.0 03/18/2015    HGBA1C 6.1 (H) 02/28/2022        ECHOCARDIOGRAM RESULTS  Results for orders placed in visit on 06/08/21    Echo Color Flow Doppler? Yes    Interpretation Summary  · The estimated PA systolic pressure is 27 mmHg.  · The left ventricle is normal in size with concentric remodeling and normal systolic function.  · The estimated ejection fraction is 60%.  · Indeterminate left ventricular diastolic function.  · Normal right  ventricular size with normal right ventricular systolic function.        CURRENT/PREVIOUS VISIT EKG  Results for orders placed or performed in visit on 06/07/21   IN OFFICE EKG 12-LEAD (to [place])    Collection Time: 06/07/21  2:14 PM    Narrative    Test Reason : R94.31,I25.10,    Vent. Rate : 081 BPM     Atrial Rate : 081 BPM     P-R Int : 258 ms          QRS Dur : 088 ms      QT Int : 384 ms       P-R-T Axes : 057 -29 021 degrees     QTc Int : 446 ms    Sinus rhythm with 1st degree A-V block  Otherwise normal ECG  When compared with ECG of 06-OCT-2020 20:45,  Minimal criteria for Anteroseptal infarct are no longer Present  Confirmed by Tam Farley MD [id number]) on 6/21/2021 8:30:05 AM    Referred By: IDRIS   SELF           Confirmed By:Tam Farley MD     No valid procedures specified.   No results found for this or any previous visit.      Physical Exam:  CONSTITUTIONAL: No fever, no chills  HEENT: Normocephalic, atraumatic,pupils reactive to light                 NECK:  No JVD no carotid bruit  CVS: S1S2+, RRR  LUNGS: Clear  ABDOMEN: Soft, NT, BS+  EXTREMITIES: No cyanosis, edema  : No yung catheter  NEURO: AAO X 3  PSY: Normal affect      Medication List with Changes/Refills   Current Medications    AMLODIPINE (NORVASC) 10 MG TABLET    TAKE 1 TABLET EVERY EVENING (NEED OFFICE VISIT BEFORE NEXT REFILL, LAST SEEN 06/2020. THIS WILL BE LAST PRESCRIPTION IF VISIT IS NOT MADE)    ASCORBIC ACID, VITAMIN C, (VITAMIN C) 500 MG TABLET    Take 500 mg by mouth once daily.    ASPIRIN (ECOTRIN) 81 MG EC TABLET    Take 81 mg by mouth once daily.    ATORVASTATIN (LIPITOR) 80 MG TABLET    TAKE 1 TABLET DAILY (NEED OFFICE VISIT BEFORE NEXT REFILL, LAST SEEN 6/2020. THIS WILL BE THE LAST PRESCRIPTION IF VISIT IS NOT MADE)    CLOPIDOGREL (PLAVIX) 75 MG TABLET    Take 1 tablet (75 mg total) by mouth once daily.    ESTRADIOL (ESTRACE) 0.01 % (0.1 MG/GRAM) VAGINAL CREAM    Place 1 g vaginally twice a week.    FERROUS  SULFATE (FEOSOL) 325 MG (65 MG IRON) TAB TABLET    Take 325 mg by mouth daily with breakfast.    FLUCONAZOLE (DIFLUCAN) 150 MG TAB    Take 1 tablet (150 mg total) by mouth once daily. for 3 days    LORATADINE (CLARITIN) 10 MG TABLET    Take 1 tablet (10 mg total) by mouth once daily.    LOSARTAN (COZAAR) 25 MG TABLET    Take 1 tablet (25 mg total) by mouth once daily.    METFORMIN (GLUCOPHAGE-XR) 500 MG ER 24HR TABLET    TAKE 1 TABLET EVERY EVENING    METOPROLOL SUCCINATE (TOPROL-XL) 50 MG 24 HR TABLET    Take 1 tablet (50 mg total) by mouth once daily.    PANTOPRAZOLE (PROTONIX) 40 MG TABLET    Take 1 tablet (40 mg total) by mouth once daily.    PREDNISOLONE ACETATE (PRED FORTE) 1 % DRPS    Place 1 drop into the right eye once daily.     TRAVATAN Z 0.004 % OPHTHALMIC SOLUTION    Place 1 drop into both eyes every evening.     VIT C/E/ZN/COPPR/LUTEIN/ZEAXAN (PRESERVISION AREDS 2 ORAL)    Take 1 capsule by mouth 2 (two) times daily.   Discontinued Medications    AMOXICILLIN-CLAVULANATE 500-125MG (AUGMENTIN) 500-125 MG TAB    Take 1 tablet (500 mg total) by mouth 2 (two) times daily. for 7 days    HYDROCORTISONE-PRAMOXINE (ANALPRAM-HC) 2.5-1 % CREA    Place rectally 3 (three) times daily.    LOSARTAN (COZAAR) 50 MG TABLET                 Assessment:       1. Coronary artery disease involving native coronary artery of native heart without angina pectoris    2. S/P drug eluting coronary stent placement LCX 1/26/15    3. Hypertension associated with diabetes    4. Type 2 diabetes mellitus with other circulatory complication, without long-term current use of insulin    5. Hyperlipidemia associated with type 2 diabetes mellitus    6. White coat syndrome with diagnosis of hypertension         Plan:     1. Patient appears to be doing fine on current medications including DAPT with Plavix and aspirin. No bleeding issues reported.   Denies any falls or head injuries.   2. Advised patient on fall precautions and to change  positions slowly.   Advised on avoiding head injury due to being on blood thinners.   3. Recommend low fat low cholesterol diet and regular exercise.   Recent labs show LDL less than 70. She is at goal. Continue with current regimen.   4. Follow up in our office in about 6 months. May call or return sooner if problems arise.     Problem List Items Addressed This Visit        Unprioritized    Hypertension associated with diabetes    Type 2 diabetes mellitus, dx 4/2012    Hyperlipidemia associated with type 2 diabetes mellitus    CAD (coronary artery disease) - Primary    S/P drug eluting coronary stent placement LCX 1/26/15    White coat syndrome with diagnosis of hypertension          Follow up in about 6 months (around 9/22/2022).

## 2022-03-31 ENCOUNTER — EXTERNAL CHRONIC CARE MANAGEMENT (OUTPATIENT)
Dept: PRIMARY CARE CLINIC | Facility: CLINIC | Age: 87
End: 2022-03-31
Payer: MEDICARE

## 2022-03-31 PROCEDURE — 99490 CHRNC CARE MGMT STAFF 1ST 20: CPT | Mod: S$PBB,,, | Performed by: FAMILY MEDICINE

## 2022-03-31 PROCEDURE — 99490 CHRNC CARE MGMT STAFF 1ST 20: CPT | Mod: PBBFAC,PO | Performed by: FAMILY MEDICINE

## 2022-03-31 PROCEDURE — 99490 PR CHRONIC CARE MGMT, 1ST 20 MIN: ICD-10-PCS | Mod: S$PBB,,, | Performed by: FAMILY MEDICINE

## 2022-04-30 ENCOUNTER — EXTERNAL CHRONIC CARE MANAGEMENT (OUTPATIENT)
Dept: PRIMARY CARE CLINIC | Facility: CLINIC | Age: 87
End: 2022-04-30
Payer: MEDICARE

## 2022-04-30 PROCEDURE — 99490 CHRNC CARE MGMT STAFF 1ST 20: CPT | Mod: S$PBB,,, | Performed by: FAMILY MEDICINE

## 2022-04-30 PROCEDURE — 99490 CHRNC CARE MGMT STAFF 1ST 20: CPT | Mod: PBBFAC,PO | Performed by: FAMILY MEDICINE

## 2022-04-30 PROCEDURE — 99490 PR CHRONIC CARE MGMT, 1ST 20 MIN: ICD-10-PCS | Mod: S$PBB,,, | Performed by: FAMILY MEDICINE

## 2022-05-31 ENCOUNTER — EXTERNAL CHRONIC CARE MANAGEMENT (OUTPATIENT)
Dept: PRIMARY CARE CLINIC | Facility: CLINIC | Age: 87
End: 2022-05-31
Payer: MEDICARE

## 2022-05-31 PROCEDURE — 99490 CHRNC CARE MGMT STAFF 1ST 20: CPT | Mod: PBBFAC,PO | Performed by: FAMILY MEDICINE

## 2022-05-31 PROCEDURE — 99490 CHRNC CARE MGMT STAFF 1ST 20: CPT | Mod: S$PBB,,, | Performed by: FAMILY MEDICINE

## 2022-05-31 PROCEDURE — 99490 PR CHRONIC CARE MGMT, 1ST 20 MIN: ICD-10-PCS | Mod: S$PBB,,, | Performed by: FAMILY MEDICINE

## 2022-06-20 ENCOUNTER — OFFICE VISIT (OUTPATIENT)
Dept: UROLOGY | Facility: CLINIC | Age: 87
End: 2022-06-20
Payer: MEDICARE

## 2022-06-20 VITALS
WEIGHT: 128.31 LBS | BODY MASS INDEX: 22.73 KG/M2 | SYSTOLIC BLOOD PRESSURE: 140 MMHG | HEART RATE: 98 BPM | HEIGHT: 63 IN | DIASTOLIC BLOOD PRESSURE: 92 MMHG

## 2022-06-20 DIAGNOSIS — R33.9 INCOMPLETE BLADDER EMPTYING: Primary | ICD-10-CM

## 2022-06-20 DIAGNOSIS — N39.0 RECURRENT UTI: ICD-10-CM

## 2022-06-20 LAB
BACTERIA #/AREA URNS HPF: ABNORMAL /HPF
BILIRUB UR QL STRIP: NEGATIVE
CLARITY UR: CLEAR
COLOR UR: YELLOW
GLUCOSE UR QL STRIP: NEGATIVE
HGB UR QL STRIP: ABNORMAL
KETONES UR QL STRIP: NEGATIVE
LEUKOCYTE ESTERASE UR QL STRIP: ABNORMAL
MICROSCOPIC COMMENT: ABNORMAL
NITRITE UR QL STRIP: NEGATIVE
PH UR STRIP: 7 [PH] (ref 5–8)
PROT UR QL STRIP: NEGATIVE
RBC #/AREA URNS HPF: 0 /HPF (ref 0–4)
SP GR UR STRIP: <=1.005 (ref 1–1.03)
URN SPEC COLLECT METH UR: ABNORMAL
UROBILINOGEN UR STRIP-ACNC: NEGATIVE EU/DL
WBC #/AREA URNS HPF: >100 /HPF (ref 0–5)

## 2022-06-20 PROCEDURE — 51701 INSERT,NON-INDWELLING BLADDER CATHETER: ICD-10-PCS | Mod: S$PBB,,, | Performed by: NURSE PRACTITIONER

## 2022-06-20 PROCEDURE — 81000 URINALYSIS NONAUTO W/SCOPE: CPT | Performed by: NURSE PRACTITIONER

## 2022-06-20 PROCEDURE — 99213 OFFICE O/P EST LOW 20 MIN: CPT | Mod: PBBFAC,PN | Performed by: NURSE PRACTITIONER

## 2022-06-20 PROCEDURE — 87186 SC STD MICRODIL/AGAR DIL: CPT | Performed by: NURSE PRACTITIONER

## 2022-06-20 PROCEDURE — 99214 OFFICE O/P EST MOD 30 MIN: CPT | Mod: S$PBB,25,, | Performed by: NURSE PRACTITIONER

## 2022-06-20 PROCEDURE — 87086 URINE CULTURE/COLONY COUNT: CPT | Performed by: NURSE PRACTITIONER

## 2022-06-20 PROCEDURE — 51701 INSERT BLADDER CATHETER: CPT | Mod: PBBFAC,PN | Performed by: NURSE PRACTITIONER

## 2022-06-20 PROCEDURE — 99999 PR PBB SHADOW E&M-EST. PATIENT-LVL III: CPT | Mod: PBBFAC,,, | Performed by: NURSE PRACTITIONER

## 2022-06-20 PROCEDURE — 99999 PR PBB SHADOW E&M-EST. PATIENT-LVL III: ICD-10-PCS | Mod: PBBFAC,,, | Performed by: NURSE PRACTITIONER

## 2022-06-20 PROCEDURE — 51701 INSERT BLADDER CATHETER: CPT | Mod: S$PBB,,, | Performed by: NURSE PRACTITIONER

## 2022-06-20 PROCEDURE — 87088 URINE BACTERIA CULTURE: CPT | Performed by: NURSE PRACTITIONER

## 2022-06-20 PROCEDURE — 81003 URINALYSIS AUTO W/O SCOPE: CPT | Performed by: NURSE PRACTITIONER

## 2022-06-20 PROCEDURE — 87077 CULTURE AEROBIC IDENTIFY: CPT | Performed by: NURSE PRACTITIONER

## 2022-06-20 PROCEDURE — 99214 PR OFFICE/OUTPT VISIT, EST, LEVL IV, 30-39 MIN: ICD-10-PCS | Mod: S$PBB,25,, | Performed by: NURSE PRACTITIONER

## 2022-06-20 NOTE — PROGRESS NOTES
[facility] Note  Staff: JOHNNIE Vela-C    PCP: MD Antonella  Urologist:  MD Fina    Chief Complaint: F/UP-Incomplete bladder emptying    Subjective:        HPI: Genny Watson is a 86 y.o. female presents today for f/up visit.  She is accompanied by family member to ov today.    TODAY:  UA today in office-pt attempted to void, but was unsuccessful.  Last time she urinated was 10:30 am today, and states since waking up this am at 7 am she has urinated 3x prior to office visit.  Currently she is having no LUTS, no fever, no fatigue or s/s of UTI at this time.  Nocturia is 3-4x nightly.    EXAM performed by me in office today:  Procedure Note:  After betadine irrigation of the urethra, A #10 FR straight cath was inserted into the bladder with over 345 mL of urine obtained.  Pt tolerated procedure well.     HX:  Pt was last evaluated by me on 3/18/22.  Had E coli UTI in August. Unsure if she was having symptoms at that time.   Today her symptoms are frequency. No incontinence. No dysuria. No gross hematuria.   Having a daily bowel movement.   Drinks 4-5 glasses a day of water. Drinks 1 cup of coffee in the am.      Cr 6/29/21: 0.8  PVR:  305 cc     5/27/21  Patient presents for hematuria. She states her urine looked orange, no blood in urine and no clots. She thinks maybe she had bleeding from her hemorrhoids.   She had a UA done on 5/17 which showed 6 RBC, >100 WBC, nitrite negative. Urine culture no growth.   She underwent CT Urogram which showed no hydro, no stones, no filling defects. Her bladder is fairly distended.      She has no significant bothersome urinary complaints.   No history of stones.  She does have UTIs and her symptoms include dysuria. Usually goes to urgent care for this.      She a bowel movement every 3 days.   Drinks 2 bottles of water a day, 1 cup of coffee.   She states she feels like she is emptying her bladder better at home, she has a shy bladder.       UA last ov: +blood and leuks, nitrite negative  PVR last ov: 390 cc (catheterized)     OV 3/18/22:  UA is abnormal  PVR by bladder scan is 292 mL  Continue to be asymptomatic today, overall pt doing well with no complaints.  Has been using the vaginal cream at this time.    Past Urine Cultures:  +E. Coli UTI 3/18/22; Treated with Augmentin  +E. Coli UTI 8/10/21; Treated with Augmentin    REVIEW OF SYSTEMS:  A comprehensive 10 system review was performed and is negative except as noted above in HPI    PMHx:  Past Medical History:   Diagnosis Date    ALLERGIC RHINITIS 4/30/2012    Arthritis     Gimenez's esophagus     Breast cancer     right, s/p right mastectomy>20yr ago    Cataract     Diabetes mellitus type II     Diverticulitis     Diverticulosis     Fuchs' corneal dystrophy     Glaucoma     Hernia, hiatal     Hyperlipidemia     Hypertension     Macular degeneration     Pulmonary embolism     Seizures     Skin cancer of face 12/2014    Dr M Weil     UTI (lower urinary tract infection)      PSHx:  Past Surgical History:   Procedure Laterality Date    angeogram      APPENDECTOMY      BREAST SURGERY      cardio stent   1/2015    Dr Dodson    COLONOSCOPY  around 2003    COLONOSCOPY N/A 1/13/2016    Procedure: COLONOSCOPY;  Surgeon: Mario Bhakta MD;  Location: [facility];  Service: Endoscopy;  Laterality: N/A; repeat in 5 years for surveillance    CORNEAL TRANSPLANT      EYE SURGERY      HYSTERECTOMY      ovaries removed    MASTECTOMY      right >20 years ago    STOMACH SURGERY      UPPER GASTROINTESTINAL ENDOSCOPY  01/13/2016    Dr. Bhakta     Allergies:  Sulfa (sulfonamide antibiotics)    Medications: reviewed   Objective:     Vitals:    06/20/22 1133   BP: (!) 140/92   Pulse: 98     Physical Exam  Vitals reviewed.   Constitutional:       Appearance: She is well-developed.   HENT:      Head: Normocephalic and atraumatic.   Eyes:      Conjunctiva/sclera: Conjunctivae normal.       Pupils: Pupils are equal, round, and reactive to light.   Cardiovascular:      Rate and Rhythm: Normal rate and regular rhythm.      Heart sounds: Normal heart sounds.   Pulmonary:      Effort: Pulmonary effort is normal.      Breath sounds: Normal breath sounds.   Abdominal:      General: Bowel sounds are normal.      Palpations: Abdomen is soft.   Musculoskeletal:         General: Normal range of motion.      Cervical back: Normal range of motion and neck supple.   Skin:     General: Skin is warm and dry.   Neurological:      Mental Status: She is alert and oriented to person, place, and time.      Deep Tendon Reflexes: Reflexes are normal and symmetric.   Psychiatric:         Behavior: Behavior normal.         Thought Content: Thought content normal.         Judgment: Judgment normal.       Assessment:       1. Incomplete bladder emptying    2. Recurrent UTI          Plan:     Pt still keeping around >300 mL of urine residual after urinating 3x this am since 7 am.    Pt is currently non-symptomatic for any UTI symptoms at this time.  Bladder training is in progress by pt and family while awake during the daytime.    At this time, pt and family will set up appt with MD to discuss CIC vs. Reddy catheter vs. No treatment regarding benefits, risks, etc.  Pt and family vu. [facility]: N/A    PINEDA Rivero

## 2022-06-22 LAB — BACTERIA UR CULT: ABNORMAL

## 2022-06-30 ENCOUNTER — EXTERNAL CHRONIC CARE MANAGEMENT (OUTPATIENT)
Dept: PRIMARY CARE CLINIC | Facility: CLINIC | Age: 87
End: 2022-06-30
Payer: MEDICARE

## 2022-06-30 PROCEDURE — 99490 PR CHRONIC CARE MGMT, 1ST 20 MIN: ICD-10-PCS | Mod: S$PBB,,, | Performed by: FAMILY MEDICINE

## 2022-06-30 PROCEDURE — 99490 CHRNC CARE MGMT STAFF 1ST 20: CPT | Mod: S$PBB,,, | Performed by: FAMILY MEDICINE

## 2022-06-30 PROCEDURE — 99490 CHRNC CARE MGMT STAFF 1ST 20: CPT | Mod: PBBFAC,PO | Performed by: FAMILY MEDICINE

## 2022-07-05 ENCOUNTER — TELEPHONE (OUTPATIENT)
Dept: FAMILY MEDICINE | Facility: CLINIC | Age: 87
End: 2022-07-05
Payer: MEDICARE

## 2022-07-05 NOTE — TELEPHONE ENCOUNTER
I called the patient to confirm her appointment that she has scheduled with Dr. Berman on 07/05/2022. No answer left voicemail to return our call.

## 2022-07-06 ENCOUNTER — OFFICE VISIT (OUTPATIENT)
Dept: FAMILY MEDICINE | Facility: CLINIC | Age: 87
End: 2022-07-06
Attending: FAMILY MEDICINE
Payer: MEDICARE

## 2022-07-06 ENCOUNTER — APPOINTMENT (OUTPATIENT)
Dept: LAB | Facility: HOSPITAL | Age: 87
End: 2022-07-06
Attending: FAMILY MEDICINE
Payer: MEDICARE

## 2022-07-06 VITALS
HEART RATE: 87 BPM | RESPIRATION RATE: 17 BRPM | SYSTOLIC BLOOD PRESSURE: 126 MMHG | TEMPERATURE: 98 F | WEIGHT: 127.56 LBS | BODY MASS INDEX: 22.6 KG/M2 | DIASTOLIC BLOOD PRESSURE: 80 MMHG | OXYGEN SATURATION: 96 % | HEIGHT: 63 IN

## 2022-07-06 DIAGNOSIS — I15.2 HYPERTENSION ASSOCIATED WITH DIABETES: ICD-10-CM

## 2022-07-06 DIAGNOSIS — E78.5 HYPERLIPIDEMIA ASSOCIATED WITH TYPE 2 DIABETES MELLITUS: ICD-10-CM

## 2022-07-06 DIAGNOSIS — E11.59 HYPERTENSION ASSOCIATED WITH DIABETES: ICD-10-CM

## 2022-07-06 DIAGNOSIS — E11.59 TYPE 2 DIABETES MELLITUS WITH OTHER CIRCULATORY COMPLICATION, WITHOUT LONG-TERM CURRENT USE OF INSULIN: Primary | ICD-10-CM

## 2022-07-06 DIAGNOSIS — I25.10 CORONARY ARTERY DISEASE INVOLVING NATIVE CORONARY ARTERY OF NATIVE HEART WITHOUT ANGINA PECTORIS: ICD-10-CM

## 2022-07-06 DIAGNOSIS — Z53.20 OSTEOPOROSIS SCREENING DECLINED: ICD-10-CM

## 2022-07-06 DIAGNOSIS — E11.69 HYPERLIPIDEMIA ASSOCIATED WITH TYPE 2 DIABETES MELLITUS: ICD-10-CM

## 2022-07-06 DIAGNOSIS — Z95.5 S/P DRUG ELUTING CORONARY STENT PLACEMENT: ICD-10-CM

## 2022-07-06 PROCEDURE — 99214 PR OFFICE/OUTPT VISIT, EST, LEVL IV, 30-39 MIN: ICD-10-PCS | Mod: S$PBB,,, | Performed by: FAMILY MEDICINE

## 2022-07-06 PROCEDURE — 99999 PR PBB SHADOW E&M-EST. PATIENT-LVL V: CPT | Mod: PBBFAC,,, | Performed by: FAMILY MEDICINE

## 2022-07-06 PROCEDURE — 99999 PR PBB SHADOW E&M-EST. PATIENT-LVL V: ICD-10-PCS | Mod: PBBFAC,,, | Performed by: FAMILY MEDICINE

## 2022-07-06 PROCEDURE — 99215 OFFICE O/P EST HI 40 MIN: CPT | Mod: PBBFAC,PN | Performed by: FAMILY MEDICINE

## 2022-07-06 PROCEDURE — 99214 OFFICE O/P EST MOD 30 MIN: CPT | Mod: S$PBB,,, | Performed by: FAMILY MEDICINE

## 2022-07-06 NOTE — PROGRESS NOTES
Subjective:       Patient ID: Genny Watson is a 86 y.o. female.    Chief Complaint: Diabetes (Pt states that she is here for her fu, patient denies any new symptoms or concerns )    86-year-old female coming in for a follow-up on high blood pressure and diabetes.  She is due for a DEXA scan but declines to have that done.  She is due for a foot exam and coming up due for an A1c, microalbumin/creatinine ratio, and BMP.  She has no complaints and says that when she got up this morning her blood pressure was 111/70.  She is having no episodes of hypoglycemia.  History includes scoliosis of the thoracolumbar spine, coronary artery disease with a drug-eluting stent to the left circumflex, [place] veins, type 2 diabetes, hypertension, hyperlipidemia, history of pulmonary embolism, right breast cancer, Gimenez's esophagus, osteoarthritis, and allergic rhinitis.    Past Medical History:  4/30/2012: ALLERGIC RHINITIS  No date: Arthritis  No date: Gimenez's esophagus  No date: Breast cancer      Comment:  right, s/p right mastectomy>20yr ago  No date: Cataract  No date: Diabetes mellitus type II  No date: Diverticulitis  No date: Diverticulosis  No date: Fuchs' corneal dystrophy  No date: Glaucoma  No date: Hernia, hiatal  No date: Hyperlipidemia  No date: Hypertension  No date: Macular degeneration  No date: Pulmonary embolism  No date: Seizures  12/2014: Skin cancer of face      Comment:  Dr M Weil   No date: UTI (lower urinary tract infection)    Past Surgical History:  No date: angeogram  No date: APPENDECTOMY  No date: BREAST SURGERY  1/2015: cardio stent       Comment:  Dr Dodson  around 2003: COLONOSCOPY  1/13/2016: COLONOSCOPY; N/A      Comment:  Procedure: COLONOSCOPY;  Surgeon: Mario Bhakta MD;                Location: [facility];  Service: Endoscopy;  Laterality:                N/A; repeat in 5 years for surveillance  No date: CORNEAL TRANSPLANT  No date: EYE SURGERY  No date: HYSTERECTOMY       Comment:  ovaries removed  No date: MASTECTOMY      Comment:  right >20 years ago  No date: STOMACH SURGERY  01/13/2016: UPPER GASTROINTESTINAL ENDOSCOPY      Comment:  Dr. Moya    Current Outpatient Medications on File Prior to Visit:  amLODIPine (NORVASC) 10 MG tablet, TAKE 1 TABLET EVERY EVENING (NEED OFFICE VISIT BEFORE NEXT REFILL, LAST SEEN 06/2020. THIS WILL BE LAST PRESCRIPTION IF VISIT IS NOT MADE), Disp: 90 tablet, Rfl: 3  ascorbic acid, vitamin C, (VITAMIN C) 500 MG tablet, Take 500 mg by mouth once daily., Disp: , Rfl:   aspirin (ECOTRIN) 81 MG EC tablet, Take 81 mg by mouth once daily., Disp: , Rfl:   atorvastatin (LIPITOR) 80 MG tablet, TAKE 1 TABLET DAILY (NEED OFFICE VISIT BEFORE NEXT REFILL, LAST SEEN 6/2020. THIS WILL BE THE LAST PRESCRIPTION IF VISIT IS NOT MADE), Disp: 90 tablet, Rfl: 3  clopidogreL (PLAVIX) 75 mg tablet, TAKE 1 TABLET DAILY, Disp: 90 tablet, Rfl: 3  estradioL (ESTRACE) 0.01 % (0.1 mg/gram) vaginal cream, USE 1 GM VAGINALLY TWICE WEEKLY, Disp: 42.5 g, Rfl: 2  ferrous sulfate (FEOSOL) 325 mg (65 mg iron) Tab tablet, Take 325 mg by mouth daily with breakfast., Disp: , Rfl:   loratadine (CLARITIN) 10 mg tablet, Take 1 tablet (10 mg total) by mouth once daily., Disp: 30 tablet, Rfl: 11  losartan (COZAAR) 25 MG tablet, TAKE 1 TABLET DAILY, Disp: 90 tablet, Rfl: 3  metFORMIN (GLUCOPHAGE-XR) 500 MG ER 24hr tablet, TAKE 1 TABLET EVERY EVENING, Disp: 90 tablet, Rfl: 1  metoprolol succinate (TOPROL-XL) 50 MG 24 hr tablet, Take 1 tablet (50 mg total) by mouth once daily., Disp: 90 tablet, Rfl: 3  pantoprazole (PROTONIX) 40 MG tablet, Take 1 tablet (40 mg total) by mouth once daily., Disp: 90 tablet, Rfl: 3  prednisoLONE acetate (PRED FORTE) 1 % DrpS, Place 1 drop into the right eye once daily. , Disp: , Rfl:   TRAVATAN Z 0.004 % ophthalmic solution, Place 1 drop into both eyes every evening. , Disp: , Rfl:   VIT C/E/ZN/COPPR/LUTEIN/ZEAXAN (PRESERVISION AREDS 2 ORAL), Take 1 capsule by  mouth 2 (two) times daily., Disp: , Rfl:     No current facility-administered medications on file prior to visit.        Review of Systems   Constitutional: Negative for chills, diaphoresis and fever.   Eyes: Negative for photophobia and visual disturbance.   Respiratory: Negative for chest tightness and shortness of breath.    Cardiovascular: Negative for chest pain, palpitations and leg swelling.   Gastrointestinal: Negative for constipation, diarrhea and nausea.   Endocrine: Negative for polydipsia and polyuria.   Genitourinary: Negative for dysuria and frequency.   Musculoskeletal: Positive for arthralgias (Pain in the dorsal arch of the right foot) and gait problem.       Objective:      Physical Exam  Vitals and nursing note reviewed.   Constitutional:       General: She is not in acute distress.     Appearance: Normal appearance. She is normal weight. She is not ill-appearing, toxic-appearing or diaphoretic.      Comments: Good blood pressure control  Normal weight with a BMI of 22.6 she is down 0.3 lb from her December 28, 2021 visit   HENT:      Head: Normocephalic and atraumatic.      Right Ear: Tympanic membrane, ear canal and external ear normal. There is no impacted cerumen.      Left Ear: Tympanic membrane, ear canal and external ear normal. There is no impacted cerumen.      Nose: Nose normal. No congestion or rhinorrhea.      Mouth/Throat:      Mouth: Mucous membranes are moist.      Pharynx: Oropharynx is clear. No oropharyngeal exudate or posterior oropharyngeal erythema.   Eyes:      Extraocular Movements: Extraocular movements intact.      Pupils: Pupils are equal, round, and reactive to light.   Neck:      Vascular: No carotid bruit.   Cardiovascular:      Rate and Rhythm: Normal rate and regular rhythm.      Pulses: Normal pulses.           Dorsalis pedis pulses are 2+ on the right side and 2+ on the left side.        Posterior tibial pulses are 2+ on the right side and 2+ on the left side.       Heart sounds: Normal heart sounds. No murmur heard.    No friction rub. No gallop.   Pulmonary:      Effort: Pulmonary effort is normal. No respiratory distress.      Breath sounds: Normal breath sounds. No stridor. No wheezing, rhonchi or rales.   Abdominal:      General: Abdomen is flat. There is no distension.      Palpations: Abdomen is soft. There is no mass.      Tenderness: There is no abdominal tenderness. There is no guarding or rebound.      Hernia: No hernia is present.   Musculoskeletal:      Cervical back: Normal range of motion and neck supple. No rigidity or tenderness.      Right foot: Normal range of motion. Deformity (Prominent Lisfranc joint with mild tenderness, arch intact) present. No bunion, Charcot foot, foot drop or prominent metatarsal heads.      Left foot: Normal range of motion. No deformity, bunion, Charcot foot, foot drop or prominent metatarsal heads.   Feet:      Right foot:      Protective Sensation: 9 sites tested. 9 sites sensed.      Skin integrity: Skin integrity normal. No ulcer, blister, skin breakdown, erythema, warmth, callus, dry skin or fissure.      Toenail Condition: Right toenails are normal.      Left foot:      Protective Sensation: 9 sites tested. 8 sites sensed.      Skin integrity: Skin integrity normal. No ulcer, blister, skin breakdown, erythema, warmth, callus, dry skin or fissure.      Toenail Condition: Left toenails are normal.   Lymphadenopathy:      Cervical: No cervical adenopathy.   Skin:     Capillary Refill: Capillary refill takes less than 2 seconds.   Neurological:      Mental Status: She is alert.      Comments: Proprioception intact 4/5 toes each foot, 5th toe incorrect repeatedly bilaterally         Assessment:       1. Type 2 diabetes mellitus with other circulatory complication, without long-term current use of insulin    2. Hypertension associated with diabetes    3. Hyperlipidemia associated with type 2 diabetes mellitus    4. Coronary artery  disease involving native coronary artery of native heart without angina pectoris    5. S/P drug eluting coronary stent placement LCX 1/26/15    6. BMI 22.0-22.9, adult    7. Osteoporosis screening declined        Plan:       1. Type 2 diabetes mellitus with other circulatory complication, without long-term current use of insulin  BMP, A1c, microalbumin done nonfasting.  Patient has difficulties finding a ride so she wanted to do it this evening  - Basic Metabolic Panel; Future  - Hemoglobin A1C; Future  - Microalbumin/Creatinine Ratio, Urine; Future    2. Hypertension associated with diabetes  Good control, no changes needed in amlodipine, losartan, Toprol  - Basic Metabolic Panel; Future    3. Hyperlipidemia associated with type 2 diabetes mellitus  Lab Results   Component Value Date    CHOL 132 02/28/2022    CHOL 136 01/11/2021    CHOL 141 02/05/2020     Lab Results   Component Value Date    HDL 53 02/28/2022    HDL 57 01/11/2021    HDL 58 02/05/2020     Lab Results   Component Value Date    LDLCALC 60.8 (L) 02/28/2022    LDLCALC 53.2 (L) 01/11/2021    LDLCALC 63.2 02/05/2020     Lab Results   Component Value Date    TRIG 91 02/28/2022    TRIG 129 01/11/2021    TRIG 99 02/05/2020     Lab Results   Component Value Date    CHOLHDL 40.2 02/28/2022    CHOLHDL 41.9 01/11/2021    CHOLHDL 41.1 02/05/2020     Good control, recheck in six months    4. Coronary artery disease involving native coronary artery of native heart without angina pectoris  Asymptomatic    5. S/P drug eluting coronary stent placement LCX 1/26/15  Asymptomatic no major bruising problems    6. BMI 22.0-22.9, adult  Good weight for age no changes needed    7. Osteoporosis screening declined  Predominantly a transportation issue

## 2022-07-31 ENCOUNTER — EXTERNAL CHRONIC CARE MANAGEMENT (OUTPATIENT)
Dept: PRIMARY CARE CLINIC | Facility: CLINIC | Age: 87
End: 2022-07-31
Payer: MEDICARE

## 2022-07-31 PROCEDURE — 99490 CHRNC CARE MGMT STAFF 1ST 20: CPT | Mod: S$PBB,,, | Performed by: FAMILY MEDICINE

## 2022-07-31 PROCEDURE — 99490 CHRNC CARE MGMT STAFF 1ST 20: CPT | Mod: PBBFAC,PO | Performed by: FAMILY MEDICINE

## 2022-07-31 PROCEDURE — 99490 PR CHRONIC CARE MGMT, 1ST 20 MIN: ICD-10-PCS | Mod: S$PBB,,, | Performed by: FAMILY MEDICINE

## 2022-08-02 NOTE — PROGRESS NOTES
[facility] Note    PCP: Josh Berman MD    Chief Complaint: hematuria     SUBJECTIVE:       History of Present Illness:  Genny Watson is a 86 y.o. female who presents to clinic for hematuria. She is Established  to our clinic.     Patient seen by NP in March and PVR was <300 cc. Was then seen again in June with inability to urinate and 350 cc drained at that time.   Both times has had a positive urine culture however were asymptomatic.  She has no bothersome urinary complaints.   Her renal function is normal.     Has been using vaginal estrogen cream.     Bladder scan today: 342 cc (unable to void)    11/29/21  Had E coli UTI in August. Unsure if she was having symptoms at that time.   Today her symptoms are frequency. No incontinence. No dysuria. No gross hematuria.   Having a daily bowel movement.   Drinks 4-5 glasses a day of water. Drinks 1 cup of coffee in the am.     Cr 6/29/21: 0.8  PVR today 305 cc       5/27/21  Patient presents for hematuria. She states her urine looked orange, no blood in urine and no clots. She thinks maybe she had bleeding from her hemorrhoids.   She had a UA done on 5/17 which showed 6 RBC, >100 WBC, nitrite negative. Urine culture no growth.   She underwent CT Urogram which showed no hydro, no stones, no filling defects. Her bladder is fairly distended.     She has no significant bothersome urinary complaints.   No history of stones.  She does have UTIs and her symptoms include dysuria. Usually goes to urgent care for this.      She a bowel movement every 3 days.   Drinks 2 bottles of water a day, 1 cup of coffee.   She states she feels like she is emptying her bladder better at home, she has a shy bladder.     UA today: +blood and leuks, nitrite negative  PVR today: 390 cc (catheterized)    Last urine culture: E coli (10/6/20)    Lab Results   Component Value Date    CREATININE 0.8 07/06/2022     Family  hx: no malignancy   Hx of HTN, HLD,  CAD s/p stent placement, PE, breast cancer, DM    Past medical, family, and social history reviewed as documented in chart with pertinent positive medical, family, and social history detailed in HPI.    Review of patient's allergies indicates:   Allergen Reactions    Sulfa (sulfonamide antibiotics)      Other reaction(s): Itching       Past Medical History:   Diagnosis Date    ALLERGIC RHINITIS 4/30/2012    Arthritis     Gimenez's esophagus     Breast cancer     right, s/p right mastectomy>20yr ago    Cataract     Diabetes mellitus type II     Diverticulitis     Diverticulosis     Fuchs' corneal dystrophy     Glaucoma     Hernia, hiatal     Hyperlipidemia     Hypertension     Macular degeneration     Pulmonary embolism     Seizures     Skin cancer of face 12/2014    Dr M Weil     UTI (lower urinary tract infection)      Past Surgical History:   Procedure Laterality Date    angeogram      APPENDECTOMY      BREAST SURGERY      cardio stent   1/2015    Dr Dodson    COLONOSCOPY  around 2003    COLONOSCOPY N/A 1/13/2016    Procedure: COLONOSCOPY;  Surgeon: Mario Bhakta MD;  [facility];  Service: Endoscopy;  Laterality: N/A; repeat in 5 years for surveillance    CORNEAL TRANSPLANT      EYE SURGERY      HYSTERECTOMY      ovaries removed    MASTECTOMY      right >20 years ago    STOMACH SURGERY      UPPER GASTROINTESTINAL ENDOSCOPY  01/13/2016    Dr. Bhakta     Family History   Problem Relation Age of Onset    Mental illness Mother     Liver cancer Mother     Early death Father     Heart disease Father     Diabetes Daughter     Early death Paternal Uncle     Heart disease Paternal Uncle     Celiac disease Sister     No Known Problems Son     Breast cancer Neg Hx     Ovarian cancer Neg Hx     Colon cancer Neg Hx     Colon polyps Neg Hx     Esophageal cancer Neg Hx     Stomach cancer Neg Hx     Ulcerative colitis Neg Hx      Social History     Tobacco Use     "Smoking status: Never Smoker    Smokeless tobacco: Never Used   Substance Use Topics    Alcohol use: No    Drug use: No        Review of Systems   Genitourinary: Negative for difficulty urinating, dysuria, frequency, hematuria, pelvic pain and urgency.       OBJECTIVE:     Anticoagulation: Plavix 75 mg, aspirin 81 mg    Estimated body mass index is 22.61 kg/m² as calculated from the following:    Height as of this encounter: 5' 3" (1.6 m).    Weight as of this encounter: 57.9 kg (127 lb 10.3 oz).    Vital Signs (Most Recent)  Pulse: (!) 117 (08/03/22 1315)  Resp: 18 (08/03/22 1315)  BP: (!) 156/99 (08/03/22 1315)    Physical Exam  Vitals reviewed.   Constitutional:       General: She is not in acute distress.     Appearance: Normal appearance. She is not ill-appearing or toxic-appearing.   HENT:      Head: Normocephalic and atraumatic.   Eyes:      General: No scleral icterus.  Cardiovascular:      Rate and Rhythm: Normal rate and regular rhythm.   Pulmonary:      Effort: Pulmonary effort is normal. No respiratory distress.   Abdominal:      Palpations: Abdomen is soft.   Skin:     Coloration: Skin is not jaundiced.   Neurological:      General: No focal deficit present.      Mental Status: She is alert and oriented to person, place, and time.   Psychiatric:         Mood and Affect: Mood normal.         Behavior: Behavior normal.         BMP  Lab Results   Component Value Date     07/06/2022    K 3.4 (L) 07/06/2022    CL 98 07/06/2022    CO2 24 07/06/2022    BUN 16 07/06/2022    CREATININE 0.8 07/06/2022    CALCIUM 9.7 07/06/2022    ANIONGAP 17 (H) 07/06/2022    ESTGFRAFRICA >60 07/06/2022    EGFRNONAA >60 07/06/2022       Lab Results   Component Value Date    WBC 8.52 06/14/2021    HGB 10.1 (L) 06/14/2021    HCT 35.9 (L) 06/14/2021    MCV 75 (L) 06/14/2021     06/14/2021       Imaging:  Per HPI    ASSESSMENT     1. Incomplete bladder emptying    2. Hypertension associated with diabetes    3. " Hyperlipidemia associated with type 2 diabetes mellitus    4. Coronary artery disease involving native coronary artery of native heart without angina pectoris    5. S/P drug eluting coronary stent placement LCX 1/26/15    6. History of pulmonary embolism    7. History of right breast cancer    8. Type 2 diabetes mellitus with other circulatory complication, without long-term current use of insulin        PLAN:     - Bladder scan today 340 without voiding  - As long as PVRs are staying in 250-300 range no need for indwelling yung or CIC  - She is very opposed to this also  - If PVRs rise to 400s will need drainage  - Follow up in 6 months  - Continue vaginal estrogen cream   - Only check urine if symptomatic     I spent 30 minutes with the patient. Over 50% of the visit was spent in counseling.      Mirna Harden MD

## 2022-08-03 ENCOUNTER — OFFICE VISIT (OUTPATIENT)
Dept: UROLOGY | Facility: CLINIC | Age: 87
End: 2022-08-03
Payer: MEDICARE

## 2022-08-03 VITALS
HEART RATE: 117 BPM | HEIGHT: 63 IN | BODY MASS INDEX: 22.61 KG/M2 | RESPIRATION RATE: 18 BRPM | DIASTOLIC BLOOD PRESSURE: 99 MMHG | SYSTOLIC BLOOD PRESSURE: 156 MMHG | WEIGHT: 127.63 LBS

## 2022-08-03 DIAGNOSIS — E78.5 HYPERLIPIDEMIA ASSOCIATED WITH TYPE 2 DIABETES MELLITUS: ICD-10-CM

## 2022-08-03 DIAGNOSIS — Z86.711 HISTORY OF PULMONARY EMBOLISM: ICD-10-CM

## 2022-08-03 DIAGNOSIS — E11.59 TYPE 2 DIABETES MELLITUS WITH OTHER CIRCULATORY COMPLICATION, WITHOUT LONG-TERM CURRENT USE OF INSULIN: ICD-10-CM

## 2022-08-03 DIAGNOSIS — E11.69 HYPERLIPIDEMIA ASSOCIATED WITH TYPE 2 DIABETES MELLITUS: ICD-10-CM

## 2022-08-03 DIAGNOSIS — Z95.5 S/P DRUG ELUTING CORONARY STENT PLACEMENT: ICD-10-CM

## 2022-08-03 DIAGNOSIS — I25.10 CORONARY ARTERY DISEASE INVOLVING NATIVE CORONARY ARTERY OF NATIVE HEART WITHOUT ANGINA PECTORIS: ICD-10-CM

## 2022-08-03 DIAGNOSIS — I15.2 HYPERTENSION ASSOCIATED WITH DIABETES: ICD-10-CM

## 2022-08-03 DIAGNOSIS — E11.59 HYPERTENSION ASSOCIATED WITH DIABETES: ICD-10-CM

## 2022-08-03 DIAGNOSIS — R33.9 INCOMPLETE BLADDER EMPTYING: Primary | ICD-10-CM

## 2022-08-03 DIAGNOSIS — Z85.3 HISTORY OF RIGHT BREAST CANCER: ICD-10-CM

## 2022-08-03 LAB — POC RESIDUAL URINE VOLUME: 342 ML (ref 0–100)

## 2022-08-03 PROCEDURE — 99212 OFFICE O/P EST SF 10 MIN: CPT | Mod: PBBFAC,PN | Performed by: STUDENT IN AN ORGANIZED HEALTH CARE EDUCATION/TRAINING PROGRAM

## 2022-08-03 PROCEDURE — 51798 US URINE CAPACITY MEASURE: CPT | Mod: PBBFAC,PN | Performed by: STUDENT IN AN ORGANIZED HEALTH CARE EDUCATION/TRAINING PROGRAM

## 2022-08-03 PROCEDURE — 99214 OFFICE O/P EST MOD 30 MIN: CPT | Mod: S$PBB,,, | Performed by: STUDENT IN AN ORGANIZED HEALTH CARE EDUCATION/TRAINING PROGRAM

## 2022-08-03 PROCEDURE — 99999 PR PBB SHADOW E&M-EST. PATIENT-LVL II: ICD-10-PCS | Mod: PBBFAC,,, | Performed by: STUDENT IN AN ORGANIZED HEALTH CARE EDUCATION/TRAINING PROGRAM

## 2022-08-03 PROCEDURE — 99999 PR PBB SHADOW E&M-EST. PATIENT-LVL II: CPT | Mod: PBBFAC,,, | Performed by: STUDENT IN AN ORGANIZED HEALTH CARE EDUCATION/TRAINING PROGRAM

## 2022-08-03 PROCEDURE — 99214 PR OFFICE/OUTPT VISIT, EST, LEVL IV, 30-39 MIN: ICD-10-PCS | Mod: S$PBB,,, | Performed by: STUDENT IN AN ORGANIZED HEALTH CARE EDUCATION/TRAINING PROGRAM

## 2022-08-22 DIAGNOSIS — I10 HYPERTENSION, ESSENTIAL: ICD-10-CM

## 2022-08-22 RX ORDER — METOPROLOL SUCCINATE 50 MG/1
TABLET, EXTENDED RELEASE ORAL
Qty: 90 TABLET | Refills: 2 | Status: SHIPPED | OUTPATIENT
Start: 2022-08-22 | End: 2023-05-18

## 2022-08-22 NOTE — TELEPHONE ENCOUNTER
No new care gaps identified.  [facility] Embedded Care Gaps. Reference number: [id number]. 8/22/2022   2:44:16 AM CDT

## 2022-08-22 NOTE — TELEPHONE ENCOUNTER
Refill Routing Note   Medication(s) are not appropriate for processing by [facility] for the following reason(s):      - Required vitals are abnormal    [facility] action(s):  Defer       Medication Therapy Plan: Last BP 08/03/22 (!) 156/99  Medication reconciliation completed: No     Appointments  past 12m or future 3m with PCP    Date Provider   Last Visit   7/6/2022 Josh Berman MD   Next Visit   1/6/2023 Josh Berman MD   ED visits in past 90 days: 0        Note composed:12:58 PM 08/22/2022

## 2022-08-31 DIAGNOSIS — Z78.0 MENOPAUSE: ICD-10-CM

## 2022-09-21 ENCOUNTER — TELEPHONE (OUTPATIENT)
Dept: CARDIOLOGY | Facility: CLINIC | Age: 87
End: 2022-09-21
Payer: MEDICARE

## 2022-09-21 NOTE — TELEPHONE ENCOUNTER
Told her we had to cancel tomorrows appointment due to Dr jesus working in the hospital. We will reach out and reschedule when that schedule is available

## 2022-10-28 DIAGNOSIS — I10 ESSENTIAL HYPERTENSION: ICD-10-CM

## 2022-10-28 RX ORDER — ATORVASTATIN CALCIUM 80 MG/1
TABLET, FILM COATED ORAL
Qty: 90 TABLET | Refills: 3 | Status: SHIPPED | OUTPATIENT
Start: 2022-10-28 | End: 2023-12-29

## 2022-10-28 RX ORDER — AMLODIPINE BESYLATE 10 MG/1
TABLET ORAL
Qty: 90 TABLET | Refills: 3 | Status: ON HOLD | OUTPATIENT
Start: 2022-10-28 | End: 2023-06-20 | Stop reason: HOSPADM

## 2023-01-05 ENCOUNTER — TELEPHONE (OUTPATIENT)
Dept: FAMILY MEDICINE | Facility: CLINIC | Age: 88
End: 2023-01-05
Payer: MEDICARE

## 2023-01-05 NOTE — TELEPHONE ENCOUNTER
..I called the patient to confirm her appointment that she has with Dr. Berman on 01/06/2023 at 11:00am, no answer left voicemail to return our call. I will send the patient a portal message as well.

## 2023-01-06 ENCOUNTER — OFFICE VISIT (OUTPATIENT)
Dept: FAMILY MEDICINE | Facility: CLINIC | Age: 88
End: 2023-01-06
Attending: FAMILY MEDICINE
Payer: MEDICARE

## 2023-01-06 VITALS
SYSTOLIC BLOOD PRESSURE: 116 MMHG | HEART RATE: 86 BPM | OXYGEN SATURATION: 97 % | RESPIRATION RATE: 17 BRPM | DIASTOLIC BLOOD PRESSURE: 78 MMHG | TEMPERATURE: 98 F | HEIGHT: 63 IN | WEIGHT: 124.44 LBS | BODY MASS INDEX: 22.05 KG/M2

## 2023-01-06 DIAGNOSIS — I15.2 HYPERTENSION ASSOCIATED WITH DIABETES: Primary | ICD-10-CM

## 2023-01-06 DIAGNOSIS — Z95.5 S/P DRUG ELUTING CORONARY STENT PLACEMENT: ICD-10-CM

## 2023-01-06 DIAGNOSIS — K22.70 BARRETT'S ESOPHAGUS WITHOUT DYSPLASIA: ICD-10-CM

## 2023-01-06 DIAGNOSIS — E11.59 TYPE 2 DIABETES MELLITUS WITH OTHER CIRCULATORY COMPLICATION, WITHOUT LONG-TERM CURRENT USE OF INSULIN: ICD-10-CM

## 2023-01-06 DIAGNOSIS — E11.69 HYPERLIPIDEMIA ASSOCIATED WITH TYPE 2 DIABETES MELLITUS: ICD-10-CM

## 2023-01-06 DIAGNOSIS — E11.59 HYPERTENSION ASSOCIATED WITH DIABETES: Primary | ICD-10-CM

## 2023-01-06 DIAGNOSIS — I25.10 CORONARY ARTERY DISEASE INVOLVING NATIVE CORONARY ARTERY OF NATIVE HEART WITHOUT ANGINA PECTORIS: ICD-10-CM

## 2023-01-06 DIAGNOSIS — E78.5 HYPERLIPIDEMIA ASSOCIATED WITH TYPE 2 DIABETES MELLITUS: ICD-10-CM

## 2023-01-06 PROCEDURE — 99999 PR PBB SHADOW E&M-EST. PATIENT-LVL V: ICD-10-PCS | Mod: PBBFAC,,, | Performed by: FAMILY MEDICINE

## 2023-01-06 PROCEDURE — 99214 OFFICE O/P EST MOD 30 MIN: CPT | Mod: S$PBB,,, | Performed by: FAMILY MEDICINE

## 2023-01-06 PROCEDURE — 99215 OFFICE O/P EST HI 40 MIN: CPT | Mod: PBBFAC,PN | Performed by: FAMILY MEDICINE

## 2023-01-06 PROCEDURE — 99999 PR PBB SHADOW E&M-EST. PATIENT-LVL V: CPT | Mod: PBBFAC,,, | Performed by: FAMILY MEDICINE

## 2023-01-06 PROCEDURE — 99214 PR OFFICE/OUTPT VISIT, EST, LEVL IV, 30-39 MIN: ICD-10-PCS | Mod: S$PBB,,, | Performed by: FAMILY MEDICINE

## 2023-01-06 RX ORDER — PANTOPRAZOLE SODIUM 40 MG/1
40 TABLET, DELAYED RELEASE ORAL DAILY
Qty: 90 TABLET | Refills: 3 | Status: SHIPPED | OUTPATIENT
Start: 2023-01-06 | End: 2024-01-28

## 2023-01-06 RX ORDER — METFORMIN HYDROCHLORIDE 500 MG/1
500 TABLET, EXTENDED RELEASE ORAL NIGHTLY
Qty: 90 TABLET | Refills: 1 | Status: SHIPPED | OUTPATIENT
Start: 2023-01-06 | End: 2023-07-26

## 2023-01-06 NOTE — PROGRESS NOTES
Subjective:       Patient ID: Genny Watson is a 87 y.o. female.    Chief Complaint: Diabetes (Pt states that she is here for her 6 mo fu )    87-year-old female coming in for a follow-up on diabetes, hypertension, hyperlipidemia and other problems.  She has no active complaints.  She was called by her mail order pharmacy a week ago and told that they did not have any metformin on hand and requested to know if she wanted them to wait for it to come in or to contact us to switch to something else.  She instructed him to wait until it comes in and she says she has about a two week supply remaining.  I have not heard of any shortages of metformin.  She needs a refill of her Protonix for her Barretts esophagus but otherwise is doing well with her medications.  She has no episodes of hypoglycemia.  Her blood pressure at home this morning was 120/76.  She is not fasting today.  She is due for a DEXA scan and it was ordered in August but she indicates that she will not do one and declines to have it.  She is also due for the shingles vaccine and I informed her that the coverage has improved since the 1st of the year but she declines to have that as well.  She is coming due for her diabetic eye exam the last one done with Dr. Alfaro in February of 2022.    Past Medical History:  4/30/2012: ALLERGIC RHINITIS  No date: Arthritis  No date: Gimenez's esophagus  No date: Breast cancer      Comment:  right, s/p right mastectomy>20yr ago  No date: Cataract  No date: Diabetes mellitus type II  No date: Diverticulitis  No date: Diverticulosis  No date: Fuchs' corneal dystrophy  No date: Glaucoma  No date: Hernia, hiatal  No date: Hyperlipidemia  No date: Hypertension  No date: Macular degeneration  No date: Pulmonary embolism  No date: Seizures  12/2014: Skin cancer of face      Comment:  Dr M Weil   No date: UTI (lower urinary tract infection)    Past Surgical History:  No date: angeogram  No date: APPENDECTOMY  No date:  BREAST SURGERY  1/2015: cardio stent       Comment:  Dr Dodson  around 2003: COLONOSCOPY  1/13/2016: COLONOSCOPY; N/A      Comment:  Procedure: COLONOSCOPY;  Surgeon: Mario Moya MD;                Location: [facility];  Service: Endoscopy;  Laterality:                N/A; repeat in 5 years for surveillance  No date: CORNEAL TRANSPLANT  No date: EYE SURGERY  No date: HYSTERECTOMY      Comment:  ovaries removed  No date: MASTECTOMY      Comment:  right >20 years ago  No date: STOMACH SURGERY  01/13/2016: UPPER GASTROINTESTINAL ENDOSCOPY      Comment:  Dr. Moya    Current Outpatient Medications on File Prior to Visit:  amLODIPine (NORVASC) 10 MG tablet, TAKE 1 TABLET EVERY EVENING, Disp: 90 tablet, Rfl: 3  ascorbic acid, vitamin C, (VITAMIN C) 500 MG tablet, Take 500 mg by mouth once daily., Disp: , Rfl:   aspirin (ECOTRIN) 81 MG EC tablet, Take 81 mg by mouth once daily., Disp: , Rfl:   atorvastatin (LIPITOR) 80 MG tablet, TAKE 1 TABLET DAILY, Disp: 90 tablet, Rfl: 3  clopidogreL (PLAVIX) 75 mg tablet, TAKE 1 TABLET DAILY, Disp: 90 tablet, Rfl: 3  estradioL (ESTRACE) 0.01 % (0.1 mg/gram) vaginal cream, USE 1 GM VAGINALLY TWICE WEEKLY, Disp: 42.5 g, Rfl: 2  ferrous sulfate (FEOSOL) 325 mg (65 mg iron) Tab tablet, Take 325 mg by mouth daily with breakfast., Disp: , Rfl:   losartan (COZAAR) 25 MG tablet, TAKE 1 TABLET DAILY, Disp: 90 tablet, Rfl: 3  metoprolol succinate (TOPROL-XL) 50 MG 24 hr tablet, TAKE 1 TABLET ONCE DAILY, Disp: 90 tablet, Rfl: 2  prednisoLONE acetate (PRED FORTE) 1 % DrpS, Place 1 drop into the right eye once daily. , Disp: , Rfl:   TRAVATAN Z 0.004 % ophthalmic solution, Place 1 drop into both eyes every evening. , Disp: , Rfl:   VIT C/E/ZN/COPPR/LUTEIN/ZEAXAN (PRESERVISION AREDS 2 ORAL), Take 1 capsule by mouth 2 (two) times daily., Disp: , Rfl:   [DISCONTINUED] metFORMIN (GLUCOPHAGE-XR) 500 MG ER 24hr tablet, TAKE 1 TABLET EVERY EVENING, Disp: 90 tablet, Rfl: 1  [DISCONTINUED]  pantoprazole (PROTONIX) 40 MG tablet, Take 1 tablet (40 mg total) by mouth once daily., Disp: 90 tablet, Rfl: 3  loratadine (CLARITIN) 10 mg tablet, Take 1 tablet (10 mg total) by mouth once daily. (Patient not taking: Reported on 1/6/2023), Disp: 30 tablet, Rfl: 11    No current facility-administered medications on file prior to visit.        Review of Systems   Constitutional:  Negative for chills, diaphoresis and fever.   Eyes:  Negative for redness and visual disturbance.   Respiratory:  Negative for chest tightness and shortness of breath.    Cardiovascular:  Negative for chest pain, palpitations and leg swelling.   Endocrine: Negative for polydipsia and polyuria.   Genitourinary:  Positive for frequency (Increased nocturia only). Negative for dysuria and hematuria.     Objective:      Physical Exam  Vitals and nursing note reviewed.   Constitutional:       General: She is not in acute distress.     Appearance: Normal appearance. She is well-developed and normal weight. She is not ill-appearing, toxic-appearing or diaphoretic.      Comments: Good blood pressure control  Normal pulse with regular rhythm  Normal weight with a BMI of 22.1 she is down 3 lb from her last visit July 6, 2022   HENT:      Head: Normocephalic and atraumatic.      Right Ear: Tympanic membrane, ear canal and external ear normal. There is no impacted cerumen.      Left Ear: Tympanic membrane, ear canal and external ear normal. There is no impacted cerumen.      Nose: Nose normal. No congestion or rhinorrhea.      Mouth/Throat:      Mouth: Mucous membranes are moist.      Pharynx: Oropharynx is clear. No oropharyngeal exudate or posterior oropharyngeal erythema.   Eyes:      General: No scleral icterus.        Right eye: No discharge.         Left eye: No discharge.      Extraocular Movements: Extraocular movements intact.      Conjunctiva/sclera: Conjunctivae normal.      Pupils: Pupils are equal, round, and reactive to light.   Neck:       Thyroid: No thyromegaly.      Vascular: No carotid bruit or JVD.   Cardiovascular:      Rate and Rhythm: Normal rate and regular rhythm.      Pulses: Normal pulses.           Dorsalis pedis pulses are 2+ on the right side and 2+ on the left side.        Posterior tibial pulses are 2+ on the right side and 2+ on the left side.      Heart sounds: Normal heart sounds. No murmur heard.    No friction rub. No gallop.   Pulmonary:      Effort: Pulmonary effort is normal. No respiratory distress.      Breath sounds: Normal breath sounds. No stridor. No wheezing, rhonchi or rales.   Chest:      Chest wall: No tenderness.   Abdominal:      General: Abdomen is flat. Bowel sounds are normal. There is no distension.      Palpations: Abdomen is soft. There is no mass.      Tenderness: There is no abdominal tenderness. There is no guarding or rebound.      Hernia: No hernia is present.   Musculoskeletal:         General: No tenderness. Normal range of motion.      Cervical back: Normal range of motion and neck supple. No rigidity or tenderness.      Right lower leg: No edema (Trace only).      Left lower leg: No edema (Trace only).      Right foot: Normal range of motion. No deformity, bunion, Charcot foot, foot drop or prominent metatarsal heads.      Left foot: Normal range of motion. No deformity, bunion, Charcot foot, foot drop or prominent metatarsal heads.   Feet:      Right foot:      Protective Sensation: 9 sites tested.  9 sites sensed.      Skin integrity: Skin integrity normal. No ulcer, blister, skin breakdown, erythema, warmth, callus, dry skin or fissure.      Toenail Condition: Right toenails are normal.      Left foot:      Protective Sensation: 9 sites tested.  9 sites sensed.      Skin integrity: Skin integrity normal. No ulcer, blister, skin breakdown, erythema, warmth, callus, dry skin or fissure.      Toenail Condition: Left toenails are normal.   Lymphadenopathy:      Cervical: No cervical adenopathy.    Skin:     General: Skin is warm and dry.      Capillary Refill: Capillary refill takes less than 2 seconds.      Coloration: Skin is not jaundiced or pale.      Findings: No bruising, erythema, lesion or rash.   Neurological:      General: No focal deficit present.      Mental Status: She is alert and oriented to person, place, and time. Mental status is at baseline.      Cranial Nerves: No cranial nerve deficit.      Deep Tendon Reflexes: Reflexes are normal and symmetric.      Comments: Proprioception intact all 10 toes   Psychiatric:         Mood and Affect: Mood normal.         Behavior: Behavior normal.         Thought Content: Thought content normal.       Assessment:       1. Hypertension associated with diabetes    2. Hyperlipidemia associated with type 2 diabetes mellitus    3. Type 2 diabetes mellitus with other circulatory complication, without long-term current use of insulin    4. Coronary artery disease involving native coronary artery of native heart without angina pectoris    5. S/P drug eluting coronary stent placement LCX 1/26/15    6. Gimenez's esophagus without dysplasia          Plan:       1. Hypertension associated with diabetes  Good control, no changes needed in medications.  Refills recently provided by Cardiology and not needed at this time.  - Lipid Panel; Future  - Comprehensive Metabolic Panel; Future  - CBC Auto Differential; Future    2. Hyperlipidemia associated with type 2 diabetes mellitus  Await lipid panel results for possible change in Lipitor.  Refill also provided by Cardiology and not needed  - Lipid Panel; Future  - Comprehensive Metabolic Panel; Future    3. Type 2 diabetes mellitus with other circulatory complication, without long-term current use of insulin  Await A1c results and chemistry panel.  Refilled metformin at her mail order pharmacy, it looks like she may have used her last refill and misunderstood the communication.  - Hemoglobin A1C; Future  - Lipid Panel;  Future  - Comprehensive Metabolic Panel; Future  - metFORMIN (GLUCOPHAGE-XR) 500 MG ER 24hr tablet; Take 1 tablet (500 mg total) by mouth every evening.  Dispense: 90 tablet; Refill: 1    4. Coronary artery disease involving native coronary artery of native heart without angina pectoris  Asymptomatic, followed by Cardiology    5. S/P drug eluting coronary stent placement LCX 1/26/15  See above    6. Gimenez's esophagus without dysplasia  Asymptomatic, refill Protonix  - pantoprazole (PROTONIX) 40 MG tablet; Take 1 tablet (40 mg total) by mouth once daily.  Dispense: 90 tablet; Refill: 3    7. BMI 22.0-22.9, adult  Good weight for age, no changes needed

## 2023-01-09 ENCOUNTER — LAB VISIT (OUTPATIENT)
Dept: LAB | Facility: HOSPITAL | Age: 88
End: 2023-01-09
Attending: FAMILY MEDICINE
Payer: MEDICARE

## 2023-01-09 DIAGNOSIS — E78.5 HYPERLIPIDEMIA ASSOCIATED WITH TYPE 2 DIABETES MELLITUS: ICD-10-CM

## 2023-01-09 DIAGNOSIS — E11.59 HYPERTENSION ASSOCIATED WITH DIABETES: ICD-10-CM

## 2023-01-09 DIAGNOSIS — E11.69 HYPERLIPIDEMIA ASSOCIATED WITH TYPE 2 DIABETES MELLITUS: ICD-10-CM

## 2023-01-09 DIAGNOSIS — E11.59 TYPE 2 DIABETES MELLITUS WITH OTHER CIRCULATORY COMPLICATION, WITHOUT LONG-TERM CURRENT USE OF INSULIN: ICD-10-CM

## 2023-01-09 DIAGNOSIS — I15.2 HYPERTENSION ASSOCIATED WITH DIABETES: ICD-10-CM

## 2023-01-09 LAB
ALBUMIN SERPL BCP-MCNC: 4.1 G/DL (ref 3.5–5.2)
ALP SERPL-CCNC: 104 U/L (ref 55–135)
ALT SERPL W/O P-5'-P-CCNC: 25 U/L (ref 10–44)
ANION GAP SERPL CALC-SCNC: 11 MMOL/L (ref 8–16)
AST SERPL-CCNC: 29 U/L (ref 10–40)
BASOPHILS # BLD AUTO: 0.06 K/UL (ref 0–0.2)
BASOPHILS NFR BLD: 0.6 % (ref 0–1.9)
BILIRUB SERPL-MCNC: 1 MG/DL (ref 0.1–1)
BUN SERPL-MCNC: 19 MG/DL (ref 8–23)
CALCIUM SERPL-MCNC: 9.5 MG/DL (ref 8.7–10.5)
CHLORIDE SERPL-SCNC: 99 MMOL/L (ref 95–110)
CHOLEST SERPL-MCNC: 127 MG/DL (ref 120–199)
CHOLEST/HDLC SERPL: 2.4 {RATIO} (ref 2–5)
CO2 SERPL-SCNC: 27 MMOL/L (ref 23–29)
CREAT SERPL-MCNC: 0.7 MG/DL (ref 0.5–1.4)
DIFFERENTIAL METHOD: NORMAL
EOSINOPHIL # BLD AUTO: 0.1 K/UL (ref 0–0.5)
EOSINOPHIL NFR BLD: 0.7 % (ref 0–8)
ERYTHROCYTE [DISTWIDTH] IN BLOOD BY AUTOMATED COUNT: 12.9 % (ref 11.5–14.5)
EST. GFR  (NO RACE VARIABLE): >60 ML/MIN/1.73 M^2
ESTIMATED AVG GLUCOSE: 128 MG/DL (ref 68–131)
GLUCOSE SERPL-MCNC: 129 MG/DL (ref 70–110)
HBA1C MFR BLD: 6.1 % (ref 4–5.6)
HCT VFR BLD AUTO: 42.2 % (ref 37–48.5)
HDLC SERPL-MCNC: 52 MG/DL (ref 40–75)
HDLC SERPL: 40.9 % (ref 20–50)
HGB BLD-MCNC: 14.1 G/DL (ref 12–16)
IMM GRANULOCYTES # BLD AUTO: 0.03 K/UL (ref 0–0.04)
IMM GRANULOCYTES NFR BLD AUTO: 0.3 % (ref 0–0.5)
LDLC SERPL CALC-MCNC: 56.6 MG/DL (ref 63–159)
LYMPHOCYTES # BLD AUTO: 3.1 K/UL (ref 1–4.8)
LYMPHOCYTES NFR BLD: 32.7 % (ref 18–48)
MCH RBC QN AUTO: 30 PG (ref 27–31)
MCHC RBC AUTO-ENTMCNC: 33.4 G/DL (ref 32–36)
MCV RBC AUTO: 90 FL (ref 82–98)
MONOCYTES # BLD AUTO: 0.9 K/UL (ref 0.3–1)
MONOCYTES NFR BLD: 9.4 % (ref 4–15)
NEUTROPHILS # BLD AUTO: 5.4 K/UL (ref 1.8–7.7)
NEUTROPHILS NFR BLD: 56.3 % (ref 38–73)
NONHDLC SERPL-MCNC: 75 MG/DL
NRBC BLD-RTO: 0 /100 WBC
PLATELET # BLD AUTO: 314 K/UL (ref 150–450)
PMV BLD AUTO: 10.6 FL (ref 9.2–12.9)
POTASSIUM SERPL-SCNC: 4 MMOL/L (ref 3.5–5.1)
PROT SERPL-MCNC: 7.1 G/DL (ref 6–8.4)
RBC # BLD AUTO: 4.7 M/UL (ref 4–5.4)
SODIUM SERPL-SCNC: 137 MMOL/L (ref 136–145)
TRIGL SERPL-MCNC: 92 MG/DL (ref 30–150)
WBC # BLD AUTO: 9.51 K/UL (ref 3.9–12.7)

## 2023-01-09 PROCEDURE — 80053 COMPREHEN METABOLIC PANEL: CPT | Performed by: FAMILY MEDICINE

## 2023-01-09 PROCEDURE — 80061 LIPID PANEL: CPT | Performed by: FAMILY MEDICINE

## 2023-01-09 PROCEDURE — 36415 COLL VENOUS BLD VENIPUNCTURE: CPT | Mod: PO | Performed by: FAMILY MEDICINE

## 2023-01-09 PROCEDURE — 83036 HEMOGLOBIN GLYCOSYLATED A1C: CPT | Performed by: FAMILY MEDICINE

## 2023-01-09 PROCEDURE — 85025 COMPLETE CBC W/AUTO DIFF WBC: CPT | Performed by: FAMILY MEDICINE

## 2023-02-02 NOTE — PROGRESS NOTES
[facility] Note    PCP: Josh Berman MD    Chief Complaint: incomplete bladder emptying      SUBJECTIVE:       History of Present Illness:  Genny Watson is a 87 y.o. female who presents to clinic for incomplete bladder emptying. She is Established  to our clinic.     Has been using vaginal estrogen cream twice a week.   No UTIs since last visit.   No hematuria or dysuria.   Has a bowel movement every 2-3 days.     cc    8/3/22  Patient seen by NP in March and PVR was <300 cc. Was then seen again in June with inability to urinate and 350 cc drained at that time.   Both times has had a positive urine culture however were asymptomatic.  She has no bothersome urinary complaints.   Her renal function is normal.     Has been using vaginal estrogen cream.     Bladder scan today: 342 cc (unable to void)    11/29/21  Had E coli UTI in August. Unsure if she was having symptoms at that time.   Today her symptoms are frequency. No incontinence. No dysuria. No gross hematuria.   Having a daily bowel movement.   Drinks 4-5 glasses a day of water. Drinks 1 cup of coffee in the am.     Cr 6/29/21: 0.8  PVR today 305 cc       5/27/21  Patient presents for hematuria. She states her urine looked orange, no blood in urine and no clots. She thinks maybe she had bleeding from her hemorrhoids.   She had a UA done on 5/17 which showed 6 RBC, >100 WBC, nitrite negative. Urine culture no growth.   She underwent CT Urogram which showed no hydro, no stones, no filling defects. Her bladder is fairly distended.     She has no significant bothersome urinary complaints.   No history of stones.  She does have UTIs and her symptoms include dysuria. Usually goes to urgent care for this.      She a bowel movement every 3 days.   Drinks 2 bottles of water a day, 1 cup of coffee.   She states she feels like she is emptying her bladder better at home, she has a shy bladder.     UA today: +blood and leuks,  nitrite negative  PVR today: 390 cc (catheterized)    Last urine culture: E coli (10/6/20)    Lab Results   Component Value Date    CREATININE 0.7 01/09/2023     Family  hx: no malignancy   Hx of HTN, HLD, CAD s/p stent placement, PE, breast cancer, DM    Past medical, family, and social history reviewed as documented in chart with pertinent positive medical, family, and social history detailed in HPI.    Review of patient's allergies indicates:   Allergen Reactions    Sulfa (sulfonamide antibiotics)      Other reaction(s): Itching       Past Medical History:   Diagnosis Date    ALLERGIC RHINITIS 4/30/2012    Arthritis     Gimenez's esophagus     Breast cancer     right, s/p right mastectomy>20yr ago    Cataract     Diabetes mellitus type II     Diverticulitis     Diverticulosis     Fuchs' corneal dystrophy     Glaucoma     Hernia, hiatal     Hyperlipidemia     Hypertension     Macular degeneration     Pulmonary embolism     Seizures     Skin cancer of face 12/2014    Dr M Weil     UTI (lower urinary tract infection)      Past Surgical History:   Procedure Laterality Date    angeogram      APPENDECTOMY      BREAST SURGERY      cardio stent   1/2015    Dr Dodson    COLONOSCOPY  around 2003    COLONOSCOPY N/A 1/13/2016    Procedure: COLONOSCOPY;  Surgeon: Mario Bhakta MD;  Location: [facility];  Service: Endoscopy;  Laterality: N/A; repeat in 5 years for surveillance    CORNEAL TRANSPLANT      EYE SURGERY      HYSTERECTOMY      ovaries removed    MASTECTOMY      right >20 years ago    STOMACH SURGERY      UPPER GASTROINTESTINAL ENDOSCOPY  01/13/2016    Dr. Bhakta     Family History   Problem Relation Age of Onset    Mental illness Mother     Liver cancer Mother     Early death Father     Heart disease Father     Diabetes Daughter     Early death Paternal Uncle     Heart disease Paternal Uncle     Celiac disease Sister     No Known Problems Son     Breast cancer Neg Hx     Ovarian cancer Neg Hx     Colon cancer  "Neg Hx     Colon polyps Neg Hx     Esophageal cancer Neg Hx     Stomach cancer Neg Hx     Ulcerative colitis Neg Hx      Social History     Tobacco Use    Smoking status: Never    Smokeless tobacco: Never   Substance Use Topics    Alcohol use: No    Drug use: No        Review of Systems   Genitourinary:  Negative for difficulty urinating, dysuria, frequency, hematuria, pelvic pain and urgency.     OBJECTIVE:     Anticoagulation: Plavix 75 mg, aspirin 81 mg    Estimated body mass index is 22.03 kg/m² as calculated from the following:    Height as of this encounter: 5' 3" (1.6 m).    Weight as of this encounter: 56.4 kg (124 lb 5.4 oz).    Vital Signs (Most Recent)  Temp: 97.8 °F (36.6 °C) (02/06/23 1042)  Pulse: 92 (02/06/23 1042)  Resp: 16 (02/06/23 1042)  BP: (!) 146/92 (02/06/23 1042)    Physical Exam  Vitals reviewed.   Constitutional:       General: She is not in acute distress.     Appearance: Normal appearance. She is not ill-appearing or toxic-appearing.   HENT:      Head: Normocephalic and atraumatic.   Eyes:      General: No scleral icterus.  Cardiovascular:      Rate and Rhythm: Normal rate and regular rhythm.   Pulmonary:      Effort: Pulmonary effort is normal. No respiratory distress.   Abdominal:      Palpations: Abdomen is soft.   Skin:     Coloration: Skin is not jaundiced.   Neurological:      General: No focal deficit present.      Mental Status: She is alert and oriented to person, place, and time.   Psychiatric:         Mood and Affect: Mood normal.         Behavior: Behavior normal.       BMP  Lab Results   Component Value Date     01/09/2023    K 4.0 01/09/2023    CL 99 01/09/2023    CO2 27 01/09/2023    BUN 19 01/09/2023    CREATININE 0.7 01/09/2023    CALCIUM 9.5 01/09/2023    ANIONGAP 11 01/09/2023    ESTGFRAFRICA >60 07/06/2022    EGFRNONAA >60 07/06/2022       Lab Results   Component Value Date    WBC 9.51 01/09/2023    HGB 14.1 01/09/2023    HCT 42.2 01/09/2023    MCV 90 01/09/2023 "     01/09/2023       Imaging:  Per HPI    ASSESSMENT     1. Incomplete bladder emptying    2. Hypertension associated with diabetes    3. Hyperlipidemia associated with type 2 diabetes mellitus    4. Coronary artery disease involving native coronary artery of native heart without angina pectoris    5. S/P drug eluting coronary stent placement LCX 1/26/15    6. Hypercholesteremia    7. History of pulmonary embolism    8. Type 2 diabetes mellitus with other circulatory complication, without long-term current use of insulin          PLAN:     - Bladder scan today 240  - As long as PVRs are staying in 250-300 range no need for indwelling yung or CIC  - She is very opposed to this also  - If PVRs rise to 400s will need drainage  - Follow up in 6 months  - Continue vaginal estrogen cream, refill sent   - Only check urine if symptomatic     I spent 30 minutes with the patient. Over 50% of the visit was spent in counseling.      Mirna Harden MD

## 2023-02-06 ENCOUNTER — OFFICE VISIT (OUTPATIENT)
Dept: UROLOGY | Facility: CLINIC | Age: 88
End: 2023-02-06
Payer: MEDICARE

## 2023-02-06 VITALS
BODY MASS INDEX: 22.03 KG/M2 | HEART RATE: 92 BPM | HEIGHT: 63 IN | WEIGHT: 124.31 LBS | SYSTOLIC BLOOD PRESSURE: 146 MMHG | RESPIRATION RATE: 16 BRPM | TEMPERATURE: 98 F | DIASTOLIC BLOOD PRESSURE: 92 MMHG

## 2023-02-06 DIAGNOSIS — R33.9 INCOMPLETE BLADDER EMPTYING: Primary | ICD-10-CM

## 2023-02-06 DIAGNOSIS — E11.69 HYPERLIPIDEMIA ASSOCIATED WITH TYPE 2 DIABETES MELLITUS: ICD-10-CM

## 2023-02-06 DIAGNOSIS — Z86.711 HISTORY OF PULMONARY EMBOLISM: ICD-10-CM

## 2023-02-06 DIAGNOSIS — E11.59 TYPE 2 DIABETES MELLITUS WITH OTHER CIRCULATORY COMPLICATION, WITHOUT LONG-TERM CURRENT USE OF INSULIN: ICD-10-CM

## 2023-02-06 DIAGNOSIS — I15.2 HYPERTENSION ASSOCIATED WITH DIABETES: ICD-10-CM

## 2023-02-06 DIAGNOSIS — E78.5 HYPERLIPIDEMIA ASSOCIATED WITH TYPE 2 DIABETES MELLITUS: ICD-10-CM

## 2023-02-06 DIAGNOSIS — E78.00 HYPERCHOLESTEREMIA: ICD-10-CM

## 2023-02-06 DIAGNOSIS — Z95.5 S/P DRUG ELUTING CORONARY STENT PLACEMENT: ICD-10-CM

## 2023-02-06 DIAGNOSIS — E11.59 HYPERTENSION ASSOCIATED WITH DIABETES: ICD-10-CM

## 2023-02-06 DIAGNOSIS — I25.10 CORONARY ARTERY DISEASE INVOLVING NATIVE CORONARY ARTERY OF NATIVE HEART WITHOUT ANGINA PECTORIS: ICD-10-CM

## 2023-02-06 PROCEDURE — 99999 PR PBB SHADOW E&M-EST. PATIENT-LVL III: ICD-10-PCS | Mod: PBBFAC,,, | Performed by: STUDENT IN AN ORGANIZED HEALTH CARE EDUCATION/TRAINING PROGRAM

## 2023-02-06 PROCEDURE — 99214 OFFICE O/P EST MOD 30 MIN: CPT | Mod: S$PBB,,, | Performed by: STUDENT IN AN ORGANIZED HEALTH CARE EDUCATION/TRAINING PROGRAM

## 2023-02-06 PROCEDURE — 99214 PR OFFICE/OUTPT VISIT, EST, LEVL IV, 30-39 MIN: ICD-10-PCS | Mod: S$PBB,,, | Performed by: STUDENT IN AN ORGANIZED HEALTH CARE EDUCATION/TRAINING PROGRAM

## 2023-02-06 PROCEDURE — 99213 OFFICE O/P EST LOW 20 MIN: CPT | Mod: PBBFAC,PN | Performed by: STUDENT IN AN ORGANIZED HEALTH CARE EDUCATION/TRAINING PROGRAM

## 2023-02-06 PROCEDURE — 99999 PR PBB SHADOW E&M-EST. PATIENT-LVL III: CPT | Mod: PBBFAC,,, | Performed by: STUDENT IN AN ORGANIZED HEALTH CARE EDUCATION/TRAINING PROGRAM

## 2023-02-06 RX ORDER — ESTRADIOL 0.1 MG/G
CREAM VAGINAL
Qty: 42.5 G | Refills: 5 | Status: SHIPPED | OUTPATIENT
Start: 2023-02-06 | End: 2023-08-08 | Stop reason: CLARIF

## 2023-05-02 ENCOUNTER — HOSPITAL ENCOUNTER (EMERGENCY)
Facility: HOSPITAL | Age: 88
Discharge: HOME | End: 2023-05-02
Attending: EMERGENCY MEDICINE
Payer: MEDICARE

## 2023-05-02 VITALS
HEIGHT: 63 IN | SYSTOLIC BLOOD PRESSURE: 123 MMHG | DIASTOLIC BLOOD PRESSURE: 71 MMHG | BODY MASS INDEX: 21.26 KG/M2 | OXYGEN SATURATION: 97 % | HEART RATE: 84 BPM | RESPIRATION RATE: 20 BRPM | TEMPERATURE: 98 F | WEIGHT: 120 LBS

## 2023-05-02 DIAGNOSIS — N30.00 ACUTE CYSTITIS WITHOUT HEMATURIA: Primary | ICD-10-CM

## 2023-05-02 LAB
BACTERIA #/AREA URNS HPF: ABNORMAL /HPF
BILIRUB UR QL STRIP: NEGATIVE
CLARITY UR: ABNORMAL
COLOR UR: ABNORMAL
GLUCOSE UR QL STRIP: NEGATIVE
HGB UR QL STRIP: ABNORMAL
HYALINE CASTS #/AREA URNS LPF: 0 /LPF
KETONES UR QL STRIP: NEGATIVE
LEUKOCYTE ESTERASE UR QL STRIP: ABNORMAL
MICROSCOPIC COMMENT: ABNORMAL
NITRITE UR QL STRIP: NEGATIVE
PH UR STRIP: 8 [PH] (ref 5–8)
POCT GLUCOSE: 259 MG/DL (ref 70–110)
PROT UR QL STRIP: ABNORMAL
RBC #/AREA URNS HPF: 0 /HPF (ref 0–4)
SP GR UR STRIP: 1 (ref 1–1.03)
URN SPEC COLLECT METH UR: ABNORMAL
UROBILINOGEN UR STRIP-ACNC: NEGATIVE EU/DL
WBC #/AREA URNS HPF: 100 /HPF (ref 0–5)

## 2023-05-02 PROCEDURE — 99283 EMERGENCY DEPT VISIT LOW MDM: CPT

## 2023-05-02 PROCEDURE — 82962 GLUCOSE BLOOD TEST: CPT

## 2023-05-02 PROCEDURE — 81000 URINALYSIS NONAUTO W/SCOPE: CPT | Performed by: EMERGENCY MEDICINE

## 2023-05-02 PROCEDURE — 87086 URINE CULTURE/COLONY COUNT: CPT | Performed by: EMERGENCY MEDICINE

## 2023-05-02 PROCEDURE — 25000003 PHARM REV CODE 250: Performed by: EMERGENCY MEDICINE

## 2023-05-02 RX ORDER — CEFUROXIME AXETIL 250 MG/1
500 TABLET ORAL
Status: COMPLETED | OUTPATIENT
Start: 2023-05-02 | End: 2023-05-02

## 2023-05-02 RX ORDER — CEFUROXIME AXETIL 500 MG/1
500 TABLET ORAL 2 TIMES DAILY
Qty: 14 TABLET | Refills: 0 | Status: SHIPPED | OUTPATIENT
Start: 2023-05-02 | End: 2023-06-15

## 2023-05-02 RX ADMIN — CEFUROXIME AXETIL 500 MG: 250 TABLET ORAL at 12:05

## 2023-05-02 NOTE — DISCHARGE INSTRUCTIONS
RETURN TO EMERGENCY DEPARTMENT WITHOUT FAIL, IF YOUR SYMPTOMS WORSEN, IF YOU GET NEW OR DIFFERENT SYMPTOMS, IF YOU ARE UNABLE TO FOLLOW UP AS DIRECTED, OR IF YOU HAVE ANY CONCERNS OR WORRIES.    Take antibiotics as directed.  Follow-up with your primary care provider.

## 2023-05-02 NOTE — ED PROVIDER NOTES
Encounter Date: 5/2/2023    SCRIBE #1 NOTE: IBharat am scribing for, and in the presence of,  Dexter Adams DO.     History     Chief Complaint   Patient presents with    Urinary Frequency     Started last night.     Time seen by provider: 8:17 AM on 05/02/2023    Genny Watson is a 87 y.o. female who presents to the ED with an onset of urinary frequency that began last night, as well as lower abdominal pain when she urinates. She has a history of UTIs, and states this episode of symptoms does not feel like her typical UTI. She states she urinated 10 times yesterday night, when she normally urinates 4 times. The patient denies dysuria, fever, chest pain, SOB, or any other symptoms at this time. She denies a history of cholecystectomy. The patient's blood sugar is 209 mg/dL. PMHx of DM II, HLD, HTN, and diverticulitis. PSHx of hysterectomy and appendectomy.    The history is provided by the patient.   Review of patient's allergies indicates:   Allergen Reactions    Sulfa (sulfonamide antibiotics)      Other reaction(s): Itching     Past Medical History:   Diagnosis Date    ALLERGIC RHINITIS 4/30/2012    Arthritis     Gimenez's esophagus     Breast cancer     right, s/p right mastectomy>20yr ago    Cataract     Diabetes mellitus type II     Diverticulitis     Diverticulosis     Fuchs' corneal dystrophy     Glaucoma     Hernia, hiatal     Hyperlipidemia     Hypertension     Macular degeneration     Pulmonary embolism     Seizures     Skin cancer of face 12/2014    Dr M Weil     UTI (lower urinary tract infection)      Past Surgical History:   Procedure Laterality Date    angeogram      APPENDECTOMY      BREAST SURGERY      cardio stent   1/2015    Dr Dodson    COLONOSCOPY  around 2003    COLONOSCOPY N/A 1/13/2016    Procedure: COLONOSCOPY;  Surgeon: Mario Bhakta MD;  Location: [facility];  Service: Endoscopy;  Laterality: N/A; repeat in 5 years for surveillance    CORNEAL TRANSPLANT      EYE  SURGERY      HYSTERECTOMY      ovaries removed    MASTECTOMY      right >20 years ago    STOMACH SURGERY      UPPER GASTROINTESTINAL ENDOSCOPY  01/13/2016    Dr. Moya     Family History   Problem Relation Age of Onset    Mental illness Mother     Liver cancer Mother     Early death Father     Heart disease Father     Diabetes Daughter     Early death Paternal Uncle     Heart disease Paternal Uncle     Celiac disease Sister     No Known Problems Son     Breast cancer Neg Hx     Ovarian cancer Neg Hx     Colon cancer Neg Hx     Colon polyps Neg Hx     Esophageal cancer Neg Hx     Stomach cancer Neg Hx     Ulcerative colitis Neg Hx      Social History     Tobacco Use    Smoking status: Never    Smokeless tobacco: Never   Substance Use Topics    Alcohol use: No    Drug use: No     Review of Systems   Constitutional:  Negative for fever.   HENT:  Negative for sore throat.    Respiratory:  Negative for shortness of breath.    Cardiovascular:  Negative for chest pain.   Gastrointestinal:  Positive for abdominal pain. Negative for nausea.   Genitourinary:  Positive for frequency. Negative for dysuria.   Musculoskeletal:  Negative for back pain.   Skin:  Negative for rash.   Neurological:  Negative for weakness.   Hematological:  Does not bruise/bleed easily.     Physical Exam     Initial Vitals [05/02/23 0802]   BP Pulse Resp Temp SpO2   135/68 91 18 97.7 °F (36.5 °C) 98 %      MAP       --         Physical Exam    Nursing note and vitals reviewed.  Constitutional: She appears well-developed and well-nourished. She appears cachectic.  Non-toxic appearance.   Frail and elderly appearing.   HENT:   Head: Normocephalic and atraumatic.   Eyes: EOM are normal. Pupils are equal, round, and reactive to light.   Neck: Neck supple.   Cardiovascular:  Normal rate, regular rhythm, normal heart sounds and intact distal pulses.     Exam reveals no gallop and no friction rub.       No murmur heard.  Pulmonary/Chest: Breath sounds  normal. No respiratory distress. She has no decreased breath sounds. She has no wheezes. She has no rhonchi. She has no rales.   Abdominal: Abdomen is soft. Bowel sounds are normal. She exhibits no distension. There is no abdominal tenderness.   No right CVA tenderness.  No left CVA tenderness.   Musculoskeletal:         General: Normal range of motion.      Cervical back: Neck supple.     Neurological: She is alert and oriented to person, place, and time.   Skin: Skin is warm and dry.   Psychiatric: She has a normal mood and affect.       ED Course   Procedures  Labs Reviewed   URINALYSIS, REFLEX TO URINE CULTURE - Abnormal; Notable for the following components:       Result Value    Appearance, UA Cloudy (*)     Protein, UA 2+ (*)     Occult Blood UA Trace (*)     Leukocytes, UA 3+ (*)     All other components within normal limits    Narrative:     Specimen Source->Urine   URINALYSIS MICROSCOPIC - Abnormal; Notable for the following components:    WBC,  (*)     All other components within normal limits    Narrative:     Specimen Source->Urine   POCT GLUCOSE - Abnormal; Notable for the following components:    POCT Glucose 259 (*)     All other components within normal limits   CULTURE, URINE   POCT GLUCOSE MONITORING CONTINUOUS          Results for orders placed or performed during the hospital encounter of 05/02/23   Urinalysis, Reflex to Urine Culture Urine, Clean Catch    Specimen: Urine   Result Value Ref Range    Specimen UA Urine, Clean Catch     Color, UA Straw Yellow, Straw, Lilian    Appearance, UA Cloudy (A) Clear    pH, UA 8.0 5.0 - 8.0    Specific Gravity, UA 1.005 1.005 - 1.030    Protein, UA 2+ (A) Negative    Glucose, UA Negative Negative    Ketones, UA Negative Negative    Bilirubin (UA) Negative Negative    Occult Blood UA Trace (A) Negative    Nitrite, UA Negative Negative    Urobilinogen, UA Negative <2.0 EU/dL    Leukocytes, UA 3+ (A) Negative   Urinalysis Microscopic   Result Value Ref  Range    RBC, UA 0 0 - 4 /hpf    WBC,  (H) 0 - 5 /hpf    Bacteria Rare None-Occ /hpf    Hyaline Casts, UA 0 0-1/lpf /lpf    Microscopic Comment SEE COMMENT    POCT glucose   Result Value Ref Range    POCT Glucose 259 (H) 70 - 110 mg/dL       Imaging Results    None          Medications   cefUROXime tablet 500 mg (has no administration in time range)     Medical Decision Making:   History:   Old Medical Records: I decided to obtain old medical records.  Clinical Tests:   Lab Tests: Ordered and Reviewed  Medical Tests: Ordered and Reviewed  ED Management:  Emergent evaluation of an 87-year-old female who presents emergency department with urinary frequency.  Her blood sugar is elevated but less than 250.  She is not tachypneic, no ketones in the urine, I doubt she is in DKA.  Urinalysis checked and she has evidence of urinary tract infection.  She does not have any CVA tenderness to suggest pyelonephritis.  She is not febrile.  She is not vomiting.  She is very well-appearing and nontoxic.  I believe that this is uncomplicated cystitis.  I will place her on an oral antibiotic and recommend she follow up closely with her primary care provider.  If symptoms change or worsen she should return to the emergency department.  She did mention some mild suprapubic pain.  I had no tenderness on exam.  I do not feel CT scan indicated that indeed complicated cystitis.  Any diverticulitis.  Antibiotics given in the ER she tolerated she will be discharged home with Ceftin and urine culture has been sent.  Will follow up with PCP and return with any changes or worsening of symptoms.    I had a detailed discussion with the patient and/or guardian regarding: The historical points, exam findings, and diagnostic results supporting the discharge diagnosis, lab results, pertinent radiology results, and the need for outpatient follow-up, for definitive care with a family practitioner and to return to the emergency department if  symptoms worsen or persist or if there are any questions or concerns that arise at home. All questions have been answered in detail. Strict return to Emergency Department precautions have been provided.    A dictation software program was used for this note.  Please expect some simple typographical  errors in this note.         Scribe Attestation:   Scribe #1: I performed the above scribed service and the documentation accurately describes the services I performed. I attest to the accuracy of the note.                   Clinical Impression:   Final diagnoses:  [N30.00] Acute cystitis without hematuria (Primary)        ED Disposition Condition    Discharge Stable          ED Prescriptions       Medication Sig Dispense Start Date End Date Auth. Provider    cefUROXime (CEFTIN) 500 MG tablet Take 1 tablet (500 mg total) by mouth 2 (two) times daily. 14 tablet 5/2/2023 -- Dexter Adams DO          Follow-up Information       Follow up With Specialties Details Why Contact Info    Josh Berman MD Family Medicine In 3 days  [address] [facility] [address]  [facility] [address]  [phone]      [facility] - Emergency Dept Emergency Medicine  If symptoms worsen [address] [place] [id number]  [phone]             Dexter Adams DO  05/02/23 1159

## 2023-05-03 LAB — BACTERIA UR CULT: NORMAL

## 2023-05-18 RX ORDER — CLOPIDOGREL BISULFATE 75 MG/1
75 TABLET ORAL DAILY
Qty: 90 TABLET | Refills: 3 | Status: SHIPPED | OUTPATIENT
Start: 2023-05-18

## 2023-05-18 NOTE — TELEPHONE ENCOUNTER
----- Message from Solomon Leal sent at 5/18/2023  9:14 AM CDT -----  Regarding: Refill  Contact: Patient  Type:  RX Refill Request    Who Called: Patient  Refill or New Rx:Refill  RX Name and Strength:clopidogreL (PLAVIX) 75 mg tablet  How is the patient currently taking it? (ex. 1XDay):  Is this a 30 day or 90 day RX:  Preferred Pharmacy with phone number:  [id number] - [place] - [address]  [address] [address]  Phone: [phone] Fax: [phone]  [facility] - [address] [address]  Phone: [phone] Fax: [phone]  Local or Mail Order:local/Mail  Ordering Provider:Mercedes Daly  Would the patient rather a call back or a response via [facility]? call  Best Call Back Number:[phone]  Additional Information: Patient called regarding a 10 day refill until new order for medication comes in mail. Patient is  out of medication

## 2023-06-05 ENCOUNTER — OFFICE VISIT (OUTPATIENT)
Dept: CARDIOLOGY | Facility: CLINIC | Age: 88
End: 2023-06-05
Payer: MEDICARE

## 2023-06-05 VITALS
BODY MASS INDEX: 21.02 KG/M2 | HEART RATE: 95 BPM | RESPIRATION RATE: 16 BRPM | WEIGHT: 118.63 LBS | SYSTOLIC BLOOD PRESSURE: 110 MMHG | DIASTOLIC BLOOD PRESSURE: 68 MMHG | OXYGEN SATURATION: 97 % | HEIGHT: 63 IN

## 2023-06-05 DIAGNOSIS — Z95.5 S/P DRUG ELUTING CORONARY STENT PLACEMENT: ICD-10-CM

## 2023-06-05 DIAGNOSIS — E11.59 HYPERTENSION ASSOCIATED WITH DIABETES: ICD-10-CM

## 2023-06-05 DIAGNOSIS — E11.69 HYPERLIPIDEMIA ASSOCIATED WITH TYPE 2 DIABETES MELLITUS: ICD-10-CM

## 2023-06-05 DIAGNOSIS — E78.5 HYPERLIPIDEMIA ASSOCIATED WITH TYPE 2 DIABETES MELLITUS: ICD-10-CM

## 2023-06-05 DIAGNOSIS — I15.2 HYPERTENSION ASSOCIATED WITH DIABETES: ICD-10-CM

## 2023-06-05 PROCEDURE — 99999 PR PBB SHADOW E&M-EST. PATIENT-LVL IV: CPT | Mod: PBBFAC,,, | Performed by: INTERNAL MEDICINE

## 2023-06-05 PROCEDURE — 99999 PR PBB SHADOW E&M-EST. PATIENT-LVL IV: ICD-10-PCS | Mod: PBBFAC,,, | Performed by: INTERNAL MEDICINE

## 2023-06-05 PROCEDURE — 99214 OFFICE O/P EST MOD 30 MIN: CPT | Mod: S$PBB,,, | Performed by: INTERNAL MEDICINE

## 2023-06-05 PROCEDURE — 99214 OFFICE O/P EST MOD 30 MIN: CPT | Mod: PBBFAC,PN | Performed by: INTERNAL MEDICINE

## 2023-06-05 PROCEDURE — 99214 PR OFFICE/OUTPT VISIT, EST, LEVL IV, 30-39 MIN: ICD-10-PCS | Mod: S$PBB,,, | Performed by: INTERNAL MEDICINE

## 2023-06-05 NOTE — PROGRESS NOTES
Patient ID:  Genny Watson is a 87 y.o. female who presents for follow-up of Follow-up      In 2015 she needed is surgical clearance for eye surgery.  Her EKG was abnormal.  A stress test was performed that apparently was abnormal a coronary arteriogram was performed by Dr. 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  A 3.0 x 12 millimeter drug-eluting stent resolute was deployed with good results.  Her LAD was normal she had a 30 percent stenosis in the mid RCA.  There was the start closure failure and therefore pressure had to be applied.  Apparently she had some bleeding from the procedure.  She was totally asymptomatic then and she has remained totally asymptomatic.      Past Medical History:   Diagnosis Date    ALLERGIC RHINITIS 4/30/2012    Arthritis     Gimenez's esophagus     Breast cancer     right, s/p right mastectomy>20yr ago    Cataract     Diabetes mellitus type II     Diverticulitis     Diverticulosis     Fuchs' corneal dystrophy     Glaucoma     Hernia, hiatal     Hyperlipidemia     Hypertension     Macular degeneration     Pulmonary embolism     Seizures     Skin cancer of face 12/2014    Dr M Weil     UTI (lower urinary tract infection)         Past Surgical History:   Procedure Laterality Date    angeogram      APPENDECTOMY      BREAST SURGERY      cardio stent   1/2015    Dr Dodson    COLONOSCOPY  around 2003    COLONOSCOPY N/A 1/13/2016    Procedure: COLONOSCOPY;  Surgeon: Mario Bhakta MD;  Location: [facility];  Service: Endoscopy;  Laterality: N/A; repeat in 5 years for surveillance    CORNEAL TRANSPLANT      EYE SURGERY      HYSTERECTOMY      ovaries removed    MASTECTOMY      right >20 years ago    STOMACH SURGERY      UPPER GASTROINTESTINAL ENDOSCOPY  01/13/2016    Dr. Bhakta          Current Outpatient Medications   Medication Instructions    amLODIPine (NORVASC) 10 MG tablet TAKE 1 TABLET EVERY EVENING    ascorbic acid (vitamin C)  "(VITAMIN C) 500 mg, Oral, Daily    aspirin (ECOTRIN) 81 mg, Oral, Daily    atorvastatin (LIPITOR) 80 MG tablet TAKE 1 TABLET DAILY    cefUROXime (CEFTIN) 500 mg, Oral, 2 times daily    clopidogreL (PLAVIX) 75 mg, Oral, Daily    estradioL (ESTRACE) 0.01 % (0.1 mg/gram) vaginal cream USE 1 GM VAGINALLY TWICE WEEKLY    ferrous sulfate (FEOSOL) 325 mg, Oral, With breakfast    loratadine (CLARITIN) 10 mg, Oral, Daily    losartan (COZAAR) 25 MG tablet TAKE 1 TABLET DAILY    metFORMIN (GLUCOPHAGE-XR) 500 mg, Oral, Nightly    metoprolol succinate (TOPROL-XL) 50 MG 24 hr tablet TAKE 1 TABLET ONCE DAILY    pantoprazole (PROTONIX) 40 mg, Oral, Daily    prednisoLONE acetate (PRED FORTE) 1 % DrpS 1 drop, Right Eye, Daily    TRAVATAN Z 0.004 % ophthalmic solution 1 drop, Both Eyes, Nightly    VIT C/E/ZN/COPPR/LUTEIN/ZEAXAN (PRESERVISION AREDS 2 ORAL) 1 capsule, Oral, 2 times daily        Review of patient's allergies indicates:   Allergen Reactions    Sulfa (sulfonamide antibiotics)      Other reaction(s): Itching        Review of Systems   Cardiovascular:  Negative for chest pain, dyspnea on exertion and palpitations.   Respiratory:  Negative for cough and shortness of breath.       Objective:     Vitals:    06/05/23 1113   BP: 110/68   BP Location: Left arm   Patient Position: Sitting   BP Method: Medium (Manual)   Pulse: 95   Resp: 16   SpO2: 97%   Weight: 53.8 kg (118 lb 9.7 oz)   Height: 5' 3" (1.6 m)       Physical Exam  Vitals and nursing note reviewed.   Constitutional:       Appearance: She is well-developed.   HENT:      Head: Normocephalic and atraumatic.   Eyes:      Conjunctiva/sclera: Conjunctivae normal.   Cardiovascular:      Rate and Rhythm: Normal rate and regular rhythm.      Heart sounds: Normal heart sounds.   Pulmonary:      Effort: Pulmonary effort is normal.      Breath sounds: Normal breath sounds.   Abdominal:      General: Bowel sounds are normal.      Palpations: Abdomen is soft.   Musculoskeletal:    "      General: Normal range of motion.   Skin:     General: Skin is warm and dry.   Neurological:      Mental Status: She is alert and oriented to person, place, and time.   Psychiatric:         Behavior: Behavior normal.         Thought Content: Thought content normal.         Judgment: Judgment normal.     CMP  Sodium   Date Value Ref Range Status   01/09/2023 137 136 - 145 mmol/L Final     Potassium   Date Value Ref Range Status   01/09/2023 4.0 3.5 - 5.1 mmol/L Final     Chloride   Date Value Ref Range Status   01/09/2023 99 95 - 110 mmol/L Final     CO2   Date Value Ref Range Status   01/09/2023 27 23 - 29 mmol/L Final     Glucose   Date Value Ref Range Status   01/09/2023 129 (H) 70 - 110 mg/dL Final     BUN   Date Value Ref Range Status   01/09/2023 19 8 - 23 mg/dL Final     Creatinine   Date Value Ref Range Status   01/09/2023 0.7 0.5 - 1.4 mg/dL Final     Calcium   Date Value Ref Range Status   01/09/2023 9.5 8.7 - 10.5 mg/dL Final     Total Protein   Date Value Ref Range Status   01/09/2023 7.1 6.0 - 8.4 g/dL Final     Albumin   Date Value Ref Range Status   01/09/2023 4.1 3.5 - 5.2 g/dL Final     Total Bilirubin   Date Value Ref Range Status   01/09/2023 1.0 0.1 - 1.0 mg/dL Final     Comment:     For infants and newborns, interpretation of results should be based  on gestational age, weight and in agreement with clinical  observations.    Premature Infant recommended reference ranges:  Up to 24 hours.............<8.0 mg/dL  Up to 48 hours............<12.0 mg/dL  3-5 days..................<15.0 mg/dL  6-29 days.................<15.0 mg/dL       Alkaline Phosphatase   Date Value Ref Range Status   01/09/2023 104 55 - 135 U/L Final     AST   Date Value Ref Range Status   01/09/2023 29 10 - 40 U/L Final     ALT   Date Value Ref Range Status   01/09/2023 25 10 - 44 U/L Final     Anion Gap   Date Value Ref Range Status   01/09/2023 11 8 - 16 mmol/L Final     eGFR if    Date Value Ref Range Status    07/06/2022 >60 >60 mL/min/1.73 m^2 Final     eGFR if non    Date Value Ref Range Status   07/06/2022 >60 >60 mL/min/1.73 m^2 Final     Comment:     Calculation used to obtain the estimated glomerular filtration  rate (eGFR) is the CKD-EPI equation.         BMP  Lab Results   Component Value Date     01/09/2023    K 4.0 01/09/2023    CL 99 01/09/2023    CO2 27 01/09/2023    BUN 19 01/09/2023    CREATININE 0.7 01/09/2023    CALCIUM 9.5 01/09/2023    ANIONGAP 11 01/09/2023    ESTGFRAFRICA >60 07/06/2022    EGFRNONAA >60 07/06/2022      BNP  @LABRCNTIP(BNP,BNPTRIAGEBLO)@   Lab Results   Component Value Date    CHOL 127 01/09/2023    CHOL 132 02/28/2022    CHOL 136 01/11/2021     Lab Results   Component Value Date    HDL 52 01/09/2023    HDL 53 02/28/2022    HDL 57 01/11/2021     Lab Results   Component Value Date    LDLCALC 56.6 (L) 01/09/2023    LDLCALC 60.8 (L) 02/28/2022    LDLCALC 53.2 (L) 01/11/2021     Lab Results   Component Value Date    TRIG 92 01/09/2023    TRIG 91 02/28/2022    TRIG 129 01/11/2021     Lab Results   Component Value Date    CHOLHDL 40.9 01/09/2023    CHOLHDL 40.2 02/28/2022    CHOLHDL 41.9 01/11/2021      Lab Results   Component Value Date    TSH 1.057 04/11/2015     Lab Results   Component Value Date    HGBA1C 6.1 (H) 01/09/2023     Lab Results   Component Value Date    WBC 9.51 01/09/2023    HGB 14.1 01/09/2023    HCT 42.2 01/09/2023    MCV 90 01/09/2023     01/09/2023         Results for orders placed in visit on 06/08/21    Echo Color Flow Doppler? Yes    Interpretation Summary  · The estimated PA systolic pressure is 27 mmHg.  · The left ventricle is normal in size with concentric remodeling and normal systolic function.  · The estimated ejection fraction is 60%.  · Indeterminate left ventricular diastolic function.  · Normal right ventricular size with normal right ventricular systolic function.     No results found for this or any previous visit.          Assessment:       Hyperlipidemia associated with type 2 diabetes mellitus  On 80 milligrams of atorvastatin    Hypertension associated with diabetes  Controlled on amlodipine 10 milligrams, losartan 25 milligrams, metoprolol 50 milligrams    S/P drug eluting coronary stent placement LCX 1/26/15  Denies any symptoms of angina       Plan:       Continue the current medical therapy return to the office in 6 months blood work will be obtained from her primary care physician Dr. Berman.

## 2023-06-15 ENCOUNTER — HOSPITAL ENCOUNTER (INPATIENT)
Facility: HOSPITAL | Age: 88
LOS: 5 days | Discharge: NURSING, REHAB OR LONG-TERM CARE | DRG: 522 | End: 2023-06-20
Attending: EMERGENCY MEDICINE | Admitting: STUDENT IN AN ORGANIZED HEALTH CARE EDUCATION/TRAINING PROGRAM
Payer: MEDICARE

## 2023-06-15 DIAGNOSIS — S72.001A CLOSED FRACTURE OF NECK OF RIGHT FEMUR, INITIAL ENCOUNTER: Primary | ICD-10-CM

## 2023-06-15 DIAGNOSIS — R52 PAIN: ICD-10-CM

## 2023-06-15 DIAGNOSIS — S72.041A: ICD-10-CM

## 2023-06-15 DIAGNOSIS — R07.9 CHEST PAIN: ICD-10-CM

## 2023-06-15 LAB
ALBUMIN SERPL BCP-MCNC: 3.8 G/DL (ref 3.5–5.2)
ALP SERPL-CCNC: 104 U/L (ref 55–135)
ALT SERPL W/O P-5'-P-CCNC: 18 U/L (ref 10–44)
ANION GAP SERPL CALC-SCNC: 17 MMOL/L (ref 8–16)
AST SERPL-CCNC: 28 U/L (ref 10–40)
BASOPHILS # BLD AUTO: 0.06 K/UL (ref 0–0.2)
BASOPHILS NFR BLD: 0.5 % (ref 0–1.9)
BILIRUB SERPL-MCNC: 0.6 MG/DL (ref 0.1–1)
BUN SERPL-MCNC: 12 MG/DL (ref 8–23)
CALCIUM SERPL-MCNC: 8.8 MG/DL (ref 8.7–10.5)
CHLORIDE SERPL-SCNC: 99 MMOL/L (ref 95–110)
CO2 SERPL-SCNC: 24 MMOL/L (ref 23–29)
CREAT SERPL-MCNC: 0.8 MG/DL (ref 0.5–1.4)
DIFFERENTIAL METHOD: ABNORMAL
EOSINOPHIL # BLD AUTO: 0.1 K/UL (ref 0–0.5)
EOSINOPHIL NFR BLD: 0.5 % (ref 0–8)
ERYTHROCYTE [DISTWIDTH] IN BLOOD BY AUTOMATED COUNT: 12 % (ref 11.5–14.5)
EST. GFR  (NO RACE VARIABLE): >60 ML/MIN/1.73 M^2
GLUCOSE SERPL-MCNC: 172 MG/DL (ref 70–110)
HCT VFR BLD AUTO: 42.1 % (ref 37–48.5)
HGB BLD-MCNC: 14.1 G/DL (ref 12–16)
IMM GRANULOCYTES # BLD AUTO: 0.13 K/UL (ref 0–0.04)
IMM GRANULOCYTES NFR BLD AUTO: 1.1 % (ref 0–0.5)
LYMPHOCYTES # BLD AUTO: 3 K/UL (ref 1–4.8)
LYMPHOCYTES NFR BLD: 24.6 % (ref 18–48)
MCH RBC QN AUTO: 29.3 PG (ref 27–31)
MCHC RBC AUTO-ENTMCNC: 33.5 G/DL (ref 32–36)
MCV RBC AUTO: 88 FL (ref 82–98)
MONOCYTES # BLD AUTO: 0.8 K/UL (ref 0.3–1)
MONOCYTES NFR BLD: 6.2 % (ref 4–15)
NEUTROPHILS # BLD AUTO: 8.1 K/UL (ref 1.8–7.7)
NEUTROPHILS NFR BLD: 67.1 % (ref 38–73)
NRBC BLD-RTO: 0 /100 WBC
PLATELET # BLD AUTO: 364 K/UL (ref 150–450)
PMV BLD AUTO: 10 FL (ref 9.2–12.9)
POCT GLUCOSE: 149 MG/DL (ref 70–110)
POCT GLUCOSE: 176 MG/DL (ref 70–110)
POTASSIUM SERPL-SCNC: 3 MMOL/L (ref 3.5–5.1)
PROT SERPL-MCNC: 7.8 G/DL (ref 6–8.4)
RBC # BLD AUTO: 4.81 M/UL (ref 4–5.4)
SODIUM SERPL-SCNC: 140 MMOL/L (ref 136–145)
WBC # BLD AUTO: 12.03 K/UL (ref 3.9–12.7)

## 2023-06-15 PROCEDURE — 94799 UNLISTED PULMONARY SVC/PX: CPT

## 2023-06-15 PROCEDURE — 25000003 PHARM REV CODE 250: Performed by: STUDENT IN AN ORGANIZED HEALTH CARE EDUCATION/TRAINING PROGRAM

## 2023-06-15 PROCEDURE — 99285 EMERGENCY DEPT VISIT HI MDM: CPT | Mod: 25

## 2023-06-15 PROCEDURE — 80053 COMPREHEN METABOLIC PANEL: CPT | Performed by: EMERGENCY MEDICINE

## 2023-06-15 PROCEDURE — 63600175 PHARM REV CODE 636 W HCPCS: Performed by: STUDENT IN AN ORGANIZED HEALTH CARE EDUCATION/TRAINING PROGRAM

## 2023-06-15 PROCEDURE — 85025 COMPLETE CBC W/AUTO DIFF WBC: CPT | Performed by: EMERGENCY MEDICINE

## 2023-06-15 PROCEDURE — 94760 N-INVAS EAR/PLS OXIMETRY 1: CPT

## 2023-06-15 PROCEDURE — 36415 COLL VENOUS BLD VENIPUNCTURE: CPT | Performed by: EMERGENCY MEDICINE

## 2023-06-15 PROCEDURE — 51702 INSERT TEMP BLADDER CATH: CPT

## 2023-06-15 PROCEDURE — 12000002 HC ACUTE/MED SURGE SEMI-PRIVATE ROOM

## 2023-06-15 RX ORDER — ASPIRIN 81 MG/1
81 TABLET ORAL DAILY
Status: DISCONTINUED | OUTPATIENT
Start: 2023-06-16 | End: 2023-06-15

## 2023-06-15 RX ORDER — SODIUM CHLORIDE 0.9 % (FLUSH) 0.9 %
10 SYRINGE (ML) INJECTION
Status: DISCONTINUED | OUTPATIENT
Start: 2023-06-15 | End: 2023-06-20 | Stop reason: HOSPADM

## 2023-06-15 RX ORDER — MUPIROCIN 20 MG/G
OINTMENT TOPICAL 2 TIMES DAILY
Status: DISCONTINUED | OUTPATIENT
Start: 2023-06-15 | End: 2023-06-20 | Stop reason: HOSPADM

## 2023-06-15 RX ORDER — ENOXAPARIN SODIUM 100 MG/ML
40 INJECTION SUBCUTANEOUS EVERY 24 HOURS
Status: DISCONTINUED | OUTPATIENT
Start: 2023-06-15 | End: 2023-06-17

## 2023-06-15 RX ORDER — CLOPIDOGREL BISULFATE 75 MG/1
75 TABLET ORAL DAILY
Status: DISCONTINUED | OUTPATIENT
Start: 2023-06-16 | End: 2023-06-15

## 2023-06-15 RX ORDER — ATORVASTATIN CALCIUM 40 MG/1
80 TABLET, FILM COATED ORAL DAILY
Status: DISCONTINUED | OUTPATIENT
Start: 2023-06-16 | End: 2023-06-20 | Stop reason: HOSPADM

## 2023-06-15 RX ORDER — PANTOPRAZOLE SODIUM 40 MG/1
40 TABLET, DELAYED RELEASE ORAL DAILY
Status: DISCONTINUED | OUTPATIENT
Start: 2023-06-16 | End: 2023-06-20 | Stop reason: HOSPADM

## 2023-06-15 RX ORDER — IBUPROFEN 200 MG
24 TABLET ORAL
Status: DISCONTINUED | OUTPATIENT
Start: 2023-06-15 | End: 2023-06-20 | Stop reason: HOSPADM

## 2023-06-15 RX ORDER — AMLODIPINE BESYLATE 5 MG/1
10 TABLET ORAL NIGHTLY
Status: DISCONTINUED | OUTPATIENT
Start: 2023-06-15 | End: 2023-06-17

## 2023-06-15 RX ORDER — ASCORBIC ACID 500 MG
500 TABLET ORAL DAILY
Status: DISCONTINUED | OUTPATIENT
Start: 2023-06-16 | End: 2023-06-20 | Stop reason: HOSPADM

## 2023-06-15 RX ORDER — LOSARTAN POTASSIUM 25 MG/1
25 TABLET ORAL DAILY
Status: DISCONTINUED | OUTPATIENT
Start: 2023-06-16 | End: 2023-06-17

## 2023-06-15 RX ORDER — METOPROLOL SUCCINATE 50 MG/1
50 TABLET, EXTENDED RELEASE ORAL DAILY
Status: DISCONTINUED | OUTPATIENT
Start: 2023-06-16 | End: 2023-06-17

## 2023-06-15 RX ORDER — INSULIN ASPART 100 [IU]/ML
1-10 INJECTION, SOLUTION INTRAVENOUS; SUBCUTANEOUS
Status: DISCONTINUED | OUTPATIENT
Start: 2023-06-15 | End: 2023-06-20 | Stop reason: HOSPADM

## 2023-06-15 RX ORDER — LATANOPROST 50 UG/ML
1 SOLUTION/ DROPS OPHTHALMIC NIGHTLY
Status: DISCONTINUED | OUTPATIENT
Start: 2023-06-15 | End: 2023-06-20 | Stop reason: HOSPADM

## 2023-06-15 RX ORDER — SODIUM CHLORIDE 0.9 % (FLUSH) 0.9 %
10 SYRINGE (ML) INJECTION
Status: DISCONTINUED | OUTPATIENT
Start: 2023-06-15 | End: 2023-06-15

## 2023-06-15 RX ORDER — IBUPROFEN 200 MG
16 TABLET ORAL
Status: DISCONTINUED | OUTPATIENT
Start: 2023-06-15 | End: 2023-06-20 | Stop reason: HOSPADM

## 2023-06-15 RX ORDER — ONDANSETRON 2 MG/ML
4 INJECTION INTRAMUSCULAR; INTRAVENOUS EVERY 8 HOURS PRN
Status: DISCONTINUED | OUTPATIENT
Start: 2023-06-15 | End: 2023-06-20 | Stop reason: HOSPADM

## 2023-06-15 RX ORDER — POTASSIUM CHLORIDE 20 MEQ/1
40 TABLET, EXTENDED RELEASE ORAL
Status: COMPLETED | OUTPATIENT
Start: 2023-06-15 | End: 2023-06-15

## 2023-06-15 RX ORDER — NALOXONE HCL 0.4 MG/ML
0.02 VIAL (ML) INJECTION
Status: DISCONTINUED | OUTPATIENT
Start: 2023-06-15 | End: 2023-06-20 | Stop reason: HOSPADM

## 2023-06-15 RX ORDER — MORPHINE SULFATE 4 MG/ML
4 INJECTION, SOLUTION INTRAMUSCULAR; INTRAVENOUS EVERY 4 HOURS PRN
Status: DISCONTINUED | OUTPATIENT
Start: 2023-06-15 | End: 2023-06-20 | Stop reason: HOSPADM

## 2023-06-15 RX ORDER — ACETAMINOPHEN 325 MG/1
650 TABLET ORAL EVERY 8 HOURS PRN
Status: DISCONTINUED | OUTPATIENT
Start: 2023-06-15 | End: 2023-06-20 | Stop reason: HOSPADM

## 2023-06-15 RX ORDER — LATANOPROST 50 UG/ML
1 SOLUTION/ DROPS OPHTHALMIC NIGHTLY
COMMUNITY
Start: 2023-04-27

## 2023-06-15 RX ORDER — HYDROCODONE BITARTRATE AND ACETAMINOPHEN 5; 325 MG/1; MG/1
1 TABLET ORAL EVERY 6 HOURS PRN
Status: DISCONTINUED | OUTPATIENT
Start: 2023-06-15 | End: 2023-06-20 | Stop reason: HOSPADM

## 2023-06-15 RX ORDER — GLUCAGON 1 MG
1 KIT INJECTION
Status: DISCONTINUED | OUTPATIENT
Start: 2023-06-15 | End: 2023-06-20 | Stop reason: HOSPADM

## 2023-06-15 RX ADMIN — LATANOPROST 1 DROP: 50 SOLUTION OPHTHALMIC at 09:06

## 2023-06-15 RX ADMIN — MUPIROCIN: 20 OINTMENT TOPICAL at 09:06

## 2023-06-15 RX ADMIN — AMLODIPINE BESYLATE 10 MG: 5 TABLET ORAL at 09:06

## 2023-06-15 RX ADMIN — POTASSIUM CHLORIDE 40 MEQ: 1500 TABLET, EXTENDED RELEASE ORAL at 07:06

## 2023-06-15 RX ADMIN — MORPHINE SULFATE 4 MG: 4 INJECTION INTRAVENOUS at 05:06

## 2023-06-15 RX ADMIN — INSULIN ASPART 1 UNITS: 100 INJECTION, SOLUTION INTRAVENOUS; SUBCUTANEOUS at 09:06

## 2023-06-15 RX ADMIN — ENOXAPARIN SODIUM 40 MG: 40 INJECTION SUBCUTANEOUS at 05:06

## 2023-06-15 RX ADMIN — POTASSIUM CHLORIDE 40 MEQ: 1500 TABLET, EXTENDED RELEASE ORAL at 05:06

## 2023-06-15 RX ADMIN — ONDANSETRON 4 MG: 2 INJECTION INTRAMUSCULAR; INTRAVENOUS at 05:06

## 2023-06-15 NOTE — ASSESSMENT & PLAN NOTE
-Orthopedic Surgery to take patient to OR 6/16  -Hold DAPT  -NPO at midnight  -Fall precuations  -Q4H neurovascular checks  -PRN analgesics

## 2023-06-15 NOTE — ED PROVIDER NOTES
Encounter Date: 6/15/2023    SCRIBE #1 NOTE: I, Bharat Garcia, am scribing for, and in the presence of,  Hue Barnes NP.     History     Chief Complaint   Patient presents with    Fall     C/o R knee pain after trip and fall today     Time seen by provider: 3:00 PM on 06/15/2023    Genny Watson is a 87 y.o. female who presents to the ED via EMS with an onset of pain to her right knee and hip that began following a fall that occurred shortly before arrival. She was walking when she tripped over a recliner, landing on her right leg. She denies hitting her head or losing consciousness. She takes Plavix and baby aspirin but denies use of blood thinners such as coumadin and xarelto. The patient denies any other symptoms at this time. History obtained from independent historian: EMS reports they gave her 50 micrograms of fentanyl which relieved her pain moderately. PMHx of DM II, HLD, HTN, and arthritis. There is no pertinent PSHx.    The history is provided by the patient.   Review of patient's allergies indicates:   Allergen Reactions    Sulfa (sulfonamide antibiotics)      Other reaction(s): Itching     Past Medical History:   Diagnosis Date    ALLERGIC RHINITIS 4/30/2012    Arthritis     Gimenez's esophagus     Breast cancer     right, s/p right mastectomy>20yr ago    Cataract     Diabetes mellitus type II     Diverticulitis     Diverticulosis     Fuchs' corneal dystrophy     Glaucoma     Hernia, hiatal     Hyperlipidemia     Hypertension     Macular degeneration     Pulmonary embolism     Seizures     Skin cancer of face 12/2014    Dr M Weil     UTI (lower urinary tract infection)      Past Surgical History:   Procedure Laterality Date    angeogram      APPENDECTOMY      BREAST SURGERY      cardio stent   1/2015    Dr Dodson    COLONOSCOPY  around 2003    COLONOSCOPY N/A 1/13/2016    Procedure: COLONOSCOPY;  Surgeon: Mario Bhakta MD;  Location: [facility];  Service: Endoscopy;  Laterality: N/A; repeat  in 5 years for surveillance    CORNEAL TRANSPLANT      EYE SURGERY      HYSTERECTOMY      ovaries removed    MASTECTOMY      right >20 years ago    STOMACH SURGERY      UPPER GASTROINTESTINAL ENDOSCOPY  01/13/2016    Dr. Moya     Family History   Problem Relation Age of Onset    Mental illness Mother     Liver cancer Mother     Early death Father     Heart disease Father     Diabetes Daughter     Early death Paternal Uncle     Heart disease Paternal Uncle     Celiac disease Sister     No Known Problems Son     Breast cancer Neg Hx     Ovarian cancer Neg Hx     Colon cancer Neg Hx     Colon polyps Neg Hx     Esophageal cancer Neg Hx     Stomach cancer Neg Hx     Ulcerative colitis Neg Hx      Social History     Tobacco Use    Smoking status: Never    Smokeless tobacco: Never   Substance Use Topics    Alcohol use: No    Drug use: No     Review of Systems   Constitutional:  Negative for fever.   HENT:  Negative for sore throat.    Respiratory:  Negative for shortness of breath.    Cardiovascular:  Negative for chest pain.   Gastrointestinal:  Negative for nausea.   Genitourinary:  Negative for dysuria.   Musculoskeletal:  Positive for arthralgias and gait problem. Negative for back pain.   Skin:  Negative for rash.   Neurological:  Negative for syncope, weakness and headaches.   Hematological:  Bruises/bleeds easily (plavix, baby aspirin).     Physical Exam     Initial Vitals [06/15/23 1452]   BP Pulse Resp Temp SpO2   (!) 150/83 91 17 98.7 °F (37.1 °C) 98 %      MAP       --         Physical Exam    Nursing note and vitals reviewed.  Constitutional: Vital signs are normal. She appears well-developed and well-nourished.   HENT:   Head: Normocephalic and atraumatic.   Eyes: Pupils are equal, round, and reactive to light.   Neck: Neck supple.   Normal range of motion.   Full passive range of motion without pain.     Cardiovascular:  Normal rate, regular rhythm, normal heart sounds and intact distal pulses.     Exam  reveals no gallop and no friction rub.       No murmur heard.  Pulses:       Dorsalis pedis pulses are 2+ on the right side.        Posterior tibial pulses are 2+ on the right side.   Pulmonary/Chest: Breath sounds normal. She has no wheezes. She has no rhonchi. She has no rales.   Abdominal: Abdomen is soft. Bowel sounds are normal. There is no abdominal tenderness.   Musculoskeletal:      Cervical back: Full passive range of motion without pain, normal range of motion and neck supple. No spinous process tenderness or muscular tenderness.      Right knee: Swelling present. Decreased range of motion (normal extension, decreased flexion). No tenderness.      Right ankle: Normal.      Right foot: Normal.      Comments: RLE is shortened and externally rotated.      Neurological: She is alert and oriented to person, place, and time. She has normal strength.   Cranial nerves III through XII grossly intact. 5/5 strength with intact sensation to BUE's and BLE's. Gait deferred.   Skin: Skin is warm, dry and intact.   Psychiatric: She has a normal mood and affect. Her speech is normal and behavior is normal.       ED Course   Procedures  Labs Reviewed   COMPREHENSIVE METABOLIC PANEL - Abnormal; Notable for the following components:       Result Value    Potassium 3.0 (*)     Glucose 172 (*)     Anion Gap 17 (*)     All other components within normal limits   CBC W/ AUTO DIFFERENTIAL - Abnormal; Notable for the following components:    Immature Granulocytes 1.1 (*)     Gran # (ANC) 8.1 (*)     Immature Grans (Abs) 0.13 (*)     All other components within normal limits   POCT GLUCOSE - Abnormal; Notable for the following components:    POCT Glucose 149 (*)     All other components within normal limits   POCT GLUCOSE, HAND-HELD DEVICE          Imaging Results              CT Head Without Contrast (Final result)  Result time 06/15/23 16:33:09      Final result by Marty Bryan MD (06/15/23 16:33:09)                   Impression:       1. No evidence of an acute intracranial abnormality.  2.  Mild generalized cerebral volume loss and moderate scattered supratentorial white matter hypoattenuation, nonspecific and may reflect sequelae of chronic microvascular ischemic change.      Electronically signed by: Marty Bryan  Date:    06/15/2023  Time:    16:33               Narrative:    EXAMINATION:  CT HEAD WITHOUT CONTRAST    CLINICAL HISTORY:  Head trauma, minor (Age >= 65y);    TECHNIQUE:  Low dose axial images were obtained through the head.  Coronal and sagittal reformations were also performed. Contrast was not administered.    COMPARISON:  None.    FINDINGS:  Ventricles and sulci are normal in size for age without evidence of hydrocephalus.    Moderate scattered hypoattenuation within the supratentorial white matter, nonspecific but probably reflecting sequelae of chronic microvascular ischemic change.   No definite parenchymal mass, hemorrhage, edema or acute major vascular distribution infarct.    No extra-axial blood or fluid collections.    No displaced calvarial fracture.    The mastoid air cells and visualized paranasal sinuses are essentially clear.    Calcific atherosclerosis of the internal carotid and vertebral arteries at the skull base.                                       X-Ray Hips Bilateral 2 View Incl AP Pelvis (Final result)  Result time 06/15/23 16:15:02      Final result by Royal Slaughter Jr., MD (06/15/23 16:15:02)                   Impression:      Fracture of the right femoral neck with displacement of the femur proximally.  Degenerative disc disease at multiple levels of the lumbar spine      Electronically signed by: Royal Slaughter MD  Date:    06/15/2023  Time:    16:15               Narrative:    EXAMINATION:  XR HIPS BILATERAL 2 VIEW INCL AP PELVIS    CLINICAL HISTORY:  Pain, unspecified    TECHNIQUE:  AP view of the pelvis and frogleg lateral views of both hips were  performed.    COMPARISON:  None.    FINDINGS:  There is transverse fracture of the neck of the right femur with proximal shift of the shaft of the femur in relation to the hip joint.  The femoral head remains in the acetabulum.  The bones of the pelvis and left hip are intact.  The sacroiliac joints are sharp and symmetric.  Degenerative disc disease is seen at several lower back levels.                                       X-Ray Knee 3 View Right (Final result)  Result time 06/15/23 16:13:53      Final result by Royal Slaughter Jr., MD (06/15/23 16:13:53)                   Impression:      Moderate osteoarthritis right knee.  On these limited views a fracture is not seen      Electronically signed by: Royal Slaughter MD  Date:    06/15/2023  Time:    16:13               Narrative:    EXAMINATION:  XR KNEE 3 VIEW RIGHT    CLINICAL HISTORY:  Pain, unspecified    TECHNIQUE:  AP, lateral, and Merchant views of the right knee were performed.    COMPARISON:  None    FINDINGS:  A fracture of the femur, patella, tibia or fibula is not seen on these limited images.  There is narrowing of both the medial and lateral intercondylar joint space and spur formation of the patella femur and tibia consistent with moderate osteoarthritis.  A joint effusion is not seen.                                       X-Ray Femur Ap/Lat Right (Final result)  Result time 06/15/23 16:13:01      Final result by Royal Slaughter Jr., MD (06/15/23 16:13:01)                   Impression:      Fracture of the right femoral neck with proximal displacement of the shaft of the femur.      Electronically signed by: Royal Slaughter MD  Date:    06/15/2023  Time:    16:13               Narrative:    EXAMINATION:  XR FEMUR 2 VIEW RIGHT    CLINICAL HISTORY:  Pain, unspecified    TECHNIQUE:  AP and lateral views of the right femur were performed.    COMPARISON:  None    FINDINGS:  There is transverse fracture of the femoral neck and proximal shift of  the femur in relation to the femoral head.  The femoral head remains in the acetabulum.  A fracture of the pelvis is not seen.  The rest of the femur appears to be intact.                                       X-Ray Chest 1 View (Final result)  Result time 06/15/23 16:12:01      Final result by Royal Slaughter Jr., MD (06/15/23 16:12:01)                   Impression:      Bilateral shoulder DJD otherwise negative chest x-ray      Electronically signed by: Royal Slaughter MD  Date:    06/15/2023  Time:    16:12               Narrative:    EXAMINATION:  XR CHEST 1 VIEW    CLINICAL HISTORY:  pre op;    TECHNIQUE:  Single frontal view of the chest was performed.    COMPARISON:  Chest of April 11, 2015    FINDINGS:  The mediastinal and cardiac size contours are normal.  No intrapulmonary masses or infiltrates are seen.  No pneumothorax or pleural effusion is noted.  Degenerative changes are noted of both shoulders.                                       Medications   potassium bicarbonate disintegrating tablet 20 mEq (20 mEq Oral Not Given 6/15/23 1715)   amLODIPine tablet 10 mg (has no administration in time range)   ascorbic acid (vitamin C) tablet 500 mg (has no administration in time range)   atorvastatin tablet 80 mg (has no administration in time range)   latanoprost 0.005 % ophthalmic solution 1 drop (has no administration in time range)   losartan tablet 25 mg (has no administration in time range)   metoprolol succinate (TOPROL-XL) 24 hr tablet 50 mg (has no administration in time range)   pantoprazole EC tablet 40 mg (has no administration in time range)   multivitamin tablet (has no administration in time range)   sodium chloride 0.9% flush 10 mL (has no administration in time range)   acetaminophen tablet 650 mg (has no administration in time range)   naloxone 0.4 mg/mL injection 0.02 mg (has no administration in time range)   glucose chewable tablet 16 g (has no administration in time range)   glucose  chewable tablet 24 g (has no administration in time range)   glucagon (human recombinant) injection 1 mg (has no administration in time range)   enoxaparin injection 40 mg (40 mg Subcutaneous Given 6/15/23 1737)   ondansetron injection 4 mg (4 mg Intravenous Given 6/15/23 1737)   HYDROcodone-acetaminophen 5-325 mg per tablet 1 tablet (has no administration in time range)   morphine injection 4 mg (4 mg Intravenous Given 6/15/23 1737)   insulin aspart U-100 pen 1-10 Units (has no administration in time range)   dextrose 10% bolus 125 mL 125 mL (has no administration in time range)   dextrose 10% bolus 250 mL 250 mL (has no administration in time range)   mupirocin 2 % ointment (has no administration in time range)   potassium chloride SA CR tablet 40 mEq (40 mEq Oral Given 6/15/23 1926)     Medical Decision Making:   History:   Old Medical Records: I decided to obtain old medical records.  Differential Diagnosis:   Fracture  Contusion  Dislocation   Clinical Tests:   Radiological Study: Ordered and Reviewed     APC / Resident Notes:   Patient is a 87 y.o. female who presents to the ED 06/15/2023 who underwent emergent evaluation for fall that occurred today.  Patient unsure she hit her head.  No focal neurological deficits.  Head CT without acute findings I do not think any emergent intracranial process such as intracranial hemorrhage or subdural hematoma.  Patient denies any neck pain has no midline C-spine tenderness normal range of motion of her neck without pain.  Patient has right lower extremity rotation and shortening.  She is +2 DP and PT pulses with no signs of distal neurovascular compromise.  Patient has displaced femoral neck fracture.  Discussed with Dr. Douglas who agrees with admission to hospital medicine team and he will performed surgery on the patient tomorrow.  Case discussed hospital medicine team is agreeable this plan of care.  Patient made aware of all findings and is agreeable plan of care.   Pain controlled in the emergency department.  Patient admitted to  team in stable condition.  Case discussed with Dr. Sy who is agreeable to plan of care.        Scribe Attestation:   Scribe #1: I performed the above scribed service and the documentation accurately describes the services I performed. I attest to the accuracy of the note.    Attending Attestation:     Physician Attestation Statement for NP/PA:   I have directed and reviewed the workup performed by the PA/NP.  I performed the substantive portion of the medical decision making.     Other NP/PA Attestation Additions:    History of Present Illness: 87-year-old female presents with hip and knee pain status post fall.    Medical Decision Making: Initial differential diagnosis included but not limited to fracture, dislocation, and contusion.  The patient has a hip fracture noted on x-ray per my independent interpretation.  The patient will be admitted to Hospital Medicine for further care.  I am in agreement with the nurse practitioner's assessment, treatment, and plan of care.       Physician Attestation for Scribe:  Physician Attestation Statement for Scribe #1: I, Hue Barnes, reviewed documentation, as scribed by in my presence, and it is both accurate and complete.     Comments: I, DREA Parker, personally performed the services described in this documentation. All medical record entries made by the scribe were at my direction and in my presence.  I have reviewed the chart and agree that the record reflects my personal performance and is accurate and complete. DREA Parker.  6:39 PM 06/15/2023                   Clinical Impression:   Final diagnoses:  [R52] Pain  [S72.001A] Closed fracture of neck of right femur, initial encounter (Primary)        ED Disposition Condition    Admit                 Hue Barnes NP  06/15/23 1841       Hue Barnes NP  06/15/23 2041       Chuck Sy MD  06/15/23 2045

## 2023-06-15 NOTE — HPI
Genny Watson is an 87 year old female with a past medical history of CAD s/ PCI, DM, HTN, HLD, DM, and Gimenez's esophagus who presented with a R femoral neck fracture after a fall. She endorses RLE pain and limited ROM. Orthopedic Surgery was consulted from the ED and plans to take the patient to the OR 6/16. Hospital Medicine was consulted for admission. DAPT has been held and she is NPO at midnight.

## 2023-06-15 NOTE — H&P
[facility] Emergency [facility] Medicine  History & Physical    Patient Name: Genny Watson  MRN: [id number]  Patient Class: IP- Inpatient  Admission Date: 6/15/2023  Attending Physician: Marty Kruse MD  Primary Care Provider: Josh Berman MD         Patient information was obtained from patient, relative(s), past medical records and ER records.     Subjective:     Principal Problem:Closed fracture of right hip    Chief Complaint:   Chief Complaint   Patient presents with    Fall     C/o R knee pain after trip and fall today        HPI: Genny Watson is an 87 year old female with a past medical history of CAD s/ PCI, DM, HTN, HLD, DM, and Gimenez's esophagus who presented with a R femoral neck fracture after a fall. She endorses RLE pain and limited ROM. Orthopedic Surgery was consulted from the ED and plans to take the patient to the OR 6/16. Hospital Medicine was consulted for admission. DAPT has been held and she is NPO at midnight.      Past Medical History:   Diagnosis Date    ALLERGIC RHINITIS 4/30/2012    Arthritis     Gimenez's esophagus     Breast cancer     right, s/p right mastectomy>20yr ago    Cataract     Diabetes mellitus type II     Diverticulitis     Diverticulosis     Fuchs' corneal dystrophy     Glaucoma     Hernia, hiatal     Hyperlipidemia     Hypertension     Macular degeneration     Pulmonary embolism     Seizures     Skin cancer of face 12/2014    Dr M Weil     UTI (lower urinary tract infection)        Past Surgical History:   Procedure Laterality Date    angeogram      APPENDECTOMY      BREAST SURGERY      cardio stent   1/2015    Dr Dodson    COLONOSCOPY  around 2003    COLONOSCOPY N/A 1/13/2016    Procedure: COLONOSCOPY;  Surgeon: Mario Bhakta MD;  Location[facility];  Service: Endoscopy;  Laterality: N/A; repeat in 5 years for surveillance    CORNEAL TRANSPLANT      EYE SURGERY      HYSTERECTOMY      ovaries removed    MASTECTOMY       right >20 years ago    STOMACH SURGERY      UPPER GASTROINTESTINAL ENDOSCOPY  01/13/2016    Dr. Moya       Review of patient's allergies indicates:   Allergen Reactions    Sulfa (sulfonamide antibiotics)      Other reaction(s): Itching       No current facility-administered medications on file prior to encounter.     Current Outpatient Medications on File Prior to Encounter   Medication Sig    amLODIPine (NORVASC) 10 MG tablet TAKE 1 TABLET EVERY EVENING    ascorbic acid, vitamin C, (VITAMIN C) 500 MG tablet Take 500 mg by mouth once daily.    aspirin (ECOTRIN) 81 MG EC tablet Take 81 mg by mouth once daily.    atorvastatin (LIPITOR) 80 MG tablet TAKE 1 TABLET DAILY    clopidogreL (PLAVIX) 75 mg tablet Take 1 tablet (75 mg total) by mouth once daily.    estradioL (ESTRACE) 0.01 % (0.1 mg/gram) vaginal cream USE 1 GM VAGINALLY TWICE WEEKLY    ferrous sulfate (FEOSOL) 325 mg (65 mg iron) Tab tablet Take 325 mg by mouth daily with breakfast.    latanoprost 0.005 % ophthalmic solution Place 1 drop into both eyes every evening.    loratadine (CLARITIN) 10 mg tablet Take 1 tablet (10 mg total) by mouth once daily. (Patient not taking: Reported on 1/6/2023)    losartan (COZAAR) 25 MG tablet TAKE 1 TABLET DAILY    metFORMIN (GLUCOPHAGE-XR) 500 MG ER 24hr tablet Take 1 tablet (500 mg total) by mouth every evening.    metoprolol succinate (TOPROL-XL) 50 MG 24 hr tablet TAKE 1 TABLET ONCE DAILY    pantoprazole (PROTONIX) 40 MG tablet Take 1 tablet (40 mg total) by mouth once daily.    prednisoLONE acetate (PRED FORTE) 1 % DrpS Place 1 drop into the right eye once daily.     TRAVATAN Z 0.004 % ophthalmic solution Place 1 drop into both eyes every evening.     VIT C/E/ZN/COPPR/LUTEIN/ZEAXAN (PRESERVISION AREDS 2 ORAL) Take 1 capsule by mouth 2 (two) times daily.    [DISCONTINUED] cefUROXime (CEFTIN) 500 MG tablet Take 1 tablet (500 mg total) by mouth 2 (two) times daily.     Family History        Problem Relation (Age of Onset)    Celiac disease Sister    Diabetes Daughter    Early death Father, Paternal Uncle    Heart disease Father, Paternal Uncle    Liver cancer Mother    Mental illness Mother    No Known Problems Son          Tobacco Use    Smoking status: Never    Smokeless tobacco: Never   Substance and Sexual Activity    Alcohol use: No    Drug use: No    Sexual activity: Never     Review of Systems   Musculoskeletal:  Positive for arthralgias, gait problem and myalgias.   Objective:     Vital Signs (Most Recent):  Temp: 98.7 °F (37.1 °C) (06/15/23 1452)  Pulse: 90 (06/15/23 1702)  Resp: 18 (06/15/23 1737)  BP: (!) 141/71 (06/15/23 1702)  SpO2: 98 % (06/15/23 1702) Vital Signs (24h Range):  Temp:  [98.7 °F (37.1 °C)] 98.7 °F (37.1 °C)  Pulse:  [] 90  Resp:  [17-18] 18  SpO2:  [96 %-100 %] 98 %  BP: (141-171)/(70-88) 141/71     Weight: 53.5 kg (118 lb)  Body mass index is 20.9 kg/m².     Physical Exam  Vitals and nursing note reviewed.   Constitutional:       General: She is not in acute distress.  HENT:      Head: Normocephalic and atraumatic.      Right Ear: External ear normal.      Left Ear: External ear normal.      Nose: Nose normal.      Mouth/Throat:      Mouth: Mucous membranes are moist.      Pharynx: Oropharynx is clear.   Eyes:      Extraocular Movements: Extraocular movements intact.      Conjunctiva/sclera: Conjunctivae normal.   Cardiovascular:      Rate and Rhythm: Normal rate and regular rhythm.      Pulses: Normal pulses.      Heart sounds: Normal heart sounds.   Pulmonary:      Effort: Pulmonary effort is normal.      Breath sounds: Normal breath sounds.   Abdominal:      General: Bowel sounds are normal.      Palpations: Abdomen is soft.   Musculoskeletal:         General: Deformity and signs of injury present.      Cervical back: Normal range of motion and neck supple.      Right lower leg: No edema.      Left lower leg: No edema.      Comments: RLE shortened and  externally rotated.   Skin:     General: Skin is warm and dry.   Neurological:      Mental Status: She is alert.      Motor: Weakness present.      Gait: Gait abnormal.   Psychiatric:         Mood and Affect: Mood normal.         Behavior: Behavior normal.              Significant Labs: All pertinent labs within the past 24 hours have been reviewed.    Significant Imaging: I have reviewed all pertinent imaging results/findings within the past 24 hours.    Assessment/Plan:     * Closed fracture of right hip  -Orthopedic Surgery to take patient to OR 6/16  -Hold DAPT  -NPO at midnight  -Fall precuations  -Q4H neurovascular checks  -PRN analgesics      Gimenez's esophagus without dysplasia  -PPI      Hypokalemia  -Telemetry  -Replete PRN  -Trend K    CAD (coronary artery disease)  -Hold DAPT  -Continue statin and beta blocker  -Telemetry    Hyperlipidemia associated with type 2 diabetes mellitus  -Hold DAPT  -Continue statin and beta blocker  -Telemetry    Type 2 diabetes mellitus, dx 4/2012  Patient's FSGs are controlled on current medication regimen.  Last A1c reviewed-   Lab Results   Component Value Date    HGBA1C 6.1 (H) 01/09/2023     Most recent fingerstick glucose reviewed-   Recent Labs   Lab 06/15/23  1725   POCTGLUCOSE 149*     Current correctional scale  Medium  Maintain anti-hyperglycemic dose as follows-   Antihyperglycemics (From admission, onward)    Start     Stop Route Frequency Ordered    06/15/23 1815  insulin aspart U-100 pen 1-10 Units         -- SubQ Before meals & nightly PRN 06/15/23 1716        Hold Oral hypoglycemics while patient is in the hospital.    Hypertension associated with diabetes  -Continue home amlodipine, losartan and metoprolol  -Continue to monitor      VTE Risk Mitigation (From admission, onward)         Ordered     enoxaparin injection 40 mg  Daily         06/15/23 1716     IP VTE HIGH RISK PATIENT  Once         06/15/23 1716     Place sequential compression device  Until  discontinued         06/15/23 1716                           Marty Kruse MD  Department of Hospital Medicine  [facility] - Emergency Dept

## 2023-06-15 NOTE — ASSESSMENT & PLAN NOTE
Patient's FSGs are controlled on current medication regimen.  Last A1c reviewed-   Lab Results   Component Value Date    HGBA1C 6.1 (H) 01/09/2023     Most recent fingerstick glucose reviewed-   Recent Labs   Lab 06/15/23  1725   POCTGLUCOSE 149*     Current correctional scale  Medium  Maintain anti-hyperglycemic dose as follows-   Antihyperglycemics (From admission, onward)    Start     Stop Route Frequency Ordered    06/15/23 1815  insulin aspart U-100 pen 1-10 Units         -- SubQ Before meals & nightly PRN 06/15/23 1716        Hold Oral hypoglycemics while patient is in the hospital.

## 2023-06-15 NOTE — SUBJECTIVE & OBJECTIVE
Past Medical History:   Diagnosis Date    ALLERGIC RHINITIS 4/30/2012    Arthritis     Gimenez's esophagus     Breast cancer     right, s/p right mastectomy>20yr ago    Cataract     Diabetes mellitus type II     Diverticulitis     Diverticulosis     Fuchs' corneal dystrophy     Glaucoma     Hernia, hiatal     Hyperlipidemia     Hypertension     Macular degeneration     Pulmonary embolism     Seizures     Skin cancer of face 12/2014    Dr M Weil     UTI (lower urinary tract infection)        Past Surgical History:   Procedure Laterality Date    angeogram      APPENDECTOMY      BREAST SURGERY      cardio stent   1/2015    Dr Dodson    COLONOSCOPY  around 2003    COLONOSCOPY N/A 1/13/2016    Procedure: COLONOSCOPY;  Surgeon: Mario Bhakta MD;  Location: [facility];  Service: Endoscopy;  Laterality: N/A; repeat in 5 years for surveillance    CORNEAL TRANSPLANT      EYE SURGERY      HYSTERECTOMY      ovaries removed    MASTECTOMY      right >20 years ago    STOMACH SURGERY      UPPER GASTROINTESTINAL ENDOSCOPY  01/13/2016    Dr. Bhakta       Review of patient's allergies indicates:   Allergen Reactions    Sulfa (sulfonamide antibiotics)      Other reaction(s): Itching       No current facility-administered medications on file prior to encounter.     Current Outpatient Medications on File Prior to Encounter   Medication Sig    amLODIPine (NORVASC) 10 MG tablet TAKE 1 TABLET EVERY EVENING    ascorbic acid, vitamin C, (VITAMIN C) 500 MG tablet Take 500 mg by mouth once daily.    aspirin (ECOTRIN) 81 MG EC tablet Take 81 mg by mouth once daily.    atorvastatin (LIPITOR) 80 MG tablet TAKE 1 TABLET DAILY    clopidogreL (PLAVIX) 75 mg tablet Take 1 tablet (75 mg total) by mouth once daily.    estradioL (ESTRACE) 0.01 % (0.1 mg/gram) vaginal cream USE 1 GM VAGINALLY TWICE WEEKLY    ferrous sulfate (FEOSOL) 325 mg (65 mg iron) Tab tablet Take 325 mg by mouth daily with breakfast.    latanoprost 0.005 % ophthalmic solution  Place 1 drop into both eyes every evening.    loratadine (CLARITIN) 10 mg tablet Take 1 tablet (10 mg total) by mouth once daily. (Patient not taking: Reported on 1/6/2023)    losartan (COZAAR) 25 MG tablet TAKE 1 TABLET DAILY    metFORMIN (GLUCOPHAGE-XR) 500 MG ER 24hr tablet Take 1 tablet (500 mg total) by mouth every evening.    metoprolol succinate (TOPROL-XL) 50 MG 24 hr tablet TAKE 1 TABLET ONCE DAILY    pantoprazole (PROTONIX) 40 MG tablet Take 1 tablet (40 mg total) by mouth once daily.    prednisoLONE acetate (PRED FORTE) 1 % DrpS Place 1 drop into the right eye once daily.     TRAVATAN Z 0.004 % ophthalmic solution Place 1 drop into both eyes every evening.     VIT C/E/ZN/COPPR/LUTEIN/ZEAXAN (PRESERVISION AREDS 2 ORAL) Take 1 capsule by mouth 2 (two) times daily.    [DISCONTINUED] cefUROXime (CEFTIN) 500 MG tablet Take 1 tablet (500 mg total) by mouth 2 (two) times daily.     Family History       Problem Relation (Age of Onset)    Celiac disease Sister    Diabetes Daughter    Early death Father, Paternal Uncle    Heart disease Father, Paternal Uncle    Liver cancer Mother    Mental illness Mother    No Known Problems Son          Tobacco Use    Smoking status: Never    Smokeless tobacco: Never   Substance and Sexual Activity    Alcohol use: No    Drug use: No    Sexual activity: Never     Review of Systems   Musculoskeletal:  Positive for arthralgias, gait problem and myalgias.   Objective:     Vital Signs (Most Recent):  Temp: 98.7 °F (37.1 °C) (06/15/23 1452)  Pulse: 90 (06/15/23 1702)  Resp: 18 (06/15/23 1737)  BP: (!) 141/71 (06/15/23 1702)  SpO2: 98 % (06/15/23 1702) Vital Signs (24h Range):  Temp:  [98.7 °F (37.1 °C)] 98.7 °F (37.1 °C)  Pulse:  [] 90  Resp:  [17-18] 18  SpO2:  [96 %-100 %] 98 %  BP: (141-171)/(70-88) 141/71     Weight: 53.5 kg (118 lb)  Body mass index is 20.9 kg/m².     Physical Exam  Vitals and nursing note reviewed.   Constitutional:       General: She is not in acute  distress.  HENT:      Head: Normocephalic and atraumatic.      Right Ear: External ear normal.      Left Ear: External ear normal.      Nose: Nose normal.      Mouth/Throat:      Mouth: Mucous membranes are moist.      Pharynx: Oropharynx is clear.   Eyes:      Extraocular Movements: Extraocular movements intact.      Conjunctiva/sclera: Conjunctivae normal.   Cardiovascular:      Rate and Rhythm: Normal rate and regular rhythm.      Pulses: Normal pulses.      Heart sounds: Normal heart sounds.   Pulmonary:      Effort: Pulmonary effort is normal.      Breath sounds: Normal breath sounds.   Abdominal:      General: Bowel sounds are normal.      Palpations: Abdomen is soft.   Musculoskeletal:         General: Deformity and signs of injury present.      Cervical back: Normal range of motion and neck supple.      Right lower leg: No edema.      Left lower leg: No edema.      Comments: RLE shortened and externally rotated.   Skin:     General: Skin is warm and dry.   Neurological:      Mental Status: She is alert.      Motor: Weakness present.      Gait: Gait abnormal.   Psychiatric:         Mood and Affect: Mood normal.         Behavior: Behavior normal.              Significant Labs: All pertinent labs within the past 24 hours have been reviewed.    Significant Imaging: I have reviewed all pertinent imaging results/findings within the past 24 hours.

## 2023-06-16 ENCOUNTER — ANESTHESIA EVENT (OUTPATIENT)
Dept: SURGERY | Facility: HOSPITAL | Age: 88
DRG: 522 | End: 2023-06-16
Payer: MEDICARE

## 2023-06-16 ENCOUNTER — ANESTHESIA (OUTPATIENT)
Dept: SURGERY | Facility: HOSPITAL | Age: 88
DRG: 522 | End: 2023-06-16
Payer: MEDICARE

## 2023-06-16 PROBLEM — E83.42 HYPOMAGNESEMIA: Status: ACTIVE | Noted: 2023-06-16

## 2023-06-16 PROBLEM — E87.6 HYPOKALEMIA: Status: RESOLVED | Noted: 2020-06-15 | Resolved: 2023-06-16

## 2023-06-16 PROBLEM — E83.39 HYPOPHOSPHATEMIA: Status: ACTIVE | Noted: 2023-06-16

## 2023-06-16 PROBLEM — N30.01 ACUTE CYSTITIS WITH HEMATURIA: Status: ACTIVE | Noted: 2023-06-16

## 2023-06-16 PROBLEM — S72.041A: Status: ACTIVE | Noted: 2023-06-16

## 2023-06-16 LAB
ABO + RH BLD: NORMAL
ALBUMIN SERPL BCP-MCNC: 3.5 G/DL (ref 3.5–5.2)
ALP SERPL-CCNC: 96 U/L (ref 55–135)
ALT SERPL W/O P-5'-P-CCNC: 17 U/L (ref 10–44)
ANION GAP SERPL CALC-SCNC: 11 MMOL/L (ref 8–16)
AST SERPL-CCNC: 22 U/L (ref 10–40)
BACTERIA #/AREA URNS HPF: ABNORMAL /HPF
BASOPHILS # BLD AUTO: 0.06 K/UL (ref 0–0.2)
BASOPHILS NFR BLD: 0.5 % (ref 0–1.9)
BILIRUB SERPL-MCNC: 0.5 MG/DL (ref 0.1–1)
BILIRUB UR QL STRIP: ABNORMAL
BLD GP AB SCN CELLS X3 SERPL QL: NORMAL
BUN SERPL-MCNC: 10 MG/DL (ref 8–23)
CALCIUM SERPL-MCNC: 8.6 MG/DL (ref 8.7–10.5)
CHLORIDE SERPL-SCNC: 104 MMOL/L (ref 95–110)
CLARITY UR: ABNORMAL
CO2 SERPL-SCNC: 24 MMOL/L (ref 23–29)
COLOR UR: ABNORMAL
CREAT SERPL-MCNC: 0.6 MG/DL (ref 0.5–1.4)
DIFFERENTIAL METHOD: ABNORMAL
EOSINOPHIL # BLD AUTO: 0 K/UL (ref 0–0.5)
EOSINOPHIL NFR BLD: 0.3 % (ref 0–8)
ERYTHROCYTE [DISTWIDTH] IN BLOOD BY AUTOMATED COUNT: 12 % (ref 11.5–14.5)
EST. GFR  (NO RACE VARIABLE): >60 ML/MIN/1.73 M^2
GLUCOSE SERPL-MCNC: 132 MG/DL (ref 70–110)
GLUCOSE UR QL STRIP: ABNORMAL
HCT VFR BLD AUTO: 41.7 % (ref 37–48.5)
HGB BLD-MCNC: 13.4 G/DL (ref 12–16)
HGB UR QL STRIP: ABNORMAL
IMM GRANULOCYTES # BLD AUTO: 0.05 K/UL (ref 0–0.04)
IMM GRANULOCYTES NFR BLD AUTO: 0.4 % (ref 0–0.5)
KETONES UR QL STRIP: ABNORMAL
LEUKOCYTE ESTERASE UR QL STRIP: ABNORMAL
LYMPHOCYTES # BLD AUTO: 2.2 K/UL (ref 1–4.8)
LYMPHOCYTES NFR BLD: 17.1 % (ref 18–48)
MAGNESIUM SERPL-MCNC: 1.3 MG/DL (ref 1.6–2.6)
MCH RBC QN AUTO: 28 PG (ref 27–31)
MCHC RBC AUTO-ENTMCNC: 32.1 G/DL (ref 32–36)
MCV RBC AUTO: 87 FL (ref 82–98)
MICROSCOPIC COMMENT: ABNORMAL
MONOCYTES # BLD AUTO: 0.9 K/UL (ref 0.3–1)
MONOCYTES NFR BLD: 6.8 % (ref 4–15)
NEUTROPHILS # BLD AUTO: 9.7 K/UL (ref 1.8–7.7)
NEUTROPHILS NFR BLD: 74.9 % (ref 38–73)
NITRITE UR QL STRIP: ABNORMAL
NRBC BLD-RTO: 0 /100 WBC
PH UR STRIP: ABNORMAL [PH] (ref 5–8)
PHOSPHATE SERPL-MCNC: 2.4 MG/DL (ref 2.7–4.5)
PLATELET # BLD AUTO: 328 K/UL (ref 150–450)
PMV BLD AUTO: 9.6 FL (ref 9.2–12.9)
POCT GLUCOSE: 126 MG/DL (ref 70–110)
POCT GLUCOSE: 153 MG/DL (ref 70–110)
POCT GLUCOSE: 264 MG/DL (ref 70–110)
POTASSIUM SERPL-SCNC: 4 MMOL/L (ref 3.5–5.1)
PROT SERPL-MCNC: 7.1 G/DL (ref 6–8.4)
PROT UR QL STRIP: ABNORMAL
RBC # BLD AUTO: 4.78 M/UL (ref 4–5.4)
RBC #/AREA URNS HPF: >100 /HPF (ref 0–4)
SODIUM SERPL-SCNC: 139 MMOL/L (ref 136–145)
SP GR UR STRIP: ABNORMAL (ref 1–1.03)
SPECIMEN OUTDATE: NORMAL
SQUAMOUS #/AREA URNS HPF: 1 /HPF
URN SPEC COLLECT METH UR: ABNORMAL
UROBILINOGEN UR STRIP-ACNC: ABNORMAL EU/DL
WBC # BLD AUTO: 12.88 K/UL (ref 3.9–12.7)
WBC #/AREA URNS HPF: 28 /HPF (ref 0–5)

## 2023-06-16 PROCEDURE — 86900 BLOOD TYPING SEROLOGIC ABO: CPT | Performed by: ANESTHESIOLOGY

## 2023-06-16 PROCEDURE — D9220A PRA ANESTHESIA: ICD-10-PCS | Mod: ANES,,, | Performed by: ANESTHESIOLOGY

## 2023-06-16 PROCEDURE — 37000008 HC ANESTHESIA 1ST 15 MINUTES: Performed by: ORTHOPAEDIC SURGERY

## 2023-06-16 PROCEDURE — 36000711: Performed by: ORTHOPAEDIC SURGERY

## 2023-06-16 PROCEDURE — 83735 ASSAY OF MAGNESIUM: CPT | Performed by: STUDENT IN AN ORGANIZED HEALTH CARE EDUCATION/TRAINING PROGRAM

## 2023-06-16 PROCEDURE — 81000 URINALYSIS NONAUTO W/SCOPE: CPT | Performed by: NURSE PRACTITIONER

## 2023-06-16 PROCEDURE — 12000002 HC ACUTE/MED SURGE SEMI-PRIVATE ROOM

## 2023-06-16 PROCEDURE — 87086 URINE CULTURE/COLONY COUNT: CPT | Performed by: NURSE PRACTITIONER

## 2023-06-16 PROCEDURE — D9220A PRA ANESTHESIA: Mod: CRNA,,, | Performed by: NURSE ANESTHETIST, CERTIFIED REGISTERED

## 2023-06-16 PROCEDURE — 80053 COMPREHEN METABOLIC PANEL: CPT | Performed by: STUDENT IN AN ORGANIZED HEALTH CARE EDUCATION/TRAINING PROGRAM

## 2023-06-16 PROCEDURE — 27201423 OPTIME MED/SURG SUP & DEVICES STERILE SUPPLY: Performed by: ORTHOPAEDIC SURGERY

## 2023-06-16 PROCEDURE — 99223 1ST HOSP IP/OBS HIGH 75: CPT | Mod: 57,,, | Performed by: ORTHOPAEDIC SURGERY

## 2023-06-16 PROCEDURE — 63600175 PHARM REV CODE 636 W HCPCS: Performed by: ORTHOPAEDIC SURGERY

## 2023-06-16 PROCEDURE — 25000003 PHARM REV CODE 250: Performed by: ANESTHESIOLOGY

## 2023-06-16 PROCEDURE — 36415 COLL VENOUS BLD VENIPUNCTURE: CPT | Performed by: ANESTHESIOLOGY

## 2023-06-16 PROCEDURE — 36415 COLL VENOUS BLD VENIPUNCTURE: CPT | Performed by: STUDENT IN AN ORGANIZED HEALTH CARE EDUCATION/TRAINING PROGRAM

## 2023-06-16 PROCEDURE — 99223 PR INITIAL HOSPITAL CARE,LEVL III: ICD-10-PCS | Mod: 57,,, | Performed by: ORTHOPAEDIC SURGERY

## 2023-06-16 PROCEDURE — D9220A PRA ANESTHESIA: ICD-10-PCS | Mod: CRNA,,, | Performed by: NURSE ANESTHETIST, CERTIFIED REGISTERED

## 2023-06-16 PROCEDURE — 63600175 PHARM REV CODE 636 W HCPCS: Performed by: NURSE ANESTHETIST, CERTIFIED REGISTERED

## 2023-06-16 PROCEDURE — 63600175 PHARM REV CODE 636 W HCPCS: Performed by: STUDENT IN AN ORGANIZED HEALTH CARE EDUCATION/TRAINING PROGRAM

## 2023-06-16 PROCEDURE — 37000009 HC ANESTHESIA EA ADD 15 MINS: Performed by: ORTHOPAEDIC SURGERY

## 2023-06-16 PROCEDURE — C1776 JOINT DEVICE (IMPLANTABLE): HCPCS | Performed by: ORTHOPAEDIC SURGERY

## 2023-06-16 PROCEDURE — 85025 COMPLETE CBC W/AUTO DIFF WBC: CPT | Performed by: STUDENT IN AN ORGANIZED HEALTH CARE EDUCATION/TRAINING PROGRAM

## 2023-06-16 PROCEDURE — 27800903 OPTIME MED/SURG SUP & DEVICES OTHER IMPLANTS: Performed by: ORTHOPAEDIC SURGERY

## 2023-06-16 PROCEDURE — 27236 PR FEMORAL FX, OPEN TX: ICD-10-PCS | Mod: RT,,, | Performed by: ORTHOPAEDIC SURGERY

## 2023-06-16 PROCEDURE — 27236 TREAT THIGH FRACTURE: CPT | Mod: RT,,, | Performed by: ORTHOPAEDIC SURGERY

## 2023-06-16 PROCEDURE — 94799 UNLISTED PULMONARY SVC/PX: CPT

## 2023-06-16 PROCEDURE — 25000003 PHARM REV CODE 250: Performed by: STUDENT IN AN ORGANIZED HEALTH CARE EDUCATION/TRAINING PROGRAM

## 2023-06-16 PROCEDURE — 84100 ASSAY OF PHOSPHORUS: CPT | Performed by: STUDENT IN AN ORGANIZED HEALTH CARE EDUCATION/TRAINING PROGRAM

## 2023-06-16 PROCEDURE — 36000710: Performed by: ORTHOPAEDIC SURGERY

## 2023-06-16 PROCEDURE — D9220A PRA ANESTHESIA: Mod: ANES,,, | Performed by: ANESTHESIOLOGY

## 2023-06-16 PROCEDURE — 25000003 PHARM REV CODE 250: Performed by: ORTHOPAEDIC SURGERY

## 2023-06-16 PROCEDURE — 25000003 PHARM REV CODE 250: Performed by: NURSE ANESTHETIST, CERTIFIED REGISTERED

## 2023-06-16 PROCEDURE — 71000033 HC RECOVERY, INTIAL HOUR: Performed by: ORTHOPAEDIC SURGERY

## 2023-06-16 PROCEDURE — 99900104 DSU ONLY-NO CHARGE-EA ADD'L HR (STAT): Performed by: ORTHOPAEDIC SURGERY

## 2023-06-16 PROCEDURE — 71000039 HC RECOVERY, EACH ADD'L HOUR: Performed by: ORTHOPAEDIC SURGERY

## 2023-06-16 PROCEDURE — 86920 COMPATIBILITY TEST SPIN: CPT | Performed by: STUDENT IN AN ORGANIZED HEALTH CARE EDUCATION/TRAINING PROGRAM

## 2023-06-16 PROCEDURE — 94761 N-INVAS EAR/PLS OXIMETRY MLT: CPT

## 2023-06-16 PROCEDURE — 99900103 DSU ONLY-NO CHARGE-INITIAL HR (STAT): Performed by: ORTHOPAEDIC SURGERY

## 2023-06-16 DEVICE — TANDEM UNIPOLAR 12/14 TAPER SLEEVE                                    + 0
Type: IMPLANTABLE DEVICE | Site: HIP | Status: FUNCTIONAL
Brand: TANDEM

## 2023-06-16 DEVICE — TANDEM UNIPOLAR HEAD 43MM
Type: IMPLANTABLE DEVICE | Site: HIP | Status: FUNCTIONAL
Brand: TANDEM

## 2023-06-16 RX ORDER — DEXAMETHASONE SODIUM PHOSPHATE 4 MG/ML
INJECTION, SOLUTION INTRA-ARTICULAR; INTRALESIONAL; INTRAMUSCULAR; INTRAVENOUS; SOFT TISSUE
Status: DISCONTINUED | OUTPATIENT
Start: 2023-06-16 | End: 2023-06-16

## 2023-06-16 RX ORDER — LIDOCAINE HYDROCHLORIDE 20 MG/ML
INJECTION INTRAVENOUS
Status: DISCONTINUED | OUTPATIENT
Start: 2023-06-16 | End: 2023-06-16

## 2023-06-16 RX ORDER — ROCURONIUM BROMIDE 10 MG/ML
INJECTION, SOLUTION INTRAVENOUS
Status: DISCONTINUED | OUTPATIENT
Start: 2023-06-16 | End: 2023-06-16

## 2023-06-16 RX ORDER — ACETAMINOPHEN 10 MG/ML
INJECTION, SOLUTION INTRAVENOUS
Status: DISCONTINUED | OUTPATIENT
Start: 2023-06-16 | End: 2023-06-16

## 2023-06-16 RX ORDER — FENTANYL CITRATE 50 UG/ML
INJECTION, SOLUTION INTRAMUSCULAR; INTRAVENOUS
Status: DISCONTINUED | OUTPATIENT
Start: 2023-06-16 | End: 2023-06-16

## 2023-06-16 RX ORDER — MAGNESIUM SULFATE HEPTAHYDRATE 40 MG/ML
4 INJECTION, SOLUTION INTRAVENOUS ONCE
Status: COMPLETED | OUTPATIENT
Start: 2023-06-16 | End: 2023-06-16

## 2023-06-16 RX ORDER — PROPOFOL 10 MG/ML
VIAL (ML) INTRAVENOUS
Status: DISCONTINUED | OUTPATIENT
Start: 2023-06-16 | End: 2023-06-16

## 2023-06-16 RX ORDER — CEFAZOLIN SODIUM 2 G/50ML
2 SOLUTION INTRAVENOUS
Status: COMPLETED | OUTPATIENT
Start: 2023-06-16 | End: 2023-06-16

## 2023-06-16 RX ORDER — MUPIROCIN 20 MG/G
OINTMENT TOPICAL
Status: DISCONTINUED | OUTPATIENT
Start: 2023-06-16 | End: 2023-06-16 | Stop reason: HOSPADM

## 2023-06-16 RX ORDER — LIDOCAINE HYDROCHLORIDE 10 MG/ML
1 INJECTION, SOLUTION EPIDURAL; INFILTRATION; INTRACAUDAL; PERINEURAL ONCE
Status: DISCONTINUED | OUTPATIENT
Start: 2023-06-16 | End: 2023-06-16 | Stop reason: HOSPADM

## 2023-06-16 RX ORDER — SODIUM,POTASSIUM PHOSPHATES 280-250MG
1 POWDER IN PACKET (EA) ORAL
Status: DISCONTINUED | OUTPATIENT
Start: 2023-06-16 | End: 2023-06-20 | Stop reason: HOSPADM

## 2023-06-16 RX ORDER — EPHEDRINE SULFATE 50 MG/ML
INJECTION, SOLUTION INTRAVENOUS
Status: DISCONTINUED | OUTPATIENT
Start: 2023-06-16 | End: 2023-06-16

## 2023-06-16 RX ORDER — ONDANSETRON 2 MG/ML
INJECTION INTRAMUSCULAR; INTRAVENOUS
Status: DISCONTINUED | OUTPATIENT
Start: 2023-06-16 | End: 2023-06-16

## 2023-06-16 RX ORDER — SUCCINYLCHOLINE CHLORIDE 20 MG/ML
INJECTION INTRAMUSCULAR; INTRAVENOUS
Status: DISCONTINUED | OUTPATIENT
Start: 2023-06-16 | End: 2023-06-16

## 2023-06-16 RX ORDER — PHENYLEPHRINE HYDROCHLORIDE 10 MG/ML
INJECTION INTRAVENOUS
Status: DISCONTINUED | OUTPATIENT
Start: 2023-06-16 | End: 2023-06-16

## 2023-06-16 RX ADMIN — SODIUM CHLORIDE, SODIUM GLUCONATE, SODIUM ACETATE, POTASSIUM CHLORIDE AND MAGNESIUM CHLORIDE: 526; 502; 368; 37; 30 INJECTION, SOLUTION INTRAVENOUS at 01:06

## 2023-06-16 RX ADMIN — PHENYLEPHRINE HYDROCHLORIDE 200 MCG: 10 INJECTION INTRAVENOUS at 03:06

## 2023-06-16 RX ADMIN — MAGNESIUM SULFATE 4 G: 2 INJECTION INTRAVENOUS at 01:06

## 2023-06-16 RX ADMIN — CEFTRIAXONE 1 G: 1 INJECTION, POWDER, FOR SOLUTION INTRAMUSCULAR; INTRAVENOUS at 12:06

## 2023-06-16 RX ADMIN — EPHEDRINE SULFATE 10 MG: 50 INJECTION, SOLUTION INTRAMUSCULAR; INTRAVENOUS; SUBCUTANEOUS at 03:06

## 2023-06-16 RX ADMIN — EPHEDRINE SULFATE 10 MG: 50 INJECTION, SOLUTION INTRAMUSCULAR; INTRAVENOUS; SUBCUTANEOUS at 04:06

## 2023-06-16 RX ADMIN — AMLODIPINE BESYLATE 10 MG: 5 TABLET ORAL at 08:06

## 2023-06-16 RX ADMIN — PROPOFOL 100 MG: 10 INJECTION, EMULSION INTRAVENOUS at 03:06

## 2023-06-16 RX ADMIN — ONDANSETRON 4 MG: 2 INJECTION INTRAMUSCULAR; INTRAVENOUS at 03:06

## 2023-06-16 RX ADMIN — LATANOPROST 1 DROP: 50 SOLUTION OPHTHALMIC at 10:06

## 2023-06-16 RX ADMIN — CEFAZOLIN SODIUM 2 G: 2 SOLUTION INTRAVENOUS at 03:06

## 2023-06-16 RX ADMIN — SUCCINYLCHOLINE CHLORIDE 120 MG: 20 INJECTION, SOLUTION INTRAMUSCULAR; INTRAVENOUS at 03:06

## 2023-06-16 RX ADMIN — FENTANYL CITRATE 25 MCG: 50 INJECTION, SOLUTION INTRAMUSCULAR; INTRAVENOUS at 04:06

## 2023-06-16 RX ADMIN — SODIUM CHLORIDE, SODIUM GLUCONATE, SODIUM ACETATE, POTASSIUM CHLORIDE AND MAGNESIUM CHLORIDE: 526; 502; 368; 37; 30 INJECTION, SOLUTION INTRAVENOUS at 04:06

## 2023-06-16 RX ADMIN — ROCURONIUM BROMIDE 10 MG: 10 INJECTION, SOLUTION INTRAVENOUS at 03:06

## 2023-06-16 RX ADMIN — LIDOCAINE HYDROCHLORIDE 75 MG: 20 INJECTION, SOLUTION INTRAVENOUS at 03:06

## 2023-06-16 RX ADMIN — DEXAMETHASONE SODIUM PHOSPHATE 4 MG: 4 INJECTION, SOLUTION INTRA-ARTICULAR; INTRALESIONAL; INTRAMUSCULAR; INTRAVENOUS; SOFT TISSUE at 03:06

## 2023-06-16 RX ADMIN — INSULIN ASPART 2 UNITS: 100 INJECTION, SOLUTION INTRAVENOUS; SUBCUTANEOUS at 08:06

## 2023-06-16 RX ADMIN — FENTANYL CITRATE 50 MCG: 50 INJECTION, SOLUTION INTRAMUSCULAR; INTRAVENOUS at 03:06

## 2023-06-16 RX ADMIN — ACETAMINOPHEN 650 MG: 10 INJECTION, SOLUTION INTRAVENOUS at 03:06

## 2023-06-16 RX ADMIN — INSULIN ASPART 3 UNITS: 100 INJECTION, SOLUTION INTRAVENOUS; SUBCUTANEOUS at 08:06

## 2023-06-16 RX ADMIN — POTASSIUM & SODIUM PHOSPHATES POWDER PACK 280-160-250 MG 1 PACKET: 280-160-250 PACK at 08:06

## 2023-06-16 RX ADMIN — METOPROLOL SUCCINATE 50 MG: 50 TABLET, EXTENDED RELEASE ORAL at 10:06

## 2023-06-16 RX ADMIN — MUPIROCIN: 20 OINTMENT TOPICAL at 08:06

## 2023-06-16 RX ADMIN — LOSARTAN POTASSIUM 25 MG: 25 TABLET, FILM COATED ORAL at 10:06

## 2023-06-16 RX ADMIN — ENOXAPARIN SODIUM 40 MG: 40 INJECTION SUBCUTANEOUS at 06:06

## 2023-06-16 NOTE — OP NOTE
[facility]  Orthopedic Surgery Department  Operative Note    SUMMARY     Date of Procedure: 6/16/2023     Procedure: Procedure(s) (LRB):  ARTHROPLASTY, HIP (Right)     Surgeon(s) and Role:     * Josh Douglas II, MD - Primary    Assisting Surgeon: None    First Assist:  LASHELL Yoon    Pre-Operative Diagnosis: Closed fracture of neck of right femur, initial encounter [S72.001A]    Post-Operative Diagnosis: Post-Op Diagnosis Codes:     * Closed fracture of neck of right femur, initial encounter [S72.001A]    Anesthesia: General    Technical Procedures Used:  Right hip hemiarthroplasty    Description of the Findings of the Procedure:  Dictated    Significant Surgical Tasks Conducted by the Assistant(s), if Applicable:  Positioning and prepping the patient, retraction during exposure and implant insertion, wound closure and bandage application.    Complications: No    Estimated Blood Loss (EBL): 150 mL           Implants:   Implant Name Type Inv. Item Serial No.  Lot No. LRB No. Used Action   43mm tandem unipolar    [employer] [id number] Right 1 Implanted   SLEEVE TANDEM UPLR 12/14 +0MM - [id number]  SLE[employer] TANDEM UPLR 12/14 +0MM  [employer] [id number] Right 1 Implanted   size 11 130mm offset femoral component    [employer] [id number] Right 1 Implanted       Specimens:   Specimen (24h ago, onward)      None                    Condition: Good    Disposition: PACU - hemodynamically stable.    Attestation: I was present for the entire procedure.    Procedure In Detail:  The patient is brought to the operating room and placed on the table in the supine position.  The patient underwent anesthesia with the anesthesia service.  She was then rolled in the left lateral decubitus position with the right hip up and secured on the bed with 2 post.  The right lower extremity was then prepped and draped in the normal sterile fashion.  A posterior approach to the hip was created.   Dissection was taken through skin and subcutaneous tissue down to the tensor fascia roderick and fascia of the gluteus nikita.  The fascia was sharply incised and blunt finger dissection was taken through the nikita musculature.  A Charnley retractor was placed.  The bursa was swept posteriorly.  The piriformis was identified and the interval between the piriformis and gluteus minimus was developed.  The piriformis and other short external rotators were then taken as 1 cuff of tissue along with the hip capsule and tagged with Ethibond suture for later repair.  This led to excellent exposure of the fracture.  A acetabular knife was used to remove the fractured femoral head.  Bits of bony debris were then removed.  A Hohmann retractor was placed around the femoral neck and a fresh femoral neck cut was made.  The acetabulum was then sized out based on the size of the fractured femoral head.  It was determined that a 43 mm femoral head would be the appropriate size.  We then began prepping the femur.  A box osteotome was used open the proximal femur followed by a canal finding Reamer and then a lateralizing Reamer.  Sequential reaming was undertaken to size 11.  Sequential broaching was undertaken to size 11.  With the 11 broach in place we trialed the 43 mm head with a standard offset neck and with those combination of components the leg lengths clinically appeared equal.  There was no excessive Shuck and the hip could be flexed to 90° and internally rotated to about 90° before subluxation.  We felt this to be a solid construct.  The trials were then removed.  A Torres and Nephew echelon size 11 stem was then impacted into the femur.  A 43 mm +0 head was then impacted onto the neck of the stem.  With the final components in place the hip was reduced and had the same stability and range of motion as it did with the trials.  We then placed the hip in a neutral position and closure was begun.  The short external rotators and  hip capsule were repaired as 1 cuff of tissue to the medius tendon.  The fascia was then closed with a 0 PDS strata fix.  We closed deep to the subdermal layer with a 2 0 PDS strata fix and the subcuticular layer was closed with 3-0 Monocryl.  A Dermabond Prineo device was applied to the skin.  A sterile Bioclusive intraoperative dressing was placed and the patient was rolled supine on the transport gurney, extubated, and taken to recovery where she was noted to be stable postoperatively.  Needle and lap counts were correct at the end of the case.

## 2023-06-16 NOTE — ANESTHESIA PROCEDURE NOTES
Intubation    Date/Time: 6/16/2023 3:12 PM  Performed by: Karina Wilson CRNA  Authorized by: Tonio Berry MD     Intubation:     Induction:  Intravenous    Intubated:  Postinduction    Mask Ventilation:  Easy mask    Attempts:  1    Attempted By:  CRNA    Method of Intubation:  Video laryngoscopy    Blade:  Mena 3    Laryngeal View Grade: Grade I - full view of cords      Difficult Airway Encountered?: No      Complications:  None    Airway Device:  Oral endotracheal tube    Airway Device Size:  7.0    Style/Cuff Inflation:  Cuffed (inflated to minimal occlusive pressure)    Inflation Amount (mL):  5    Tube secured:  21    Secured at:  The lips    Placement Verified By:  Capnometry and Revisualization with laryngoscopy    Complicating Factors:  None    Findings Post-Intubation:  BS equal bilateral and atraumatic/condition of teeth unchanged

## 2023-06-16 NOTE — CARE UPDATE
06/15/23 2045   Patient Assessment/Suction   Level of Consciousness (AVPU) alert   Respiratory Effort Normal;Unlabored   PRE-TX-O2   Device (Oxygen Therapy) room air   SpO2 98 %   Pulse Oximetry Type Intermittent   $ Pulse Oximetry - Single Charge Pulse Oximetry - Single   Incentive Spirometer   $ Incentive Spirometer Charges preop instruction   Incentive Spirometer Predicted Level (mL) 1360   Administration (IS) instruction provided, initial;mouthpiece utilized;proper technique demonstrated   Number of Repetitions (IS) 10   Level Incentive Spirometer (mL) 750   Patient Tolerance (IS) fair;no adverse signs/symptoms present

## 2023-06-16 NOTE — PLAN OF CARE
Released per anesthesia, when criteria met. VS WDL, Resp even and unlabored. Dressing  dry and intact, denies pain, catheter draining and urine is clearing

## 2023-06-16 NOTE — TRANSFER OF CARE
"Anesthesia Transfer of Care Note    Patient: Genny Watson    Procedure(s) Performed: Procedure(s) (LRB):  ARTHROPLASTY, HIP (Right)    Patient location: PACU    Anesthesia Type: general    Transport from OR: Transported from OR on 2-3 L/min O2 by NC with adequate spontaneous ventilation    Post pain: adequate analgesia    Post assessment: no apparent anesthetic complications and tolerated procedure well    Post vital signs: stable    Level of consciousness: awake, alert and oriented    Nausea/Vomiting: no nausea/vomiting    Complications: none    Transfer of care protocol was followed      Last vitals:   Visit Vitals  BP (!) 165/79   Pulse 87   Temp 36.6 °C (97.9 °F) (Oral)   Resp 16   Ht 5' 3" (1.6 m)   Wt 53.5 kg (118 lb)   SpO2 95%   Breastfeeding No   BMI 20.90 kg/m²     "

## 2023-06-16 NOTE — PLAN OF CARE
Patient ready for surgery. Surgery and anesthesia consents signed. Educated on incentive spirometer use. Belongings in 324. Daughter set up with text message notifications.

## 2023-06-16 NOTE — HOSPITAL COURSE
Genny Watson is an 87 year old female with a past medical history of CAD s/ PCI, DM, HTN, HLD, DM, and Gimenez's esophagus who presented with a R femoral neck fracture after a fall. She endorses RLE pain and limited ROM. Orthopedic Surgery was consulted from the ED and took the patient to the OR 6/16. DAPT was been held. Her course has been complicated by a UTI for which she completed a course of Rocephin.  Urine culture had no growth.She also developed hypotension on POD 1 with a roughly three point fall in the Hgb.  She was transfused 2 units of PRBCs.  Chemical DVT prophylaxis has been held and the patient is receiving IV fluids while her home PO medications have also been held. Hgb is being trended. PT/OT has been consulted post-operatively.  CT showed 2 small hematomas.  This was discussed with Dr. Douglas who was okay with restarting DAPT.  Patient was seen by Urology who recommended keeping Reddy in place with outpatient follow-up.  Patient was placed in skilled nursing facility and transferred there for further therapy.  She will follow-up with ortho and urology.

## 2023-06-16 NOTE — CARE UPDATE
06/16/23 0830   PRE-TX-O2   Device (Oxygen Therapy) room air   SpO2 95 %   Pulse Oximetry Type Intermittent   $ Pulse Oximetry - Multiple Charge Pulse Oximetry - Multiple   Pulse 78   Resp 17   Incentive Spirometer   $ Incentive Spirometer Charges postop instruction   Administration (IS) instruction provided, follow-up;mouthpiece utilized   Number of Repetitions (IS) 10   Level Incentive Spirometer (mL) 1000   Patient Tolerance (IS) fair;no adverse signs/symptoms present

## 2023-06-16 NOTE — ANESTHESIA PREPROCEDURE EVALUATION
06/16/2023  Genny Watson is a 87 y.o., female.      Pre-op Assessment    I have reviewed the NPO Status.   I have reviewed the Medications.     Review of Systems  Anesthesia Hx:  No problems with previous Anesthesia    Cardiovascular:   Exercise tolerance: good Hypertension CAD  CABG/stent  ECG has been reviewed.    Pulmonary:   Hx PE   Hepatic/GI:   Hiatal Hernia,    Musculoskeletal:   Femur fracture   Neurological:   Neuromuscular Disease, Seizures    Endocrine:   Diabetes, type 2        Physical Exam  General: Well nourished    Airway:  Mallampati: II   Mouth Opening: Small, but > 3cm  TM Distance: Normal  Tongue: Normal  Neck ROM: Normal ROM    Dental:  Intact    Chest/Lungs:  Clear to auscultation, Normal Respiratory Rate        Anesthesia Plan  Type of Anesthesia, risks & benefits discussed:    Anesthesia Type: Gen ETT  Intra-op Monitoring Plan: Standard ASA Monitors  Post Op Pain Control Plan: multimodal analgesia, IV/PO Opioids PRN and peripheral nerve block  Induction:  IV  Airway Plan: Direct, Post-Induction  Informed Consent: Informed consent signed with the Patient and all parties understand the risks and agree with anesthesia plan.  All questions answered. Patient consented to blood products? Yes  ASA Score: 3  Anesthesia Plan Notes: Unable to receive spinal secondary to plavix    Ready For Surgery From Anesthesia Perspective.     .

## 2023-06-16 NOTE — NURSING
Notified Ana M Gamez NP that urine has become a darker red color than previously noted pink color. See new order for urinalysis.

## 2023-06-16 NOTE — PROGRESS NOTES
[facility] Medicine  Progress Note    Patient Name: Genny Watson  MRN: [id number]  Patient Class: IP- Inpatient   Admission Date: 6/15/2023  Length of Stay: 1 days  Attending Physician: Marty Kruse MD  Primary Care Provider: Josh Berman MD        Subjective:     Principal Problem:Closed fracture of right hip        HPI:  Genny Watson is an 87 year old female with a past medical history of CAD s/ PCI, DM, HTN, HLD, DM, and Gimenez's esophagus who presented with a R femoral neck fracture after a fall. She endorses RLE pain and limited ROM. Orthopedic Surgery was consulted from the ED and plans to take the patient to the OR 6/16. Hospital Medicine was consulted for admission. DAPT has been held and she is NPO at midnight.      Overview/Hospital Course:  Genyn Watson is an 87 year old female with a past medical history of CAD s/ PCI, DM, HTN, HLD, DM, and Gimenez's esophagus who presented with a R femoral neck fracture after a fall. She endorses RLE pain and limited ROM. Orthopedic Surgery was consulted from the ED and plans to take the patient to the OR 6/16. DAPT has been held and she is NPO. Her course has been complicated by a UTI for which Rocephin has been started. Urine culture is pending. PT/OT is to be consulted post-operatively.      No new subjective & objective note has been filed under this hospital service since the last note was generated.      Assessment/Plan:      * Closed fracture of right hip  -Orthopedic Surgery to take patient to OR 6/16  -Hold DAPT  -NPO   -Fall precuations  -Q4H neurovascular checks  -PRN analgesics  -PT/OT      Hypomagnesemia  -Trend Mg  -Replete PRN      Hypophosphatemia  -Scheduled repletion  -Trend P      Acute cystitis with hematuria  -Follow up urine culture  -Rocephin      Gimenez's esophagus without dysplasia  -PPI      CAD (coronary artery disease)  -Hold DAPT  -Continue statin and beta  blocker  -Telemetry    Hyperlipidemia associated with type 2 diabetes mellitus  -Hold DAPT  -Continue statin and beta blocker  -Telemetry    Type 2 diabetes mellitus, dx 4/2012  Patient's FSGs are controlled on current medication regimen.  Last A1c reviewed-   Lab Results   Component Value Date    HGBA1C 6.1 (H) 01/09/2023     Most recent fingerstick glucose reviewed-   Recent Labs   Lab 06/15/23  1725 06/15/23  2147 06/16/23  0759   POCTGLUCOSE 149* 176* 153*     Current correctional scale  Medium  Maintain anti-hyperglycemic dose as follows-   Antihyperglycemics (From admission, onward)    Start     Stop Route Frequency Ordered    06/15/23 1815  insulin aspart U-100 pen 1-10 Units         -- SubQ Before meals & nightly PRN 06/15/23 1716        Hold Oral hypoglycemics while patient is in the hospital.    Hypertension associated with diabetes  -Continue home amlodipine, losartan and metoprolol  -Continue to monitor      VTE Risk Mitigation (From admission, onward)         Ordered     enoxaparin injection 40 mg  Daily         06/15/23 1716     IP VTE HIGH RISK PATIENT  Once         06/15/23 1716     Place sequential compression device  Until discontinued         06/15/23 1716                Discharge Planning   NILDA: 6/19/2023    Code Status: Full Code   Is the patient medically ready for discharge?:     Reason for patient still in hospital (select all that apply): Patient trending condition, Laboratory test, Treatment, Consult recommendations, PT / OT recommendations and Pending disposition                     Marty Kruse MD  Department of Hospital Medicine   [facility]

## 2023-06-16 NOTE — CONSULTS
CC:  Right hip pain    HPI:  87-year-old female who sustained a fall yesterday landing on her right side.  She had immediate onset of pain and inability bear weight.  She was brought to the emergency department by EMS.  She was noted to have a displaced right femoral neck fracture.  She was admitted by the hospitalist service and Orthopedics has been consulted for fracture management.    Past Medical History:   Diagnosis Date    ALLERGIC RHINITIS 4/30/2012    Arthritis     Gimenez's esophagus     Breast cancer     right, s/p right mastectomy>20yr ago    Cataract     Diabetes mellitus type II     Diverticulitis     Diverticulosis     Fuchs' corneal dystrophy     Glaucoma     Hernia, hiatal     Hyperlipidemia     Hypertension     Macular degeneration     Pulmonary embolism     Seizures     Skin cancer of face 12/2014    Dr M Weil     UTI (lower urinary tract infection)        Past Surgical History:   Procedure Laterality Date    angeogram      APPENDECTOMY      BREAST SURGERY      cardio stent   1/2015    Dr Dodson    COLONOSCOPY  around 2003    COLONOSCOPY N/A 1/13/2016    Procedure: COLONOSCOPY;  Surgeon: Mario Bhakta MD;  Location: [facility];  Service: Endoscopy;  Laterality: N/A; repeat in 5 years for surveillance    CORNEAL TRANSPLANT      EYE SURGERY      HYSTERECTOMY      ovaries removed    MASTECTOMY      right >20 years ago    STOMACH SURGERY      UPPER GASTROINTESTINAL ENDOSCOPY  01/13/2016    Dr. Bhakta       No current facility-administered medications on file prior to encounter.     Current Outpatient Medications on File Prior to Encounter   Medication Sig Dispense Refill    amLODIPine (NORVASC) 10 MG tablet TAKE 1 TABLET EVERY EVENING 90 tablet 3    ascorbic acid, vitamin C, (VITAMIN C) 500 MG tablet Take 500 mg by mouth once daily.      aspirin (ECOTRIN) 81 MG EC tablet Take 81 mg by mouth once daily.      atorvastatin (LIPITOR) 80 MG tablet TAKE 1 TABLET DAILY 90 tablet 3    clopidogreL (PLAVIX)  75 mg tablet Take 1 tablet (75 mg total) by mouth once daily. 90 tablet 3    estradioL (ESTRACE) 0.01 % (0.1 mg/gram) vaginal cream USE 1 GM VAGINALLY TWICE WEEKLY 42.5 g 5    ferrous sulfate (FEOSOL) 325 mg (65 mg iron) Tab tablet Take 325 mg by mouth daily with breakfast.      latanoprost 0.005 % ophthalmic solution Place 1 drop into both eyes every evening.      loratadine (CLARITIN) 10 mg tablet Take 1 tablet (10 mg total) by mouth once daily. (Patient not taking: Reported on 1/6/2023) 30 tablet 11    losartan (COZAAR) 25 MG tablet TAKE 1 TABLET DAILY 90 tablet 0    metFORMIN (GLUCOPHAGE-XR) 500 MG ER 24hr tablet Take 1 tablet (500 mg total) by mouth every evening. 90 tablet 1    metoprolol succinate (TOPROL-XL) 50 MG 24 hr tablet TAKE 1 TABLET ONCE DAILY 90 tablet 0    pantoprazole (PROTONIX) 40 MG tablet Take 1 tablet (40 mg total) by mouth once daily. 90 tablet 3    prednisoLONE acetate (PRED FORTE) 1 % DrpS Place 1 drop into the right eye once daily.       TRAVATAN Z 0.004 % ophthalmic solution Place 1 drop into both eyes every evening.       VIT C/E/ZN/COPPR/LUTEIN/ZEAXAN (PRESERVISION AREDS 2 ORAL) Take 1 capsule by mouth 2 (two) times daily.         ROS:    Constitution: Denies chills, fever, and sweats.  HENT: Denies headaches or blurry vision.  Cardiovascular: Denies chest pain or irregular heart beat.  Respiratory: Denies cough or shortness of breath.  Gastrointestinal: Denies abdominal pain, nausea, or vomiting.  Genitourinary:  Denies urinary incontinence, bladder and kidney issues  Musculoskeletal:  Denies muscle cramps.  Neurological: Denies dizziness or focal weakness.  Psychiatric/Behavioral: Normal mental status.  Hematologic/Lymphatic: Denies bleeding problem or easy bruising/bleeding.  Skin: Denies rash or suspicious lesions.    Physical examination     Gen - No acute distress, well nourished, well groomed   Eyes - Extraoccular motions intact, pupils equally round and reactive to light and  accommodation   ENT - normocephalic, atruamtic, oropharynx clear   Neck - Supple, no abnormal masses   Cardiovascular - regular rate and rhythm   Pulmonary - clear to auscultation bilaterally, no wheezes, ronchi, or rales   Abdomen - soft, non-tender, non-distended, positive bowel sounds   Psych - The patient is alert and oriented x3 with normal mood and affect    RLE:  The leg is shortened and externally rotated  Skin is intact throughout  Grossly intact motor sensory function distally  Plus 2 distal pulses  Pain with any attempted motion of the right hip    X-ray images were examined and personally interpreted by me.  Three views the right hip dated 06/15/2023 show a displaced right femoral neck fracture    Dx:  Displaced right femoral neck fracture    Plan:  Potential morbidity to the right lower extremity with both operative and non operative treatments were discussed.  Recommendation is for right hip hemiarthroplasty.  Risks and benefits of the surgery were explained to the patient.  She verbalized understanding and does wish to proceed.  We will schedule that for later on today.

## 2023-06-16 NOTE — ANESTHESIA POSTPROCEDURE EVALUATION
Anesthesia Post Evaluation    Patient: Genny Watson    Procedure(s) Performed: Procedure(s) (LRB):  ARTHROPLASTY, HIP (Right)    Final Anesthesia Type: general      Patient location during evaluation: PACU  Patient participation: Yes- Able to Participate  Level of consciousness: awake and alert and oriented  Post-procedure vital signs: reviewed and stable  Pain management: adequate  Airway patency: patent  CLARE mitigation strategies: Multimodal analgesia, Extubation while patient is awake, Verification of full reversal of neuromuscular block and Extubation and recovery carried out in lateral, semiupright, or other nonsupine position  PONV status at discharge: No PONV  Anesthetic complications: no      Cardiovascular status: blood pressure returned to baseline  Respiratory status: unassisted, spontaneous ventilation and room air  Hydration status: euvolemic  Follow-up not needed.          Vitals Value Taken Time   /68 06/16/23 1740   Temp 36.7 °C (98.1 °F) 06/16/23 1638   Pulse 82 06/16/23 1740   Resp 18 06/16/23 1740   SpO2 93 % 06/16/23 1740   Vitals shown include unvalidated device data.      No case tracking events are documented in the log.      Pain/Sol Score: Pain Rating Prior to Med Admin: 7 (6/15/2023  5:37 PM)  Pain Rating Post Med Admin: 4 (6/15/2023  5:56 PM)  Sol Score: 8 (6/16/2023  5:00 PM)

## 2023-06-16 NOTE — PLAN OF CARE
Patrica Bailey [address] [facility] [address] [facility]  Authorization for Surgical Operation and Procedure   Date:___________                                                                                            Time:__________  1.  I hereby aut Pt is in surgery. CM to follow up           06/16/23 1534   Discharge Assessment   Assessment Type Discharge Planning Assessment        reactions, hemolytic reactions, transmission of diseases such as Hepatitis, AIDS and Cytomegalovirus (CMV) and fluid overload. In the event that I wish to have an autologous transfusion of my own blood, or a directed donor transfusion.   I will discuss thi ends for purposes of reinstating the DNAR order.   10. Patients having a sterilization procedure: I understand that if the procedure is successful the results will be permanent and it will therefore be impossible for me to inseminate, conceive, or bear chil

## 2023-06-16 NOTE — PLAN OF CARE
Plan of care reviewed with patient. Patient verbalized complete understanding. Two hour patient rounding maintained throughout shift. All fall precautions maintained. Bed in lowest position, locked, call light within reach. Side rails up x 2. Slip resistant socks maintained. Needs attended to.      Problem: Adult Inpatient Plan of Care  Goal: Plan of Care Review  Outcome: Ongoing, Progressing  Goal: Patient-Specific Goal (Individualized)  Outcome: Ongoing, Progressing  Goal: Absence of Hospital-Acquired Illness or Injury  Outcome: Ongoing, Progressing  Goal: Optimal Comfort and Wellbeing  Outcome: Ongoing, Progressing  Goal: Readiness for Transition of Care  Outcome: Ongoing, Progressing     Problem: Infection  Goal: Absence of Infection Signs and Symptoms  Outcome: Ongoing, Progressing     Problem: Diabetes Comorbidity  Goal: Blood Glucose Level Within Targeted Range  Outcome: Ongoing, Progressing     Problem: Fall Injury Risk  Goal: Absence of Fall and Fall-Related Injury  Outcome: Ongoing, Progressing     Problem: Skin Injury Risk Increased  Goal: Skin Health and Integrity  Outcome: Ongoing, Progressing

## 2023-06-16 NOTE — ASSESSMENT & PLAN NOTE
Patient's FSGs are controlled on current medication regimen.  Last A1c reviewed-   Lab Results   Component Value Date    HGBA1C 6.1 (H) 01/09/2023     Most recent fingerstick glucose reviewed-   Recent Labs   Lab 06/15/23  1725 06/15/23  2147 06/16/23  0759   POCTGLUCOSE 149* 176* 153*     Current correctional scale  Medium  Maintain anti-hyperglycemic dose as follows-   Antihyperglycemics (From admission, onward)    Start     Stop Route Frequency Ordered    06/15/23 1815  insulin aspart U-100 pen 1-10 Units         -- SubQ Before meals & nightly PRN 06/15/23 1716        Hold Oral hypoglycemics while patient is in the hospital.

## 2023-06-16 NOTE — ASSESSMENT & PLAN NOTE
-Orthopedic Surgery to take patient to OR 6/16  -Hold DAPT  -NPO   -Fall precuations  -Q4H neurovascular checks  -PRN analgesics  -PT/OT

## 2023-06-16 NOTE — NURSING
Nurses Note -- 4 Eyes      6/15/2023   11:46 PM      Skin assessed during: Admit      [x] No Altered Skin Integrity Present    []Prevention Measures Documented      [] Yes- Altered Skin Integrity Present or Discovered   [] LDA Added if Not in Epic (Describe Wound)   [] New Altered Skin Integrity was Present on Admit and Documented in LDA   [] Wound Image Taken    Wound Care Consulted? No    Attending Nurse:  olivier shane RN     Second RN/Staff Member:  Anh Rajan RN

## 2023-06-17 PROBLEM — E83.39 HYPOPHOSPHATEMIA: Status: RESOLVED | Noted: 2023-06-16 | Resolved: 2023-06-17

## 2023-06-17 PROBLEM — D64.9 POSTOPERATIVE ANEMIA: Status: ACTIVE | Noted: 2023-06-17

## 2023-06-17 PROBLEM — I95.81 POSTPROCEDURAL HYPOTENSION: Status: ACTIVE | Noted: 2023-06-17

## 2023-06-17 PROBLEM — E83.42 HYPOMAGNESEMIA: Status: RESOLVED | Noted: 2023-06-16 | Resolved: 2023-06-17

## 2023-06-17 LAB
ALBUMIN SERPL BCP-MCNC: 2.7 G/DL (ref 3.5–5.2)
ALP SERPL-CCNC: 74 U/L (ref 55–135)
ALT SERPL W/O P-5'-P-CCNC: 16 U/L (ref 10–44)
ANION GAP SERPL CALC-SCNC: 13 MMOL/L (ref 8–16)
AST SERPL-CCNC: 25 U/L (ref 10–40)
BACTERIA UR CULT: NO GROWTH
BASOPHILS # BLD AUTO: 0.03 K/UL (ref 0–0.2)
BASOPHILS # BLD AUTO: 0.05 K/UL (ref 0–0.2)
BASOPHILS NFR BLD: 0.2 % (ref 0–1.9)
BASOPHILS NFR BLD: 0.3 % (ref 0–1.9)
BILIRUB SERPL-MCNC: 0.4 MG/DL (ref 0.1–1)
BLD PROD TYP BPU: NORMAL
BLD PROD TYP BPU: NORMAL
BLOOD UNIT EXPIRATION DATE: NORMAL
BLOOD UNIT EXPIRATION DATE: NORMAL
BLOOD UNIT TYPE CODE: 9500
BLOOD UNIT TYPE CODE: 9500
BLOOD UNIT TYPE: NORMAL
BLOOD UNIT TYPE: NORMAL
BUN SERPL-MCNC: 14 MG/DL (ref 8–23)
CALCIUM SERPL-MCNC: 8 MG/DL (ref 8.7–10.5)
CHLORIDE SERPL-SCNC: 101 MMOL/L (ref 95–110)
CO2 SERPL-SCNC: 23 MMOL/L (ref 23–29)
CODING SYSTEM: NORMAL
CODING SYSTEM: NORMAL
CREAT SERPL-MCNC: 0.9 MG/DL (ref 0.5–1.4)
CROSSMATCH INTERPRETATION: NORMAL
CROSSMATCH INTERPRETATION: NORMAL
DIFFERENTIAL METHOD: ABNORMAL
DIFFERENTIAL METHOD: ABNORMAL
DISPENSE STATUS: NORMAL
DISPENSE STATUS: NORMAL
EOSINOPHIL # BLD AUTO: 0 K/UL (ref 0–0.5)
EOSINOPHIL # BLD AUTO: 0 K/UL (ref 0–0.5)
EOSINOPHIL NFR BLD: 0 % (ref 0–8)
EOSINOPHIL NFR BLD: 0.1 % (ref 0–8)
ERYTHROCYTE [DISTWIDTH] IN BLOOD BY AUTOMATED COUNT: 12.2 % (ref 11.5–14.5)
ERYTHROCYTE [DISTWIDTH] IN BLOOD BY AUTOMATED COUNT: 12.5 % (ref 11.5–14.5)
EST. GFR  (NO RACE VARIABLE): >60 ML/MIN/1.73 M^2
GLUCOSE SERPL-MCNC: 212 MG/DL (ref 70–110)
HCT VFR BLD AUTO: 23.7 % (ref 37–48.5)
HCT VFR BLD AUTO: 30.5 % (ref 37–48.5)
HGB BLD-MCNC: 10 G/DL (ref 12–16)
HGB BLD-MCNC: 7.8 G/DL (ref 12–16)
IMM GRANULOCYTES # BLD AUTO: 0.11 K/UL (ref 0–0.04)
IMM GRANULOCYTES # BLD AUTO: 0.14 K/UL (ref 0–0.04)
IMM GRANULOCYTES NFR BLD AUTO: 0.6 % (ref 0–0.5)
IMM GRANULOCYTES NFR BLD AUTO: 0.7 % (ref 0–0.5)
LYMPHOCYTES # BLD AUTO: 1.4 K/UL (ref 1–4.8)
LYMPHOCYTES # BLD AUTO: 2.1 K/UL (ref 1–4.8)
LYMPHOCYTES NFR BLD: 10.9 % (ref 18–48)
LYMPHOCYTES NFR BLD: 7.3 % (ref 18–48)
MAGNESIUM SERPL-MCNC: 2.2 MG/DL (ref 1.6–2.6)
MCH RBC QN AUTO: 28.9 PG (ref 27–31)
MCH RBC QN AUTO: 29.2 PG (ref 27–31)
MCHC RBC AUTO-ENTMCNC: 32.8 G/DL (ref 32–36)
MCHC RBC AUTO-ENTMCNC: 32.9 G/DL (ref 32–36)
MCV RBC AUTO: 88 FL (ref 82–98)
MCV RBC AUTO: 89 FL (ref 82–98)
MONOCYTES # BLD AUTO: 1.1 K/UL (ref 0.3–1)
MONOCYTES # BLD AUTO: 1.6 K/UL (ref 0.3–1)
MONOCYTES NFR BLD: 5.8 % (ref 4–15)
MONOCYTES NFR BLD: 8.8 % (ref 4–15)
NEUTROPHILS # BLD AUTO: 14.8 K/UL (ref 1.8–7.7)
NEUTROPHILS # BLD AUTO: 16.1 K/UL (ref 1.8–7.7)
NEUTROPHILS NFR BLD: 79.2 % (ref 38–73)
NEUTROPHILS NFR BLD: 86.1 % (ref 38–73)
NRBC BLD-RTO: 0 /100 WBC
NRBC BLD-RTO: 0 /100 WBC
NUM UNITS TRANS PACKED RBC: NORMAL
NUM UNITS TRANS PACKED RBC: NORMAL
PHOSPHATE SERPL-MCNC: 3.7 MG/DL (ref 2.7–4.5)
PLATELET # BLD AUTO: 195 K/UL (ref 150–450)
PLATELET # BLD AUTO: 348 K/UL (ref 150–450)
PLATELET BLD QL SMEAR: ABNORMAL
PMV BLD AUTO: 10.1 FL (ref 9.2–12.9)
PMV BLD AUTO: 10.4 FL (ref 9.2–12.9)
POCT GLUCOSE: 185 MG/DL (ref 70–110)
POCT GLUCOSE: 186 MG/DL (ref 70–110)
POCT GLUCOSE: 227 MG/DL (ref 70–110)
POTASSIUM SERPL-SCNC: 3.6 MMOL/L (ref 3.5–5.1)
PROT SERPL-MCNC: 5.8 G/DL (ref 6–8.4)
RBC # BLD AUTO: 2.67 M/UL (ref 4–5.4)
RBC # BLD AUTO: 3.46 M/UL (ref 4–5.4)
SODIUM SERPL-SCNC: 137 MMOL/L (ref 136–145)
WBC # BLD AUTO: 18.66 K/UL (ref 3.9–12.7)
WBC # BLD AUTO: 18.73 K/UL (ref 3.9–12.7)

## 2023-06-17 PROCEDURE — 85025 COMPLETE CBC W/AUTO DIFF WBC: CPT | Mod: 91 | Performed by: STUDENT IN AN ORGANIZED HEALTH CARE EDUCATION/TRAINING PROGRAM

## 2023-06-17 PROCEDURE — 63600175 PHARM REV CODE 636 W HCPCS: Performed by: STUDENT IN AN ORGANIZED HEALTH CARE EDUCATION/TRAINING PROGRAM

## 2023-06-17 PROCEDURE — 83735 ASSAY OF MAGNESIUM: CPT | Performed by: ORTHOPAEDIC SURGERY

## 2023-06-17 PROCEDURE — 25000003 PHARM REV CODE 250: Performed by: ORTHOPAEDIC SURGERY

## 2023-06-17 PROCEDURE — 25000003 PHARM REV CODE 250: Performed by: NURSE PRACTITIONER

## 2023-06-17 PROCEDURE — 97165 OT EVAL LOW COMPLEX 30 MIN: CPT

## 2023-06-17 PROCEDURE — P9016 RBC LEUKOCYTES REDUCED: HCPCS | Performed by: STUDENT IN AN ORGANIZED HEALTH CARE EDUCATION/TRAINING PROGRAM

## 2023-06-17 PROCEDURE — 25000003 PHARM REV CODE 250: Performed by: STUDENT IN AN ORGANIZED HEALTH CARE EDUCATION/TRAINING PROGRAM

## 2023-06-17 PROCEDURE — 36415 COLL VENOUS BLD VENIPUNCTURE: CPT | Performed by: ORTHOPAEDIC SURGERY

## 2023-06-17 PROCEDURE — 85025 COMPLETE CBC W/AUTO DIFF WBC: CPT | Performed by: ORTHOPAEDIC SURGERY

## 2023-06-17 PROCEDURE — 36430 TRANSFUSION BLD/BLD COMPNT: CPT

## 2023-06-17 PROCEDURE — 36415 COLL VENOUS BLD VENIPUNCTURE: CPT | Performed by: STUDENT IN AN ORGANIZED HEALTH CARE EDUCATION/TRAINING PROGRAM

## 2023-06-17 PROCEDURE — 12000002 HC ACUTE/MED SURGE SEMI-PRIVATE ROOM

## 2023-06-17 PROCEDURE — 97162 PT EVAL MOD COMPLEX 30 MIN: CPT

## 2023-06-17 PROCEDURE — 25500020 PHARM REV CODE 255

## 2023-06-17 PROCEDURE — 80053 COMPREHEN METABOLIC PANEL: CPT | Performed by: ORTHOPAEDIC SURGERY

## 2023-06-17 PROCEDURE — 97110 THERAPEUTIC EXERCISES: CPT

## 2023-06-17 PROCEDURE — 94799 UNLISTED PULMONARY SVC/PX: CPT

## 2023-06-17 PROCEDURE — 63600175 PHARM REV CODE 636 W HCPCS: Performed by: ORTHOPAEDIC SURGERY

## 2023-06-17 PROCEDURE — 84100 ASSAY OF PHOSPHORUS: CPT | Performed by: ORTHOPAEDIC SURGERY

## 2023-06-17 PROCEDURE — 94761 N-INVAS EAR/PLS OXIMETRY MLT: CPT

## 2023-06-17 RX ORDER — SODIUM CHLORIDE, SODIUM LACTATE, POTASSIUM CHLORIDE, CALCIUM CHLORIDE 600; 310; 30; 20 MG/100ML; MG/100ML; MG/100ML; MG/100ML
INJECTION, SOLUTION INTRAVENOUS CONTINUOUS
Status: DISCONTINUED | OUTPATIENT
Start: 2023-06-17 | End: 2023-06-20 | Stop reason: HOSPADM

## 2023-06-17 RX ORDER — HYDROCODONE BITARTRATE AND ACETAMINOPHEN 500; 5 MG/1; MG/1
TABLET ORAL
Status: DISCONTINUED | OUTPATIENT
Start: 2023-06-17 | End: 2023-06-20 | Stop reason: HOSPADM

## 2023-06-17 RX ORDER — QUETIAPINE FUMARATE 25 MG/1
25 TABLET, FILM COATED ORAL ONCE
Status: COMPLETED | OUTPATIENT
Start: 2023-06-17 | End: 2023-06-17

## 2023-06-17 RX ADMIN — OXYCODONE HYDROCHLORIDE AND ACETAMINOPHEN 500 MG: 500 TABLET ORAL at 08:06

## 2023-06-17 RX ADMIN — LATANOPROST 1 DROP: 50 SOLUTION OPHTHALMIC at 08:06

## 2023-06-17 RX ADMIN — POTASSIUM & SODIUM PHOSPHATES POWDER PACK 280-160-250 MG 1 PACKET: 280-160-250 PACK at 11:06

## 2023-06-17 RX ADMIN — POTASSIUM & SODIUM PHOSPHATES POWDER PACK 280-160-250 MG 1 PACKET: 280-160-250 PACK at 08:06

## 2023-06-17 RX ADMIN — INSULIN ASPART 2 UNITS: 100 INJECTION, SOLUTION INTRAVENOUS; SUBCUTANEOUS at 11:06

## 2023-06-17 RX ADMIN — SODIUM CHLORIDE, POTASSIUM CHLORIDE, SODIUM LACTATE AND CALCIUM CHLORIDE 1605 ML: 600; 310; 30; 20 INJECTION, SOLUTION INTRAVENOUS at 11:06

## 2023-06-17 RX ADMIN — ATORVASTATIN CALCIUM 80 MG: 40 TABLET, FILM COATED ORAL at 08:06

## 2023-06-17 RX ADMIN — SODIUM CHLORIDE: 9 INJECTION, SOLUTION INTRAVENOUS at 09:06

## 2023-06-17 RX ADMIN — QUETIAPINE 25 MG: 25 TABLET ORAL at 01:06

## 2023-06-17 RX ADMIN — SODIUM CHLORIDE, POTASSIUM CHLORIDE, SODIUM LACTATE AND CALCIUM CHLORIDE: 600; 310; 30; 20 INJECTION, SOLUTION INTRAVENOUS at 04:06

## 2023-06-17 RX ADMIN — PANTOPRAZOLE SODIUM 40 MG: 40 TABLET, DELAYED RELEASE ORAL at 08:06

## 2023-06-17 RX ADMIN — CEFTRIAXONE 1 G: 1 INJECTION, POWDER, FOR SOLUTION INTRAMUSCULAR; INTRAVENOUS at 11:06

## 2023-06-17 RX ADMIN — POTASSIUM & SODIUM PHOSPHATES POWDER PACK 280-160-250 MG 1 PACKET: 280-160-250 PACK at 05:06

## 2023-06-17 RX ADMIN — IOHEXOL 75 ML: 350 INJECTION, SOLUTION INTRAVENOUS at 03:06

## 2023-06-17 RX ADMIN — INSULIN ASPART 2 UNITS: 100 INJECTION, SOLUTION INTRAVENOUS; SUBCUTANEOUS at 08:06

## 2023-06-17 RX ADMIN — THERA TABS 1 TABLET: TAB at 08:06

## 2023-06-17 NOTE — ASSESSMENT & PLAN NOTE
Patient's FSGs are controlled on current medication regimen.  Last A1c reviewed-   Lab Results   Component Value Date    HGBA1C 6.1 (H) 01/09/2023     Most recent fingerstick glucose reviewed-   Recent Labs   Lab 06/16/23  1145 06/16/23 2028 06/17/23  0841   POCTGLUCOSE 126* 264* 185*     Current correctional scale  Medium  Maintain anti-hyperglycemic dose as follows-   Antihyperglycemics (From admission, onward)    Start     Stop Route Frequency Ordered    06/15/23 1815  insulin aspart U-100 pen 1-10 Units         -- SubQ Before meals & nightly PRN 06/15/23 1716        Hold Oral hypoglycemics while patient is in the hospital.

## 2023-06-17 NOTE — PLAN OF CARE
Goals to be met by: 7/15/23     Patient will increase functional independence with ADLs by performing:    UE Dressing with Modified [place].  LE Dressing with Minimal Assistance.  Grooming while seated with Modified [place].  Toileting from bedside commode with Minimal Assistance for hygiene and clothing management.   Bathing from  shower chair/bench with Minimal Assistance.  Toilet transfer to bedside commode with Minimal Assistance.  Increased functional strength to WFL for ADL's/IADL's.

## 2023-06-17 NOTE — PLAN OF CARE
Problem: Adult Inpatient Plan of Care  Goal: Plan of Care Review  Outcome: Ongoing, Progressing     Problem: Adult Inpatient Plan of Care  Goal: Patient-Specific Goal (Individualized)  Outcome: Ongoing, Progressing     Problem: Adult Inpatient Plan of Care  Goal: Absence of Hospital-Acquired Illness or Injury  Outcome: Ongoing, Progressing     Problem: Adult Inpatient Plan of Care  Goal: Optimal Comfort and Wellbeing  Outcome: Ongoing, Progressing     Problem: Adult Inpatient Plan of Care  Goal: Readiness for Transition of Care  Outcome: Ongoing, Progressing     Problem: Diabetes Comorbidity  Goal: Blood Glucose Level Within Targeted Range  Outcome: Ongoing, Progressing     Problem: Fall Injury Risk  Goal: Absence of Fall and Fall-Related Injury  Outcome: Ongoing, Progressing     Problem: Skin Injury Risk Increased  Goal: Skin Health and Integrity  Outcome: Ongoing, Progressing   Care plan and meds reviewed. Pt verbalized understanding. VSS throughout shift Pt on room air. IV Abx administered. Tele in place NSR. Up with x1 assist to BR. IS at bedside. Instructed on use and return demonstration performed. 1 unit of blood infusing due to symptomatic blood lossRepositions self. Safety maintained. Call light within reach. No complaints at this time.

## 2023-06-17 NOTE — PT/OT/SLP EVAL
Occupational Therapy   Evaluation    Name: Genny Watson  MRN: [id number]  Admitting Diagnosis: Traumatic closed displaced fracture of base of neck of right femur, initial encounter  Recent Surgery: Procedure(s) (LRB):  ARTHROPLASTY, HIP (Right) 1 Day Post-Op    Recommendations:     Discharge Recommendations: other (see comments) (TBD)  Discharge Equipment Recommendations:  other (see comments), hip kit (TBD)  Barriers to discharge:       Assessment:     Genny Watson is a 87 y.o. female with a medical diagnosis of Traumatic closed displaced fracture of base of neck of right femur, initial encounter.  She presents with the following performance deficits affecting function: weakness, impaired endurance, impaired self care skills, impaired functional mobility, gait instability, impaired balance, decreased upper extremity function, decreased lower extremity function, pain, decreased ROM, orthopedic precautions.  Pt was agreeable to OT. Son(retired respiratory therapist) and daughter in law(nurse) present. Pt demonstrates AROM bilateral shoulder flexion approximately 90 degrees with grimace at end range. Pt thinks it is from arthritis. Pt demonstrates distal BUE AROM/strength WFL's. OT provided instruction in home safety and RLE posterior precautions with ADL's/IADL's. Pt verbalized understanding. Further training indicated. OT provided education in use of AE for lower body dressing and bathing. Further training indicated. Pt was agreeable to graded BUE therapeutic exercises with HOB elevated. OT provided instruction in graded BUE AROM therapeutic exercises to increase strength and functional activity tolerance for ADL's/IADL's. Pt required cues and demonstration to complete. OT provided education in calling for assist. Pt verbalized understanding. Discharge recommendations TBD.    Rehab Prognosis: Good; patient would benefit from acute skilled OT services to address these deficits and reach maximum  level of function.       Plan:     Patient to be seen 5 x/week to address the above listed problems via self-care/home management, therapeutic activities, therapeutic exercises  Plan of Care Expires: 07/15/23  Plan of Care Reviewed with: patient, son, other (see comments) (daughter in law(nurse). Son is a retired Respiratory Therapist)    Subjective     Chief Complaint: weakness  Patient/Family Comments/goals: To get better    Occupational Profile:  Living Environment: Pt has son and his SO living with her for a couple of months. Pt lives in a 1 story home with threshold LINDA. Pt has tub/shower and comfort height toilet.   Previous level of function: Independent  Roles and Routines: Mother  Equipment Used at Home: bath bench, bedside commode, walker, rolling, wheelchair  Assistance upon Discharge: Family    Pain/Comfort:  Pain Rating 1: 0/10  Pain Rating Post-Intervention 1: 0/10    Patients cultural, spiritual, [employer] conflicts given the current situation:      Objective:     Communicated with: Nurse Werner prior to session.  Patient found HOB elevated with peripheral IV upon OT entry to room.    General Precautions: Standard, fall  Orthopedic Precautions: RLE posterior precautions  Braces: N/A  Respiratory Status: Room air    Occupational Performance:      Activities of Daily Living:  Feeding:  independence    Grooming: minimum assistance    Bathing: maximal assistance    Upper Body Dressing: minimum assistance    Lower Body Dressing: maximal assistance    Toileting: maximal assistance      Cognitive/Visual Perceptual:  Pt alert and followed commands for OT Eval and therapeutic exercises    Physical Exam:  Upper Extremity Strength:    -       Right Upper Extremity: Pt demonstrates AROM R shoulder flexion to approximately 90 degrees. Pt attributes to arthritis. Pt demonstrates distal BUE AROM/strength WFL's  -       Left Upper Extremity: Pt demonstrates AROM L shoulder flexion to approximately 90 degrees. Pt  demonstrates distal LUE AROM/strength WFL's.     [facility] 6 Click ADL:  AMPAC Total Score: 15    Treatment & Education:  OT provided education in role of OT. Pt/family verbalized understanding and pt was agreeable to OT.  OT provided instruction in home safety and RLE posterior precautions with ADL's/IADL's including review of home set up and DME/AE.Pt verbalized understanding. Further training indicated.  OT provided education in use of AE for lower body dressing and bathing. Further training indicated.  OT provided instruction in graded BUE AROM therapeutic exercises to increase strength and functional activity tolerance for ADL's/IADL's. Pt required cues and demonstration to complete.  OT provided education in calling for assist. Pt verbalized understanding.    Patient left HOB elevated with all lines intact, call button in reach, bed alarm on, and family present    GOALS:   Multidisciplinary Problems       Occupational Therapy Goals          Problem: Occupational Therapy    Goal Priority Disciplines Outcome Interventions   Occupational Therapy Goal     OT, PT/OT     Description: Goals to be met by: 7/15/23     Patient will increase functional independence with ADLs by performing:    UE Dressing with Modified [place].  LE Dressing with Minimal Assistance.  Grooming while seated with Modified [place].  Toileting from bedside commode with Minimal Assistance for hygiene and clothing management.   Bathing from  shower chair/bench with Minimal Assistance.  Toilet transfer to bedside commode with Minimal Assistance.  Increased functional strength to WFL for ADL's/IADL's.                         History:     Past Medical History:   Diagnosis Date    ALLERGIC RHINITIS 4/30/2012    Arthritis     Gimenez's esophagus     Breast cancer     right, s/p right mastectomy>20yr ago    Cataract     Diabetes mellitus type II     Diverticulitis     Diverticulosis     Fuchs' corneal dystrophy     Glaucoma     Hernia, hiatal      Hyperlipidemia     Hypertension     Macular degeneration     Pulmonary embolism     Seizures     Skin cancer of face 12/2014    Dr M Weil     UTI (lower urinary tract infection)          Past Surgical History:   Procedure Laterality Date    angeogram      APPENDECTOMY      BREAST SURGERY      cardio stent   1/2015    Dr Dodson    COLONOSCOPY  around 2003    COLONOSCOPY N/A 1/13/2016    Procedure: COLONOSCOPY;  Surgeon: Mario Moya MD;  Location: [facility];  Service: Endoscopy;  Laterality: N/A; repeat in 5 years for surveillance    CORNEAL TRANSPLANT      EYE SURGERY      HYSTERECTOMY      ovaries removed    MASTECTOMY      right >20 years ago    STOMACH SURGERY      UPPER GASTROINTESTINAL ENDOSCOPY  01/13/2016    Dr. Moya       Time Tracking:     OT Date of Treatment: 06/17/23  OT Start Time: 1358  OT Stop Time: 1421  OT Total Time (min): 23 min    Billable Minutes:Evaluation 8  Therapeutic Exercise 15    6/17/2023

## 2023-06-17 NOTE — CONSULTS
87-year-old female consult for history of urinary infection  Patient had a Reddy catheter placed    Initially the urine was very clear  But then began to be slightly blood-tinged    Today on rounds the urine is clear once again  I would recommend upper tract imaging with a renal ultrasound  Leave Reddy catheter in place for now  I would be glad to follow up with her as an outpatient  Please consult again as needed

## 2023-06-17 NOTE — PLAN OF CARE
Goals to be met by: 2023     Patient will increase functional independence with mobility by performin. Supine to sit with MInimal Assistance  2. Sit to supine with MInimal Assistance  3. Sit to stand transfer with Minimal Assistance  4. Bed to chair transfer with Minimal Assistance using Rolling Walker  5. Gait  x >/= 50 feet with Minimal Assistance using Rolling Walker.

## 2023-06-17 NOTE — PT/OT/SLP EVAL
Physical Therapy Evaluation    Patient Name:  Genny Watson   MRN:  [id number]    Recommendations:     Discharge Recommendations: other (see comments)   Discharge Equipment Recommendations: walker, rolling   Barriers to discharge: None    Assessment:     Genny Watson is a 87 y.o. female admitted with a medical diagnosis of Traumatic closed displaced fracture of base of neck of right femur, initial encounter.  She presents with the following impairments/functional limitations: weakness, impaired endurance, impaired self care skills, impaired functional mobility, gait instability, impaired balance, pain, decreased ROM, orthopedic precautions Patient is agreeable to participate in PT evaluation. Patient is independent at baseline. Her son and his significant other live with her at this time and will be able to help when she is discharged. Pt performed supine to sit with MOD A for Right Lower Extremity and cueing to follow posterior hip precautions. Unable to perform standing assessment at this time secondary to patient dizziness and low blood pressure 81/36mmHg taken in Left lower extremity in supine. Nurse Alphonso notified.     Rehab Prognosis: Good; patient would benefit from acute skilled PT services to address these deficits and reach maximum level of function.    Recent Surgery: Procedure(s) (LRB):  ARTHROPLASTY, HIP (Right) 1 Day Post-Op    Plan:     During this hospitalization, patient to be seen BID to address the identified rehab impairments via gait training, therapeutic activities, therapeutic exercises and progress toward the following goals:    Plan of Care Expires:  07/17/23 (TBD: SNF vs HHPT)    Subjective     Chief Complaint: Patient reports tripping over her recliner and falling onto her Right side. She was transported to the ED where she found out she had a hip fracture. This led to her having a Right total hip arthroplasty on 06/16/2023. She hasn't had any pain since she woke up  after surgery.     Patient/Family Comments/goals: maximize functional strength and mobility prior to discharge to next level of care.     Pain/Comfort:  Pain Rating 1: 0/10  Location - Side 1: Right  Location 1: hip    Patients cultural, spiritual, Spiritism conflicts given the current situation:      Living Environment:  Lives alone in 1 story home, however her son and his significant other currently live with her and will be able to assist once discharged.     Prior to admission, patients level of function was independent.  Equipment used at home: grab bar, bath bench.  DME owned (not currently used): none.  Upon discharge, patient will have assistance from her son.    Objective:     Communicated with nurse Alphonso prior to session.  Patient found HOB elevated with peripheral IV  upon PT entry to room.    General Precautions: Standard, fall  Orthopedic Precautions:RLE posterior precautions   Braces: N/A  Respiratory Status: Room air    Exams:  Cognitive Exam:  Patient is oriented to Person, Place, Time, and Situation  RUE ROM: WFL  RUE Strength: WFL  LUE ROM: WFL  LUE Strength: WFL  RLE ROM: limited secondary to day 1 status post Right total hip arthroplasty   RLE Strength: Deficits: grossly 3-/5   LLE ROM: WFL  LLE Strength: WFL    Functional Mobility:  Bed Mobility:  Rolling Left:  maximal assistance  Scooting: maximal assistance  Supine to Sit: maximal assistance  Sit to Supine: maximal assistance    Pt experienced dizziness once sitting edge of bed. BP taken on left lower extremity in supine 81/36mmHg. Nurse Alphonso notified and was present upon PT exiting room.       AM-PAC 6 CLICK MOBILITY  Total Score:10       Treatment & Education:  Bed mobility as above     Patient left HOB elevated with all lines intact, call button in reach, bed alarm on, and nurse Alphonso present.    GOALS:   Multidisciplinary Problems       Physical Therapy Goals          Problem: Physical Therapy    Goal Priority Disciplines Outcome Goal  Variances Interventions   Physical Therapy Goal     PT, PT/OT      Description: Goals to be met by: 2023     Patient will increase functional independence with mobility by performin. Supine to sit with MInimal Assistance  2. Sit to supine with MInimal Assistance  3. Sit to stand transfer with Minimal Assistance  4. Bed to chair transfer with Minimal Assistance using Rolling Walker  5. Gait  x >/= 50 feet with Minimal Assistance using Rolling Walker.                          History:     Past Medical History:   Diagnosis Date    ALLERGIC RHINITIS 2012    Arthritis     Gimenez's esophagus     Breast cancer     right, s/p right mastectomy>20yr ago    Cataract     Diabetes mellitus type II     Diverticulitis     Diverticulosis     Fuchs' corneal dystrophy     Glaucoma     Hernia, hiatal     Hyperlipidemia     Hypertension     Macular degeneration     Pulmonary embolism     Seizures     Skin cancer of face 2014    Dr M Weil     UTI (lower urinary tract infection)        Past Surgical History:   Procedure Laterality Date    angeogram      APPENDECTOMY      BREAST SURGERY      cardio stent   2015    Dr Dodson    COLONOSCOPY  around     COLONOSCOPY N/A 2016    Procedure: COLONOSCOPY;  Surgeon: Mario Bhakta MD;  Location: [facility];  Service: Endoscopy;  Laterality: N/A; repeat in 5 years for surveillance    CORNEAL TRANSPLANT      EYE SURGERY      HYSTERECTOMY      ovaries removed    MASTECTOMY      right >20 years ago    STOMACH SURGERY      UPPER GASTROINTESTINAL ENDOSCOPY  2016    Dr. Bhakta       Time Tracking:     PT Received On: 23  PT Start Time: 30     PT Stop Time: 948  PT Total Time (min): 18 min     Billable Minutes: Evaluation 18      2023

## 2023-06-17 NOTE — SUBJECTIVE & OBJECTIVE
"Interval History: see "Hospital Course"    Review of Systems   Musculoskeletal:  Positive for arthralgias, gait problem and myalgias.   Objective:     Vital Signs (Most Recent):  Temp: 97.8 °F (36.6 °C) (06/17/23 0729)  Pulse: 86 (06/17/23 0820)  Resp: 18 (06/17/23 0820)  BP: (!) 80/52 (06/17/23 0842)  SpO2: 95 % (06/17/23 0820) Vital Signs (24h Range):  Temp:  [97.2 °F (36.2 °C)-98.1 °F (36.7 °C)] 97.8 °F (36.6 °C)  Pulse:  [66-94] 86  Resp:  [13-20] 18  SpO2:  [93 %-100 %] 95 %  BP: ()/(50-79) 80/52     Weight: 53.5 kg (118 lb)  Body mass index is 20.9 kg/m².    Intake/Output Summary (Last 24 hours) at 6/17/2023 1103  Last data filed at 6/17/2023 0715  Gross per 24 hour   Intake 2035 ml   Output 1110 ml   Net 925 ml         Physical Exam  Vitals and nursing note reviewed.   Constitutional:       General: She is not in acute distress.  HENT:      Head: Normocephalic and atraumatic.      Right Ear: External ear normal.      Left Ear: External ear normal.      Nose: Nose normal.      Mouth/Throat:      Mouth: Mucous membranes are moist.      Pharynx: Oropharynx is clear.   Eyes:      Extraocular Movements: Extraocular movements intact.      Conjunctiva/sclera: Conjunctivae normal.   Cardiovascular:      Rate and Rhythm: Normal rate and regular rhythm.      Pulses: Normal pulses.      Heart sounds: Normal heart sounds.   Pulmonary:      Effort: Pulmonary effort is normal.      Breath sounds: Normal breath sounds.   Abdominal:      General: Bowel sounds are normal.      Palpations: Abdomen is soft.   Musculoskeletal:         General: Swelling present. No deformity.      Cervical back: Normal range of motion and neck supple.      Right lower leg: No edema.      Left lower leg: No edema.   Skin:     General: Skin is warm and dry.      Comments: Dressings clean, dry and intact.   Neurological:      Mental Status: She is alert.      Motor: Weakness present.   Psychiatric:         Mood and Affect: Mood normal.         " Behavior: Behavior normal.           Significant Labs: All pertinent labs within the past 24 hours have been reviewed.    Significant Imaging: I have reviewed all pertinent imaging results/findings within the past 24 hours.

## 2023-06-17 NOTE — CARE UPDATE
06/17/23 0820   Patient Assessment/Suction   Level of Consciousness (AVPU) alert   Respiratory Effort Unlabored   Expansion/Accessory Muscles/Retractions no use of accessory muscles;no retractions;expansion symmetric   Rhythm/Pattern, Respiratory unlabored;depth regular;pattern regular   PRE-TX-O2   Device (Oxygen Therapy) room air   SpO2 95 %   Pulse Oximetry Type Intermittent   $ Pulse Oximetry - Multiple Charge Pulse Oximetry - Multiple   Pulse 86   Resp 18   Incentive Spirometer   $ Incentive Spirometer Charges done with encouragement   Incentive Spirometer Predicted Level (mL) 1360   Administration (IS) instruction provided, follow-up   Number of Repetitions (IS) 10   Level Incentive Spirometer (mL) 1000   Patient Tolerance (IS) fair

## 2023-06-17 NOTE — PROGRESS NOTES
"[facility]  Progress Note    Patient Name: Genny Watson  MRN: [id number]  Patient Class: IP- Inpatient   Admission Date: 6/15/2023  Length of Stay: 2 days  Attending Physician: Marty Kruse MD  Primary Care Provider: Josh Berman MD        Subjective:     Principal Problem:Traumatic closed displaced fracture of base of neck of right femur, initial encounter        HPI:  Genny aWtson is an 87 year old female with a past medical history of CAD s/ PCI, DM, HTN, HLD, DM, and Gimenez's esophagus who presented with a R femoral neck fracture after a fall. She endorses RLE pain and limited ROM. Orthopedic Surgery was consulted from the ED and plans to take the patient to the OR 6/16. Hospital Medicine was consulted for admission. DAPT has been held and she is NPO at midnight.      Overview/Hospital Course:  Genny Watson is an 87 year old female with a past medical history of CAD s/ PCI, DM, HTN, HLD, DM, and Gimenez's esophagus who presented with a R femoral neck fracture after a fall. She endorses RLE pain and limited ROM. Orthopedic Surgery was consulted from the ED and took the patient to the OR 6/16. DAPT has been held. Her course has been complicated by a UTI for which Rocephin has been started. Urine culture is pending. She also developed hypotension on POD 1 with a roughly three point fall in the Hgb. Chemical DVT prophylaxis has been held and the patient is receiving IV fluids while her home PO medications have also been held. Hgb is being trended. PT/OT has been consulted post-operatively.      Interval History: see "Hospital Course"    Review of Systems   Musculoskeletal:  Positive for arthralgias, gait problem and myalgias.   Objective:     Vital Signs (Most Recent):  Temp: 97.8 °F (36.6 °C) (06/17/23 0729)  Pulse: 86 (06/17/23 0820)  Resp: 18 (06/17/23 0820)  BP: (!) 80/52 (06/17/23 0842)  SpO2: 95 % (06/17/23 0820) Vital Signs (24h Range):  Temp:  " [97.2 °F (36.2 °C)-98.1 °F (36.7 °C)] 97.8 °F (36.6 °C)  Pulse:  [66-94] 86  Resp:  [13-20] 18  SpO2:  [93 %-100 %] 95 %  BP: ()/(50-79) 80/52     Weight: 53.5 kg (118 lb)  Body mass index is 20.9 kg/m².    Intake/Output Summary (Last 24 hours) at 6/17/2023 1103  Last data filed at 6/17/2023 0715  Gross per 24 hour   Intake 2035 ml   Output 1110 ml   Net 925 ml         Physical Exam  Vitals and nursing note reviewed.   Constitutional:       General: She is not in acute distress.  HENT:      Head: Normocephalic and atraumatic.      Right Ear: External ear normal.      Left Ear: External ear normal.      Nose: Nose normal.      Mouth/Throat:      Mouth: Mucous membranes are moist.      Pharynx: Oropharynx is clear.   Eyes:      Extraocular Movements: Extraocular movements intact.      Conjunctiva/sclera: Conjunctivae normal.   Cardiovascular:      Rate and Rhythm: Normal rate and regular rhythm.      Pulses: Normal pulses.      Heart sounds: Normal heart sounds.   Pulmonary:      Effort: Pulmonary effort is normal.      Breath sounds: Normal breath sounds.   Abdominal:      General: Bowel sounds are normal.      Palpations: Abdomen is soft.   Musculoskeletal:         General: Swelling present. No deformity.      Cervical back: Normal range of motion and neck supple.      Right lower leg: No edema.      Left lower leg: No edema.   Skin:     General: Skin is warm and dry.      Comments: Dressings clean, dry and intact.   Neurological:      Mental Status: She is alert.      Motor: Weakness present.   Psychiatric:         Mood and Affect: Mood normal.         Behavior: Behavior normal.           Significant Labs: All pertinent labs within the past 24 hours have been reviewed.    Significant Imaging: I have reviewed all pertinent imaging results/findings within the past 24 hours.      Assessment/Plan:      * Traumatic closed displaced fracture of base of neck of right femur, initial encounter  -Orthopedic Surgery  took patient to OR 6/16; POD 1  -Hold DAPT   -Fall precuations  -Q4H neurovascular checks  -PRN analgesics  -PT/OT  -Hold DVT prophylaxis given hypotension  -Incentive spirometry      Postoperative anemia  -Trend Hgb with CBC  -Continue IV fluids  -Hold all anticoagulation      Postprocedural hypotension  -Hold home PO BP medications  -IV fluid bolus with LR  -Hold any anticoagulation for possible bleed  -Continue to monitor      Acute cystitis with hematuria  -Follow up urine culture  -Rocephin      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  Last A1c reviewed-   Lab Results   Component Value Date    HGBA1C 6.1 (H) 01/09/2023     Most recent fingerstick glucose reviewed-   Recent Labs   Lab 06/16/23  1145 06/16/23 2028 06/17/23  0841   POCTGLUCOSE 126* 264* 185*     Current correctional scale  Medium  Maintain anti-hyperglycemic dose as follows-   Antihyperglycemics (From admission, onward)    Start     Stop Route Frequency Ordered    06/15/23 1815  insulin aspart U-100 pen 1-10 Units         -- SubQ Before meals & nightly PRN 06/15/23 1716        Hold Oral hypoglycemics while patient is in the hospital.    Hypertension associated with diabetes  -Hold home amlodipine, losartan and metoprolol given hypotension  -Continue to monitor      VTE Risk Mitigation (From admission, onward)         Ordered     IP VTE HIGH RISK PATIENT  Once         06/15/23 1716     Place sequential compression device  Until discontinued         06/15/23 1716                Discharge Planning   NILDA: 6/20/2023     Code Status: Full Code   Is the patient medically ready for discharge?:     Reason for patient still in hospital (select all that  apply): Patient new problem, Patient trending condition, Laboratory test, Treatment, PT / OT recommendations and Pending disposition                     Marty Kruse MD  Department of Hospital Medicine   [facility]

## 2023-06-17 NOTE — ASSESSMENT & PLAN NOTE
-Hold home PO BP medications  -IV fluid bolus with LR  -Hold any anticoagulation for possible bleed  -Continue to monitor

## 2023-06-17 NOTE — PLAN OF CARE
[facility]  Initial Discharge Assessment       Primary Care Provider: Josh Berman MD    Admission Diagnosis: Pain [R52]  Chest pain [R07.9]  Closed fracture of neck of right femur, initial encounter [S72.001A]    Admission Date: 6/15/2023  Expected Discharge Date: 6/20/2023    Transition of Care Barriers: None    Payor: MEDICARE / Plan: MEDICARE PART A & B / Product Type: Government /     Extended Emergency Contact Information  Primary Emergency Contact: WalterRadha jackson  Address: [address]           [place] [address] [facility]  Home Phone: [phone]  Relation: Daughter  Preferred language: English   needed? No  Secondary Emergency Contact: Lisa Schaeffer  Address: UNKNOWN   [facility]  Home Phone: [phone]  Mobile Phone: [phone]  Relation: Daughter  Preferred language: English   needed? No    Discharge Plan A: Home Health  Discharge Plan B: Skilled Nursing Facility      [id number] [place] - [address]  [address] [address]  Phone: [phone] Fax: [phone]    [facility] - [address] [address]  Phone: [phone] Fax: [phone]      Completed DC assessment with patient and daughter, Lisa, at bedside. Verified information on facesheet as correct. Denies POA. NOK 5 children. Lives at listed address with her son and his girlfriend. Verified PCP as Dr. Berman- last seen about 6 months ago. [facility] is [facility] [address]. Denies hh/hd/outpt services. On plavix prior to admit. DME- rw, wc, bsc, bp monitor, and sc. Reports that she is independent and uses no AD at baseline. Does not drive. Reports that her family provides transportation to [facility] for her and likely her daughter Lisa will provide transportation home upon DC. Denies recent inpt stay in last 30 days. Reports taking home  medication as prescribed and can afford them. Verified insurance on file. We briefly discussed HH VS SNF. Ideally patient will like to DC to home with HH( no company preference).     Initial Assessment (most recent)       Adult Discharge Assessment - 06/17/23 1348          Discharge Assessment    Assessment Type Discharge Planning Assessment     Confirmed/corrected address, phone number and insurance Yes     Confirmed Demographics Correct on Facesheet     Source of Information patient     Communicated NILDA with patient/caregiver Yes     Reason For Admission hip fracture     People in Home child(katie), adult     Facility Arrived From: ER     Do you expect to return to your current living situation? Yes     Do you have help at home or someone to help you manage your care at home? Yes     Prior to hospitilization cognitive status: Alert/Oriented     Current cognitive status: Alert/Oriented     Equipment Currently Used at Home blood pressure machine;walker, rolling;shower chair;wheelchair;bedside commode     Readmission within 30 days? No     Patient currently being followed by outpatient case management? No     Do you currently have service(s) that help you manage your care at home? No     Do you take prescription medications? Yes     Do you have prescription coverage? Yes     Do you have any problems affording any of your prescribed medications? No     Is the patient taking medications as prescribed? yes     Who is going to help you get home at discharge? family     How do you get to doctors appointments? car, drives self;family or friend will provide     Are you on dialysis? No     Do you take coumadin? No     Discharge Plan A Home Health     Discharge Plan B Skilled Nursing Facility     DME Needed Upon Discharge  none     Discharge Plan discussed with: Patient;Adult children     Transition of Care Barriers None        Physical Activity    On average, how many days per week do you engage in moderate to strenuous  exercise (like a brisk walk)? 0 days     On average, how many minutes do you engage in exercise at this level? 0 min        Financial Resource Strain    How hard is it for you to pay for the very basics like food, housing, medical care, and heating? Not hard at all        Housing Stability    In the last 12 months, was there a time when you were not able to pay the mortgage or rent on time? No     In the last 12 months, how many places have you lived? 1     In the last 12 months, was there a time when you did not have a steady place to sleep or slept in a shelter (including now)? No        Transportation Needs    In the past 12 months, has lack of transportation kept you from medical appointments or from getting medications? No     In the past 12 months, has lack of transportation kept you from meetings, work, or from getting things needed for daily living? No        Food Insecurity    Within the past 12 months, you worried that your food would run out before you got the money to buy more. Never true     Within the past 12 months, the food you bought just didn't last and you didn't have money to get more. Never true        Stress    Do you feel stress - tense, restless, nervous, or anxious, or unable to sleep at night because your mind is troubled all the time - these days? To some extent        Social Connections    In a typical week, how many times do you talk on the phone with family, friends, or neighbors? More than three times a week     How often do you get together with friends or relatives? More than three times a week     How often do you attend Pentecostalism or Mosque services? More than 4 times per year     Do you belong to any clubs or organizations such as Pentecostalism groups, unions, fraternal or athletic groups, or school groups? No     How often do you attend meetings of the clubs or organizations you belong to? Never     Are you , , , , never , or living with a partner?          Alcohol Use    Q1: How often do you have a drink containing alcohol? Never     Q2: How many drinks containing alcohol do you have on a typical day when you are drinking? Patient does not drink     Q3: How often do you have six or more drinks on one occasion? Never

## 2023-06-17 NOTE — ASSESSMENT & PLAN NOTE
-Orthopedic Surgery took patient to OR 6/16; POD 1  -Hold DAPT   -Fall precuations  -Q4H neurovascular checks  -PRN analgesics  -PT/OT  -Hold DVT prophylaxis given hypotension  -Incentive spirometry

## 2023-06-17 NOTE — NURSING
Pt got up with PT today. Claimed she was getting dizzy when she got up. BP checked and it was 80s/40s. MD notified. Started on fluids.

## 2023-06-18 LAB
ALBUMIN SERPL BCP-MCNC: 2.5 G/DL (ref 3.5–5.2)
ALP SERPL-CCNC: 65 U/L (ref 55–135)
ALT SERPL W/O P-5'-P-CCNC: 14 U/L (ref 10–44)
ANION GAP SERPL CALC-SCNC: 10 MMOL/L (ref 8–16)
AST SERPL-CCNC: 26 U/L (ref 10–40)
BASOPHILS # BLD AUTO: 0.04 K/UL (ref 0–0.2)
BASOPHILS # BLD AUTO: 0.06 K/UL (ref 0–0.2)
BASOPHILS NFR BLD: 0.4 % (ref 0–1.9)
BASOPHILS NFR BLD: 0.4 % (ref 0–1.9)
BILIRUB SERPL-MCNC: 0.9 MG/DL (ref 0.1–1)
BUN SERPL-MCNC: 18 MG/DL (ref 8–23)
CALCIUM SERPL-MCNC: 7.9 MG/DL (ref 8.7–10.5)
CHLORIDE SERPL-SCNC: 101 MMOL/L (ref 95–110)
CO2 SERPL-SCNC: 25 MMOL/L (ref 23–29)
CREAT SERPL-MCNC: 0.7 MG/DL (ref 0.5–1.4)
DIFFERENTIAL METHOD: ABNORMAL
DIFFERENTIAL METHOD: ABNORMAL
EOSINOPHIL # BLD AUTO: 0 K/UL (ref 0–0.5)
EOSINOPHIL # BLD AUTO: 0.1 K/UL (ref 0–0.5)
EOSINOPHIL NFR BLD: 0.3 % (ref 0–8)
EOSINOPHIL NFR BLD: 0.8 % (ref 0–8)
ERYTHROCYTE [DISTWIDTH] IN BLOOD BY AUTOMATED COUNT: 13.3 % (ref 11.5–14.5)
ERYTHROCYTE [DISTWIDTH] IN BLOOD BY AUTOMATED COUNT: 13.4 % (ref 11.5–14.5)
EST. GFR  (NO RACE VARIABLE): >60 ML/MIN/1.73 M^2
GLUCOSE SERPL-MCNC: 136 MG/DL (ref 70–110)
HCT VFR BLD AUTO: 22.8 % (ref 37–48.5)
HCT VFR BLD AUTO: 29.8 % (ref 37–48.5)
HGB BLD-MCNC: 10.2 G/DL (ref 12–16)
HGB BLD-MCNC: 7.7 G/DL (ref 12–16)
IMM GRANULOCYTES # BLD AUTO: 0.1 K/UL (ref 0–0.04)
IMM GRANULOCYTES # BLD AUTO: 0.15 K/UL (ref 0–0.04)
IMM GRANULOCYTES NFR BLD AUTO: 0.9 % (ref 0–0.5)
IMM GRANULOCYTES NFR BLD AUTO: 1 % (ref 0–0.5)
LYMPHOCYTES # BLD AUTO: 1.9 K/UL (ref 1–4.8)
LYMPHOCYTES # BLD AUTO: 2.3 K/UL (ref 1–4.8)
LYMPHOCYTES NFR BLD: 15.3 % (ref 18–48)
LYMPHOCYTES NFR BLD: 17.7 % (ref 18–48)
MAGNESIUM SERPL-MCNC: 1.8 MG/DL (ref 1.6–2.6)
MCH RBC QN AUTO: 29.4 PG (ref 27–31)
MCH RBC QN AUTO: 30 PG (ref 27–31)
MCHC RBC AUTO-ENTMCNC: 33.8 G/DL (ref 32–36)
MCHC RBC AUTO-ENTMCNC: 34.2 G/DL (ref 32–36)
MCV RBC AUTO: 86 FL (ref 82–98)
MCV RBC AUTO: 89 FL (ref 82–98)
MONOCYTES # BLD AUTO: 1.2 K/UL (ref 0.3–1)
MONOCYTES # BLD AUTO: 1.5 K/UL (ref 0.3–1)
MONOCYTES NFR BLD: 11.2 % (ref 4–15)
MONOCYTES NFR BLD: 9.9 % (ref 4–15)
NEUTROPHILS # BLD AUTO: 10.7 K/UL (ref 1.8–7.7)
NEUTROPHILS # BLD AUTO: 7.6 K/UL (ref 1.8–7.7)
NEUTROPHILS NFR BLD: 69.5 % (ref 38–73)
NEUTROPHILS NFR BLD: 72.6 % (ref 38–73)
NRBC BLD-RTO: 0 /100 WBC
NRBC BLD-RTO: 0 /100 WBC
PHOSPHATE SERPL-MCNC: 3.3 MG/DL (ref 2.7–4.5)
PLATELET # BLD AUTO: 151 K/UL (ref 150–450)
PLATELET # BLD AUTO: 188 K/UL (ref 150–450)
PMV BLD AUTO: 10.2 FL (ref 9.2–12.9)
PMV BLD AUTO: 10.2 FL (ref 9.2–12.9)
POCT GLUCOSE: 122 MG/DL (ref 70–110)
POCT GLUCOSE: 142 MG/DL (ref 70–110)
POCT GLUCOSE: 158 MG/DL (ref 70–110)
POCT GLUCOSE: 189 MG/DL (ref 70–110)
POTASSIUM SERPL-SCNC: 3.7 MMOL/L (ref 3.5–5.1)
PROT SERPL-MCNC: 5.3 G/DL (ref 6–8.4)
RBC # BLD AUTO: 2.57 M/UL (ref 4–5.4)
RBC # BLD AUTO: 3.47 M/UL (ref 4–5.4)
SODIUM SERPL-SCNC: 136 MMOL/L (ref 136–145)
WBC # BLD AUTO: 10.94 K/UL (ref 3.9–12.7)
WBC # BLD AUTO: 14.7 K/UL (ref 3.9–12.7)

## 2023-06-18 PROCEDURE — 80053 COMPREHEN METABOLIC PANEL: CPT | Performed by: ORTHOPAEDIC SURGERY

## 2023-06-18 PROCEDURE — 63600175 PHARM REV CODE 636 W HCPCS: Performed by: ORTHOPAEDIC SURGERY

## 2023-06-18 PROCEDURE — 94799 UNLISTED PULMONARY SVC/PX: CPT

## 2023-06-18 PROCEDURE — 94761 N-INVAS EAR/PLS OXIMETRY MLT: CPT

## 2023-06-18 PROCEDURE — 85025 COMPLETE CBC W/AUTO DIFF WBC: CPT | Mod: 91 | Performed by: ORTHOPAEDIC SURGERY

## 2023-06-18 PROCEDURE — 85025 COMPLETE CBC W/AUTO DIFF WBC: CPT | Performed by: NURSE PRACTITIONER

## 2023-06-18 PROCEDURE — 12000002 HC ACUTE/MED SURGE SEMI-PRIVATE ROOM

## 2023-06-18 PROCEDURE — 63600175 PHARM REV CODE 636 W HCPCS: Performed by: STUDENT IN AN ORGANIZED HEALTH CARE EDUCATION/TRAINING PROGRAM

## 2023-06-18 PROCEDURE — 84100 ASSAY OF PHOSPHORUS: CPT | Performed by: ORTHOPAEDIC SURGERY

## 2023-06-18 PROCEDURE — 25000003 PHARM REV CODE 250: Performed by: ORTHOPAEDIC SURGERY

## 2023-06-18 PROCEDURE — 36415 COLL VENOUS BLD VENIPUNCTURE: CPT | Performed by: NURSE PRACTITIONER

## 2023-06-18 PROCEDURE — 99900035 HC TECH TIME PER 15 MIN (STAT)

## 2023-06-18 PROCEDURE — 83735 ASSAY OF MAGNESIUM: CPT | Performed by: ORTHOPAEDIC SURGERY

## 2023-06-18 PROCEDURE — 97110 THERAPEUTIC EXERCISES: CPT

## 2023-06-18 PROCEDURE — 36415 COLL VENOUS BLD VENIPUNCTURE: CPT | Performed by: ORTHOPAEDIC SURGERY

## 2023-06-18 PROCEDURE — 97530 THERAPEUTIC ACTIVITIES: CPT

## 2023-06-18 RX ADMIN — POTASSIUM & SODIUM PHOSPHATES POWDER PACK 280-160-250 MG 1 PACKET: 280-160-250 PACK at 09:06

## 2023-06-18 RX ADMIN — INSULIN ASPART 1 UNITS: 100 INJECTION, SOLUTION INTRAVENOUS; SUBCUTANEOUS at 09:06

## 2023-06-18 RX ADMIN — CEFTRIAXONE 1 G: 1 INJECTION, POWDER, FOR SOLUTION INTRAMUSCULAR; INTRAVENOUS at 12:06

## 2023-06-18 RX ADMIN — PANTOPRAZOLE SODIUM 40 MG: 40 TABLET, DELAYED RELEASE ORAL at 08:06

## 2023-06-18 RX ADMIN — MUPIROCIN: 20 OINTMENT TOPICAL at 08:06

## 2023-06-18 RX ADMIN — INSULIN ASPART 2 UNITS: 100 INJECTION, SOLUTION INTRAVENOUS; SUBCUTANEOUS at 04:06

## 2023-06-18 RX ADMIN — POTASSIUM & SODIUM PHOSPHATES POWDER PACK 280-160-250 MG 1 PACKET: 280-160-250 PACK at 06:06

## 2023-06-18 RX ADMIN — OXYCODONE HYDROCHLORIDE AND ACETAMINOPHEN 500 MG: 500 TABLET ORAL at 08:06

## 2023-06-18 RX ADMIN — SODIUM CHLORIDE, POTASSIUM CHLORIDE, SODIUM LACTATE AND CALCIUM CHLORIDE: 600; 310; 30; 20 INJECTION, SOLUTION INTRAVENOUS at 12:06

## 2023-06-18 RX ADMIN — MUPIROCIN: 20 OINTMENT TOPICAL at 09:06

## 2023-06-18 RX ADMIN — THERA TABS 1 TABLET: TAB at 08:06

## 2023-06-18 RX ADMIN — POTASSIUM & SODIUM PHOSPHATES POWDER PACK 280-160-250 MG 1 PACKET: 280-160-250 PACK at 08:06

## 2023-06-18 RX ADMIN — LATANOPROST 1 DROP: 50 SOLUTION OPHTHALMIC at 09:06

## 2023-06-18 RX ADMIN — SODIUM CHLORIDE, POTASSIUM CHLORIDE, SODIUM LACTATE AND CALCIUM CHLORIDE: 600; 310; 30; 20 INJECTION, SOLUTION INTRAVENOUS at 09:06

## 2023-06-18 RX ADMIN — ATORVASTATIN CALCIUM 80 MG: 40 TABLET, FILM COATED ORAL at 08:06

## 2023-06-18 NOTE — PLAN OF CARE
Problem: Physical Therapy  Goal: Physical Therapy Goal  Description: Goals to be met by: 2023     Patient will increase functional independence with mobility by performin. Supine to sit with MInimal Assistance  2. Sit to supine with MInimal Assistance  3. Sit to stand transfer with Minimal Assistance  4. Bed to chair transfer with Minimal Assistance using Rolling Walker  5. Gait  x >/= 50 feet with Minimal Assistance using Rolling Walker.     Outcome: Ongoing, Progressing

## 2023-06-18 NOTE — ASSESSMENT & PLAN NOTE
-Orthopedic Surgery took patient to OR 6/16  -Hold DAPT   -Fall precuations  -Q4H neurovascular checks  -PRN analgesics  -PT/OT  -Hold DVT prophylaxis given hypotension  -Incentive spirometry

## 2023-06-18 NOTE — PLAN OF CARE
CM updated by Heri with PT that recommendation is SNF    Met with patient at bedside to discuss DC plans. I discussed therapy recommendations with her and explained SNF in detail with her. Patient is currently declining SNF placement and wants to go home with HH. States she has all the equip she needs already at home and has family that will be able to help her. Requesting [facility] .   I asked her to discuss with her family and make sure everyone is on the same page with helping her.        06/18/23 1525   Post-Acute Status   Post-Acute Authorization Placement   Post-Acute Placement Status Patient declined/refused

## 2023-06-18 NOTE — PLAN OF CARE
Plan of care reviewed with pt. Pt verbalized understanding. Patient able to make needs known. Continuous cardiac monitoring in place as ordered. A-febrile. Meds given per MAR. IVF infusing as ordered. 2 units PRBC's given as ordered, pt tolerated well. BG monitored closely, covered per sliding scale orders. Repositions self independently. No complaints of pain or discomfort. Reddy to gravity in place, no s/s of infection to insertion site. Purposeful hourly/q2hr rounding done during shift to promote patient safety. NAD noted. Safety maintained with side rails up x3, bed wheels locked, bed in lowest position, call light in reach. Patient educated to call for assistance with ambulation if needed, verbalized understanding. Pt remains free of falls. No further needs expressed at this time. Will continue to monitor.     Problem: Adult Inpatient Plan of Care  Goal: Plan of Care Review  Outcome: Ongoing, Progressing  Goal: Patient-Specific Goal (Individualized)  Outcome: Ongoing, Progressing  Goal: Absence of Hospital-Acquired Illness or Injury  Outcome: Ongoing, Progressing  Goal: Optimal Comfort and Wellbeing  Outcome: Ongoing, Progressing  Goal: Readiness for Transition of Care  Outcome: Ongoing, Progressing     Problem: Infection  Goal: Absence of Infection Signs and Symptoms  Outcome: Ongoing, Progressing     Problem: Diabetes Comorbidity  Goal: Blood Glucose Level Within Targeted Range  Outcome: Ongoing, Progressing     Problem: Fall Injury Risk  Goal: Absence of Fall and Fall-Related Injury  Outcome: Ongoing, Progressing     Problem: Skin Injury Risk Increased  Goal: Skin Health and Integrity  Outcome: Ongoing, Progressing

## 2023-06-18 NOTE — PROGRESS NOTES
[facility] Hospital Medicine  Progress Note    Patient Name: Genny Watson  MRN: [id number]  Patient Class: IP- Inpatient   Admission Date: 6/15/2023  Length of Stay: 3 days  Attending Physician: Sue Haile MD  Primary Care Provider: Josh Berman MD        Subjective:     Principal Problem:Traumatic closed displaced fracture of base of neck of right femur, initial encounter        HPI:  Genny Watson is an 87 year old female with a past medical history of CAD s/ PCI, DM, HTN, HLD, DM, and Gimenez's esophagus who presented with a R femoral neck fracture after a fall. She endorses RLE pain and limited ROM. Orthopedic Surgery was consulted from the ED and plans to take the patient to the OR 6/16. Hospital Medicine was consulted for admission. DAPT has been held and she is NPO at midnight.      Overview/Hospital Course:  Genny Watson is an 87 year old female with a past medical history of CAD s/ PCI, DM, HTN, HLD, DM, and Gimenez's esophagus who presented with a R femoral neck fracture after a fall. She endorses RLE pain and limited ROM. Orthopedic Surgery was consulted from the ED and took the patient to the OR 6/16. DAPT has been held. Her course has been complicated by a UTI for which Rocephin has been started. Urine culture is pending. She also developed hypotension on POD 1 with a roughly three point fall in the Hgb. Chemical DVT prophylaxis has been held and the patient is receiving IV fluids while her home PO medications have also been held. Hgb is being trended. PT/OT has been consulted post-operatively.      Interval History:  Patient seen and examined.  Family members at bedside.  Reviewed imaging and results with members.  Patient reports that she is feeling better today.  White blood cell count 14, hemoglobin 10.7.  Urology note reviewed, renal ultrasound obtained which showed distended bladder.  Discussed with nurse who irrigated and repositioned  Maureen.    Review of Systems   Musculoskeletal:  Positive for arthralgias, gait problem and myalgias.   Objective:     Vital Signs (Most Recent):  Temp: 98.1 °F (36.7 °C) (06/18/23 1235)  Pulse: 95 (06/18/23 1235)  Resp: 16 (06/18/23 1235)  BP: (!) 98/47 (06/18/23 1235)  SpO2: 97 % (06/18/23 1235) Vital Signs (24h Range):  Temp:  [97.3 °F (36.3 °C)-98.4 °F (36.9 °C)] 98.1 °F (36.7 °C)  Pulse:  [] 95  Resp:  [16-18] 16  SpO2:  [92 %-97 %] 97 %  BP: ()/(47-71) 98/47     Weight: 53.5 kg (118 lb)  Body mass index is 20.9 kg/m².    Intake/Output Summary (Last 24 hours) at 6/18/2023 1337  Last data filed at 6/18/2023 1140  Gross per 24 hour   Intake 2639.21 ml   Output 1950 ml   Net 689.21 ml           Physical Exam  Vitals and nursing note reviewed.   Constitutional:       General: She is not in acute distress.  HENT:      Head: Normocephalic and atraumatic.      Right Ear: External ear normal.      Left Ear: External ear normal.      Nose: Nose normal.      Mouth/Throat:      Mouth: Mucous membranes are moist.      Pharynx: Oropharynx is clear.   Eyes:      Extraocular Movements: Extraocular movements intact.      Conjunctiva/sclera: Conjunctivae normal.   Cardiovascular:      Rate and Rhythm: Normal rate and regular rhythm.      Pulses: Normal pulses.      Heart sounds: Normal heart sounds.   Pulmonary:      Effort: Pulmonary effort is normal.      Breath sounds: Normal breath sounds.   Abdominal:      General: Bowel sounds are normal.      Palpations: Abdomen is soft.   Musculoskeletal:         General: Swelling present. No deformity.      Cervical back: Normal range of motion and neck supple.      Right lower leg: No edema.      Left lower leg: No edema.   Skin:     General: Skin is warm and dry.      Comments: Dressings clean, dry and intact.   Neurological:      Mental Status: She is alert.      Motor: Weakness present.   Psychiatric:         Mood and Affect: Mood normal.         Behavior: Behavior  normal.           Significant Labs: All pertinent labs within the past 24 hours have been reviewed.    Significant Imaging: I have reviewed all pertinent imaging results/findings within the past 24 hours.      Assessment/Plan:      * Traumatic closed displaced fracture of base of neck of right femur, initial encounter  -Orthopedic Surgery took patient to OR 6/16  -Hold DAPT   -Fall precuations  -Q4H neurovascular checks  -PRN analgesics  -PT/OT  -Hold DVT prophylaxis given hypotension  -Incentive spirometry      Postoperative anemia  -Trend Hgb with CBC  -Continue IV fluids  -Hold all anticoagulation      Postprocedural hypotension  -Hold home PO BP medications  -IV fluid bolus with LR  -Hold any anticoagulation for possible bleed  -Continue to monitor      Acute cystitis with hematuria  Urine culture with no growth  -Rocephin      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  Last A1c reviewed-   Lab Results   Component Value Date    HGBA1C 6.1 (H) 01/09/2023     Most recent fingerstick glucose reviewed-   Recent Labs   Lab 06/17/23  1650 06/17/23 2027 06/18/23  0844 06/18/23  1134   POCTGLUCOSE 186* 227* 142* 122*     Current correctional scale  Medium  Maintain anti-hyperglycemic dose as follows-   Antihyperglycemics (From admission, onward)    Start     Stop Route Frequency Ordered    06/15/23 1815  insulin aspart U-100 pen 1-10 Units         -- SubQ Before meals & nightly PRN 06/15/23 1716        Hold Oral hypoglycemics while patient is in the hospital.    Hypertension associated with diabetes  -Hold home amlodipine, losartan and metoprolol given hypotension  -Continue to monitor      VTE Risk Mitigation (From  admission, onward)         Ordered     IP VTE HIGH RISK PATIENT  Once         06/15/23 1716     Place sequential compression device  Until discontinued         06/15/23 1716                Discharge Planning   NILDA: 6/20/2023     Code Status: Full Code   Is the patient medically ready for discharge?:     Reason for patient still in hospital (select all that apply): Patient trending condition and Treatment  Discharge Plan A: Home Health                  Sue Haile MD  Department of Hospital Medicine   [facility]

## 2023-06-18 NOTE — ASSESSMENT & PLAN NOTE
Patient's FSGs are controlled on current medication regimen.  Last A1c reviewed-   Lab Results   Component Value Date    HGBA1C 6.1 (H) 01/09/2023     Most recent fingerstick glucose reviewed-   Recent Labs   Lab 06/17/23  1650 06/17/23 2027 06/18/23  0844 06/18/23  1134   POCTGLUCOSE 186* 227* 142* 122*     Current correctional scale  Medium  Maintain anti-hyperglycemic dose as follows-   Antihyperglycemics (From admission, onward)    Start     Stop Route Frequency Ordered    06/15/23 1815  insulin aspart U-100 pen 1-10 Units         -- SubQ Before meals & nightly PRN 06/15/23 1716        Hold Oral hypoglycemics while patient is in the hospital.

## 2023-06-18 NOTE — CARE UPDATE
06/18/23 0815   Patient Assessment/Suction   Level of Consciousness (AVPU) alert   Respiratory Effort Unlabored;Shallow   Expansion/Accessory Muscles/Retractions no use of accessory muscles;no retractions   Rhythm/Pattern, Respiratory unlabored;shallow   PRE-TX-O2   Device (Oxygen Therapy) room air   SpO2 95 %   Pulse Oximetry Type Intermittent   $ Pulse Oximetry - Multiple Charge Pulse Oximetry - Multiple   Pulse 95   Resp 16   Incentive Spirometer   $ Incentive Spirometer Charges unable to perform

## 2023-06-18 NOTE — PT/OT/SLP PROGRESS
Physical Therapy Treatment    Patient Name:  Genny Watson   MRN:  [id number]    Recommendations:     Discharge Recommendations: other (see comments)  Discharge Equipment Recommendations: walker, rolling  Barriers to discharge: None    Assessment:     Genny Watson is a 87 y.o. female admitted with a medical diagnosis of Traumatic closed displaced fracture of base of neck of right femur, initial encounter.  She presents with the following impairments/functional limitations: weakness, impaired endurance, impaired self care skills, impaired functional mobility, gait instability, impaired balance, decreased lower extremity function, pain, edema, impaired skin, orthopedic precautions. Patient agreeable to strength/mobility training.    Rehab Prognosis: Fair; patient would benefit from acute skilled PT services to address these deficits and reach maximum level of function.    Recent Surgery: Procedure(s) (LRB):  ARTHROPLASTY, HIP (Right) 2 Days Post-Op    Plan:     During this hospitalization, patient to be seen BID to address the identified rehab impairments via gait training, therapeutic activities, therapeutic exercises and progress toward the following goals:    Plan of Care Expires:  07/17/23 (TBD: SNF vs HHPT)    Subjective     Chief Complaint: right lower extremity pain and weakness  Patient/Family Comments/goals: improve overall strength/mobility  Pain/Comfort:  Pain Rating 1: 0/10  Location - Side 1: Right  Location 1: hip  Pain Addressed 1: Cessation of Activity  Pain Rating Post-Intervention 1: 5/10  Pain Rating 2: 0/10      Objective:     Communicated with nurse prior to session.  Patient found supine with bed alarm, peripheral IV, SCD, telemetry upon PT entry to room.     General Precautions: Standard, fall  Orthopedic Precautions: RLE posterior precautions  Braces: N/A  Respiratory Status: Room air     Functional Mobility:  Bed Mobility:     Supine to Sit: moderate assistance  Sit to  Supine: moderate assistance  Transfers:     Sit to Stand:  minimum assistance with rolling walker  Gait: stood one minute at bedside with rolling-walker and minimal assist x 2      AM-PAC 6 CLICK MOBILITY  Turning over in bed (including adjusting bedclothes, sheets and blankets)?: 2  Sitting down on and standing up from a chair with arms (e.g., wheelchair, bedside commode, etc.): 2  Moving from lying on back to sitting on the side of the bed?: 2  Moving to and from a bed to a chair (including a wheelchair)?: 1  Need to walk in hospital room?: 1  Climbing 3-5 steps with a railing?: 1  Basic Mobility Total Score: 9       Treatment & Education:  X 10 each supine bilateral lower extremity therapeutic exercise = heel slides, hip abduction/adduction, ankle pumps, quad sets/glut sets     Patient left supine with all lines intact, call button in reach, and family present..    GOALS:   Multidisciplinary Problems       Physical Therapy Goals          Problem: Physical Therapy    Goal Priority Disciplines Outcome Goal Variances Interventions   Physical Therapy Goal     PT, PT/OT Ongoing, Progressing     Description: Goals to be met by: 2023     Patient will increase functional independence with mobility by performin. Supine to sit with MInimal Assistance  2. Sit to supine with MInimal Assistance  3. Sit to stand transfer with Minimal Assistance  4. Bed to chair transfer with Minimal Assistance using Rolling Walker  5. Gait  x >/= 50 feet with Minimal Assistance using Rolling Walker.                          Time Tracking:     PT Received On: 23  PT Start Time: 1020     PT Stop Time: 1050  PT Total Time (min): 30 min     Billable Minutes: Therapeutic Activity 20 and Therapeutic Exercise 10    Treatment Type: Treatment  PT/PTA: PT     Number of PTA visits since last PT visit: 0     2023

## 2023-06-18 NOTE — SUBJECTIVE & OBJECTIVE
Interval History:  Patient seen and examined.  Family members at bedside.  Reviewed imaging and results with members.  Patient reports that she is feeling better today.  White blood cell count 14, hemoglobin 10.7.  Urology note reviewed, renal ultrasound obtained which showed distended bladder.  Discussed with nurse who irrigated and repositioned Reddy.    Review of Systems   Musculoskeletal:  Positive for arthralgias, gait problem and myalgias.   Objective:     Vital Signs (Most Recent):  Temp: 98.1 °F (36.7 °C) (06/18/23 1235)  Pulse: 95 (06/18/23 1235)  Resp: 16 (06/18/23 1235)  BP: (!) 98/47 (06/18/23 1235)  SpO2: 97 % (06/18/23 1235) Vital Signs (24h Range):  Temp:  [97.3 °F (36.3 °C)-98.4 °F (36.9 °C)] 98.1 °F (36.7 °C)  Pulse:  [] 95  Resp:  [16-18] 16  SpO2:  [92 %-97 %] 97 %  BP: ()/(47-71) 98/47     Weight: 53.5 kg (118 lb)  Body mass index is 20.9 kg/m².    Intake/Output Summary (Last 24 hours) at 6/18/2023 1337  Last data filed at 6/18/2023 1140  Gross per 24 hour   Intake 2639.21 ml   Output 1950 ml   Net 689.21 ml           Physical Exam  Vitals and nursing note reviewed.   Constitutional:       General: She is not in acute distress.  HENT:      Head: Normocephalic and atraumatic.      Right Ear: External ear normal.      Left Ear: External ear normal.      Nose: Nose normal.      Mouth/Throat:      Mouth: Mucous membranes are moist.      Pharynx: Oropharynx is clear.   Eyes:      Extraocular Movements: Extraocular movements intact.      Conjunctiva/sclera: Conjunctivae normal.   Cardiovascular:      Rate and Rhythm: Normal rate and regular rhythm.      Pulses: Normal pulses.      Heart sounds: Normal heart sounds.   Pulmonary:      Effort: Pulmonary effort is normal.      Breath sounds: Normal breath sounds.   Abdominal:      General: Bowel sounds are normal.      Palpations: Abdomen is soft.   Musculoskeletal:         General: Swelling present. No deformity.      Cervical back: Normal  range of motion and neck supple.      Right lower leg: No edema.      Left lower leg: No edema.   Skin:     General: Skin is warm and dry.      Comments: Dressings clean, dry and intact.   Neurological:      Mental Status: She is alert.      Motor: Weakness present.   Psychiatric:         Mood and Affect: Mood normal.         Behavior: Behavior normal.           Significant Labs: All pertinent labs within the past 24 hours have been reviewed.    Significant Imaging: I have reviewed all pertinent imaging results/findings within the past 24 hours.

## 2023-06-18 NOTE — PLAN OF CARE
Problem: Adult Inpatient Plan of Care  Goal: Plan of Care Review  Outcome: Ongoing, Progressing     Problem: Adult Inpatient Plan of Care  Goal: Patient-Specific Goal (Individualized)  Outcome: Ongoing, Progressing     Problem: Adult Inpatient Plan of Care  Goal: Absence of Hospital-Acquired Illness or Injury  Outcome: Ongoing, Progressing     Problem: Adult Inpatient Plan of Care  Goal: Optimal Comfort and Wellbeing  Outcome: Ongoing, Progressing     Problem: Adult Inpatient Plan of Care  Goal: Readiness for Transition of Care  Outcome: Ongoing, Progressing     Problem: Diabetes Comorbidity  Goal: Blood Glucose Level Within Targeted Range  Outcome: Ongoing, Progressing     Problem: Fall Injury Risk  Goal: Absence of Fall and Fall-Related Injury  Outcome: Ongoing, Progressing     Problem: Skin Injury Risk Increased  Goal: Skin Health and Integrity  Outcome: Ongoing, Progressing   Care plan and meds reviewed. Pt verbalized understanding. VSS throughout shift. Pt on room air. IV Abx administered. Tele in place NSR. Up with x1 assist to BR. IS at bedside. Instructed on use and return demonstration performed. PRN pain meds administered. Reddy in place. Irrigated this morning to clear out any clots. Repositions self. Safety maintained. Call light within reach. No complaints at this time.

## 2023-06-19 LAB
ALBUMIN SERPL BCP-MCNC: 2.2 G/DL (ref 3.5–5.2)
ALP SERPL-CCNC: 69 U/L (ref 55–135)
ALT SERPL W/O P-5'-P-CCNC: 19 U/L (ref 10–44)
ANION GAP SERPL CALC-SCNC: 9 MMOL/L (ref 8–16)
AST SERPL-CCNC: 30 U/L (ref 10–40)
BASOPHILS # BLD AUTO: 0.07 K/UL (ref 0–0.2)
BASOPHILS NFR BLD: 0.6 % (ref 0–1.9)
BILIRUB SERPL-MCNC: 0.6 MG/DL (ref 0.1–1)
BUN SERPL-MCNC: 9 MG/DL (ref 8–23)
CALCIUM SERPL-MCNC: 7.7 MG/DL (ref 8.7–10.5)
CHLORIDE SERPL-SCNC: 103 MMOL/L (ref 95–110)
CO2 SERPL-SCNC: 23 MMOL/L (ref 23–29)
CREAT SERPL-MCNC: 0.5 MG/DL (ref 0.5–1.4)
DIFFERENTIAL METHOD: ABNORMAL
EOSINOPHIL # BLD AUTO: 0.3 K/UL (ref 0–0.5)
EOSINOPHIL NFR BLD: 2.5 % (ref 0–8)
ERYTHROCYTE [DISTWIDTH] IN BLOOD BY AUTOMATED COUNT: 13.5 % (ref 11.5–14.5)
EST. GFR  (NO RACE VARIABLE): >60 ML/MIN/1.73 M^2
GLUCOSE SERPL-MCNC: 120 MG/DL (ref 70–110)
HCT VFR BLD AUTO: 26.4 % (ref 37–48.5)
HGB BLD-MCNC: 8.8 G/DL (ref 12–16)
IMM GRANULOCYTES # BLD AUTO: 0.08 K/UL (ref 0–0.04)
IMM GRANULOCYTES NFR BLD AUTO: 0.6 % (ref 0–0.5)
LYMPHOCYTES # BLD AUTO: 2.1 K/UL (ref 1–4.8)
LYMPHOCYTES NFR BLD: 17 % (ref 18–48)
MAGNESIUM SERPL-MCNC: 1.5 MG/DL (ref 1.6–2.6)
MCH RBC QN AUTO: 29.6 PG (ref 27–31)
MCHC RBC AUTO-ENTMCNC: 33.3 G/DL (ref 32–36)
MCV RBC AUTO: 89 FL (ref 82–98)
MONOCYTES # BLD AUTO: 1.2 K/UL (ref 0.3–1)
MONOCYTES NFR BLD: 9.2 % (ref 4–15)
NEUTROPHILS # BLD AUTO: 8.8 K/UL (ref 1.8–7.7)
NEUTROPHILS NFR BLD: 70.1 % (ref 38–73)
NRBC BLD-RTO: 0 /100 WBC
PHOSPHATE SERPL-MCNC: 2.3 MG/DL (ref 2.7–4.5)
PLATELET # BLD AUTO: 191 K/UL (ref 150–450)
PMV BLD AUTO: 10.6 FL (ref 9.2–12.9)
POCT GLUCOSE: 131 MG/DL (ref 70–110)
POCT GLUCOSE: 137 MG/DL (ref 70–110)
POCT GLUCOSE: 173 MG/DL (ref 70–110)
POCT GLUCOSE: 176 MG/DL (ref 70–110)
POCT GLUCOSE: 184 MG/DL (ref 70–110)
POTASSIUM SERPL-SCNC: 3.6 MMOL/L (ref 3.5–5.1)
PROT SERPL-MCNC: 4.8 G/DL (ref 6–8.4)
RBC # BLD AUTO: 2.97 M/UL (ref 4–5.4)
SODIUM SERPL-SCNC: 135 MMOL/L (ref 136–145)
WBC # BLD AUTO: 12.5 K/UL (ref 3.9–12.7)

## 2023-06-19 PROCEDURE — 36415 COLL VENOUS BLD VENIPUNCTURE: CPT | Performed by: ORTHOPAEDIC SURGERY

## 2023-06-19 PROCEDURE — 36415 COLL VENOUS BLD VENIPUNCTURE: CPT | Performed by: HOSPITALIST

## 2023-06-19 PROCEDURE — 12000002 HC ACUTE/MED SURGE SEMI-PRIVATE ROOM

## 2023-06-19 PROCEDURE — 83735 ASSAY OF MAGNESIUM: CPT | Performed by: ORTHOPAEDIC SURGERY

## 2023-06-19 PROCEDURE — 85025 COMPLETE CBC W/AUTO DIFF WBC: CPT | Performed by: ORTHOPAEDIC SURGERY

## 2023-06-19 PROCEDURE — 25000003 PHARM REV CODE 250: Performed by: NURSE PRACTITIONER

## 2023-06-19 PROCEDURE — 30200315 PPD INTRADERMAL TEST REV CODE 302: Performed by: HOSPITALIST

## 2023-06-19 PROCEDURE — 80053 COMPREHEN METABOLIC PANEL: CPT | Performed by: ORTHOPAEDIC SURGERY

## 2023-06-19 PROCEDURE — 63600175 PHARM REV CODE 636 W HCPCS: Performed by: STUDENT IN AN ORGANIZED HEALTH CARE EDUCATION/TRAINING PROGRAM

## 2023-06-19 PROCEDURE — 63600175 PHARM REV CODE 636 W HCPCS: Performed by: ORTHOPAEDIC SURGERY

## 2023-06-19 PROCEDURE — 84100 ASSAY OF PHOSPHORUS: CPT | Performed by: ORTHOPAEDIC SURGERY

## 2023-06-19 PROCEDURE — 94799 UNLISTED PULMONARY SVC/PX: CPT

## 2023-06-19 PROCEDURE — 86580 TB INTRADERMAL TEST: CPT | Performed by: HOSPITALIST

## 2023-06-19 PROCEDURE — 97530 THERAPEUTIC ACTIVITIES: CPT

## 2023-06-19 PROCEDURE — 97110 THERAPEUTIC EXERCISES: CPT

## 2023-06-19 PROCEDURE — 25000003 PHARM REV CODE 250: Performed by: ORTHOPAEDIC SURGERY

## 2023-06-19 PROCEDURE — 99900035 HC TECH TIME PER 15 MIN (STAT)

## 2023-06-19 PROCEDURE — 25000003 PHARM REV CODE 250: Performed by: HOSPITALIST

## 2023-06-19 PROCEDURE — 94761 N-INVAS EAR/PLS OXIMETRY MLT: CPT

## 2023-06-19 RX ORDER — LANOLIN ALCOHOL/MO/W.PET/CERES
800 CREAM (GRAM) TOPICAL
Status: DISCONTINUED | OUTPATIENT
Start: 2023-06-19 | End: 2023-06-20 | Stop reason: HOSPADM

## 2023-06-19 RX ORDER — SODIUM,POTASSIUM PHOSPHATES 280-250MG
2 POWDER IN PACKET (EA) ORAL
Status: DISCONTINUED | OUTPATIENT
Start: 2023-06-19 | End: 2023-06-20 | Stop reason: HOSPADM

## 2023-06-19 RX ORDER — CLOPIDOGREL BISULFATE 75 MG/1
75 TABLET ORAL DAILY
Status: DISCONTINUED | OUTPATIENT
Start: 2023-06-19 | End: 2023-06-20 | Stop reason: HOSPADM

## 2023-06-19 RX ORDER — ASPIRIN 81 MG/1
81 TABLET ORAL DAILY
Status: DISCONTINUED | OUTPATIENT
Start: 2023-06-19 | End: 2023-06-20 | Stop reason: HOSPADM

## 2023-06-19 RX ORDER — SIMETHICONE 80 MG
1 TABLET,CHEWABLE ORAL 3 TIMES DAILY PRN
Status: DISCONTINUED | OUTPATIENT
Start: 2023-06-19 | End: 2023-06-20 | Stop reason: HOSPADM

## 2023-06-19 RX ADMIN — POTASSIUM BICARBONATE 50 MEQ: 977.5 TABLET, EFFERVESCENT ORAL at 11:06

## 2023-06-19 RX ADMIN — SIMETHICONE 80 MG: 80 TABLET, CHEWABLE ORAL at 03:06

## 2023-06-19 RX ADMIN — OXYCODONE HYDROCHLORIDE AND ACETAMINOPHEN 500 MG: 500 TABLET ORAL at 08:06

## 2023-06-19 RX ADMIN — POTASSIUM & SODIUM PHOSPHATES POWDER PACK 280-160-250 MG 1 PACKET: 280-160-250 PACK at 09:06

## 2023-06-19 RX ADMIN — CLOPIDOGREL BISULFATE 75 MG: 75 TABLET, FILM COATED ORAL at 03:06

## 2023-06-19 RX ADMIN — LATANOPROST 1 DROP: 50 SOLUTION OPHTHALMIC at 11:06

## 2023-06-19 RX ADMIN — POTASSIUM & SODIUM PHOSPHATES POWDER PACK 280-160-250 MG 2 PACKET: 280-160-250 PACK at 08:06

## 2023-06-19 RX ADMIN — PANTOPRAZOLE SODIUM 40 MG: 40 TABLET, DELAYED RELEASE ORAL at 08:06

## 2023-06-19 RX ADMIN — THERA TABS 1 TABLET: TAB at 08:06

## 2023-06-19 RX ADMIN — MUPIROCIN: 20 OINTMENT TOPICAL at 09:06

## 2023-06-19 RX ADMIN — Medication 800 MG: at 11:06

## 2023-06-19 RX ADMIN — INSULIN ASPART 1 UNITS: 100 INJECTION, SOLUTION INTRAVENOUS; SUBCUTANEOUS at 09:06

## 2023-06-19 RX ADMIN — HYDROCODONE BITARTRATE AND ACETAMINOPHEN 1 TABLET: 5; 325 TABLET ORAL at 03:06

## 2023-06-19 RX ADMIN — POTASSIUM & SODIUM PHOSPHATES POWDER PACK 280-160-250 MG 2 PACKET: 280-160-250 PACK at 11:06

## 2023-06-19 RX ADMIN — ATORVASTATIN CALCIUM 80 MG: 40 TABLET, FILM COATED ORAL at 08:06

## 2023-06-19 RX ADMIN — SODIUM CHLORIDE, POTASSIUM CHLORIDE, SODIUM LACTATE AND CALCIUM CHLORIDE: 600; 310; 30; 20 INJECTION, SOLUTION INTRAVENOUS at 05:06

## 2023-06-19 RX ADMIN — TUBERCULIN PURIFIED PROTEIN DERIVATIVE 5 UNITS: 5 INJECTION, SOLUTION INTRADERMAL at 01:06

## 2023-06-19 RX ADMIN — Medication 800 MG: at 03:06

## 2023-06-19 RX ADMIN — ASPIRIN 81 MG: 81 TABLET, COATED ORAL at 03:06

## 2023-06-19 RX ADMIN — CEFTRIAXONE 1 G: 1 INJECTION, POWDER, FOR SOLUTION INTRAMUSCULAR; INTRAVENOUS at 11:06

## 2023-06-19 RX ADMIN — MUPIROCIN: 20 OINTMENT TOPICAL at 08:06

## 2023-06-19 RX ADMIN — INSULIN ASPART 2 UNITS: 100 INJECTION, SOLUTION INTRAVENOUS; SUBCUTANEOUS at 04:06

## 2023-06-19 NOTE — PT/OT/SLP PROGRESS
Physical Therapy Treatment    Patient Name:  Genny Watson   MRN:  [id number]    Recommendations:     Discharge Recommendations: rehabilitation facility  Discharge Equipment Recommendations: to be determined by next level of care  Barriers to discharge: None    Assessment:     Genny Watson is a 87 y.o. female admitted with a medical diagnosis of Traumatic closed displaced fracture of base of neck of right femur, initial encounter.  She presents with the following impairments/functional limitations: weakness, impaired endurance, impaired functional mobility, impaired self care skills, gait instability, impaired balance, decreased lower extremity function, decreased safety awareness, decreased ROM, orthopedic precautions, impaired cardiopulmonary response to activity. Patient sitting up in chair and is agreeable to participation with second PT treatment of the day requesting assistance back to bed. She requires ModA x2 for sit to stand transfer from chair. She performed step transfer from chair to EOB with RW, ModA, RLE WBAT, VC for sequencing, and assistance advancing RW. She returned to bed with bed alarm on and family present.     Rehab Prognosis: Good; patient would benefit from acute skilled PT services to address these deficits and reach maximum level of function.    Recent Surgery: Procedure(s) (LRB):  ARTHROPLASTY, HIP (Right) 3 Days Post-Op    Plan:     During this hospitalization, patient to be seen BID to address the identified rehab impairments via gait training, therapeutic activities, therapeutic exercises and progress toward the following goals:    Plan of Care Expires:  07/17/23    Subjective     Chief Complaint: ready to get back in bed   Patient/Family Comments/goals: lie down  Pain/Comfort:  Pain Rating 1: 0/10      Objective:     Communicated with RN prior to session.  Patient found up in chair with chair check, yung catheter, peripheral IV, telemetry upon PT entry to room.      General Precautions: Standard, fall  Orthopedic Precautions: RLE weight bearing as tolerated, RLE posterior precautions  Braces: N/A  Respiratory Status: Room air     Functional Mobility:  Bed Mobility:     Scooting: maximal assistance and of 2 persons  Sit to Supine: maximal assistance and of 2 persons  Transfers:     Sit to Stand:  moderate assistance and of 2 persons with rolling walker  Chair to Bed: moderate assistance with  rolling walker  using  Step Transfer      AM-PAC 6 CLICK MOBILITY  Turning over in bed (including adjusting bedclothes, sheets and blankets)?: 2  Sitting down on and standing up from a chair with arms (e.g., wheelchair, bedside commode, etc.): 2  Moving from lying on back to sitting on the side of the bed?: 2  Moving to and from a bed to a chair (including a wheelchair)?: 2  Need to walk in hospital room?: 2  Climbing 3-5 steps with a railing?: 1  Basic Mobility Total Score: 11       Treatment & Education:  Patient was educated on the importance of OOB activity and functional mobility to negate negative effects of prolonged bed rest during hospitalization, safe transfers and ambulation, and D/C planning     Patient left HOB elevated with all lines intact, call button in reach, and family present..    GOALS:   Multidisciplinary Problems       Physical Therapy Goals          Problem: Physical Therapy    Goal Priority Disciplines Outcome Goal Variances Interventions   Physical Therapy Goal     PT, PT/OT Ongoing, Progressing     Description: Goals to be met by: 2023     Patient will increase functional independence with mobility by performin. Supine to sit with MInimal Assistance  2. Sit to supine with MInimal Assistance  3. Sit to stand transfer with Minimal Assistance  4. Bed to chair transfer with Minimal Assistance using Rolling Walker  5. Gait  x >/= 50 feet with Minimal Assistance using Rolling Walker.                          Time Tracking:     PT Received On:  06/19/23  PT Start Time: 1124     PT Stop Time: 1135  PT Total Time (min): 11 min     Billable Minutes: Therapeutic Activity 11    Treatment Type: Treatment  PT/PTA: PT     Number of PTA visits since last PT visit: 0     06/19/2023

## 2023-06-19 NOTE — CARE UPDATE
06/19/23 1531   Patient Assessment/Suction   Level of Consciousness (AVPU) alert   Respiratory Effort Unlabored   Rhythm/Pattern, Respiratory unlabored   PRE-TX-O2   Device (Oxygen Therapy) room air   Incentive Spirometer   $ Incentive Spirometer Charges done with encouragement   Incentive Spirometer Predicted Level (mL) 1360   Administration (IS) mouthpiece utilized   Number of Repetitions (IS) 10   Level Incentive Spirometer (mL) 1000   Patient Tolerance (IS) fair

## 2023-06-19 NOTE — PLAN OF CARE
Problem: Adult Inpatient Plan of Care  Goal: Plan of Care Review  6/18/2023 2007 by Adry Russell LPN  Outcome: Ongoing, Progressing    Safety maintained, call light within reach, bed locked and in low position, and side rails up. Patient remains free from injury.    Monitoring and following care plan.

## 2023-06-19 NOTE — PLAN OF CARE
Patient accepted at [facility]   Pt's preference is [facility].    Per Luz Maria with [facility], patient accepted once medically cleared.     Patient still has not provided social security number so no LOCET completed     06/19/23 9379   Post-Acute Status   Post-Acute Authorization Placement   Post-Acute Placement Status Pending medical clearance/testing

## 2023-06-19 NOTE — PROGRESS NOTES
[facility] Hospital Medicine  Progress Note    Patient Name: Genny Watson  MRN: [id number]  Patient Class: IP- Inpatient   Admission Date: 6/15/2023  Length of Stay: 4 days  Attending Physician: Sue Haile MD  Primary Care Provider: Josh Berman MD        Subjective:     Principal Problem:Traumatic closed displaced fracture of base of neck of right femur, initial encounter        HPI:  Genny Watson is an 87 year old female with a past medical history of CAD s/ PCI, DM, HTN, HLD, DM, and Gimenez's esophagus who presented with a R femoral neck fracture after a fall. She endorses RLE pain and limited ROM. Orthopedic Surgery was consulted from the ED and plans to take the patient to the OR 6/16. Hospital Medicine was consulted for admission. DAPT has been held and she is NPO at midnight.      Overview/Hospital Course:  Genny Watson is an 87 year old female with a past medical history of CAD s/ PCI, DM, HTN, HLD, DM, and Gimenez's esophagus who presented with a R femoral neck fracture after a fall. She endorses RLE pain and limited ROM. Orthopedic Surgery was consulted from the ED and took the patient to the OR 6/16. DAPT has been held. Her course has been complicated by a UTI for which Rocephin has been started. Urine culture is pending. She also developed hypotension on POD 1 with a roughly three point fall in the Hgb. Chemical DVT prophylaxis has been held and the patient is receiving IV fluids while her home PO medications have also been held. Hgb is being trended. PT/OT has been consulted post-operatively.      Interval History:  Patient seen and examined.  Daughter at bedside.  Discussed with patient that I encouraged her going to a rehab facility so she can make more progress with therapy before going home and she is agreeable.  Hemoglobin 8.8 today.  Reddy in place per Urology.    Review of Systems   Musculoskeletal:  Positive for arthralgias, gait problem and  myalgias.   Objective:     Vital Signs (Most Recent):  Temp: 98.4 °F (36.9 °C) (06/19/23 1015)  Pulse: 94 (06/19/23 1015)  Resp: 18 (06/19/23 1015)  BP: (!) 124/58 (06/19/23 1015)  SpO2: 98 % (06/19/23 1015) Vital Signs (24h Range):  Temp:  [98.1 °F (36.7 °C)-99.6 °F (37.6 °C)] 98.4 °F (36.9 °C)  Pulse:  [] 94  Resp:  [16-18] 18  SpO2:  [92 %-98 %] 98 %  BP: ()/(47-65) 124/58     Weight: 53.5 kg (118 lb)  Body mass index is 20.9 kg/m².    Intake/Output Summary (Last 24 hours) at 6/19/2023 1157  Last data filed at 6/19/2023 0449  Gross per 24 hour   Intake 2703.43 ml   Output 2050 ml   Net 653.43 ml           Physical Exam  Vitals and nursing note reviewed.   Constitutional:       General: She is not in acute distress.  HENT:      Head: Normocephalic and atraumatic.      Right Ear: External ear normal.      Left Ear: External ear normal.      Nose: Nose normal.      Mouth/Throat:      Mouth: Mucous membranes are moist.      Pharynx: Oropharynx is clear.   Eyes:      Extraocular Movements: Extraocular movements intact.      Conjunctiva/sclera: Conjunctivae normal.   Cardiovascular:      Rate and Rhythm: Normal rate and regular rhythm.      Pulses: Normal pulses.      Heart sounds: Normal heart sounds.   Pulmonary:      Effort: Pulmonary effort is normal.      Breath sounds: Normal breath sounds.   Abdominal:      General: Bowel sounds are normal.      Palpations: Abdomen is soft.   Musculoskeletal:         General: Swelling present. No deformity.      Cervical back: Normal range of motion and neck supple.      Right lower leg: No edema.      Left lower leg: No edema.   Skin:     General: Skin is warm and dry.      Comments: Dressings clean, dry and intact.   Neurological:      Mental Status: She is alert.      Motor: Weakness present.   Psychiatric:         Mood and Affect: Mood normal.         Behavior: Behavior normal.           Significant Labs: All pertinent labs within the past 24 hours have been  reviewed.    Significant Imaging: I have reviewed all pertinent imaging results/findings within the past 24 hours.      Assessment/Plan:      * Traumatic closed displaced fracture of base of neck of right femur, initial encounter  -Orthopedic Surgery took patient to OR 6/16  -Hold DAPT   -Fall precuations  -Q4H neurovascular checks  -PRN analgesics  -PT/OT  -Hold DVT prophylaxis given hypotension  -Incentive spirometry      Postoperative anemia  -Trend Hgb with CBC  -Continue IV fluids  -Hold all anticoagulation      Postprocedural hypotension  -Hold home PO BP medications  -IV fluid bolus with LR  -Hold any anticoagulation for possible bleed  -Continue to monitor      Acute cystitis with hematuria  Urine culture with no growth  -Rocephin      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  Last A1c reviewed-   Lab Results   Component Value Date    HGBA1C 6.1 (H) 01/09/2023     Most recent fingerstick glucose reviewed-   Recent Labs   Lab 06/18/23  1646 06/18/23  2114 06/19/23  0739 06/19/23  1152   POCTGLUCOSE 158* 189* 131* 137*     Current correctional scale  Medium  Maintain anti-hyperglycemic dose as follows-   Antihyperglycemics (From admission, onward)      Start     Stop Route Frequency Ordered    06/15/23 1815  insulin aspart U-100 pen 1-10 Units         -- SubQ Before meals & nightly PRN 06/15/23 1716          Hold Oral hypoglycemics while patient is in the hospital.    Hypertension associated with diabetes  -Hold home amlodipine, losartan and metoprolol given hypotension  -Continue to monitor      VTE Risk Mitigation (From admission, onward)           Ordered     IP VTE HIGH RISK PATIENT  Once         06/15/23 1716      Place sequential compression device  Until discontinued         06/15/23 1716                    Discharge Planning   NILDA: 6/20/2023     Code Status: Full Code   Is the patient medically ready for discharge?:     Reason for patient still in hospital (select all that apply): Patient trending condition and Treatment  Discharge Plan A: Home Health          Addendum:  Discussed with orthopedist Dr. Douglas who is okay with my restarting dual antiplatelet therapy.        Sue Haile MD  Department of Hospital Medicine   [facility]

## 2023-06-19 NOTE — PT/OT/SLP PROGRESS
Physical Therapy Treatment    Patient Name:  Genny Watson   MRN:  [id number]    Recommendations:     Discharge Recommendations: rehabilitation facility  Discharge Equipment Recommendations: to be determined by next level of care  Barriers to discharge: None    Assessment:     Genny Watson is a 87 y.o. female admitted with a medical diagnosis of Traumatic closed displaced fracture of base of neck of right femur, initial encounter.  She presents with the following impairments/functional limitations: weakness, impaired endurance, impaired functional mobility, impaired self care skills, gait instability, impaired balance, decreased lower extremity function, decreased safety awareness, decreased ROM, orthopedic precautions, impaired cardiopulmonary response to activity. Patient is agreeable to participation with PT treatment. She denies pain at rest. Education provided regarding posterior hip precautions and RLE WBAT. Patient reports getting out of bed to the left at home. She attempted supine to sit exiting the bed to the left. However, she is unable to perform without RLE internally rotating and requires MaxA to complete transfer while maintaining posterior hip precautions. She denies dizziness upon sitting EOB. She requires ModA for sit to stand with RW and performed a step transfer from EOB to chair with RW, ModA, RLE WBAT, and VC for sequencing. She is agreeable to sit up in chair with chair alarm on and family present. Patient is agreeable to placement upon D/C to get stronger prior to returning home.  Based on the patient's progress with physical therapy, motivation to participate in treatment, family support, and prior level of function she is an excellent candidate for inpatient rehabilitation.    Rehab Prognosis: Good; patient would benefit from acute skilled PT services to address these deficits and reach maximum level of function.    Recent Surgery: Procedure(s) (LRB):  ARTHROPLASTY, HIP  (Right) 3 Days Post-Op    Plan:     During this hospitalization, patient to be seen BID to address the identified rehab impairments via gait training, therapeutic activities, therapeutic exercises and progress toward the following goals:    Plan of Care Expires:  07/17/23    Subjective     Chief Complaint: none verbalized  Patient/Family Comments/goals: agrees with placement   Pain/Comfort:  Pain Rating 1: 0/10      Objective:     Communicated with CELINA Langston prior to session.  Patient found HOB elevated with bed alarm, yung catheter, peripheral IV, telemetry, SCD upon PT entry to room.     General Precautions: Standard, fall  Orthopedic Precautions: RLE weight bearing as tolerated, RLE posterior precautions  Braces: N/A  Respiratory Status: Room air     Functional Mobility:  Bed Mobility:     Supine to Sit: maximal assistance  Transfers:     Sit to Stand:  moderate assistance with rolling walker  Bed to Chair: moderate assistance with  rolling walker  using  Step Transfer      AM-PAC 6 CLICK MOBILITY  Turning over in bed (including adjusting bedclothes, sheets and blankets)?: 2  Sitting down on and standing up from a chair with arms (e.g., wheelchair, bedside commode, etc.): 2  Moving from lying on back to sitting on the side of the bed?: 2  Moving to and from a bed to a chair (including a wheelchair)?: 2  Need to walk in hospital room?: 2  Climbing 3-5 steps with a railing?: 1  Basic Mobility Total Score: 11       Treatment & Education:  Patient was educated on the importance of OOB activity and functional mobility to negate negative effects of prolonged bed rest during hospitalization, safe transfers and ambulation, RLE posterior hip precautions and WBAT, IRF, and D/C planning     Patient left up in chair with all lines intact, call button in reach, chair alarm on, and family present..    GOALS:   Multidisciplinary Problems       Physical Therapy Goals          Problem: Physical Therapy    Goal Priority  Disciplines Outcome Goal Variances Interventions   Physical Therapy Goal     PT, PT/OT Ongoing, Progressing     Description: Goals to be met by: 2023     Patient will increase functional independence with mobility by performin. Supine to sit with MInimal Assistance  2. Sit to supine with MInimal Assistance  3. Sit to stand transfer with Minimal Assistance  4. Bed to chair transfer with Minimal Assistance using Rolling Walker  5. Gait  x >/= 50 feet with Minimal Assistance using Rolling Walker.                          Time Tracking:     PT Received On: 23  PT Start Time: 955     PT Stop Time: 1013  PT Total Time (min): 18 min     Billable Minutes: Therapeutic Activity 18    Treatment Type: Treatment  PT/PTA: PT     Number of PTA visits since last PT visit: 0     2023

## 2023-06-19 NOTE — PLAN OF CARE
Patient now agreeable to placement    Therapy recommends inpt rehab- patient's first choice is [facility] or [facility]    CM sent referrals via [employer]    Attempted to call in [facility]- social incorrect in system. Will verify social with pt/family    Patient does not remember social security number and does not have card on her. Family going home to get card and then will call CM back with correct number     06/19/23 6952   Post-Acute Status   Post-Acute Authorization Placement   Post-Acute Placement Status Referrals Sent   Patient choice form signed by patient/caregiver List from System Post-Acute Care   Discharge Plan   Discharge Plan A Rehab;Skilled Nursing Facility

## 2023-06-19 NOTE — PT/OT/SLP PROGRESS
Occupational Therapy   Treatment    Name: Genny Watson  MRN: [id number]  Admitting Diagnosis:  Traumatic closed displaced fracture of base of neck of right femur, initial encounter  3 Days Post-Op    Recommendations:     Discharge Recommendations: other (see comments) (TBD)  Discharge Equipment Recommendations:  none, other (see comments) (TBD next level of care)  Barriers to discharge:  None    Assessment:     Genny Watson is a 87 y.o. female with a medical diagnosis of Traumatic closed displaced fracture of base of neck of right femur, initial encounter. Patient agreeable to participate in OT treatment session and mobilization. Patient unable to recall posterior hip precautions. OTR reinforcing R LE posterior hip precautions step by step at start/end and throughout session. Patient able to recall 2/3 at end of session. Patient will require continued reinforcement.    Step by step verbal instruction for supine>sit techniques - performing with Mod (A). Anterior/posterior scooting edge of bed with step by step verbal instruction and tactile cues as needed with Mod to Min (A). Mod/Min (A) sit<>stand with RW with step by step verbal instruction to ensure adherence to R LE posterior hip precautions; sit<>stand performed x 2 throughout session. She presents with the following performance deficits affecting function are weakness, impaired endurance, impaired self care skills, impaired functional mobility, gait instability, impaired balance, decreased upper extremity function, decreased lower extremity function, decreased safety awareness, pain, decreased ROM, impaired cardiopulmonary response to activity, orthopedic precautions.     OTR discussing discharge planning with patient/daughters - education regarding differences between IRF and SNF (ie therapy requirements, length of stay, etc) - patient/daughters verbalize understanding. Recommend inpatient rehabilitation upon discharge for intense therapy  "and education/instruction regarding functional transfers/mobility, AD training, ADL training with use of adaptive equipment - all in adherence with R LE posterior hip precautions. Patient/daughters in agreement with plan.     Rehab Prognosis:  Good; patient would benefit from acute skilled OT services to address these deficits and reach maximum level of function.       Plan:     Patient to be seen 5 x/week to address the above listed problems via self-care/home management, therapeutic activities, therapeutic exercises  Plan of Care Expires: 07/15/23  Plan of Care Reviewed with: patient, family    Subjective     Chief Complaint: R hip discomfort  Patient/Family Comments/goals: "To get back to how I was before."   Pain/Comfort:  Pain Rating 1: 0/10    Objective:     Communicated with: Nurse, Ivis, prior to session.  Patient found HOB elevated with bed alarm, SCD, yung catheter, peripheral IV, telemetry (Daughters bedside) upon OT entry to room.    General Precautions: Standard, fall    Orthopedic Precautions:RLE weight bearing as tolerated, RLE posterior precautions  Braces: N/A  Respiratory Status: Room air     Occupational Performance:     Bed Mobility:    Patient completed Scooting/Bridging with maximal assistance with step by step instruction for bridging technique with L LE to assist  Patient completed Supine to Sit with minimum assistance, moderate assistance, and step by step instruction for technique in adherence with R LE posterior hip precautions  Patient completed Sit to Supine with moderate assistance with step by step verbal instruction for technique in adherence with posterior hip precautions    Functional Mobility/Transfers:  Patient completed Sit <> Stand Transfer with minimum assistance and moderate assistance  with  rolling walker and step by step verbal instruction for correct transfer sequence/technique in adherence with posterior hip precautions and proper hand placement   Functional " Mobility: Mod (A) to take single step forward/backward with RW and step by step verbal instruction for correct walker management and gait/walker sequence    Activities of Daily Living:  Lower Body Dressing: maximal assistance and total assistance from bed level    AMPAC 6 Click ADL: 14    Treatment & Education:  - OTR reinforcing purpose of/3 R LE posterior hip precautions at start/end and throughout session. Step by step instruction regarding adaptive mobility with bed mobility and ADL to ensure adherence to precautions; OTR initiating education regarding use of adaptive equipment to perform dressing in adherence with precautions; step by step verbal instruction for correct transfer sequence/techniques in adherence with posterior hip precautions and correct walker management/gait/walker management - patient verbalizes/demonstrates understanding when appropriate; however, patient will require continued reinforcement.Patient able to recall 0/3 hip precautions at start of session and recalling 2/3 at end of session with cues.  - OTR providing step by step verbal instruction with tactile cues as needed for anterior/posterior scooting techniques (performed edge of bed) - initially requiring Mod (A) and improving to Min (A) with repetition   - Per inquiries from daughters, OTR providing education regarding IRF versus SNF placement (ie therapy requirements and average length of stay differences) - daughters verbalize understanding; OTR recommending inpatient rehabilitation placement upon discharge - patient/daughters in agreement    Patient left HOB elevated with all lines intact, call button in reach, bed alarm on, and daughters present    GOALS:   Multidisciplinary Problems       Occupational Therapy Goals          Problem: Occupational Therapy    Goal Priority Disciplines Outcome Interventions   Occupational Therapy Goal     OT, PT/OT Ongoing, Progressing    Description: Goals to be met by: 7/15/23     Patient will  increase functional independence with ADLs by performing:    UE Dressing with Modified [place].  LE Dressing with Minimal Assistance.  Grooming while seated with Modified [place].  Toileting from bedside commode with Minimal Assistance for hygiene and clothing management.   Bathing from  shower chair/bench with Minimal Assistance.  Toilet transfer to bedside commode with Minimal Assistance.  Increased functional strength to WFL for ADL's/IADL's.                         Time Tracking:     OT Date of Treatment: 06/19/23  OT Start Time: 1233  OT Stop Time: 1315  OT Total Time (min): 42 min    Billable Minutes:Therapeutic Activity 27  Therapeutic Exercise 15    OT/CECELIA: OT     Number of CECELIA visits since last OT visit: 0    6/19/2023

## 2023-06-19 NOTE — SUBJECTIVE & OBJECTIVE
Interval History:  Patient seen and examined.  Daughter at bedside.  Discussed with patient that I encouraged her going to a rehab facility so she can make more progress with therapy before going home and she is agreeable.  Hemoglobin 8.8 today.  Reddy in place per Urology.    Review of Systems   Musculoskeletal:  Positive for arthralgias, gait problem and myalgias.   Objective:     Vital Signs (Most Recent):  Temp: 98.4 °F (36.9 °C) (06/19/23 1015)  Pulse: 94 (06/19/23 1015)  Resp: 18 (06/19/23 1015)  BP: (!) 124/58 (06/19/23 1015)  SpO2: 98 % (06/19/23 1015) Vital Signs (24h Range):  Temp:  [98.1 °F (36.7 °C)-99.6 °F (37.6 °C)] 98.4 °F (36.9 °C)  Pulse:  [] 94  Resp:  [16-18] 18  SpO2:  [92 %-98 %] 98 %  BP: ()/(47-65) 124/58     Weight: 53.5 kg (118 lb)  Body mass index is 20.9 kg/m².    Intake/Output Summary (Last 24 hours) at 6/19/2023 1157  Last data filed at 6/19/2023 0449  Gross per 24 hour   Intake 2703.43 ml   Output 2050 ml   Net 653.43 ml           Physical Exam  Vitals and nursing note reviewed.   Constitutional:       General: She is not in acute distress.  HENT:      Head: Normocephalic and atraumatic.      Right Ear: External ear normal.      Left Ear: External ear normal.      Nose: Nose normal.      Mouth/Throat:      Mouth: Mucous membranes are moist.      Pharynx: Oropharynx is clear.   Eyes:      Extraocular Movements: Extraocular movements intact.      Conjunctiva/sclera: Conjunctivae normal.   Cardiovascular:      Rate and Rhythm: Normal rate and regular rhythm.      Pulses: Normal pulses.      Heart sounds: Normal heart sounds.   Pulmonary:      Effort: Pulmonary effort is normal.      Breath sounds: Normal breath sounds.   Abdominal:      General: Bowel sounds are normal.      Palpations: Abdomen is soft.   Musculoskeletal:         General: Swelling present. No deformity.      Cervical back: Normal range of motion and neck supple.      Right lower leg: No edema.      Left lower  leg: No edema.   Skin:     General: Skin is warm and dry.      Comments: Dressings clean, dry and intact.   Neurological:      Mental Status: She is alert.      Motor: Weakness present.   Psychiatric:         Mood and Affect: Mood normal.         Behavior: Behavior normal.           Significant Labs: All pertinent labs within the past 24 hours have been reviewed.    Significant Imaging: I have reviewed all pertinent imaging results/findings within the past 24 hours.

## 2023-06-19 NOTE — PLAN OF CARE
Problem: Occupational Therapy  Goal: Occupational Therapy Goal  Description: Goals to be met by: 7/15/23     Patient will increase functional independence with ADLs by performing:    UE Dressing with Modified [place].  LE Dressing with Minimal Assistance.  Grooming while seated with Modified [place].  Toileting from bedside commode with Minimal Assistance for hygiene and clothing management.   Bathing from  shower chair/bench with Minimal Assistance.  Toilet transfer to bedside commode with Minimal Assistance.  Increased functional strength to WFL for ADL's/IADL's.    Outcome: Ongoing, Progressing

## 2023-06-19 NOTE — PLAN OF CARE
Problem: Adult Inpatient Plan of Care  Goal: Plan of Care Review  Outcome: Ongoing, Progressing  Goal: Patient-Specific Goal (Individualized)  Outcome: Ongoing, Progressing  Goal: Absence of Hospital-Acquired Illness or Injury  Outcome: Ongoing, Progressing  Goal: Optimal Comfort and Wellbeing  Outcome: Ongoing, Progressing  Goal: Readiness for Transition of Care  Outcome: Ongoing, Progressing     Problem: Infection  Goal: Absence of Infection Signs and Symptoms  Outcome: Ongoing, Progressing     Problem: Diabetes Comorbidity  Goal: Blood Glucose Level Within Targeted Range  Outcome: Ongoing, Progressing     Problem: Fall Injury Risk  Goal: Absence of Fall and Fall-Related Injury  Outcome: Ongoing, Progressing     Problem: Skin Injury Risk Increased  Goal: Skin Health and Integrity  Outcome: Ongoing, Progressing   Plan of care reviewed with patient. Patient verbalized complete understanding. Potassium, Phosphate and Mag replaced. TB skin test administered to right FA. Reddy care maintained. Two hour patient rounding maintained throughout shift. All fall precautions maintained. Bed in lowest position, locked, call light within reach. Side rails up x 2. Slip resistant socks maintained. Needs attended to.

## 2023-06-19 NOTE — ASSESSMENT & PLAN NOTE
Patient's FSGs are controlled on current medication regimen.  Last A1c reviewed-   Lab Results   Component Value Date    HGBA1C 6.1 (H) 01/09/2023     Most recent fingerstick glucose reviewed-   Recent Labs   Lab 06/18/23  1646 06/18/23  2114 06/19/23  0739 06/19/23  1152   POCTGLUCOSE 158* 189* 131* 137*     Current correctional scale  Medium  Maintain anti-hyperglycemic dose as follows-   Antihyperglycemics (From admission, onward)    Start     Stop Route Frequency Ordered    06/15/23 1815  insulin aspart U-100 pen 1-10 Units         -- SubQ Before meals & nightly PRN 06/15/23 1716        Hold Oral hypoglycemics while patient is in the hospital.

## 2023-06-20 VITALS
WEIGHT: 118 LBS | OXYGEN SATURATION: 97 % | RESPIRATION RATE: 17 BRPM | BODY MASS INDEX: 20.91 KG/M2 | TEMPERATURE: 98 F | DIASTOLIC BLOOD PRESSURE: 66 MMHG | HEIGHT: 63 IN | HEART RATE: 88 BPM | SYSTOLIC BLOOD PRESSURE: 139 MMHG

## 2023-06-20 LAB
ALBUMIN SERPL BCP-MCNC: 2 G/DL (ref 3.5–5.2)
ALP SERPL-CCNC: 70 U/L (ref 55–135)
ALT SERPL W/O P-5'-P-CCNC: 27 U/L (ref 10–44)
ANION GAP SERPL CALC-SCNC: 11 MMOL/L (ref 8–16)
AST SERPL-CCNC: 40 U/L (ref 10–40)
BASOPHILS # BLD AUTO: 0.06 K/UL (ref 0–0.2)
BASOPHILS NFR BLD: 0.4 % (ref 0–1.9)
BILIRUB SERPL-MCNC: 0.5 MG/DL (ref 0.1–1)
BUN SERPL-MCNC: 11 MG/DL (ref 8–23)
CALCIUM SERPL-MCNC: 7.8 MG/DL (ref 8.7–10.5)
CHLORIDE SERPL-SCNC: 104 MMOL/L (ref 95–110)
CO2 SERPL-SCNC: 21 MMOL/L (ref 23–29)
CREAT SERPL-MCNC: 0.6 MG/DL (ref 0.5–1.4)
DIFFERENTIAL METHOD: ABNORMAL
EOSINOPHIL # BLD AUTO: 0.6 K/UL (ref 0–0.5)
EOSINOPHIL NFR BLD: 4.3 % (ref 0–8)
ERYTHROCYTE [DISTWIDTH] IN BLOOD BY AUTOMATED COUNT: 13.2 % (ref 11.5–14.5)
EST. GFR  (NO RACE VARIABLE): >60 ML/MIN/1.73 M^2
GLUCOSE SERPL-MCNC: 100 MG/DL (ref 70–110)
HCT VFR BLD AUTO: 28.3 % (ref 37–48.5)
HGB BLD-MCNC: 9.2 G/DL (ref 12–16)
IMM GRANULOCYTES # BLD AUTO: 0.07 K/UL (ref 0–0.04)
IMM GRANULOCYTES NFR BLD AUTO: 0.5 % (ref 0–0.5)
LYMPHOCYTES # BLD AUTO: 2.8 K/UL (ref 1–4.8)
LYMPHOCYTES NFR BLD: 20.9 % (ref 18–48)
MAGNESIUM SERPL-MCNC: 1.5 MG/DL (ref 1.6–2.6)
MCH RBC QN AUTO: 28.7 PG (ref 27–31)
MCHC RBC AUTO-ENTMCNC: 32.5 G/DL (ref 32–36)
MCV RBC AUTO: 88 FL (ref 82–98)
MONOCYTES # BLD AUTO: 1.2 K/UL (ref 0.3–1)
MONOCYTES NFR BLD: 8.8 % (ref 4–15)
NEUTROPHILS # BLD AUTO: 8.7 K/UL (ref 1.8–7.7)
NEUTROPHILS NFR BLD: 65.1 % (ref 38–73)
NRBC BLD-RTO: 0 /100 WBC
PHOSPHATE SERPL-MCNC: 2.2 MG/DL (ref 2.7–4.5)
PLATELET # BLD AUTO: 220 K/UL (ref 150–450)
PMV BLD AUTO: 10.5 FL (ref 9.2–12.9)
POCT GLUCOSE: 121 MG/DL (ref 70–110)
POCT GLUCOSE: 175 MG/DL (ref 70–110)
POTASSIUM SERPL-SCNC: 4.1 MMOL/L (ref 3.5–5.1)
PROT SERPL-MCNC: 4.9 G/DL (ref 6–8.4)
RBC # BLD AUTO: 3.21 M/UL (ref 4–5.4)
SODIUM SERPL-SCNC: 136 MMOL/L (ref 136–145)
WBC # BLD AUTO: 13.37 K/UL (ref 3.9–12.7)

## 2023-06-20 PROCEDURE — 63600175 PHARM REV CODE 636 W HCPCS: Performed by: ORTHOPAEDIC SURGERY

## 2023-06-20 PROCEDURE — 94799 UNLISTED PULMONARY SVC/PX: CPT

## 2023-06-20 PROCEDURE — 97110 THERAPEUTIC EXERCISES: CPT

## 2023-06-20 PROCEDURE — 84100 ASSAY OF PHOSPHORUS: CPT | Performed by: ORTHOPAEDIC SURGERY

## 2023-06-20 PROCEDURE — 63600175 PHARM REV CODE 636 W HCPCS: Performed by: STUDENT IN AN ORGANIZED HEALTH CARE EDUCATION/TRAINING PROGRAM

## 2023-06-20 PROCEDURE — 97116 GAIT TRAINING THERAPY: CPT

## 2023-06-20 PROCEDURE — 99222 PR INITIAL HOSPITAL CARE,LEVL II: ICD-10-PCS | Mod: ,,, | Performed by: PHYSICAL MEDICINE & REHABILITATION

## 2023-06-20 PROCEDURE — 83735 ASSAY OF MAGNESIUM: CPT | Performed by: ORTHOPAEDIC SURGERY

## 2023-06-20 PROCEDURE — 36415 COLL VENOUS BLD VENIPUNCTURE: CPT | Performed by: ORTHOPAEDIC SURGERY

## 2023-06-20 PROCEDURE — 80053 COMPREHEN METABOLIC PANEL: CPT | Performed by: ORTHOPAEDIC SURGERY

## 2023-06-20 PROCEDURE — 25000003 PHARM REV CODE 250: Performed by: ORTHOPAEDIC SURGERY

## 2023-06-20 PROCEDURE — 85025 COMPLETE CBC W/AUTO DIFF WBC: CPT | Performed by: ORTHOPAEDIC SURGERY

## 2023-06-20 PROCEDURE — 25000003 PHARM REV CODE 250: Performed by: HOSPITALIST

## 2023-06-20 PROCEDURE — 94761 N-INVAS EAR/PLS OXIMETRY MLT: CPT

## 2023-06-20 PROCEDURE — 99222 1ST HOSP IP/OBS MODERATE 55: CPT | Mod: ,,, | Performed by: PHYSICAL MEDICINE & REHABILITATION

## 2023-06-20 RX ORDER — HYDROCODONE BITARTRATE AND ACETAMINOPHEN 5; 325 MG/1; MG/1
1 TABLET ORAL EVERY 6 HOURS PRN
Refills: 0
Start: 2023-06-20 | End: 2023-08-08 | Stop reason: CLARIF

## 2023-06-20 RX ADMIN — CLOPIDOGREL BISULFATE 75 MG: 75 TABLET, FILM COATED ORAL at 09:06

## 2023-06-20 RX ADMIN — OXYCODONE HYDROCHLORIDE AND ACETAMINOPHEN 500 MG: 500 TABLET ORAL at 09:06

## 2023-06-20 RX ADMIN — ASPIRIN 81 MG: 81 TABLET, COATED ORAL at 09:06

## 2023-06-20 RX ADMIN — SODIUM CHLORIDE, POTASSIUM CHLORIDE, SODIUM LACTATE AND CALCIUM CHLORIDE: 600; 310; 30; 20 INJECTION, SOLUTION INTRAVENOUS at 03:06

## 2023-06-20 RX ADMIN — POTASSIUM & SODIUM PHOSPHATES POWDER PACK 280-160-250 MG 1 PACKET: 280-160-250 PACK at 09:06

## 2023-06-20 RX ADMIN — POTASSIUM & SODIUM PHOSPHATES POWDER PACK 280-160-250 MG 1 PACKET: 280-160-250 PACK at 12:06

## 2023-06-20 RX ADMIN — INSULIN ASPART 2 UNITS: 100 INJECTION, SOLUTION INTRAVENOUS; SUBCUTANEOUS at 12:06

## 2023-06-20 RX ADMIN — THERA TABS 1 TABLET: TAB at 09:06

## 2023-06-20 RX ADMIN — PANTOPRAZOLE SODIUM 40 MG: 40 TABLET, DELAYED RELEASE ORAL at 09:06

## 2023-06-20 RX ADMIN — ATORVASTATIN CALCIUM 80 MG: 40 TABLET, FILM COATED ORAL at 09:06

## 2023-06-20 RX ADMIN — MUPIROCIN: 20 OINTMENT TOPICAL at 09:06

## 2023-06-20 NOTE — PT/OT/SLP PROGRESS
Physical Therapy Treatment    Patient Name:  Genny Watson   MRN:  [id number]    Recommendations:     Discharge Recommendations: rehabilitation facility  Discharge Equipment Recommendations: to be determined by next level of care  Barriers to discharge: None    Assessment:     Genny Watson is a 87 y.o. female admitted with a medical diagnosis of Traumatic closed displaced fracture of base of neck of right femur, initial encounter.  She presents with the following impairments/functional limitations: weakness, impaired endurance, impaired functional mobility, gait instability, impaired balance, decreased lower extremity function, pain, decreased ROM, edema, orthopedic precautions .  Patient agreeable to PT treatment this morning.  Patient presented supine in bed and required mod assist to transfer to sitting and then min assist to stand with a RW and WBAT right LE.  Patient then ambulated x 50 feet with a RW and min assist.    Rehab Prognosis: Good; patient would benefit from acute skilled PT services to address these deficits and reach maximum level of function.    Recent Surgery: Procedure(s) (LRB):  ARTHROPLASTY, HIP (Right) 4 Days Post-Op    Plan:     During this hospitalization, patient to be seen BID to address the identified rehab impairments via gait training, therapeutic activities, therapeutic exercises and progress toward the following goals:    Plan of Care Expires:  07/17/23    Subjective     Chief Complaint: fatigue  Patient/Family Comments/goals: go to rehab facility  Pain/Comfort:  Pain Rating 1: 0/10  Location - Side 1: Right  Location - Orientation 1: lower  Location 1: hip  Pain Addressed 1: Reposition, Cessation of Activity  Pain Rating Post-Intervention 1: 1/10      Objective:     Communicated with nurse prior to session.  Patient found supine with bed alarm, yung catheter upon PT entry to room.     General Precautions: Standard, fall  Orthopedic Precautions: RLE weight  bearing as tolerated, RLE posterior precautions  Braces: N/A  Respiratory Status: Room air     Functional Mobility:  Bed Mobility:     Supine to Sit: moderate assistance  Transfers:     Sit to Stand:  minimum assistance with rolling walker  Gait: x 50 feet RW min assist and WBAT right LE      AM-PAC 6 CLICK MOBILITY          Treatment & Education:  Exercise to include ankle pumps, quad sets, heel slides hip abd and LAQ.  All done right LE x 10 reps.  Gait training x 50 feet with RW with min assist and slow step to gait pattern.    Patient left up in chair with call button in reach, chair alarm on, and nurse notified..    GOALS:   Multidisciplinary Problems       Physical Therapy Goals          Problem: Physical Therapy    Goal Priority Disciplines Outcome Goal Variances Interventions   Physical Therapy Goal     PT, PT/OT Ongoing, Progressing     Description: Goals to be met by: 2023     Patient will increase functional independence with mobility by performin. Supine to sit with MInimal Assistance  2. Sit to supine with MInimal Assistance  3. Sit to stand transfer with Minimal Assistance  4. Bed to chair transfer with Minimal Assistance using Rolling Walker  5. Gait  x >/= 50 feet with Minimal Assistance using Rolling Walker.                          Time Tracking:     PT Received On: 23  PT Start Time: 841     PT Stop Time: 905  PT Total Time (min): 24 min     Billable Minutes: Gait Training 12 and Therapeutic Exercise 12    Treatment Type: Treatment  PT/PTA: PT     Number of PTA visits since last PT visit: 0     2023

## 2023-06-20 NOTE — PLAN OF CARE
Pt clear for DC from case management standpoint. Discharging to [facility]     06/20/23 1103   Final Note   Assessment Type Final Discharge Note   Anticipated Discharge Disposition Rehab

## 2023-06-20 NOTE — DISCHARGE INSTRUCTIONS
[facility] Transfer Orders        Admit to: Rehab    Diagnoses:   Active Hospital Problems    Diagnosis  POA    *Traumatic closed displaced fracture of base of neck of right femur, initial encounter [S72.041A]  Yes    Postprocedural hypotension [I95.81]  No    Postoperative anemia [D64.9]  Yes    Acute cystitis with hematuria [N30.01]  Yes    Gimenez's esophagus without dysplasia [K22.70]  Yes    CAD (coronary artery disease) [I25.10]  Yes    Hypertension associated with diabetes [E11.59, I15.2]  Yes    Type 2 diabetes mellitus, dx 4/2012 [E11.9]  Yes    Hyperlipidemia associated with type 2 diabetes mellitus [E11.69, E78.5]  Yes      Resolved Hospital Problems    Diagnosis Date Resolved POA    Hypophosphatemia [E83.39] 06/17/2023 Clinically Undetermined    Hypomagnesemia [E83.42] 06/17/2023 Clinically Undetermined    Hypokalemia [E87.6] 06/16/2023 Yes     Allergies:   Review of patient's allergies indicates:   Allergen Reactions    Sulfa (sulfonamide antibiotics)      Other reaction(s): Itching       Code Status: Full    Vitals: Routine       Diet: diabetic diet: 2000 calorie    Activity: Activity as tolerated    Nursing Precautions: Aspiration , Fall, Seizure, and Pressure ulcer prevention    Bed/Surface: Low Air Loss    Consults: PT to evaluate and treat- 7 times a week and OT to evaluate and treat- 7 times a week    Oxygen: room air    Dialysis: Patient is not on dialysis.     Labs: CBC and CMP weekly  Pending Diagnostic Studies:       None          Imaging: none    Miscellaneous Care:   Reddy Care: Empty Reddy bag every shift.  Change Reddy every month  Diabetes Care: Diabetes: Check blood sugar. Fingerstick blood sugar AC and HS  Sliding Scale/Hypoglycemia Protocol: **MODERATE CORRECTION DOSE**  Blood Glucose  mg/dL    Pre-meal     Bedtime  151-200  2 units        1 unit  201-250   4 units       2 units   251-300  6 units        3 units   301-350   8 units       4 units   >350       10 units        5 units  **CALL MD for BG >350**    IV Access: Peripheral     Medications: Discontinue all previous medication orders, if any. See new list below.  Current Discharge Medication List        START taking these medications    Details   HYDROcodone-acetaminophen (NORCO) 5-325 mg per tablet Take 1 tablet by mouth every 6 (six) hours as needed for Pain.  Refills: 0    Comments: Quantity prescribed more than 7 day supply? No           CONTINUE these medications which have NOT CHANGED    Details   ascorbic acid, vitamin C, (VITAMIN C) 500 MG tablet Take 500 mg by mouth once daily.      aspirin (ECOTRIN) 81 MG EC tablet Take 81 mg by mouth once daily.      atorvastatin (LIPITOR) 80 MG tablet TAKE 1 TABLET DAILY  Qty: 90 tablet, Refills: 3      clopidogreL (PLAVIX) 75 mg tablet Take 1 tablet (75 mg total) by mouth once daily.  Qty: 90 tablet, Refills: 3      estradioL (ESTRACE) 0.01 % (0.1 mg/gram) vaginal cream USE 1 GM VAGINALLY TWICE WEEKLY  Qty: 42.5 g, Refills: 5      ferrous sulfate (FEOSOL) 325 mg (65 mg iron) Tab tablet Take 325 mg by mouth daily with breakfast.      latanoprost 0.005 % ophthalmic solution Place 1 drop into both eyes every evening.      loratadine (CLARITIN) 10 mg tablet Take 1 tablet (10 mg total) by mouth once daily.  Qty: 30 tablet, Refills: 11    Associated Diagnoses: Seasonal allergic rhinitis, unspecified trigger      metFORMIN (GLUCOPHAGE-XR) 500 MG ER 24hr tablet Take 1 tablet (500 mg total) by mouth every evening.  Qty: 90 tablet, Refills: 1    Associated Diagnoses: Type 2 diabetes mellitus with other circulatory complication, without long-term current use of insulin      pantoprazole (PROTONIX) 40 MG tablet Take 1 tablet (40 mg total) by mouth once daily.  Qty: 90 tablet, Refills: 3    Associated Diagnoses: Gimenez's esophagus without dysplasia      prednisoLONE acetate (PRED FORTE) 1 % DrpS Place 1 drop into the right eye once daily.       TRAVATAN Z 0.004 % ophthalmic  solution Place 1 drop into both eyes every evening.       VIT C/E/ZN/COPPR/LUTEIN/ZEAXAN (PRESERVISION AREDS 2 ORAL) Take 1 capsule by mouth 2 (two) times daily.           STOP taking these medications       amLODIPine (NORVASC) 10 MG tablet Comments:   Reason for Stopping:         losartan (COZAAR) 25 MG tablet Comments:   Reason for Stopping:         metoprolol succinate (TOPROL-XL) 50 MG 24 hr tablet Comments:   Reason for Stopping:             Follow up:    Follow-up Information       Josh Douglas II, MD Follow up on 7/6/2023.    Specialty: Orthopedic Surgery  Why: at 8:45 AM for post op visit  Contact information:  [address] [place] [address] [address] [place] [address]  [phone]               Reginald Henderson MD Follow up on 7/5/2023.    Specialty: Urology  Why: 9:20 AM for hospital follow up  Contact information:  [address]  [address]  [place] [address]  [phone]                               Immunizations Administered as of 6/20/2023       Name Date Dose VIS Date Route Exp Date    COVID-19, MRNA, LN-S, PF (Moderna) 12/15/2021 -- -- -- --    External: Patient reported     COVID-19, vector-nr, rS-Ad26 (J&J) 3/12/2021 -- -- Intramuscular --    Site: Left arm     Lot: [id number]     COVID-19, vector-nr, rS-Ad26 (J&J) 3/12/2021 0.5 mL -- Intramuscular --    Site: Left arm     Lot: [id number]

## 2023-06-20 NOTE — PLAN OF CARE
DC orders sent to [facility] via [email or web address]     06/20/23 6525   Post-Acute Status   Post-Acute Authorization Placement   Post-Acute Placement Status Pending post-acute provider review/more information requested

## 2023-06-20 NOTE — DISCHARGE SUMMARY
[facility] Medicine  Discharge Summary      Patient Name: Genny Watson  MRN: [id number]  RAVI: [id number]  Patient Class: IP- Inpatient  Admission Date: 6/15/2023  Hospital Length of Stay: 5 days  Discharge Date and Time: 6/20/2023  2:15 PM  Attending Physician: No att. providers found   Discharging Provider: Sue Haile MD  Primary Care Provider: Josh Berman MD    Primary Care Team: Networked reference to record PCT     HPI:   Genny Watson is an 87 year old female with a past medical history of CAD s/ PCI, DM, HTN, HLD, DM, and Gimenez's esophagus who presented with a R femoral neck fracture after a fall. She endorses RLE pain and limited ROM. Orthopedic Surgery was consulted from the ED and plans to take the patient to the OR 6/16. Hospital Medicine was consulted for admission. DAPT has been held and she is NPO at midnight.      Procedure(s) (LRB):  ARTHROPLASTY, HIP (Right)      Hospital Course:   Genny Watson is an 87 year old female with a past medical history of CAD s/ PCI, DM, HTN, HLD, DM, and Gimenez's esophagus who presented with a R femoral neck fracture after a fall. She endorses RLE pain and limited ROM. Orthopedic Surgery was consulted from the ED and took the patient to the OR 6/16. DAPT was been held. Her course has been complicated by a UTI for which she completed a course of Rocephin.  Urine culture had no growth.She also developed hypotension on POD 1 with a roughly three point fall in the Hgb.  She was transfused 2 units of PRBCs.  Chemical DVT prophylaxis has been held and the patient is receiving IV fluids while her home PO medications have also been held. Hgb is being trended. PT/OT has been consulted post-operatively.  CT showed 2 small hematomas.  This was discussed with Dr. Douglas who was okay with restarting DAPT.  Patient was seen by Urology who recommended keeping Reddy in place with outpatient follow-up.  Patient was placed in  skilled nursing facility and transferred there for further therapy.  She will follow-up with ortho and urology.       Goals of Care Treatment Preferences:  Code Status: Full Code      Consults:   Consults (From admission, onward)        Status Ordering Provider     Inpatient consult to Social Work/Case Management  Once        Provider:  (Not yet assigned)    REMINGTON Khan     Inpatient consult to Orthopedics  Once        Provider:  Josh Douglas II, MD    Completed TIAGO CARSON          No new Assessment & Plan notes have been filed under this hospital service since the last note was generated.  Service: Hospital Medicine    Final Active Diagnoses:    Diagnosis Date Noted POA    PRINCIPAL PROBLEM:  Traumatic closed displaced fracture of base of neck of right femur, initial encounter [S72.041A] 06/16/2023 Yes    Postprocedural hypotension [I95.81] 06/17/2023 No    Postoperative anemia [D64.9] 06/17/2023 Yes    Acute cystitis with hematuria [N30.01] 06/16/2023 Yes    Gimenez's esophagus without dysplasia [K22.70]  Yes    CAD (coronary artery disease) [I25.10] 01/29/2015 Yes    Hypertension associated with diabetes [E11.59, I15.2] 04/30/2012 Yes    Type 2 diabetes mellitus, dx 4/2012 [E11.9] 04/30/2012 Yes    Hyperlipidemia associated with type 2 diabetes mellitus [E11.69, E78.5] 04/30/2012 Yes      Problems Resolved During this Admission:    Diagnosis Date Noted Date Resolved POA    Hypophosphatemia [E83.39] 06/16/2023 06/17/2023 Clinically Undetermined    Hypomagnesemia [E83.42] 06/16/2023 06/17/2023 Clinically Undetermined    Hypokalemia [E87.6] 06/15/2020 06/16/2023 Yes       Discharged Condition: good    Disposition: Rehab Facility    Follow Up:   Follow-up Information     Josh Douglas II, MD Follow up on 7/6/2023.    Specialty: Orthopedic Surgery  Why: at 8:45 AM for post op visit  Contact information:  [address] [place] [address] [address] [place] [address]  [phone]             Reginald  MD Santiago Follow up on 7/5/2023.    Specialty: Urology  Why: 9:20 AM for hospital follow up  Contact information:  [address] [facility]  [address] [place] [address]  [phone]                       Patient Instructions:      Notify your health care provider if you experience any of the following:  temperature >100.4     Notify your health care provider if you experience any of the following:  severe uncontrolled pain     Notify your health care provider if you experience any of the following:  redness, tenderness, or signs of infection (pain, swelling, redness, odor or green/yellow discharge around incision site)     Notify your health care provider if you experience any of the following:  persistent nausea and vomiting or diarrhea     Activity as tolerated       Significant Diagnostic Studies: Labs:   BMP:   Recent Labs   Lab 06/19/23 0317 06/20/23  0302   * 100   * 136   K 3.6 4.1    104   CO2 23 21*   BUN 9 11   CREATININE 0.5 0.6   CALCIUM 7.7* 7.8*   MG 1.5* 1.5*    and CBC   Recent Labs   Lab 06/19/23 0317 06/20/23  0302   WBC 12.50 13.37*   HGB 8.8* 9.2*   HCT 26.4* 28.3*    220     Radiology Results (last 7 days)    Procedure Component Value Units Date/Time   US Retroperitoneal Complete [[id number]] Resulted: 06/18/23 1033   Order Status: Completed Updated: 06/18/23 1036   Narrative:     EXAMINATION:   US RETROPERITONEAL COMPLETE     CLINICAL HISTORY:   urinary retention;     TECHNIQUE:   Ultrasound of the kidneys and urinary bladder was performed including color flow and Doppler evaluation of the kidneys.     COMPARISON:   None.     FINDINGS:   Right kidney: The right kidney measures 11 cm. No cortical thinning. No loss of corticomedullary distinction. Resistive index measures 0.83 which is elevated.  No mass. No renal stone. No hydronephrosis.     Left kidney: The left kidney measures 9.3 cm. No cortical thinning. No loss of corticomedullary distinction. Resistive index  measures 0.77.  No mass. No renal stone. No hydronephrosis.     The bladder is distended with a calculated volume of 431 cc at the time of scanning despite the presence of a Reddy balloon catheter in the bladder    Impression:       Elevated resistive index from the right kidney suggesting intrinsic renal disease.  The bladder is distended during this examination despite the presence of a Reddy balloon catheter.  If the catheter was not occluded for this examination than it is not working properly       Electronically signed by: Royal Slaughter MD   Date: 06/18/2023   Time: 10:33   CT Hip With Contrast Right [[id number]] Resulted: 06/17/23 1525   Order Status: Completed Updated: 06/17/23 1528   Narrative:     EXAMINATION:   CT HIP WITH CONTRAST RIGHT     CLINICAL HISTORY:   Hematoma;     TECHNIQUE:   Axial images are obtained through the right hip following administration of 75 cc shilpi Omnipaque 350 IV contrast.  Multiplanar reformatted images are displayed at soft tissue and bone windows.     COMPARISON:   Plain x-rays of Tamiko 15, 2025.     FINDINGS:   The patient has undergone a recent right hip arthroplasty with acetabular and femoral components in position.  A fracture of a round the prosthesis in the femur or acetabulum or pelvic bone is not seen.  Dislocation is not noted.     As expected so soon after operation there are air bubbles scattered throughout the pelvic and thigh musculature.  In the incision there is some high density material consistent with a small 2 part hematoma.  The upper portion measures approximately 3 cm in maximum diameter and the lower portion is more oblong measuring 4.9 by 1.7 cm in diameter.  A larger hematoma is not seen.     In this postcontrast study there is visualization of the common iliac, external and internal iliac arteries which are patent.  Branches of the internal iliac passing through the gluteus musculature are identified.  Active extravasation of contrasted blood is not  seen.    Impression:       Status post recent right hip arthroplasty.  There are air bubbles in the musculature and soft tissues of the pelvis and thigh as expected.  Small high density collections in the incision are felt to represent hematomas measuring 3 cm and 4.9 cm.  A larger hematoma is not noted.  Active extravasation of contrasted blood is not seen from the visualized vessels around the right hip.       Electronically signed by: Royal Slaughter MD   Date: 06/17/2023   Time: 15:25   X-Ray Pelvis Routine AP [[id number]] Resulted: 06/16/23 1827   Order Status: Completed Updated: 06/16/23 1830   Narrative:     EXAMINATION:   XR PELVIS ROUTINE AP     CLINICAL HISTORY:   s/p right hip hemiarthroplasty;     TECHNIQUE:   AP view of the pelvis was performed.     COMPARISON:   06/15/2023     FINDINGS:   Right hip arthroplasty with the orthopedic hardware as visualized in the single AP projection appearing in place and intact.  Soft tissue air which can be attributed to a postoperative basis    Impression:       As above       Electronically signed by: Yohana Mckeon MD   Date: 06/16/2023   Time: 18:27   CT Head Without Contrast [[id number]] Resulted: 06/15/23 1633   Order Status: Completed Updated: 06/15/23 1635   Narrative:     EXAMINATION:   CT HEAD WITHOUT CONTRAST     CLINICAL HISTORY:   Head trauma, minor (Age >= 65y);     TECHNIQUE:   Low dose axial images were obtained through the head.  Coronal and sagittal reformations were also performed. Contrast was not administered.     COMPARISON:   None.     FINDINGS:   Ventricles and sulci are normal in size for age without evidence of hydrocephalus.     Moderate scattered hypoattenuation within the supratentorial white matter, nonspecific but probably reflecting sequelae of chronic microvascular ischemic change.   No definite parenchymal mass, hemorrhage, edema or acute major vascular distribution infarct.     No extra-axial blood or fluid collections.     No  displaced calvarial fracture.     The mastoid air cells and visualized paranasal sinuses are essentially clear.     Calcific atherosclerosis of the internal carotid and vertebral arteries at the skull base.    Impression:       1. No evidence of an acute intracranial abnormality.   2.  Mild generalized cerebral volume loss and moderate scattered supratentorial white matter hypoattenuation, nonspecific and may reflect sequelae of chronic microvascular ischemic change.       Electronically signed by: Marty Bryan   Date: 06/15/2023   Time: 16:33   X-Ray Hips Bilateral 2 View Incl AP Pelvis [[id number]] Resulted: 06/15/23 1615   Order Status: Completed Updated: 06/15/23 1617   Narrative:     EXAMINATION:   XR HIPS BILATERAL 2 VIEW INCL AP PELVIS     CLINICAL HISTORY:   Pain, unspecified     TECHNIQUE:   AP view of the pelvis and frogleg lateral views of both hips were performed.     COMPARISON:   None.     FINDINGS:   There is transverse fracture of the neck of the right femur with proximal shift of the shaft of the femur in relation to the hip joint.  The femoral head remains in the acetabulum.  The bones of the pelvis and left hip are intact.  The sacroiliac joints are sharp and symmetric.  Degenerative disc disease is seen at several lower back levels.    Impression:       Fracture of the right femoral neck with displacement of the femur proximally.  Degenerative disc disease at multiple levels of the lumbar spine       Electronically signed by: Royal Slaughter MD   Date: 06/15/2023   Time: 16:15   X-Ray Knee 3 View Right [[id number]] Resulted: 06/15/23 1613   Order Status: Completed Updated: 06/15/23 1616   Narrative:     EXAMINATION:   XR KNEE 3 VIEW RIGHT     CLINICAL HISTORY:   Pain, unspecified     TECHNIQUE:   AP, lateral, and Merchant views of the right knee were performed.     COMPARISON:   None     FINDINGS:   A fracture of the femur, patella, tibia or fibula is not seen on these limited images.  There is  narrowing of both the medial and lateral intercondylar joint space and spur formation of the patella femur and tibia consistent with moderate osteoarthritis.  A joint effusion is not seen.    Impression:       Moderate osteoarthritis right knee.  On these limited views a fracture is not seen       Electronically signed by: Royal Slaughter MD   Date: 06/15/2023   Time: 16:13   X-Ray Femur Ap/Lat Right [[id number]] Resulted: 06/15/23 1613   Order Status: Completed Updated: 06/15/23 1615   Narrative:     EXAMINATION:   XR FEMUR 2 VIEW RIGHT     CLINICAL HISTORY:   Pain, unspecified     TECHNIQUE:   AP and lateral views of the right femur were performed.     COMPARISON:   None     FINDINGS:   There is transverse fracture of the femoral neck and proximal shift of the femur in relation to the femoral head.  The femoral head remains in the acetabulum.  A fracture of the pelvis is not seen.  The rest of the femur appears to be intact.    Impression:       Fracture of the right femoral neck with proximal displacement of the shaft of the femur.       Electronically signed by: Royal Slaughter MD   Date: 06/15/2023   Time: 16:13   X-Ray Chest 1 View [[id number]] Resulted: 06/15/23 1612   Order Status: Completed Updated: 06/15/23 1614   Narrative:     EXAMINATION:   XR CHEST 1 VIEW     CLINICAL HISTORY:   pre op;     TECHNIQUE:   Single frontal view of the chest was performed.     COMPARISON:   Chest of April 11, 2015     FINDINGS:   The mediastinal and cardiac size contours are normal.  No intrapulmonary masses or infiltrates are seen.  No pneumothorax or pleural effusion is noted.  Degenerative changes are noted of both shoulders.    Impression:       Bilateral shoulder DJD otherwise negative chest x-ray       Electronically signed by: Royal Slaughter MD   Date: 06/15/2023   Time: 16:12   Microbiology Results (Last 365 Days)    Procedure Component Value Units Date/Time   Urine culture [[id number]] Collected: 06/16/23 3470    Order Status: Completed Specimen: Urine Updated: 06/17/23 1400    Urine Culture, Routine No growth   Narrative:     Specimen Source->Urine   Urine culture [[id number]] Collected: 05/02/23 1037   Order Status: Completed Specimen: Urine Updated: 05/03/23 2259    Urine Culture, Routine No significant growth   Narrative:     Specimen Source->Urine         Pending Diagnostic Studies:     None         Medications:  Reconciled Home Medications:      Medication List      START taking these medications    HYDROcodone-acetaminophen 5-325 mg per tablet  Commonly known as: NORCO  Take 1 tablet by mouth every 6 (six) hours as needed for Pain.        CONTINUE taking these medications    ascorbic acid (vitamin C) 500 MG tablet  Commonly known as: VITAMIN C  Take 500 mg by mouth once daily.     aspirin 81 MG EC tablet  Commonly known as: ECOTRIN  Take 81 mg by mouth once daily.     atorvastatin 80 MG tablet  Commonly known as: LIPITOR  TAKE 1 TABLET DAILY     clopidogreL 75 mg tablet  Commonly known as: PLAVIX  Take 1 tablet (75 mg total) by mouth once daily.     estradioL 0.01 % (0.1 mg/gram) vaginal cream  Commonly known as: ESTRACE  USE 1 GM VAGINALLY TWICE WEEKLY     ferrous sulfate 325 mg (65 mg iron) Tab tablet  Commonly known as: FEOSOL  Take 325 mg by mouth daily with breakfast.     latanoprost 0.005 % ophthalmic solution  Place 1 drop into both eyes every evening.     loratadine 10 mg tablet  Commonly known as: CLARITIN  Take 1 tablet (10 mg total) by mouth once daily.     metFORMIN 500 MG ER 24hr tablet  Commonly known as: GLUCOPHAGE-XR  Take 1 tablet (500 mg total) by mouth every evening.     pantoprazole 40 MG tablet  Commonly known as: PROTONIX  Take 1 tablet (40 mg total) by mouth once daily.     prednisoLONE acetate 1 % Drps  Commonly known as: PRED FORTE  Place 1 drop into the right eye once daily.     PRESERVISION AREDS 2 ORAL  Take 1 capsule by mouth 2 (two) times daily.     TRAVATAN Z 0.004 % ophthalmic  solution  Generic drug: travoprost  Place 1 drop into both eyes every evening.        STOP taking these medications    amLODIPine 10 MG tablet  Commonly known as: NORVASC     losartan 25 MG tablet  Commonly known as: COZAAR     metoprolol succinate 50 MG 24 hr tablet  Commonly known as: TOPROL-XL            Indwelling Lines/Drains at time of discharge:   Lines/Drains/Airways     Drain  Duration                Urethral Catheter 06/15/23 1520 16 Fr. 5 days                Time spent on the discharge of patient: 35 minutes         Sue Haile MD  Department of Hospital Medicine  [facility]

## 2023-06-20 NOTE — PROGRESS NOTES
PIV and Tele removed. Personal belongins in hand. Catheter in place per urology. Pt transported via wheelchair to transport vehicle. Transporting to [facility].

## 2023-06-20 NOTE — PT/OT/SLP PROGRESS
Physical Therapy      Patient Name:  Genny Watson   MRN:  [id number]    Patient not seen today secondary to Patient unwilling to participate. Will follow-up 6/21/23.

## 2023-06-20 NOTE — PLAN OF CARE
POC discussed with patient, verbalized understanding. Patient with uneventful night, slept well between care. VS stable. Surgical dressing R hip CDI. NV wnl. Denies pain. Reddy with yellow urine. NSR on telemetry. Call light at bedside, bed alarm in use.

## 2023-06-20 NOTE — PLAN OF CARE
Per Luz Maria with [facility], report can be called to [phone]. Luz Maria arranged transportation for 2:15PM     06/20/23 1100   Post-Acute Status   Post-Acute Authorization Placement   Post-Acute Placement Status Set-up Complete/Auth obtained

## 2023-06-21 NOTE — PHYSICIAN QUERY
PT Name: Genny Watson  MR #: [id number]    DOCUMENTATION CLARIFICATION      CDS/: LASHAE Cox, RN               Contact information:[email or web address]    This form is a permanent document in the medical record.      Query Date: June 21, 2023    By submitting this query, we are merely seeking further clarification of documentation. Please utilize your independent clinical judgment when addressing the question(s) below.    The Medical Record contains the following:   Indicators  Supporting Clinical Findings Location in Medical Record    x Anemia documented Postoperative anemia Hospital Medicine Progress Notes 06/17/2023      H&H      x BP                    HR BP: ()/(50-79)                   Pulse:  [66-94]  Hospital Medicine Progress Notes 06/17/2023      Bleeding      x Procedure/Surgery Performed/EBL Procedure:ARTHROPLASTY, HIP (Right)   Estimated Blood Loss (EBL): 150 mL Orthopedic Surgery Op Note 06/16/2023      x Transfusion(s) Transfuse RBC 2 Units  Transfusion Orders 06/17/2023    x Acute/Chronic illness Traumatic closed displaced fracture of base of neck of right femur, Postoperative anemia,  Postprocedural hypotension, Acute cystitis with hematuria, Gimenez's esophagus without dysplasia,  CAD ( coronary artery disease), Hyperlipidemia associated with type 2 diabetes mellitus, Type 2 diabetes mellitus, Hypertension associated with diabetes   Hospital Medicine Progress Notes 06/17/2023      x Treatments -Trend Hgb with CBC  -Continue IV fluids  -Hold all anticoagulation    Hgb is being trended.    She also developed hypotension on POD 1 with a roughly three point fall in the Hgb.  She was transfused 2 units of PRBCs.   Hospital Medicine Progress Notes 06/17/2023          Hospital Medicine Discharge Summary 06/20/2023    x Other She also developed hypotension on POD 1 with a roughly three point fall in the Hgb. Hospital Medicine Progress Notes 06/17/2023     Provider, please specify  diagnosis or diagnoses associated with above clinical findings.   [   ] Acute blood loss anemia    [   x] Acute blood loss anemia expected post-operatively    [   ] Anemia, unspecified    [   ] Other Hematological Diagnosis (please specify): _________________   [   ] Clinically Undetermined              Please document in your progress notes daily for the duration of treatment, until resolved, and include in your discharge summary.    Form No. [id number]

## 2023-06-21 NOTE — PHYSICIAN QUERY
PT Name: Genny Watson  MR #: [id number]    DOCUMENTATION CLARIFICATION      CDS/: LASHAE Cox, RN               Contact information:[email or web address]     This form is a permanent document in the medical record.     Query Date: June 21, 2023    By submitting this query, we are merely seeking further clarification of documentation. Please utilize your independent clinical judgment when addressing the question(s) below.    The Medical Record contains the following:   Indicators   Supporting Clinical Findings Location in Medical Record    x Hypertension associated with diabetes documented  Hypertension associated with diabetes Hospital Medicine Progress Notes 06/16/2023      x Chronic condition(s) Gimenez's esophagus without dysplasia  CAD (coronary artery disease)  Hyperlipidemia associated with type 2 diabetes mellitus  Type 2 diabetes mellitus  Hypertension associated with diabetes   Hospital Medicine Progress Notes 06/16/2023      x Vital signs Temp:  [97.2 °F (36.2 °C)-98.1 °F (36.7 °C)]   Pulse:  [66-94]  Resp:  [13-20]   SpO2:  [93 %-100 %]   BP: ()/(50-79)   Hospital Medicine Progress Notes 06/17/2023      x Treatment/Medication -Continue home amlodipine, losartan and metoprolol  -Continue to monitor Hospital Medicine Progress Notes 06/16/2023    Other       Provider, please clarify the relationship between the Hypertension and Diabetes Mellitus    [ x  ] Hypertension is a manifestation of Diabetes Mellitus (Secondary Hypertension)   [   ]  Hypertension is not a manifestation of Diabetes Mellitus (Essential Hypertension)   [   ] Other Clarification (please specify): ____________   [  ] Clinically Undetermined       Please document in your progress notes daily for the duration of treatment, until resolved, and include in your discharge summary.

## 2023-06-22 ENCOUNTER — HOSPITAL ENCOUNTER (OUTPATIENT)
Dept: RADIOLOGY | Facility: HOSPITAL | Age: 88
Discharge: HOME | End: 2023-06-22
Attending: PHYSICAL MEDICINE & REHABILITATION
Payer: MEDICARE

## 2023-06-22 PROCEDURE — 74018 RADEX ABDOMEN 1 VIEW: CPT | Mod: 26,,, | Performed by: RADIOLOGY

## 2023-06-22 PROCEDURE — 74018 XR KUB: ICD-10-PCS | Mod: 26,,, | Performed by: RADIOLOGY

## 2023-06-30 DIAGNOSIS — Z96.641 STATUS POST TOTAL HIP REPLACEMENT, RIGHT: Primary | ICD-10-CM

## 2023-07-04 PROCEDURE — G0180 MD CERTIFICATION HHA PATIENT: HCPCS | Mod: ,,, | Performed by: FAMILY MEDICINE

## 2023-07-04 PROCEDURE — G0180 PR HOME HEALTH MD CERTIFICATION: ICD-10-PCS | Mod: ,,, | Performed by: FAMILY MEDICINE

## 2023-07-05 ENCOUNTER — TELEPHONE (OUTPATIENT)
Dept: FAMILY MEDICINE | Facility: CLINIC | Age: 88
End: 2023-07-05
Payer: MEDICARE

## 2023-07-05 NOTE — TELEPHONE ENCOUNTER
----- Message from Mira Condon sent at 7/5/2023  8:10 AM CDT -----  Contact: Patient daughter  Type:  Sooner Apoointment Request    Caller is requesting a sooner appointment.  Caller declined first available appointment listed below.  Caller will not accept being placed on the waitlist and is requesting a message be sent to doctor.    Name of Caller:Patient's daughterLisa     When is the first available appointment?    Symptoms: f/u     Would the patient rather a call back or a response via [facility]? Call     Best Call Back Number: [phone]    Additional Information:

## 2023-07-05 NOTE — TELEPHONE ENCOUNTER
----- Message from Tam Garcia sent at 7/5/2023  1:59 PM CDT -----  Contact: pt daughter  Type:  Patient Returning Call    Who Called:  pt daughter  Who Left Message for Patient:  bonifacio  Does the patient know what this is regarding?:  yes  Best Call Back Number:  [phone]    Additional Information:  pt daughter is trying to return a call. Please advise.

## 2023-07-06 ENCOUNTER — HOSPITAL ENCOUNTER (OUTPATIENT)
Dept: RADIOLOGY | Facility: HOSPITAL | Age: 88
Discharge: HOME | End: 2023-07-06
Attending: ORTHOPAEDIC SURGERY
Payer: MEDICARE

## 2023-07-06 ENCOUNTER — OFFICE VISIT (OUTPATIENT)
Dept: ORTHOPEDICS | Facility: CLINIC | Age: 88
End: 2023-07-06
Payer: MEDICARE

## 2023-07-06 VITALS — BODY MASS INDEX: 20.91 KG/M2 | WEIGHT: 118 LBS | HEIGHT: 63 IN

## 2023-07-06 DIAGNOSIS — Z96.641 HISTORY OF RIGHT HIP HEMIARTHROPLASTY: Primary | ICD-10-CM

## 2023-07-06 DIAGNOSIS — Z96.641 STATUS POST TOTAL HIP REPLACEMENT, RIGHT: ICD-10-CM

## 2023-07-06 PROCEDURE — 99999 PR PBB SHADOW E&M-EST. PATIENT-LVL III: CPT | Mod: PBBFAC,,, | Performed by: ORTHOPAEDIC SURGERY

## 2023-07-06 PROCEDURE — 99213 OFFICE O/P EST LOW 20 MIN: CPT | Mod: PBBFAC,PO | Performed by: ORTHOPAEDIC SURGERY

## 2023-07-06 PROCEDURE — 99024 PR POST-OP FOLLOW-UP VISIT: ICD-10-PCS | Mod: POP,,, | Performed by: ORTHOPAEDIC SURGERY

## 2023-07-06 PROCEDURE — 73501 X-RAY EXAM HIP UNI 1 VIEW: CPT | Mod: 26,RT,, | Performed by: RADIOLOGY

## 2023-07-06 PROCEDURE — 99999 PR PBB SHADOW E&M-EST. PATIENT-LVL III: ICD-10-PCS | Mod: PBBFAC,,, | Performed by: ORTHOPAEDIC SURGERY

## 2023-07-06 PROCEDURE — 73501 XR HIP WITH PELVIS WHEN PERFORMED, 1 VIEW RIGHT: ICD-10-PCS | Mod: 26,RT,, | Performed by: RADIOLOGY

## 2023-07-06 PROCEDURE — 73501 X-RAY EXAM HIP UNI 1 VIEW: CPT | Mod: TC,PO,RT

## 2023-07-06 PROCEDURE — 99024 POSTOP FOLLOW-UP VISIT: CPT | Mod: POP,,, | Performed by: ORTHOPAEDIC SURGERY

## 2023-07-06 NOTE — PROGRESS NOTES
CC:  87-year-old female follows up status post right hip hemiarthroplasty for a displaced right femoral neck fracture.  Date of surgery was 06/16/2023.  She is almost 3 weeks out.    RLE:  Incision is healing well with no sign of infection  Grossly intact motor and sensory function disc  Plus 2 distal pulse    X-ray images were examined and personally interpreted by me.  Multiple views of the right hip dated 07/06/2023 show a right hip hemiarthroplasty that is well fixed and in good alignment    Dx:  Status post right hip hemiarthroplasty for displaced right femoral neck fracture,    Plan:  Progress activity as tolerated.  Follow up in 1 month with an x-ray

## 2023-07-10 ENCOUNTER — EXTERNAL HOME HEALTH (OUTPATIENT)
Dept: HOME HEALTH SERVICES | Facility: HOSPITAL | Age: 88
End: 2023-07-10
Payer: MEDICARE

## 2023-07-13 ENCOUNTER — DOCUMENT SCAN (OUTPATIENT)
Dept: HOME HEALTH SERVICES | Facility: HOSPITAL | Age: 88
End: 2023-07-13
Payer: MEDICARE

## 2023-07-26 ENCOUNTER — DOCUMENT SCAN (OUTPATIENT)
Dept: HOME HEALTH SERVICES | Facility: HOSPITAL | Age: 88
End: 2023-07-26
Payer: MEDICARE

## 2023-07-26 DIAGNOSIS — E11.59 TYPE 2 DIABETES MELLITUS WITH OTHER CIRCULATORY COMPLICATION, WITHOUT LONG-TERM CURRENT USE OF INSULIN: ICD-10-CM

## 2023-07-26 RX ORDER — METFORMIN HYDROCHLORIDE 500 MG/1
TABLET, EXTENDED RELEASE ORAL
Qty: 90 TABLET | Refills: 1 | Status: SHIPPED | OUTPATIENT
Start: 2023-07-26 | End: 2024-01-04

## 2023-07-26 NOTE — TELEPHONE ENCOUNTER
Care Due:                  Date            Visit Type   Department     Provider  --------------------------------------------------------------------------------                                EP -                              PRIMARY      [facility]  Last Visit: 01-      CARE ([facility])   PRACTICE       Josh Berman                               -                              PRIMARY      [facility]  Next Visit: 08-      CARE ([facility])   PRACTICE       Josh Carrasquillo  Test          Frequency    Reason                     Performed    Due Date  --------------------------------------------------------------------------------    HBA1C.......  6 months...  metFORMIN................  01- 07-    [employer] Embedded Care Due Messages. Reference number: [id number].   7/26/2023 11:03:10 AM CDT

## 2023-08-02 ENCOUNTER — TELEPHONE (OUTPATIENT)
Dept: UROLOGY | Facility: CLINIC | Age: 88
End: 2023-08-02
Payer: MEDICARE

## 2023-08-02 DIAGNOSIS — R39.9 UTI SYMPTOMS: Primary | ICD-10-CM

## 2023-08-02 NOTE — TELEPHONE ENCOUNTER
Call placed to inform HH nurse order has been placed for urine culture, no answer, message left with call back number.

## 2023-08-02 NOTE — TELEPHONE ENCOUNTER
----- Message from Gracie ComerÃ­o sent at 8/2/2023 12:15 PM CDT -----  Contact: Tahmina from [facility]  Type:  Needs Medical Advice    Who Called: Tahmina from [facility]  Would the patient rather a call back or a response via [facility]? call  Best Call Back Number:   Additional Information: pt is having symptoms of a UTI and wanted to see if orders can be put in for a UAN culture. Please advise and thank you.

## 2023-08-02 NOTE — TELEPHONE ENCOUNTER
Returned call to  nurse, she states the patient has a follow up appt on next week, but right now she is having burning with urination, would like to know can order be placed to be collected at home and she will bring the sample to the [facility] lab. Informed provider is out of the office, will send message to covering provider for advisement, she verbally understood.

## 2023-08-03 ENCOUNTER — APPOINTMENT (OUTPATIENT)
Dept: LAB | Facility: HOSPITAL | Age: 88
End: 2023-08-03
Attending: NURSE PRACTITIONER
Payer: MEDICARE

## 2023-08-03 DIAGNOSIS — N39.0 URINARY TRACT INFECTION, SITE NOT SPECIFIED: Primary | ICD-10-CM

## 2023-08-03 LAB
BACTERIA #/AREA URNS HPF: ABNORMAL /HPF
BILIRUB UR QL STRIP: NEGATIVE
CAOX CRY URNS QL MICRO: ABNORMAL
CLARITY UR: ABNORMAL
COLOR UR: YELLOW
GLUCOSE UR QL STRIP: NEGATIVE
HGB UR QL STRIP: ABNORMAL
HYALINE CASTS #/AREA URNS LPF: 0 /LPF
KETONES UR QL STRIP: NEGATIVE
LEUKOCYTE ESTERASE UR QL STRIP: ABNORMAL
MICROSCOPIC COMMENT: ABNORMAL
NITRITE UR QL STRIP: NEGATIVE
PH UR STRIP: 6 [PH] (ref 5–8)
PROT UR QL STRIP: ABNORMAL
RBC #/AREA URNS HPF: 10 /HPF (ref 0–4)
SP GR UR STRIP: 1.02 (ref 1–1.03)
URN SPEC COLLECT METH UR: ABNORMAL
UROBILINOGEN UR STRIP-ACNC: NEGATIVE EU/DL
WBC #/AREA URNS HPF: >100 /HPF (ref 0–5)

## 2023-08-03 PROCEDURE — 87086 URINE CULTURE/COLONY COUNT: CPT | Performed by: NURSE PRACTITIONER

## 2023-08-03 PROCEDURE — 81000 URINALYSIS NONAUTO W/SCOPE: CPT | Performed by: NURSE PRACTITIONER

## 2023-08-03 PROCEDURE — 87077 CULTURE AEROBIC IDENTIFY: CPT | Performed by: NURSE PRACTITIONER

## 2023-08-03 PROCEDURE — 87186 SC STD MICRODIL/AGAR DIL: CPT | Performed by: NURSE PRACTITIONER

## 2023-08-04 DIAGNOSIS — Z96.641 HISTORY OF RIGHT HIP HEMIARTHROPLASTY: Primary | ICD-10-CM

## 2023-08-05 LAB — BACTERIA UR CULT: ABNORMAL

## 2023-08-07 ENCOUNTER — HOSPITAL ENCOUNTER (OUTPATIENT)
Dept: RADIOLOGY | Facility: HOSPITAL | Age: 88
Discharge: HOME | End: 2023-08-07
Attending: ORTHOPAEDIC SURGERY
Payer: MEDICARE

## 2023-08-07 ENCOUNTER — OFFICE VISIT (OUTPATIENT)
Dept: ORTHOPEDICS | Facility: CLINIC | Age: 88
End: 2023-08-07
Payer: MEDICARE

## 2023-08-07 ENCOUNTER — OFFICE VISIT (OUTPATIENT)
Dept: UROLOGY | Facility: CLINIC | Age: 88
End: 2023-08-07
Payer: MEDICARE

## 2023-08-07 DIAGNOSIS — R33.9 INCOMPLETE BLADDER EMPTYING: ICD-10-CM

## 2023-08-07 DIAGNOSIS — N39.0 BACTERIAL UTI: Primary | ICD-10-CM

## 2023-08-07 DIAGNOSIS — Z96.641 HISTORY OF RIGHT HIP HEMIARTHROPLASTY: ICD-10-CM

## 2023-08-07 DIAGNOSIS — Z96.641 HISTORY OF RIGHT HIP HEMIARTHROPLASTY: Primary | ICD-10-CM

## 2023-08-07 DIAGNOSIS — A49.9 BACTERIAL UTI: Primary | ICD-10-CM

## 2023-08-07 PROCEDURE — 99213 OFFICE O/P EST LOW 20 MIN: CPT | Mod: PBBFAC,PO | Performed by: NURSE PRACTITIONER

## 2023-08-07 PROCEDURE — 99999PBSHW PR PBB SHADOW TECHNICAL ONLY FILED TO HB: ICD-10-PCS | Mod: PBBFAC,,,

## 2023-08-07 PROCEDURE — 99999 PR PBB SHADOW E&M-EST. PATIENT-LVL I: CPT | Mod: PBBFAC,,, | Performed by: ORTHOPAEDIC SURGERY

## 2023-08-07 PROCEDURE — 73502 X-RAY EXAM HIP UNI 2-3 VIEWS: CPT | Mod: TC,PO,RT

## 2023-08-07 PROCEDURE — 99214 PR OFFICE/OUTPT VISIT, EST, LEVL IV, 30-39 MIN: ICD-10-PCS | Mod: S$PBB,,, | Performed by: NURSE PRACTITIONER

## 2023-08-07 PROCEDURE — 73502 X-RAY EXAM HIP UNI 2-3 VIEWS: CPT | Mod: 26,RT,, | Performed by: RADIOLOGY

## 2023-08-07 PROCEDURE — 51798 US URINE CAPACITY MEASURE: CPT | Mod: PBBFAC,PO | Performed by: NURSE PRACTITIONER

## 2023-08-07 PROCEDURE — 99999 PR PBB SHADOW E&M-EST. PATIENT-LVL III: ICD-10-PCS | Mod: PBBFAC,,, | Performed by: NURSE PRACTITIONER

## 2023-08-07 PROCEDURE — 99999PBSHW PR PBB SHADOW TECHNICAL ONLY FILED TO HB: Mod: PBBFAC,,,

## 2023-08-07 PROCEDURE — 99211 OFF/OP EST MAY X REQ PHY/QHP: CPT | Mod: PBBFAC,27,PO | Performed by: ORTHOPAEDIC SURGERY

## 2023-08-07 PROCEDURE — 99214 OFFICE O/P EST MOD 30 MIN: CPT | Mod: S$PBB,,, | Performed by: NURSE PRACTITIONER

## 2023-08-07 PROCEDURE — 99999 PR PBB SHADOW E&M-EST. PATIENT-LVL III: CPT | Mod: PBBFAC,,, | Performed by: NURSE PRACTITIONER

## 2023-08-07 PROCEDURE — 99024 PR POST-OP FOLLOW-UP VISIT: ICD-10-PCS | Mod: POP,,, | Performed by: ORTHOPAEDIC SURGERY

## 2023-08-07 PROCEDURE — 99999 PR PBB SHADOW E&M-EST. PATIENT-LVL I: ICD-10-PCS | Mod: PBBFAC,,, | Performed by: ORTHOPAEDIC SURGERY

## 2023-08-07 PROCEDURE — 99024 POSTOP FOLLOW-UP VISIT: CPT | Mod: POP,,, | Performed by: ORTHOPAEDIC SURGERY

## 2023-08-07 PROCEDURE — 73502 XR HIP WITH PELVIS WHEN PERFORMED, 2 OR 3  VIEWS RIGHT: ICD-10-PCS | Mod: 26,RT,, | Performed by: RADIOLOGY

## 2023-08-07 RX ORDER — NITROFURANTOIN 25; 75 MG/1; MG/1
100 CAPSULE ORAL 2 TIMES DAILY
Qty: 10 CAPSULE | Refills: 0 | OUTPATIENT
Start: 2023-08-07 | End: 2023-08-10

## 2023-08-07 NOTE — PROGRESS NOTES
CHIEF COMPLAINT:    Mrs Watson is a 87 y.o. female presenting for f/u incomplete bladder emptying.  PRESENTING ILLNESS:    Genny Watson is a 87 y.o. female who presents for f/u incomplete bladder emptying. Last clinic visit was 2/6/23 with Dr. Harden    8/7/23  Pt presents today for f/u incomplete bladder emptying  Reddy was removed 7/2/23 prior to d/c from skilled nursing facility  Pt has been voiding with no issues during the daytime and nocturia x 2  + dysuria, which she has + urine culture 8/3/23, needs antibiotic treatment  No further episodes of gross hematuria since hospital stay  Denies flank pain, fever, chills, nausea or vomiting  No constipation; recently started with diarrhea  Not using estrace cream as ordered by Dr. Harden    Pt unable to void today in clinic  Bladder scan 166 ml      6/15/23 presented to ED for fall, right femoral neck fracture  Had gross hematuria during hospital stay  +UTI, treated with rocephin  6/16/23 urine culture no growth  6/17/23 consult by Dr. Crockett  Reddy catheter placed. Had blood tinged urine. MIRNA completed.  D/c to skilled nursing for therapy      2/6/23  Has been using vaginal estrogen cream twice a week.   No UTIs since last visit.   No hematuria or dysuria.   Has a bowel movement every 2-3 days.      cc     8/3/22  Patient seen by NP in March and PVR was <300 cc. Was then seen again in June with inability to urinate and 350 cc drained at that time.   Both times has had a positive urine culture however were asymptomatic.  She has no bothersome urinary complaints.   Her renal function is normal.      Has been using vaginal estrogen cream.      Bladder scan today: 342 cc (unable to void)     11/29/21  Had E coli UTI in August. Unsure if she was having symptoms at that time.   Today her symptoms are frequency. No incontinence. No dysuria. No gross hematuria.   Having a daily bowel movement.   Drinks 4-5 glasses a day of water. Drinks 1 cup  of coffee in the am.      Cr 6/29/21: 0.8  PVR today 305 cc        5/27/21  Patient presents for hematuria. She states her urine looked orange, no blood in urine and no clots. She thinks maybe she had bleeding from her hemorrhoids.   She had a UA done on 5/17 which showed 6 RBC, >100 WBC, nitrite negative. Urine culture no growth.   She underwent CT Urogram which showed no hydro, no stones, no filling defects. Her bladder is fairly distended.      She has no significant bothersome urinary complaints.   No history of stones.  She does have UTIs and her symptoms include dysuria. Usually goes to urgent care for this.      She a bowel movement every 3 days.   Drinks 2 bottles of water a day, 1 cup of coffee.   She states she feels like she is emptying her bladder better at home, she has a shy bladder.      UA today: +blood and leuks, nitrite negative  PVR today: 390 cc (catheterized)     Urine cultures:   Lab Results   Component Value Date    LABURIN ESCHERICHIA COLI  >100,000 cfu/ml   (A) 08/03/2023    LABURIN No growth 06/16/2023    LABURIN No significant growth 05/02/2023    LABURIN ESCHERICHIA COLI  >100,000 cfu/ml   (A) 06/20/2022    LABURIN ESCHERICHIA COLI  >100,000 cfu/ml   (A) 03/18/2022    LABURIN ESCHERICHIA COLI  >100,000 cfu/ml   (A) 08/10/2021    LABURIN No significant growth 05/27/2021    LABURIN No significant growth 05/17/2021    LABURIN No significant growth 10/14/2020    LABURIN ESCHERICHIA COLI  >100,000 cfu/ml   (A) 10/06/2020     REVIEW OF SYSTEMS:    Review of Systems    Constitutional: Negative for fever and chills.   HENT: Negative for hearing loss.   Eyes: Negative for visual disturbance.   Respiratory: Negative for shortness of breath.   Cardiovascular: Negative for chest pain.   Gastrointestinal: Negative for nausea, vomiting.   Genitourinary: See above  Neurological: Negative for dizziness.   Hematological: Does not bruise/bleed easily.     PATIENT HISTORY:    Past Medical History:    Diagnosis Date    ALLERGIC RHINITIS 4/30/2012    Arthritis     Gimenez's esophagus     Breast cancer     right, s/p right mastectomy>20yr ago    Cataract     Diabetes mellitus type II     Diverticulitis     Diverticulosis     Fuchs' corneal dystrophy     Glaucoma     Hernia, hiatal     Hyperlipidemia     Hypertension     Macular degeneration     Pulmonary embolism     Seizures     Skin cancer of face 12/2014    Dr M Weil     UTI (lower urinary tract infection)        Past Surgical History:   Procedure Laterality Date    angeogram      APPENDECTOMY      BREAST SURGERY      cardio stent   1/2015    Dr Dodson    COLONOSCOPY  around 2003    COLONOSCOPY N/A 1/13/2016    Procedure: COLONOSCOPY;  Surgeon: Mario Bhakta MD;  Location: [facility] ENDO;  Service: Endoscopy;  Laterality: N/A; repeat in 5 years for surveillance    CORNEAL TRANSPLANT      EYE SURGERY      HIP ARTHROPLASTY Right 6/16/2023    Procedure: ARTHROPLASTY, HIP;  Surgeon: Josh Douglas II, MD;  Location: [facility] OR;  Service: Orthopedics;  Laterality: Right;    HYSTERECTOMY      ovaries removed    MASTECTOMY      right >20 years ago    STOMACH SURGERY      UPPER GASTROINTESTINAL ENDOSCOPY  01/13/2016    Dr. Bhakta       Family History   Problem Relation Age of Onset    Mental illness Mother     Liver cancer Mother     Early death Father     Heart disease Father     Diabetes Daughter     Early death Paternal Uncle     Heart disease Paternal Uncle     Celiac disease Sister     No Known Problems Son     Breast cancer Neg Hx     Ovarian cancer Neg Hx     Colon cancer Neg Hx     Colon polyps Neg Hx     Esophageal cancer Neg Hx     Stomach cancer Neg Hx     Ulcerative colitis Neg Hx        Social History     Socioeconomic History    Marital status:    Tobacco Use    Smoking status: Never    Smokeless tobacco: Never   Substance and Sexual Activity    Alcohol use: No    Drug use: No    Sexual activity: Never     Social Determinants of Health     Financial  Resource Strain: Low Risk  (6/17/2023)    Overall Financial Resource Strain (CARDIA)     Difficulty of Paying Living Expenses: Not hard at all   Food Insecurity: No Food Insecurity (6/17/2023)    Hunger Vital Sign     Worried About Running Out of Food in the Last Year: Never true     Ran Out of Food in the Last Year: Never true   Transportation Needs: No Transportation Needs (6/17/2023)    PRAPARE - Transportation     Lack of Transportation (Medical): No     Lack of Transportation (Non-Medical): No   Physical Activity: Inactive (6/17/2023)    Exercise Vital Sign     Days of Exercise per Week: 0 days     Minutes of Exercise per Session: 0 min   Stress: Stress Concern Present (6/17/2023)    [employer] - Occupational Stress Questionnaire     Feeling of Stress : To some extent   Social Connections: Moderately Isolated (6/17/2023)    Social Connection and Isolation Panel [NHANES]     Frequency of Communication with Friends and Family: More than three times a week     Frequency of Social Gatherings with Friends and Family: More than three times a week     Attends Mu-ism Services: More than 4 times per year     Active Member of Clubs or Organizations: No     Attends Club or Organization Meetings: Never     Marital Status:    Housing Stability: Low Risk  (6/17/2023)    Housing Stability Vital Sign     Unable to Pay for Housing in the Last Year: No     Number of Places Lived in the Last Year: 1     Unstable Housing in the Last Year: No       Allergies:  Sulfa (sulfonamide antibiotics)    Medications:    Current Outpatient Medications:     ascorbic acid, vitamin C, (VITAMIN C) 500 MG tablet, Take 500 mg by mouth once daily., Disp: , Rfl:     aspirin (ECOTRIN) 81 MG EC tablet, Take 81 mg by mouth once daily., Disp: , Rfl:     atorvastatin (LIPITOR) 80 MG tablet, TAKE 1 TABLET DAILY, Disp: 90 tablet, Rfl: 3    clopidogreL (PLAVIX) 75 mg tablet, Take 1 tablet (75 mg total) by mouth once  daily., Disp: 90 tablet, Rfl: 3    ferrous sulfate (FEOSOL) 325 mg (65 mg iron) Tab tablet, Take 325 mg by mouth daily with breakfast., Disp: , Rfl:     latanoprost 0.005 % ophthalmic solution, Place 1 drop into both eyes every evening., Disp: , Rfl:     metFORMIN (GLUCOPHAGE-XR) 500 MG ER 24hr tablet, TAKE 1 TABLET EVERY EVENING, Disp: 90 tablet, Rfl: 1    pantoprazole (PROTONIX) 40 MG tablet, Take 1 tablet (40 mg total) by mouth once daily., Disp: 90 tablet, Rfl: 3    prednisoLONE acetate (PRED FORTE) 1 % DrpS, Place 1 drop into the right eye once daily. , Disp: , Rfl:     TRAVATAN Z 0.004 % ophthalmic solution, Place 1 drop into both eyes every evening. , Disp: , Rfl:     VIT C/E/ZN/COPPR/LUTEIN/ZEAXAN (PRESERVISION AREDS 2 ORAL), Take 1 capsule by mouth 2 (two) times daily., Disp: , Rfl:     estradioL (ESTRACE) 0.01 % (0.1 mg/gram) vaginal cream, USE 1 GM VAGINALLY TWICE WEEKLY (Patient not taking: Reported on 8/7/2023), Disp: 42.5 g, Rfl: 5    HYDROcodone-acetaminophen (NORCO) 5-325 mg per tablet, Take 1 tablet by mouth every 6 (six) hours as needed for Pain. (Patient not taking: Reported on 8/7/2023), Disp: , Rfl: 0    loratadine (CLARITIN) 10 mg tablet, Take 1 tablet (10 mg total) by mouth once daily. (Patient not taking: Reported on 1/6/2023), Disp: 30 tablet, Rfl: 11    nitrofurantoin, macrocrystal-monohydrate, (MACROBID) 100 MG capsule, Take 1 capsule (100 mg total) by mouth 2 (two) times daily. for 5 days, Disp: 10 capsule, Rfl: 0    PHYSICAL EXAMINATION:    Constitutional: She appears well-developed and well-nourished.  She is in no apparent distress.    Neck: Normal ROM.     Cardiovascular: Normal rate.      Pulmonary/Chest: Effort normal. No respiratory distress.     Abdominal:  She exhibits no distension.  There is no CVA tenderness.     Neurological: She is alert     Skin: Skin is warm and dry.     Psych: Cooperative with normal affect.    Physical Exam      LABS:    Lab Results   Component Value  Date    CREATININE 0.6 06/20/2023       IMPRESSION:    Encounter Diagnoses   Name Primary?    Bacterial UTI Yes    Incomplete bladder emptying        PLAN:  -Urine culture positive, E coli  Sensitive to macrobid  Discussed side effects and indications for macrobid. Prescription sent to the pharmacy. Pt verbalized understanding.  Allergy to sulfa    -UTI precautions:  Avoid constipation.  Drink lots of water  Void every 2 hrs regardless of urge  Void soon after urge arises. Do not hold urine once urge occurs.    OVER THE COUNTER:  Utiva Cranberry supplement- need 36mg of proanthocyanidins (PAC) in supplement- helps to block bacteria from attaching to bladder wall.  D-mannose- 2 grams daily- blocks bacteria receptors  Probiotic with Lactobacillus    Prescription:  Estrace- apply a pea sized amount to vagina 2x weekly at night.    -Bladder scan <300 ml. Will continue to monitor.    -RTC with Dr. Harden to discuss possible cysto for gross hematuria during hospital stay      I encouraged her or any of her family members to call or email me with questions and/or concerns.      30 minutes of total time spent on the encounter, which includes face to face time and non-face to face time preparing to see the patient (eg, review of tests), Obtaining and/or reviewing separately obtained history, Documenting clinical information in the electronic or other health record, Independently interpreting results (not separately reported) and communicating results to the patient/family/caregiver, or Care coordination (not separately reported).

## 2023-08-07 NOTE — PATIENT INSTRUCTIONS
UTI precautions:  Avoid constipation.  Drink lots of water  Void every 2 hrs regardless of urge  Void soon after urge arises. Do not hold urine once urge occurs.    OVER THE COUNTER:  Utiva Cranberry supplement- need 36mg of proanthocyanidins (PAC) in supplement- helps to block bacteria from attaching to bladder wall.  D-mannose- 2 grams daily- blocks bacteria receptors  Probiotic with Lactobacillus    Prescription:  Estrace- apply a pea sized amount to vagina 2x weekly at night.

## 2023-08-07 NOTE — PROGRESS NOTES
CC:  87-year-old female follows up status post right hip hemiarthroplasty for fracture of the right femoral neck.  Date of surgery was 06/16/2023.  She is just over 7 weeks out.  Overall she is doing well.  She is currently pain free.    Examination of the Right Lower Extremity    Skin is intact throughout.  Incision is healing well no sign of infection.  At the bottom into the incision there is a small scab and there is a little piece of Monocryl sticking out adjacent to it.  We clipped that flush with the skin.  Motor in intact EHL,FHL,TA,sarah  +2 DP/PT  Sensation LT intact D/P/1st    Examination of the Right Hip    C-Sign negative  Logroll negative  Stenchfield negative    Pain with ROM negative    ROM:    Flexion   120  Extension   30  Abduction   45  Adduction   20  External Rotation 45  Internal Rotation 35    Flexion contracture negative    FADIR negative  FADER negative    Tenderness to palpation over lateral and posterolateral greater tochanter negative    X-ray images were examined and personally interpreted by me.  Three views the right hip dated 08/07/2023 three views the right hip dated 08/07/2023 show a right hip hemiarthroplasty that appears well fixed and in good alignment.    Dx:  Status post right hip hemiarthroplasty, stable    Plan:  Progress activity as tolerated.  Follow up in 6 weeks with an x-ray.

## 2023-08-08 ENCOUNTER — TELEPHONE (OUTPATIENT)
Dept: FAMILY MEDICINE | Facility: CLINIC | Age: 88
End: 2023-08-08
Payer: MEDICARE

## 2023-08-08 ENCOUNTER — HOSPITAL ENCOUNTER (EMERGENCY)
Facility: HOSPITAL | Age: 88
Discharge: HOME | End: 2023-08-08
Attending: EMERGENCY MEDICINE
Payer: MEDICARE

## 2023-08-08 VITALS
OXYGEN SATURATION: 98 % | SYSTOLIC BLOOD PRESSURE: 160 MMHG | BODY MASS INDEX: 21.26 KG/M2 | HEIGHT: 63 IN | TEMPERATURE: 99 F | RESPIRATION RATE: 16 BRPM | DIASTOLIC BLOOD PRESSURE: 78 MMHG | HEART RATE: 85 BPM | WEIGHT: 120 LBS

## 2023-08-08 DIAGNOSIS — R31.9 URINARY TRACT INFECTION WITH HEMATURIA, SITE UNSPECIFIED: ICD-10-CM

## 2023-08-08 DIAGNOSIS — R33.9 URINARY RETENTION: ICD-10-CM
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
## 2023-08-08 DIAGNOSIS — S09.90XA INJURY OF HEAD, INITIAL ENCOUNTER: ICD-10-CM

## 2023-08-08 DIAGNOSIS — N39.0 URINARY TRACT INFECTION WITH HEMATURIA, SITE UNSPECIFIED: ICD-10-CM

## 2023-08-08 DIAGNOSIS — S01.01XA LACERATION OF SCALP, INITIAL ENCOUNTER: ICD-10-CM

## 2023-08-08 LAB
ALBUMIN SERPL BCP-MCNC: 3.8 G/DL (ref 3.5–5.2)
ALP SERPL-CCNC: 110 U/L (ref 55–135)
ALT SERPL W/O P-5'-P-CCNC: 19 U/L (ref 10–44)
ANION GAP SERPL CALC-SCNC: 16 MMOL/L (ref 8–16)
AST SERPL-CCNC: 33 U/L (ref 10–40)
BACTERIA #/AREA URNS HPF: ABNORMAL /HPF
BASOPHILS # BLD AUTO: 0.06 K/UL (ref 0–0.2)
BASOPHILS NFR BLD: 0.6 % (ref 0–1.9)
BILIRUB SERPL-MCNC: 0.7 MG/DL (ref 0.1–1)
BILIRUB UR QL STRIP: NEGATIVE
BUN SERPL-MCNC: 13 MG/DL (ref 8–23)
CALCIUM SERPL-MCNC: 9.4 MG/DL (ref 8.7–10.5)
CHLORIDE SERPL-SCNC: 101 MMOL/L (ref 95–110)
CLARITY UR: ABNORMAL
CO2 SERPL-SCNC: 21 MMOL/L (ref 23–29)
COLOR UR: ABNORMAL
CREAT SERPL-MCNC: 0.7 MG/DL (ref 0.5–1.4)
DIFFERENTIAL METHOD: ABNORMAL
EOSINOPHIL # BLD AUTO: 0.1 K/UL (ref 0–0.5)
EOSINOPHIL NFR BLD: 1 % (ref 0–8)
ERYTHROCYTE [DISTWIDTH] IN BLOOD BY AUTOMATED COUNT: 14.2 % (ref 11.5–14.5)
EST. GFR  (NO RACE VARIABLE): >60 ML/MIN/1.73 M^2
GLUCOSE SERPL-MCNC: 133 MG/DL (ref 70–110)
GLUCOSE UR QL STRIP: NEGATIVE
HCT VFR BLD AUTO: 43.6 % (ref 37–48.5)
HGB BLD-MCNC: 13.6 G/DL (ref 12–16)
HGB UR QL STRIP: ABNORMAL
HYALINE CASTS #/AREA URNS LPF: 4 /LPF
IMM GRANULOCYTES # BLD AUTO: 0.04 K/UL (ref 0–0.04)
IMM GRANULOCYTES NFR BLD AUTO: 0.4 % (ref 0–0.5)
KETONES UR QL STRIP: ABNORMAL
LEUKOCYTE ESTERASE UR QL STRIP: ABNORMAL
LYMPHOCYTES # BLD AUTO: 2.7 K/UL (ref 1–4.8)
LYMPHOCYTES NFR BLD: 29.4 % (ref 18–48)
MCH RBC QN AUTO: 27.8 PG (ref 27–31)
MCHC RBC AUTO-ENTMCNC: 31.2 G/DL (ref 32–36)
MCV RBC AUTO: 89 FL (ref 82–98)
MICROSCOPIC COMMENT: ABNORMAL
MONOCYTES # BLD AUTO: 0.8 K/UL (ref 0.3–1)
MONOCYTES NFR BLD: 8.2 % (ref 4–15)
NEUTROPHILS # BLD AUTO: 5.6 K/UL (ref 1.8–7.7)
NEUTROPHILS NFR BLD: 60.4 % (ref 38–73)
NITRITE UR QL STRIP: NEGATIVE
NRBC BLD-RTO: 0 /100 WBC
PH UR STRIP: 7 [PH] (ref 5–8)
PLATELET # BLD AUTO: 262 K/UL (ref 150–450)
PMV BLD AUTO: 9.9 FL (ref 9.2–12.9)
POTASSIUM SERPL-SCNC: 3.5 MMOL/L (ref 3.5–5.1)
PROT SERPL-MCNC: 7.7 G/DL (ref 6–8.4)
PROT UR QL STRIP: ABNORMAL
RBC # BLD AUTO: 4.89 M/UL (ref 4–5.4)
RBC #/AREA URNS HPF: >100 /HPF (ref 0–4)
SODIUM SERPL-SCNC: 138 MMOL/L (ref 136–145)
SP GR UR STRIP: 1.01 (ref 1–1.03)
UNIDENT CRYS URNS QL MICRO: 18
URN SPEC COLLECT METH UR: ABNORMAL
UROBILINOGEN UR STRIP-ACNC: NEGATIVE EU/DL
WBC # BLD AUTO: 9.3 K/UL (ref 3.9–12.7)
WBC #/AREA URNS HPF: 90 /HPF (ref 0–5)

## 2023-08-08 PROCEDURE — 99285 EMERGENCY DEPT VISIT HI MDM: CPT | Mod: 25

## 2023-08-08 PROCEDURE — 25000003 PHARM REV CODE 250: Performed by: PHYSICIAN ASSISTANT

## 2023-08-08 PROCEDURE — 80053 COMPREHEN METABOLIC PANEL: CPT | Performed by: PHYSICIAN ASSISTANT

## 2023-08-08 PROCEDURE — 63600175 PHARM REV CODE 636 W HCPCS: Performed by: PHYSICIAN ASSISTANT

## 2023-08-08 PROCEDURE — 93010 ELECTROCARDIOGRAM REPORT: CPT | Mod: ,,, | Performed by: INTERNAL MEDICINE

## 2023-08-08 PROCEDURE — 12001 RPR S/N/AX/GEN/TRNK 2.5CM/<: CPT

## 2023-08-08 PROCEDURE — 85025 COMPLETE CBC W/AUTO DIFF WBC: CPT | Performed by: PHYSICIAN ASSISTANT

## 2023-08-08 PROCEDURE — 81000 URINALYSIS NONAUTO W/SCOPE: CPT | Performed by: PHYSICIAN ASSISTANT

## 2023-08-08 PROCEDURE — 36415 COLL VENOUS BLD VENIPUNCTURE: CPT | Performed by: PHYSICIAN ASSISTANT

## 2023-08-08 PROCEDURE — 93010 EKG 12-LEAD: ICD-10-PCS | Mod: ,,, | Performed by: INTERNAL MEDICINE

## 2023-08-08 PROCEDURE — 93005 ELECTROCARDIOGRAM TRACING: CPT | Mod: 59

## 2023-08-08 PROCEDURE — 96374 THER/PROPH/DIAG INJ IV PUSH: CPT

## 2023-08-08 RX ORDER — LIDOCAINE HYDROCHLORIDE AND EPINEPHRINE 10; 10 MG/ML; UG/ML
10 INJECTION, SOLUTION INFILTRATION; PERINEURAL
Status: COMPLETED | OUTPATIENT
Start: 2023-08-08 | End: 2023-08-08

## 2023-08-08 RX ORDER — ONDANSETRON 2 MG/ML
4 INJECTION INTRAMUSCULAR; INTRAVENOUS
Status: COMPLETED | OUTPATIENT
Start: 2023-08-08 | End: 2023-08-08

## 2023-08-08 RX ADMIN — ONDANSETRON 4 MG: 2 INJECTION INTRAMUSCULAR; INTRAVENOUS at 05:08

## 2023-08-08 RX ADMIN — LIDOCAINE HYDROCHLORIDE,EPINEPHRINE BITARTRATE 10 ML: 10; .01 INJECTION, SOLUTION INFILTRATION; PERINEURAL at 07:08

## 2023-08-08 NOTE — TELEPHONE ENCOUNTER
..I called the patient to confirm her appointment that she has with Dr. Berman on 08/09/2023 at 3:00pm, no answer left voicemail to return our call. I will send the patient a portal message as well.

## 2023-08-08 NOTE — DISCHARGE INSTRUCTIONS
Continue antibiotics as prescribed.  Follow up closely with Urology and primary care.  Keep dressing in place for 24 hours.

## 2023-08-08 NOTE — ED NOTES
Irrigated wound. Placed dressing with quick clot, non adhering dressing and wrapped with ace bandage.

## 2023-08-08 NOTE — ED PROVIDER NOTES
Encounter Date: 8/8/2023       History     Chief Complaint   Patient presents with    Fall     Hit head pta      Patient is an 87-year-old female who presents with fall off the toilet while having a bowel movement. Family states they heard her and immediately ran to the bathroom.  They denied loss of consciousness.  Her only complaint is that she is having difficulty urinating.  Family states this is chronic and she sees Urology for this.  She does not self cath.  She denies any neck pain, hip pain or other complaints.  She is currently on Plavix.  She reports history of hypertension and states she took her medications today. Family reports prior episode of syncope while having a bowel movement. No syncope today.     The history is provided by the patient and a relative.     Review of patient's allergies indicates:   Allergen Reactions    Sulfa (sulfonamide antibiotics)      Other reaction(s): Itching     Past Medical History:   Diagnosis Date    ALLERGIC RHINITIS 4/30/2012    Arthritis     Gimenez's esophagus     Breast cancer     right, s/p right mastectomy>20yr ago    Cataract     Diabetes mellitus type II     Diverticulitis     Diverticulosis     Fuchs' corneal dystrophy     Glaucoma     Hernia, hiatal     Hyperlipidemia     Hypertension     Macular degeneration     Pulmonary embolism     Seizures     Skin cancer of face 12/2014    Dr M Weil     UTI (lower urinary tract infection)      Past Surgical History:   Procedure Laterality Date    angeogram      APPENDECTOMY      BREAST SURGERY      cardio stent   1/2015    Dr Dodson    COLONOSCOPY  around 2003    COLONOSCOPY N/A 1/13/2016    Procedure: COLONOSCOPY;  Surgeon: Mario Bhakta MD;  Location: [facility];  Service: Endoscopy;  Laterality: N/A; repeat in 5 years for surveillance    CORNEAL TRANSPLANT      EYE SURGERY      HIP ARTHROPLASTY Right 6/16/2023    Procedure: ARTHROPLASTY, HIP;  Surgeon: Josh Douglas II, MD;  Location: [facility] OR;  Service:  Orthopedics;  Laterality: Right;    HYSTERECTOMY      ovaries removed    MASTECTOMY      right >20 years ago    STOMACH SURGERY      UPPER GASTROINTESTINAL ENDOSCOPY  01/13/2016    Dr. Moya     Family History   Problem Relation Age of Onset    Mental illness Mother     Liver cancer Mother     Early death Father     Heart disease Father     Diabetes Daughter     Early death Paternal Uncle     Heart disease Paternal Uncle     Celiac disease Sister     No Known Problems Son     Breast cancer Neg Hx     Ovarian cancer Neg Hx     Colon cancer Neg Hx     Colon polyps Neg Hx     Esophageal cancer Neg Hx     Stomach cancer Neg Hx     Ulcerative colitis Neg Hx      Social History     Tobacco Use    Smoking status: Never    Smokeless tobacco: Never   Substance Use Topics    Alcohol use: No    Drug use: No     Review of Systems   Constitutional:  Negative for activity change, appetite change, chills and fever.   HENT:  Negative for congestion, rhinorrhea and sore throat.    Eyes:  Negative for redness and visual disturbance.   Respiratory:  Negative for cough, chest tightness and shortness of breath.    Cardiovascular:  Negative for chest pain.   Gastrointestinal:  Negative for abdominal pain, diarrhea, nausea and vomiting.   Genitourinary:  Positive for difficulty urinating. Negative for dysuria and frequency.   Musculoskeletal:  Negative for back pain, neck pain and neck stiffness.   Skin:  Negative for rash.   Neurological:  Negative for dizziness, syncope, numbness and headaches.       Physical Exam     Initial Vitals [08/08/23 1603]   BP Pulse Resp Temp SpO2   (!) 208/112 (!) 115 (!) 26 98 °F (36.7 °C) 100 %      MAP       --         Physical Exam    Nursing note and vitals reviewed.  Constitutional: Vital signs are normal. She appears well-developed and well-nourished. She is cooperative.  Non-toxic appearance. She does not have a sickly appearance.   HENT:   Head: Normocephalic.       Right Ear: External ear normal.    Left Ear: External ear normal.   Nose: Nose normal.   Superficial, oozing abrasions noted to the scalp   Eyes: Conjunctivae and lids are normal. Pupils are equal, round, and reactive to light.   Neck: Neck supple.   Normal range of motion.   Full passive range of motion without pain.     Cardiovascular:  Normal rate, regular rhythm and normal heart sounds.     Exam reveals no gallop and no friction rub.       No murmur heard.  Pulmonary/Chest: Breath sounds normal. She has no wheezes. She has no rhonchi. She has no rales.   Abdominal: Abdomen is soft. There is no abdominal tenderness. There is no rebound and no guarding.   Musculoskeletal:      Cervical back: Full passive range of motion without pain, normal range of motion and neck supple.     Neurological: She is alert and oriented to person, place, and time.   5/5 strength to bilateral upper and lower extremities. Alert, oriented and answering questions. Able to follow commands.    Skin: Skin is warm, dry and intact. No rash noted.         ED Course   Lac Repair    Date/Time: 8/8/2023 3:54 PM    Performed by: Ammy Gresham PA-C  Authorized by: Cipriano Castaneda MD    Consent:     Consent obtained:  Verbal    Consent given by:  Patient    Risks, benefits, and alternatives were discussed: yes    Universal protocol:     Patient identity confirmed:  Verbally with patient  Anesthesia:     Anesthesia method:  Local infiltration    Local anesthetic:  Lidocaine 1% WITH epi  Laceration details:     Location:  Scalp    Length (cm):  1  Pre-procedure details:     Preparation:  Patient was prepped and draped in usual sterile fashion  Treatment:     Area cleansed with:  Saline    Amount of cleaning:  Extensive    Irrigation solution:  Sterile saline    Debridement:  None    Undermining:  None  Skin repair:     Repair method:  Sutures    Suture size:  4-0    Wound skin closure material used: vicryl rapide.    Suture technique:  Simple interrupted    Number of  sutures:  2  Approximation:     Approximation:  Close  Repair type:     Repair type:  Simple  Post-procedure details:     Dressing:  Bulky dressing    Procedure completion:  Tolerated well, no immediate complications    Labs Reviewed   CBC W/ AUTO DIFFERENTIAL - Abnormal; Notable for the following components:       Result Value    MCHC 31.2 (*)     All other components within normal limits   COMPREHENSIVE METABOLIC PANEL - Abnormal; Notable for the following components:    CO2 21 (*)     Glucose 133 (*)     All other components within normal limits   URINALYSIS - Abnormal; Notable for the following components:    Color, UA Orange (*)     Appearance, UA Hazy (*)     Protein, UA 1+ (*)     Ketones, UA 1+ (*)     Occult Blood UA 3+ (*)     Leukocytes, UA 2+ (*)     All other components within normal limits   URINALYSIS MICROSCOPIC - Abnormal; Notable for the following components:    RBC, UA >100 (*)     WBC, UA 90 (*)     Hyaline Casts, UA 4 (*)     All other components within normal limits          Imaging Results               CT Cervical Spine Without Contrast (Final result)  Result time 08/08/23 16:31:42      Final result by Derrell Puentes MD (08/08/23 16:31:42)                   Impression:      1. There is extensive multilevel degenerative change, including trace degenerative listhesis at several levels but no acute fractures identified.  The study is motion degraded in the bones are osteopenic.  2. No significant spinal stenosis is present but there is multilevel foraminal stenosis due to uncovertebral spurring, disc space narrowing and facet joint arthropathy.  3. There is nonspecific patchy density in the right upper lung which could be chronic and represent fibrosis although is incompletely included and evaluated.  There is a small more solid component.  Again, the findings are age indeterminate but have progressed compared to CTA chest dated 01/30/2015.  CT chest may be warranted for more thorough  evaluation.  This report was flagged in Epic as containing an incidental finding.      Electronically signed by: Derrell Puentes MD  Date:    08/08/2023  Time:    16:31               Narrative:    EXAMINATION:  CT CERVICAL SPINE WITHOUT CONTRAST    CLINICAL HISTORY:  Neck trauma (Age >= 65y);    TECHNIQUE:  Low dose axial images, sagittal and coronal reformations were preformed though the cervical spine.  Contrast was not administered.    COMPARISON:  None    FINDINGS:  The study is motion degraded.    Vertebral column: The bones are osteopenic.  There is extensive multilevel degenerative disc and facet disease.  The vertebral bodies maintain normal height.  There is no fracture.  There is trace anterolisthesis of C3 on C4, C6 on C7, C7 on T1.  There is mild retrolisthesis of C5 on C6.  There is severe disc space narrowing at the C3-4 through C6-7 levels.  There is extensive multilevel bilateral facet joint arthropathy.  The odontoid process is intact.  The anterior and posterior arches of C1 are intact.    Spinal canal, cord, epidural space: The spinal canal appears to be developmentally normal.  There is no significant spinal stenosis.  There is no suspected abnormal epidural mass or fluid collection.    Findings by level:    There is extensive multilevel degenerative disc and facet disease with disc space narrowing, uncovertebral spurring, facet joint arthropathy at multiple levels.  There is no significant spinal stenosis but there is multilevel foraminal stenosis.    Soft tissues, other: The prevertebral soft tissues are grossly normal.  There is mild plaque at the carotid bulb bilaterally.  There is ill-defined patchy density in the included right lung apex which is age indeterminate and could potentially represent fibrosis but is incompletely included and evaluated.  These changes have however progressed compared to CTA chest dated 01/30/2015.                                       CT Head Without Contrast  (Final result)  Result time 08/08/23 16:24:19      Final result by Derrell Puentes MD (08/08/23 16:24:19)                   Impression:      1. There is no acute intracranial abnormality.  There is volume loss and nonspecific white matter change.  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  There is no hemorrhage, mass effect or obvious acute edema or ischemia.  2. There is a left parietal scalp laceration and contusion without underlying fracture.      Electronically signed by: Derrell Puentes MD  Date:    08/08/2023  Time:    16:24               Narrative:    EXAMINATION:  CT HEAD WITHOUT CONTRAST    CLINICAL HISTORY:  Head trauma, minor (Age >= 65y);    TECHNIQUE:  Routine unenhanced axial images were obtained through the head.  Sagittal and coronal reformatted images were created.  The study is reviewed in bone and soft tissue windows.    COMPARISON:  Head CT dated 06/15/2023    FINDINGS:  Intracranial contents: Some images are motion degraded.  The study was repeated.  There is no acute abnormality or suspected change in the appearance of the brain compared to the prior study.  There is generalized volume loss with a moderate burden of white matter hypodensity.  These changes likely reflect sequelae of chronic small vessel disease.  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  There is no intracranial hemorrhage.  There is no abnormal extra-axial fluid collection.  The basilar cisterns are open.  Cerebellar tonsils remain in normal position.    Extracranial contents, calvarium, soft tissues: There is a left parietal scalp laceration and contusion without underlying fracture.  There are changes of hyperostosis frontalis interna.  The included paranasal sinuses and mastoid air cells are clear.                                       Medications   ondansetron injection 4 mg (4 mg Intravenous Given 8/8/23 1750)    LIDOcaine-EPINEPHrine 1%-1:100,000 injection 10 mL (10 mLs Intradermal Given 8/8/23 1923)     Medical Decision Making:   History:   I obtained history from: someone other than patient.  Old Medical Records: I decided to obtain old medical records.  Clinical Tests:   Lab Tests: Ordered and Reviewed  Radiological Study: Ordered and Reviewed  Medical Tests: Ordered and Reviewed       APC / Resident Notes:   Emergent evaluation of an 87-year-old female who had a fall while on the toilet.  She reports no headache, visual changes, nausea, vomiting, hip pain or other complaints.  She denies neck pain.  She is alert oriented.  She is on Plavix.  CT scan of the head shows no acute abnormalities.  CT of the cervical spine shows a prior lung nodule which has increased in size since previous.  Explained these results to the family, they voiced understanding.  She denies chest pain or shortness of breath.  EKG shows no acute changes.  She is initially hypertensive.  She is history of urinary retention.  She does not self cath.  Doubt catheter obtained with greater than 800 cc.  Labs reviewed.  She has a known UTI for which she started Macrobid yesterday.  This is being followed by Urology.  She states she has no difficulty urinating at home and does not want to leave the Reddy catheter.  Laceration repair performed, see procedure note. Discussed results with patient. Return precautions given. Based on my clinical evaluation, I do not appreciate any immediate, emergent, or life threatening condition or etiology that warrants additional workup today and feel that the patient can be discharged with close follow up care.  Patient is to follow up with their primary care provider. Case was discussed with Dr. Castaneda who is in agreement with the plan of care. All questions answered.                        Clinical Impression:   Final diagnoses:  [W19.XXXA] Fall (Primary)  [S09.90XA] Injury of head, initial encounter  [R33.9] Urinary  retention  [S01.01XA] Laceration of scalp, initial encounter  [N39.0, R31.9] Urinary tract infection with hematuria, site unspecified        ED Disposition Condition    Discharge Stable          ED Prescriptions    None       Follow-up Information       Follow up With Specialties Details Why Contact Info Additional Information    Josh Berman MD Family Medicine   [address]  [facility] [address]  [phone]       Mirna Harden MD Urology   [address] [place] [address] [address]  [address]  [facility] [address]  [phone]       [facility] - ED Emergency Medicine  As needed [address] [place] [address] [address] [place] [address] 1st floor             Ammy Gresham PA-C  08/08/23 2020

## 2023-08-09 ENCOUNTER — TELEPHONE (OUTPATIENT)
Dept: UROLOGY | Facility: CLINIC | Age: 88
End: 2023-08-09
Payer: MEDICARE

## 2023-08-09 ENCOUNTER — TELEPHONE (OUTPATIENT)
Dept: FAMILY MEDICINE | Facility: CLINIC | Age: 88
End: 2023-08-09
Payer: MEDICARE

## 2023-08-09 NOTE — ED NOTES
Upon discharge, patient is AAOx4, no cardiac or respiratory complications. Follow up care reviewed with patient and has been instructed to return to the ER if needed. Patient verbalized understanding and was assisted to vehicle via wheel chair.  NICO LOPEZ

## 2023-08-09 NOTE — TELEPHONE ENCOUNTER
Per keon  nurse  Additional Information: Keon states the patient was in the hospital yesterday from a fall.  Now she is home and is having a lot of blood in her urine    Additional info: pt is on plavix , not sure if blood is from medicine or from trauma of the fall. Advised pt to seek treatment at nearest ER

## 2023-08-09 NOTE — TELEPHONE ENCOUNTER
----- Message from Andria Gonzalez sent at 8/9/2023  2:12 PM CDT -----  Regarding: advice  Contact: Tahmina with [facility]  Type: Needs Medical Advice  Who Called:  Tahmina with [facility]  Symptoms (please be specific):    How long has patient had these symptoms:    Pharmacy name and phone #:    [id number] - [place] - [address]  [address] [address]  Phone: [phone] Fax: [phone]    Best Call Back Number: [phone]  Additional Information: Tahmnia states the patient was in the hospital yesterday from a fall.  Now she is home and is having a lot of blood in her urine.  Please call Tahmina to advise. Thanks!

## 2023-08-09 NOTE — ED NOTES
"Patient was cathed for urine "Because she has a shy bladder and does not urinate in public." Patient tolerated straight cath with 200 ml of urine returned.   "

## 2023-08-09 NOTE — TELEPHONE ENCOUNTER
----- Message from Andria Gonzalez sent at 8/9/2023  2:12 PM CDT -----  Regarding: advice  Contact: Tahmina with [facility]  Type: Needs Medical Advice  Who Called:  Tahmina with [facility]  Symptoms (please be specific):    How long has patient had these symptoms:    Pharmacy name and phone #:    [id number] - [place] - [address]  [address] [address]  Phone: [phone] Fax: [phone]    Best Call Back Number: [phone]  Additional Information: Tahmina states the patient was in the hospital yesterday from a fall.  Now she is home and is having a lot of blood in her urine.  Please call Tahmina to advise. Thanks!

## 2023-08-09 NOTE — TELEPHONE ENCOUNTER
Spoke with daughter Lisa.   She refused appt with PA or NP    She has a Home Health visit nurse visit today.   She will call back if appt is need or update      ----- Message from Amanda Metcalf sent at 8/9/2023 10:16 AM CDT -----  Type:  Sooner Appointment Request    Caller is requesting a sooner appointment.  Caller declined first available appointment listed below.  Caller will not accept being placed on the waitlist and is requesting a message be sent to doctor.    Name of Caller:  pts daughter  When is the first available appointment?  8/17  Symptoms:  hospital f/u  Best Call Back Number:  [phone]  Additional Information:  pl call bk to advise thanks

## 2023-08-10 ENCOUNTER — HOSPITAL ENCOUNTER (EMERGENCY)
Facility: HOSPITAL | Age: 88
Discharge: HOME | End: 2023-08-10
Attending: EMERGENCY MEDICINE
Payer: MEDICARE

## 2023-08-10 ENCOUNTER — TELEPHONE (OUTPATIENT)
Dept: FAMILY MEDICINE | Facility: CLINIC | Age: 88
End: 2023-08-10
Payer: MEDICARE

## 2023-08-10 VITALS
TEMPERATURE: 97 F | BODY MASS INDEX: 21.26 KG/M2 | DIASTOLIC BLOOD PRESSURE: 71 MMHG | HEIGHT: 63 IN | OXYGEN SATURATION: 98 % | RESPIRATION RATE: 20 BRPM | HEART RATE: 72 BPM | WEIGHT: 120 LBS | SYSTOLIC BLOOD PRESSURE: 136 MMHG

## 2023-08-10 DIAGNOSIS — R31.0 GROSS HEMATURIA: Primary | ICD-10-CM

## 2023-08-10 DIAGNOSIS — N30.01 ACUTE CYSTITIS WITH HEMATURIA: ICD-10-CM

## 2023-08-10 DIAGNOSIS — R91.8 LUNG MASS: ICD-10-CM

## 2023-08-10 LAB
ANION GAP SERPL CALC-SCNC: 14 MMOL/L (ref 8–16)
BASOPHILS # BLD AUTO: 0.04 K/UL (ref 0–0.2)
BASOPHILS NFR BLD: 0.4 % (ref 0–1.9)
BUN SERPL-MCNC: 15 MG/DL (ref 8–23)
CALCIUM SERPL-MCNC: 8.8 MG/DL (ref 8.7–10.5)
CHLORIDE SERPL-SCNC: 97 MMOL/L (ref 95–110)
CO2 SERPL-SCNC: 24 MMOL/L (ref 23–29)
CREAT SERPL-MCNC: 0.7 MG/DL (ref 0.5–1.4)
DIFFERENTIAL METHOD: ABNORMAL
EOSINOPHIL # BLD AUTO: 0 K/UL (ref 0–0.5)
EOSINOPHIL NFR BLD: 0.3 % (ref 0–8)
ERYTHROCYTE [DISTWIDTH] IN BLOOD BY AUTOMATED COUNT: 14.3 % (ref 11.5–14.5)
EST. GFR  (NO RACE VARIABLE): >60 ML/MIN/1.73 M^2
GLUCOSE SERPL-MCNC: 176 MG/DL (ref 70–110)
HCT VFR BLD AUTO: 33.5 % (ref 37–48.5)
HGB BLD-MCNC: 11 G/DL (ref 12–16)
IMM GRANULOCYTES # BLD AUTO: 0.02 K/UL (ref 0–0.04)
IMM GRANULOCYTES NFR BLD AUTO: 0.2 % (ref 0–0.5)
LYMPHOCYTES # BLD AUTO: 1.2 K/UL (ref 1–4.8)
LYMPHOCYTES NFR BLD: 12 % (ref 18–48)
MCH RBC QN AUTO: 28.9 PG (ref 27–31)
MCHC RBC AUTO-ENTMCNC: 32.8 G/DL (ref 32–36)
MCV RBC AUTO: 88 FL (ref 82–98)
MONOCYTES # BLD AUTO: 0.8 K/UL (ref 0.3–1)
MONOCYTES NFR BLD: 8.6 % (ref 4–15)
NEUTROPHILS # BLD AUTO: 7.6 K/UL (ref 1.8–7.7)
NEUTROPHILS NFR BLD: 78.5 % (ref 38–73)
NRBC BLD-RTO: 0 /100 WBC
PLATELET # BLD AUTO: 219 K/UL (ref 150–450)
PMV BLD AUTO: 9.8 FL (ref 9.2–12.9)
POTASSIUM SERPL-SCNC: 3 MMOL/L (ref 3.5–5.1)
RBC # BLD AUTO: 3.81 M/UL (ref 4–5.4)
SODIUM SERPL-SCNC: 135 MMOL/L (ref 136–145)
WBC # BLD AUTO: 9.69 K/UL (ref 3.9–12.7)

## 2023-08-10 PROCEDURE — 25000003 PHARM REV CODE 250: Performed by: EMERGENCY MEDICINE

## 2023-08-10 PROCEDURE — 99449 NTRPROF PH1/NTRNET/EHR 31/>: CPT | Mod: ,,, | Performed by: UROLOGY

## 2023-08-10 PROCEDURE — 25500020 PHARM REV CODE 255

## 2023-08-10 PROCEDURE — 96366 THER/PROPH/DIAG IV INF ADDON: CPT | Mod: 59

## 2023-08-10 PROCEDURE — 99285 EMERGENCY DEPT VISIT HI MDM: CPT | Mod: 25

## 2023-08-10 PROCEDURE — 85025 COMPLETE CBC W/AUTO DIFF WBC: CPT | Performed by: EMERGENCY MEDICINE

## 2023-08-10 PROCEDURE — 99449 PR INTERPROF, PHONE/INTERNET/EHR, CONSULT, >= 31 MINS: ICD-10-PCS | Mod: ,,, | Performed by: UROLOGY

## 2023-08-10 PROCEDURE — 63600175 PHARM REV CODE 636 W HCPCS: Performed by: EMERGENCY MEDICINE

## 2023-08-10 PROCEDURE — 87086 URINE CULTURE/COLONY COUNT: CPT | Performed by: EMERGENCY MEDICINE

## 2023-08-10 PROCEDURE — 51702 INSERT TEMP BLADDER CATH: CPT

## 2023-08-10 PROCEDURE — 96365 THER/PROPH/DIAG IV INF INIT: CPT | Mod: 59

## 2023-08-10 PROCEDURE — 36415 COLL VENOUS BLD VENIPUNCTURE: CPT | Performed by: EMERGENCY MEDICINE

## 2023-08-10 PROCEDURE — 80048 BASIC METABOLIC PNL TOTAL CA: CPT | Performed by: EMERGENCY MEDICINE

## 2023-08-10 RX ORDER — LOSARTAN POTASSIUM 25 MG/1
25 TABLET ORAL DAILY
Qty: 90 TABLET | Refills: 3 | OUTPATIENT
Start: 2023-08-10 | End: 2024-08-09

## 2023-08-10 RX ORDER — CIPROFLOXACIN 500 MG/1
500 TABLET ORAL 2 TIMES DAILY
Qty: 20 TABLET | Refills: 0 | Status: SHIPPED | OUTPATIENT
Start: 2023-08-10 | End: 2023-08-20

## 2023-08-10 RX ADMIN — CEFTRIAXONE 1 G: 1 INJECTION, POWDER, FOR SOLUTION INTRAMUSCULAR; INTRAVENOUS at 01:08

## 2023-08-10 RX ADMIN — IOHEXOL 75 ML: 350 INJECTION, SOLUTION INTRAVENOUS at 10:08

## 2023-08-10 RX ADMIN — SODIUM CHLORIDE 1000 ML: 9 INJECTION, SOLUTION INTRAVENOUS at 01:08

## 2023-08-10 NOTE — TELEPHONE ENCOUNTER
No care due was identified.  [facility] Embedded Care Due Messages. Reference number: [id number].   8/10/2023 1:11:54 PM CDT

## 2023-08-10 NOTE — ED PROVIDER NOTES
Encounter Date: 8/10/2023       History     Chief Complaint   Patient presents with    Hematuria     Family reports blood in urine; told to come per home health.  Has diagnosed UTI.  Recent visit for fall; sutures in head/elbow.     HPI patient 87-year-old woman who presents emergency department for evaluation of gross hematuria.  She is currently being treated for pansensitive E coli UTI on Macrobid by her urologist.  She had a recent fall 2 days ago and was evaluated in the emergency department with a negative CT head and had scalp laceration repaired with sutures placed.  She is on Plavix and has been experiencing gross hematuria.  She was recommended come to the emergency department by Urology to rule out any traumatic injuries from her recent fall.  Review of patient's allergies indicates:   Allergen Reactions    Sulfa (sulfonamide antibiotics)      Other reaction(s): Itching     Past Medical History:   Diagnosis Date    ALLERGIC RHINITIS 4/30/2012    Arthritis     Gimenez's esophagus     Breast cancer     right, s/p right mastectomy>20yr ago    Cataract     Diabetes mellitus type II     Diverticulitis     Diverticulosis     Fuchs' corneal dystrophy     Glaucoma     Hernia, hiatal     Hyperlipidemia     Hypertension     Macular degeneration     Pulmonary embolism     Seizures     Skin cancer of face 12/2014    Dr M Weil     UTI (lower urinary tract infection)      Past Surgical History:   Procedure Laterality Date    angeogram      APPENDECTOMY      BREAST SURGERY      cardio stent   1/2015    Dr Dodson    COLONOSCOPY  around 2003    COLONOSCOPY N/A 1/13/2016    Procedure: COLONOSCOPY;  Surgeon: Mario Bhakta MD;  Location: [facility];  Service: Endoscopy;  Laterality: N/A; repeat in 5 years for surveillance    CORNEAL TRANSPLANT      EYE SURGERY      HIP ARTHROPLASTY Right 6/16/2023    Procedure: ARTHROPLASTY, HIP;  Surgeon: Josh Douglas II, MD;  Location: [facility] OR;  Service: Orthopedics;  Laterality:  Right;    HYSTERECTOMY      ovaries removed    MASTECTOMY      right >20 years ago    STOMACH SURGERY      UPPER GASTROINTESTINAL ENDOSCOPY  01/13/2016    Dr. Moya     Family History   Problem Relation Age of Onset    Mental illness Mother     Liver cancer Mother     Early death Father     Heart disease Father     Diabetes Daughter     Early death Paternal Uncle     Heart disease Paternal Uncle     Celiac disease Sister     No Known Problems Son     Breast cancer Neg Hx     Ovarian cancer Neg Hx     Colon cancer Neg Hx     Colon polyps Neg Hx     Esophageal cancer Neg Hx     Stomach cancer Neg Hx     Ulcerative colitis Neg Hx      Social History     Tobacco Use    Smoking status: Never    Smokeless tobacco: Never   Substance Use Topics    Alcohol use: No    Drug use: No     Review of Systems   Genitourinary:  Positive for difficulty urinating and hematuria.       Physical Exam     Initial Vitals   BP Pulse Resp Temp SpO2   08/10/23 0941 08/10/23 0934 08/10/23 0934 08/10/23 0934 08/10/23 0934   (!) 160/78 85 (!) 21 97.3 °F (36.3 °C) 97 %      MAP       --                Physical Exam    Nursing note and vitals reviewed.  Constitutional: She appears well-developed and well-nourished. No distress.   HENT:   Head: Normocephalic.   Sutures in place on the scalp with dried blood   Eyes: EOM are normal. Pupils are equal, round, and reactive to light.   Neck: Neck supple.   Pulmonary/Chest: No respiratory distress.   Abdominal: Abdomen is soft. She exhibits no distension and no mass. There is no abdominal tenderness. There is no rebound and no guarding.   Musculoskeletal:         General: Normal range of motion.      Cervical back: Neck supple.      Comments: Small laceration to her left elbow with bandage in place.  No active bleeding.  No signs of infection.     Neurological: She is alert and oriented to person, place, and time.   Skin: Skin is warm and dry.         ED Course   Procedures  Labs Reviewed   CBC W/ AUTO  DIFFERENTIAL - Abnormal; Notable for the following components:       Result Value    RBC 3.81 (*)     Hemoglobin 11.0 (*)     Hematocrit 33.5 (*)     Gran % 78.5 (*)     Lymph % 12.0 (*)     All other components within normal limits   BASIC METABOLIC PANEL - Abnormal; Notable for the following components:    Sodium 135 (*)     Potassium 3.0 (*)     Glucose 176 (*)     All other components within normal limits   CULTURE, URINE          Imaging Results              CT Abdomen Pelvis With Contrast (Final result)  Result time 08/10/23 11:11:52      Final result by Nadia Ramsey MD (08/10/23 11:11:52)                   Impression:      There are no CT findings to suggest acute injury of the abdomen and pelvis.    There is circumferential bladder wall thickening and a large hiatal hernia.  The circumferential bladder wall thickening may be seen with cystitis.      Electronically signed by: Nadia Ramsey MD  Date:    08/10/2023  Time:    11:11               Narrative:    EXAMINATION:  CT ABDOMEN PELVIS WITH CONTRAST    CLINICAL HISTORY:  Abdominal trauma, blunt, urologic injury suspected;    TECHNIQUE:  Low dose axial images, sagittal and coronal reformations were obtained from the lung bases to the pubic symphysis following the IV administration of 75 mL of Omnipaque 350 and the oral administration of 30 mL of Omnipaque 350.    COMPARISON:  05/26/2021    FINDINGS:  There is a large hiatal hernia.  There is atelectasis at bilateral lung bases.    The liver, spleen, adrenal glands, kidneys are unremarkable.  There is pancreatic atrophy.  The gallbladder is in place.    There is no evidence bowel obstruction.  Stool is seen throughout the colon.    There is circumferential bladder wall thickening.  There is no evidence of abdominal nor pelvic lymphadenopathy.  There is atherosclerotic disease of the aorta.    There is no evidence right lower quadrant abdominal inflammation.    The patient has a total right hip  arthroplasty.  There are compression fractures of T12 and L1 that appear unchanged from 05/26/2021.  There is grade 1 anterolisthesis of L4 with respect to L5 unchanged from prior exam.                                       Medications   iohexoL (OMNIPAQUE 350) 350 mg iodine/mL injection (75 mLs Intravenous Given 8/10/23 1054)   sodium chloride 0.9% bolus 1,000 mL 1,000 mL (0 mLs Intravenous Stopped 8/10/23 1505)   cefTRIAXone (ROCEPHIN) 1 g in dextrose 5 % in water (D5W) 100 mL IVPB (MB+) (0 g Intravenous Stopped 8/10/23 1505)     Medical Decision Making:   History:   Old Medical Records: I decided to obtain old medical records.  Initial Assessment:   87-year-old woman currently being treated for UTI with Macrobid on Plavix presents emergency department for gross hematuria.  Vital signs stable without fever or abdominal tenderness on exam.  Incidentally she had a recent fall and urology recommended she come to the ED to rule out traumatic etiology to her hematuria.  Differential Diagnosis:   Renal hemorrhage, bladder rupture, anticoagulation induced hematuria, renal stone, cystitis  ED Management:  Patient likely has hematuria from cystitis and being on Plavix.  Discussed with Dr. Martini who reviewed case and made recommendations.  Patient had no evidence of urinary outlet obstruction with a normal postvoid residual however did place Reddy and irrigated with clearing of the urine.  Per recommendation, Reddy will be left in place and patient's antibiotics will be switched over to ciprofloxacin.  Patient did receive Rocephin in the emergency department.  Patient to follow-up with Urology on outpatient basis.  CT abdomen pelvis showed evidence of cystitis without any traumatic abnormalities.  Encouraged to drink lots of water to avoid clots from accumulating in the bladder.  Other:   I have discussed this case with another health care provider.             ED Course as of 08/10/23 1548   Thu Aug 10, 2023   1524 Reddy  catheter irrigated and urine much clearer now. [AS]   1542 Sodium(!): 135 [AS]   1542 Potassium(!): 3.0 [AS]   1543 Chloride: 97 [AS]   1543 CO2: 24 [AS]   1543 Glucose(!): 176 [AS]   1543 BUN: 15 [AS]   1543 Creatinine: 0.7 [AS]   1543 RBC(!): 3.81 [AS]   1543 Hemoglobin(!): 11.0 [AS]   1543 WBC: 9.69 [AS]   1543 Hematocrit(!): 33.5 [AS]      ED Course User Index  [AS] Dudley Martinez MD                   Clinical Impression:   Final diagnoses:  [R31.0] Gross hematuria (Primary)  [N30.01] Acute cystitis with hematuria        ED Disposition Condition    Discharge Stable          ED Prescriptions       Medication Sig Dispense Start Date End Date Auth. Provider    ciprofloxacin HCl (CIPRO) 500 MG tablet Take 1 tablet (500 mg total) by mouth 2 (two) times daily. for 10 days 20 tablet 8/10/2023 8/20/2023 Dudley Martinez MD          Follow-up Information       Follow up With Specialties Details Why Contact Info Additional Information    Mirna Harden MD Urology Schedule an appointment as soon as possible for a visit   [address] [place] [address] [address]IVE  [address]  [facility] [id number] [phone]       [facility] -  Emergency Medicine  As needed, If symptoms worsen [address] [place] [address] [facility] [id number] 1st floor             Dudley Martinez MD  08/10/23 7856

## 2023-08-10 NOTE — TELEPHONE ENCOUNTER
----- Message from Gracie Galaviz sent at 8/10/2023  9:00 AM CDT -----  Contact: Lisa  Type:  Needs Medical Advice, APPT REQUEST    Who Called: daughter Lisa  Symptoms (please be specific): check up, been in and out hosp  Would the patient rather a call back or a response via [facility]? call  Best Call Back Number: [phone] (home)     Additional Information: pt would like to see  or np asap. Please advise and thank you.

## 2023-08-10 NOTE — TELEPHONE ENCOUNTER
Losartan was D/C from [facility] in 6/2023    Medication pended if needed    ----- Message from Gracie Galaviz sent at 8/10/2023  8:56 AM CDT -----  Contact: Lisa  Type:  RX Refill Request    Who Called: lisa  Refill or New Rx:refill  RX Name and Strength: losartan 25 mg  How is the patient currently taking it? (ex. 1XDay):as directed  Is this a 30 day or 90 day RX:as directed  Preferred Pharmacy with phone number:    "[facility] - [address] [address]  Phone: [phone] Fax: [phone]      Local or Mail Order:mail order  Ordering Provider:ramiro  Would the patient rather a call back or a response via [facility]? call  Best Call Back Number:[phone] (home) or [phone]    Additional Information: please advise and thank you.

## 2023-08-11 ENCOUNTER — TELEPHONE (OUTPATIENT)
Dept: UROLOGY | Facility: CLINIC | Age: 88
End: 2023-08-11
Payer: MEDICARE

## 2023-08-11 NOTE — TELEPHONE ENCOUNTER
Spoke with patient's daughter Lisa. Informed of upcoming appt and patient is to retained the catheter to discuss at time of appt, she verbally understood.

## 2023-08-11 NOTE — CONSULTS
Urology Interproffesional Telephone Consult:  Consult from: Dr Martinez, emergency department  Consult for: gross hematuria, uti    Discussion of this emergency department patient with ED physician with patient having consented to discussing care with subspecialists    Today:  87-year-old woman who presents emergency department for evaluation of gross hematuria.  She is currently being treated for pansensitive E coli UTI on Macrobid by her urologist.  She had a recent fall 2 days ago and was evaluated in the emergency department with a negative CT head and had scalp laceration repaired with sutures placed.  She is on Plavix and has been experiencing gross hematuria.  She was recommended come to the emergency department by Urology to rule out any traumatic injuries from her recent fall    ED physician noted  CT abdomen pelvis negative, postvoid residual in the 170s, hematuria, thick with clots. Vital signs stable but hematocrit did drop from 43 to 33 in 2 days. Inquired about placing a Yung catheter and irrigating, as well as holding blood thinners and having her follow-up with suspicion of the hematuria was from the UTI in the anticoagulation.      Chart review noted pansensitive E coli, and we discuss that Macrobid is a poor choice in a woman of her age bracket, and recommended changing to Cipro or Ceftin given her sulfa allergy.  As well, noted that her postvoid residual was actually at or better than baseline on review of chart and urologic record.    Last visit with Dr Harden 2/2/23:  Bladder scan today 240 - As long as PVRs are staying in 250-300 range no need for indwelling yung or CIC - She is very opposed to this also - If PVRs rise to 400s will need drainage    She did howevever have interim NP f/u with SAVITA Fitch in uro clinic noting    for f/u incomplete bladder emptying. Last clinic visit was 2/6/23 with Dr. Harden     8/7/23 Pt presents today for f/u incomplete bladder emptying  Yung was removed 7/2/23  prior to d/c from skilled nursing facility, Pt has been voiding with no issues during the daytime and nocturia x 2, + dysuria, which she has + urine culture 8/3/23, needs antibiotic treatment  No further episodes of gross hematuria since hospital stay. Denies flank pain, fever, chills, nausea or vomiting  No constipation; recently started with diarrhea, Not using estrace cream as ordered by Dr. Harden  Pt unable to void today in clinic, Bladder scan 166 ml     6/15/23 presented to ED for fall, right femoral neck fracture. Had gross hematuria during hospital stay  +UTI, treated with rocephin. 6/16/23 urine culture no growth. 6/17/23 consult by Dr. Crockett  Reddy catheter placed. Had blood tinged urine. MIRNA completed. D/c to skilled nursing for therapy    -->  We did review that given active infection with gross hematuria and clots, I do agree that her gross hematuria is likely due to anticoagulation plus infection.  As she has baseline incomplete bladder emptying, which has not worsened despite her gross hematuria, and on review of interim episodes above with continued issues status post Reddy catheter placement, I did agree with placing 20 or 22 Burmese Reddy catheter, manually irrigating with sterile water to clear clot burden, given IV Rocephin in the emergency department secondary to manipulation and infection, and switching antibiotic course as above to Ceftin or Cipro as Macrobid has poor clearance in the elderly, and especially with incomplete bladder emptying.  Would recommend holding anticoagulation 48-72 hours, hydrating well, and maintaining Reddy catheter at minimum until antibiotic treatment is complete, but consider keeping it indwelling until follow-up for further discussion about management given the interim episodes, infections, ER and hospitalizations since plan as above despite residual is not increasing.  If hematuria persists in the absence of infection, or either way, may need cystoscopic  evaluation.  Will message office to set up office based follow-up with Dr. Harden to discuss further plans for Reddy, cystoscope, etc..    > 35 minutes was spent in extensive review of prior medical record, current emergency department visit, labs, CT etcetera, and discussion with and formulation of care plan with emergency department provider, greater than 50% spent in the latter.  Communicated directly with Dr. Martinez, and all recommendations as above provided in writing and patient's medical record

## 2023-08-11 NOTE — TELEPHONE ENCOUNTER
Patient was seen in emergency department today.  See routing comments in encounter from NP Jose who agreed with the emergency department evaluation and NP Little to follow up on yung status etc to discuss cysto with MD.    As per tele-consult note with ER, Yung placed, manually irrigated, antibiotics changed for more appropriate culture specific antibiotics given age and bacteria, and advise would plan follow-up    --> please see details of plan from ER tele consult note as below, but advise patient that she should hold her blood thinners for 48-72 hours, hydrate well, complete the course of new antibiotics prescribed in the emergency department today, and follow-up with Dr. Harden as scheduled.  - please notify that visit with Dr. Harden is scheduled for 8/22/22 at 1130am.  Will discuss further plans for Yung and/or cystoscopy at that time      ..   did agree with placing 20 or 22 Pal[place] Uyng catheter, manually irrigating with sterile water to clear clot burden, given IV Rocephin in the emergency department secondary to manipulation and infection, and switching antibiotic course as above to Ceftin or Cipro as Macrobid has poor clearance in the elderly, and especially with incomplete bladder emptying.  Would recommend holding anticoagulation 48-72 hours, hydrating well, and maintaining Yung catheter at minimum until antibiotic treatment is complete, but consider keeping it indwelling until follow-up for further discussion about management given the interim episodes, infections, ER and hospitalizations since plan as above despite residual is not increasing.  If hematuria persists in the absence of infection, or either way, may need cystoscopic evaluation.  Will message office to set up office based follow-up with Dr. Harden to discuss further plans for Yung, cystoscope, etc..

## 2023-08-11 NOTE — TELEPHONE ENCOUNTER
----- Message from Belkys Zambrano, Patient Care Assistant sent at 8/11/2023  8:40 AM CDT -----  Regarding: appointment  Contact: pt's daughter Lisa  Type:  Sooner Appointment Request    Caller is requesting a sooner appointment.  Caller declined first available appointment listed below.  Caller will not accept being placed on the waitlist and is requesting a message be sent to doctor.    Name of Caller:  pt's daughter Lisa    When is the first available appointment?  2023    Symptoms:  [facility] f/u     Best Call Back Number:  [phone] (home)     Additional Information: please call pt's daughter Lisa to advise. Thanks!

## 2023-08-11 NOTE — TELEPHONE ENCOUNTER
Amlodipine, metoprolol, and losartan were all discontinued when she was admitted to the hospital briefly Tamiko 15, 2023.  She was transferred to rehab where the metoprolol was resumed but amlodipine and losartan were not.  Patient was supposed to see me yesterday but canceled and went to the emergency room where her blood pressure was listed as 208/112 and the note says vital signs are normal it looks like she was down to 160/78 by the time she left.  The only meds she was given were lidocaine injection for a suture repair and Zofran.    Patient was in the emergency room because she fell off the toilet and cut her head.  Is she taking losartan and amlodipine when she is not supposed to be?  Did she blackout from hypotension?  I do not know, I can not fill this.

## 2023-08-11 NOTE — TELEPHONE ENCOUNTER
Spoke w pts daughter Lisa she was informed of on call md instructions and appts scheduled   Pts daughter voiced ok

## 2023-08-14 ENCOUNTER — TELEPHONE (OUTPATIENT)
Dept: FAMILY MEDICINE | Facility: CLINIC | Age: 88
End: 2023-08-14
Payer: MEDICARE

## 2023-08-14 LAB — BACTERIA UR CULT: NO GROWTH

## 2023-08-14 NOTE — TELEPHONE ENCOUNTER
----- Message from Pebbles Jackman sent at 8/14/2023  1:29 PM CDT -----  Contact: pt  Type: Needs Medical Advice  Who Called:  pt  Best Call Back Number: [phone]    Additional Information: Pt is calling the office needs an appt for 6 month follow up and hospital follow up.Please call back and advise.

## 2023-08-15 ENCOUNTER — DOCUMENT SCAN (OUTPATIENT)
Dept: HOME HEALTH SERVICES | Facility: HOSPITAL | Age: 88
End: 2023-08-15
Payer: MEDICARE

## 2023-08-16 ENCOUNTER — HOSPITAL ENCOUNTER (EMERGENCY)
Facility: HOSPITAL | Age: 88
Discharge: HOME | End: 2023-08-16
Attending: EMERGENCY MEDICINE
Payer: MEDICARE

## 2023-08-16 VITALS
HEIGHT: 63 IN | HEART RATE: 79 BPM | WEIGHT: 120 LBS | DIASTOLIC BLOOD PRESSURE: 63 MMHG | RESPIRATION RATE: 16 BRPM | SYSTOLIC BLOOD PRESSURE: 136 MMHG | TEMPERATURE: 98 F | OXYGEN SATURATION: 99 % | BODY MASS INDEX: 21.26 KG/M2

## 2023-08-16 DIAGNOSIS — E87.6 HYPOKALEMIA: ICD-10-CM

## 2023-08-16 DIAGNOSIS — R55 SYNCOPE: ICD-10-CM

## 2023-08-16 DIAGNOSIS — R55 VASOVAGAL SYNCOPE: Primary | ICD-10-CM

## 2023-08-16 LAB
ANION GAP SERPL CALC-SCNC: 12 MMOL/L (ref 8–16)
BASOPHILS # BLD AUTO: 0.04 K/UL (ref 0–0.2)
BASOPHILS NFR BLD: 0.3 % (ref 0–1.9)
BUN SERPL-MCNC: 9 MG/DL (ref 8–23)
CALCIUM SERPL-MCNC: 8.8 MG/DL (ref 8.7–10.5)
CHLORIDE SERPL-SCNC: 100 MMOL/L (ref 95–110)
CO2 SERPL-SCNC: 25 MMOL/L (ref 23–29)
CREAT SERPL-MCNC: 0.6 MG/DL (ref 0.5–1.4)
DIFFERENTIAL METHOD: ABNORMAL
EOSINOPHIL # BLD AUTO: 0.1 K/UL (ref 0–0.5)
EOSINOPHIL NFR BLD: 0.8 % (ref 0–8)
ERYTHROCYTE [DISTWIDTH] IN BLOOD BY AUTOMATED COUNT: 14.6 % (ref 11.5–14.5)
EST. GFR  (NO RACE VARIABLE): >60 ML/MIN/1.73 M^2
GLUCOSE SERPL-MCNC: 169 MG/DL (ref 70–110)
HCT VFR BLD AUTO: 35.7 % (ref 37–48.5)
HGB BLD-MCNC: 11.3 G/DL (ref 12–16)
IMM GRANULOCYTES # BLD AUTO: 0.06 K/UL (ref 0–0.04)
IMM GRANULOCYTES NFR BLD AUTO: 0.5 % (ref 0–0.5)
LYMPHOCYTES # BLD AUTO: 1.1 K/UL (ref 1–4.8)
LYMPHOCYTES NFR BLD: 9 % (ref 18–48)
MCH RBC QN AUTO: 28.6 PG (ref 27–31)
MCHC RBC AUTO-ENTMCNC: 31.7 G/DL (ref 32–36)
MCV RBC AUTO: 90 FL (ref 82–98)
MONOCYTES # BLD AUTO: 0.8 K/UL (ref 0.3–1)
MONOCYTES NFR BLD: 6.5 % (ref 4–15)
NEUTROPHILS # BLD AUTO: 10.5 K/UL (ref 1.8–7.7)
NEUTROPHILS NFR BLD: 82.9 % (ref 38–73)
NRBC BLD-RTO: 0 /100 WBC
PLATELET # BLD AUTO: 277 K/UL (ref 150–450)
PMV BLD AUTO: 9.7 FL (ref 9.2–12.9)
POTASSIUM SERPL-SCNC: 2.8 MMOL/L (ref 3.5–5.1)
RBC # BLD AUTO: 3.95 M/UL (ref 4–5.4)
SODIUM SERPL-SCNC: 137 MMOL/L (ref 136–145)
WBC # BLD AUTO: 12.71 K/UL (ref 3.9–12.7)

## 2023-08-16 PROCEDURE — 85025 COMPLETE CBC W/AUTO DIFF WBC: CPT | Performed by: EMERGENCY MEDICINE

## 2023-08-16 PROCEDURE — 93005 ELECTROCARDIOGRAM TRACING: CPT

## 2023-08-16 PROCEDURE — 80048 BASIC METABOLIC PNL TOTAL CA: CPT | Performed by: EMERGENCY MEDICINE

## 2023-08-16 PROCEDURE — 99284 EMERGENCY DEPT VISIT MOD MDM: CPT

## 2023-08-16 PROCEDURE — 93010 EKG 12-LEAD: ICD-10-PCS | Mod: ,,, | Performed by: SPECIALIST

## 2023-08-16 PROCEDURE — 93010 ELECTROCARDIOGRAM REPORT: CPT | Mod: ,,, | Performed by: SPECIALIST

## 2023-08-16 PROCEDURE — 25000003 PHARM REV CODE 250: Performed by: EMERGENCY MEDICINE

## 2023-08-16 PROCEDURE — 36415 COLL VENOUS BLD VENIPUNCTURE: CPT | Performed by: EMERGENCY MEDICINE

## 2023-08-16 RX ADMIN — POTASSIUM BICARBONATE 60 MEQ: 391 TABLET, EFFERVESCENT ORAL at 11:08

## 2023-08-16 NOTE — ED PROVIDER NOTES
Encounter Date: 8/16/2023       History     Chief Complaint   Patient presents with    Loss of Consciousness     Family reports pt passed out while having a BM this morning      87-year-old female with a history of diabetes hypertension PE presents to the ER for an episode of syncope associated with a bowel movement earlier today.  History provided by patient and her daughter.  Daughter corroborates the patient's history.  Patient was at home when this occurred.  She states she feels back to normal at this time has no complaints.  Another family member was at home with the patient and called EMS.  Currently has Reddy catheter and is being treated for a bladder infection with ciprofloxacin according to the daughter.  Patient does have a history of syncope associated with bowel movements in the past.  At least 1 previous episode.  She denies chest pain and denies shortness of breath.  No headache.    The history is provided by the patient and a relative.     Review of patient's allergies indicates:   Allergen Reactions    Sulfa (sulfonamide antibiotics)      Other reaction(s): Itching     Past Medical History:   Diagnosis Date    ALLERGIC RHINITIS 4/30/2012    Arthritis     Gimenez's esophagus     Breast cancer     right, s/p right mastectomy>20yr ago    Cataract     Diabetes mellitus type II     Diverticulitis     Diverticulosis     Fuchs' corneal dystrophy     Glaucoma     Hernia, hiatal     Hyperlipidemia     Hypertension     Macular degeneration     Pulmonary embolism     Seizures     Skin cancer of face 12/2014    Dr M Weil     UTI (lower urinary tract infection)      Past Surgical History:   Procedure Laterality Date    angeogram      APPENDECTOMY      BREAST SURGERY      cardio stent   1/2015    Dr Dodson    COLONOSCOPY  around 2003    COLONOSCOPY N/A 1/13/2016    Procedure: COLONOSCOPY;  Surgeon: Mario Bhakta MD;  Location: [facility];  Service: Endoscopy;  Laterality: N/A; repeat in 5 years for  "surveillance    CORNEAL TRANSPLANT      EYE SURGERY      HIP ARTHROPLASTY Right 6/16/2023    Procedure: ARTHROPLASTY, HIP;  Surgeon: Josh Douglas II, MD;  Location: [facility];  Service: Orthopedics;  Laterality: Right;    HYSTERECTOMY      ovaries removed    MASTECTOMY      right >20 years ago    STOMACH SURGERY      UPPER GASTROINTESTINAL ENDOSCOPY  01/13/2016    Dr. Moya     Family History   Problem Relation Age of Onset    Mental illness Mother     Liver cancer Mother     Early death Father     Heart disease Father     Diabetes Daughter     Early death Paternal Uncle     Heart disease Paternal Uncle     Celiac disease Sister     No Known Problems Son     Breast cancer Neg Hx     Ovarian cancer Neg Hx     Colon cancer Neg Hx     Colon polyps Neg Hx     Esophageal cancer Neg Hx     Stomach cancer Neg Hx     Ulcerative colitis Neg Hx      Social History     Tobacco Use    Smoking status: Never    Smokeless tobacco: Never   Substance Use Topics    Alcohol use: No    Drug use: No     Review of Systems   Constitutional:  Negative for fever.   Respiratory:  Negative for shortness of breath.    Cardiovascular:  Negative for chest pain.   Gastrointestinal:  Negative for constipation ("not anymore") and nausea.   Musculoskeletal:  Negative for back pain.   Skin:  Negative for rash.   Neurological:  Negative for weakness and headaches.   Hematological:  Does not bruise/bleed easily.       Physical Exam     Initial Vitals [08/16/23 0853]   BP Pulse Resp Temp SpO2   (!) 150/77 81 18 97.7 °F (36.5 °C) 99 %      MAP       --         Physical Exam    Nursing note and vitals reviewed.  Constitutional: She appears well-developed and well-nourished. No distress.   Well-appearing   HENT:   Head: Normocephalic and atraumatic.   Eyes: EOM are normal.   Neck: Neck supple.   Normal range of motion.  Cardiovascular:  Normal rate, regular rhythm and normal heart sounds.     Exam reveals no gallop and no friction rub.       No murmur " heard.  Pulmonary/Chest: Breath sounds normal. No respiratory distress. She has no wheezes. She has no rhonchi. She has no rales.   Abdominal: Abdomen is soft. She exhibits no distension. There is no abdominal tenderness. There is no rebound.   Genitourinary:    Genitourinary Comments: Indwelling Reddy catheter     Musculoskeletal:         General: Normal range of motion.      Cervical back: Normal range of motion and neck supple.     Neurological: She is alert and oriented to person, place, and time.   Skin: Skin is warm and dry.   Psychiatric: She has a normal mood and affect. Her behavior is normal. Judgment and thought content normal.         ED Course   Procedures  Labs Reviewed - No data to display       Imaging Results    None          Medications - No data to display  Medical Decision Making:   History:   I obtained history from: someone other than patient.       <> Summary of History: Daughter  Old Medical Records: I decided to obtain old medical records.  Old Records Summarized: other records.       <> Summary of Records: Previous potassiums reviewed  Initial Assessment:   Syncope with bowel movement  Differential Diagnosis:   GI bleed, vasovagal syncope  Clinical Tests:   Lab Tests: Ordered and Reviewed  Medical Tests: Ordered and Reviewed    1044 87-year-old female presents to the ER after an episode of syncope that was associated with a bowel movement.  Patient distinctly remembers straining to have a bowel movement and then passing out.  I do not believe she needs to be admitted for this.  EKG is unchanged from prior.  Her potassium is low and will be replaced.  This needs to be rechecked later.  Family reports they have a primary care visit on the 28th of this month.  She does not need to be admitted, return for any concerns.  Very well-appearing and asymptomatic in the emergency room.  [EF]               ED Course as of 08/16/23 1153   Wed Aug 16, 2023   0911 BP(!): 150/77 [EF]   0911 Temp: 97.7 °F  (36.5 °C) [EF]   0911 Temp Source: Oral [EF]   0911 Pulse: 81 [EF]   0911 Resp: 18 [EF]   0911 SpO2: 99 % [EF]   0943 Sinus rhythm first-degree AV block left axis 78 beats per minute no ST segment elevation or depression or T-wave inversion independently interpreted [EF]   0950 WBC(!): 12.71 [EF]   0950 Hemoglobin(!): 11.3 [EF]   0950 Platelets: 277 [EF]   1022 Sodium: 137 [EF]   1022 Potassium(!!): 2.8 [EF]   1022 Chloride: 100 [EF]   1022 CO2: 25 [EF]   1022 Glucose(!): 169 [EF]   1022 BUN: 9 [EF]   1022 Creatinine: 0.6 [EF]   1044 87-year-old female presents to the ER after an episode of syncope that was associated with a bowel movement.  Patient distinctly remembers straining to have a bowel movement and then passing out.  I do not believe she needs to be admitted for this.  EKG is unchanged from prior.  Her potassium is low and will be replaced.  This needs to be rechecked later.  Family reports they have a primary care visit on the 28th of this month.  She does not need to be admitted, return for any concerns.  Very well-appearing and asymptomatic in the emergency room.  [EF]      ED Course User Index  [EF] Marcus Villaseñor MD                 Clinical Impression:   Final diagnoses:  [R55] Syncope               Marcus Villaseñor MD  08/16/23 9970

## 2023-08-17 ENCOUNTER — DOCUMENT SCAN (OUTPATIENT)
Dept: HOME HEALTH SERVICES | Facility: HOSPITAL | Age: 88
End: 2023-08-17
Payer: MEDICARE

## 2023-08-22 ENCOUNTER — OFFICE VISIT (OUTPATIENT)
Dept: UROLOGY | Facility: CLINIC | Age: 88
End: 2023-08-22
Payer: MEDICARE

## 2023-08-22 VITALS
SYSTOLIC BLOOD PRESSURE: 161 MMHG | HEART RATE: 111 BPM | BODY MASS INDEX: 21.25 KG/M2 | HEIGHT: 63 IN | DIASTOLIC BLOOD PRESSURE: 103 MMHG | RESPIRATION RATE: 18 BRPM | WEIGHT: 119.94 LBS

## 2023-08-22 DIAGNOSIS — I15.2 HYPERTENSION ASSOCIATED WITH DIABETES: ICD-10-CM

## 2023-08-22 DIAGNOSIS — N39.0 RECURRENT UTI: Primary | ICD-10-CM

## 2023-08-22 DIAGNOSIS — N30.01 ACUTE CYSTITIS WITH HEMATURIA: ICD-10-CM

## 2023-08-22 DIAGNOSIS — E11.59 TYPE 2 DIABETES MELLITUS WITH OTHER CIRCULATORY COMPLICATION, WITHOUT LONG-TERM CURRENT USE OF INSULIN: ICD-10-CM

## 2023-08-22 DIAGNOSIS — I25.10 CORONARY ARTERY DISEASE INVOLVING NATIVE CORONARY ARTERY OF NATIVE HEART WITHOUT ANGINA PECTORIS: ICD-10-CM

## 2023-08-22 DIAGNOSIS — Z95.5 S/P DRUG ELUTING CORONARY STENT PLACEMENT: ICD-10-CM

## 2023-08-22 DIAGNOSIS — E11.59 HYPERTENSION ASSOCIATED WITH DIABETES: ICD-10-CM

## 2023-08-22 DIAGNOSIS — R33.9 INCOMPLETE BLADDER EMPTYING: ICD-10-CM

## 2023-08-22 DIAGNOSIS — Z86.711 HISTORY OF PULMONARY EMBOLISM: ICD-10-CM

## 2023-08-22 DIAGNOSIS — Z85.3 HISTORY OF RIGHT BREAST CANCER: ICD-10-CM

## 2023-08-22 PROCEDURE — 99214 PR OFFICE/OUTPT VISIT, EST, LEVL IV, 30-39 MIN: ICD-10-PCS | Mod: S$PBB,,, | Performed by: STUDENT IN AN ORGANIZED HEALTH CARE EDUCATION/TRAINING PROGRAM

## 2023-08-22 PROCEDURE — 99214 OFFICE O/P EST MOD 30 MIN: CPT | Mod: S$PBB,,, | Performed by: STUDENT IN AN ORGANIZED HEALTH CARE EDUCATION/TRAINING PROGRAM

## 2023-08-22 PROCEDURE — 99999 PR PBB SHADOW E&M-EST. PATIENT-LVL III: ICD-10-PCS | Mod: PBBFAC,,, | Performed by: STUDENT IN AN ORGANIZED HEALTH CARE EDUCATION/TRAINING PROGRAM

## 2023-08-22 PROCEDURE — 99999 PR PBB SHADOW E&M-EST. PATIENT-LVL III: CPT | Mod: PBBFAC,,, | Performed by: STUDENT IN AN ORGANIZED HEALTH CARE EDUCATION/TRAINING PROGRAM

## 2023-08-22 PROCEDURE — 99213 OFFICE O/P EST LOW 20 MIN: CPT | Mod: PBBFAC,PO | Performed by: STUDENT IN AN ORGANIZED HEALTH CARE EDUCATION/TRAINING PROGRAM

## 2023-08-22 NOTE — PROGRESS NOTES
[facility] Note    PCP: Josh Berman MD    Chief Complaint: incomplete bladder emptying      SUBJECTIVE:       History of Present Illness:  Genny Watson is a 87 y.o. female who presents to clinic for incomplete bladder emptying. She is Established  to our clinic.     Patient recently had episodes of gross hematuria in setting of a UTI. Most recently in ED on 8/10. She has undergone a CT which showed only bladder wall thickening.   Culture 8/3/23: E coli     Currently has a yung in place x 2 weeks. No further hematuria.   Has not been using vaginal estrogen cream recently.   Having a BM every 2-4 days.  Not drinking enough water.     2/6/23  Has been using vaginal estrogen cream twice a week.   No UTIs since last visit.   No hematuria or dysuria.   Has a bowel movement every 2-3 days.     cc    8/3/22  Patient seen by NP in March and PVR was <300 cc. Was then seen again in June with inability to urinate and 350 cc drained at that time.   Both times has had a positive urine culture however were asymptomatic.  She has no bothersome urinary complaints.   Her renal function is normal.     Has been using vaginal estrogen cream.     Bladder scan today: 342 cc (unable to void)    11/29/21  Had E coli UTI in August. Unsure if she was having symptoms at that time.   Today her symptoms are frequency. No incontinence. No dysuria. No gross hematuria.   Having a daily bowel movement.   Drinks 4-5 glasses a day of water. Drinks 1 cup of coffee in the am.     Cr 6/29/21: 0.8  PVR today 305 cc       5/27/21  Patient presents for hematuria. She states her urine looked orange, no blood in urine and no clots. She thinks maybe she had bleeding from her hemorrhoids.   She had a UA done on 5/17 which showed 6 RBC, >100 WBC, nitrite negative. Urine culture no growth.   She underwent CT Urogram which showed no hydro, no stones, no filling defects. Her bladder is fairly distended.     She has  no significant bothersome urinary complaints.   No history of stones.  She does have UTIs and her symptoms include dysuria. Usually goes to urgent care for this.      She a bowel movement every 3 days.   Drinks 2 bottles of water a day, 1 cup of coffee.   She states she feels like she is emptying her bladder better at home, she has a shy bladder.     UA today: +blood and leuks, nitrite negative  PVR today: 390 cc (catheterized)    Last urine culture: E coli (10/6/20)    Lab Results   Component Value Date    CREATININE 0.6 08/16/2023     Family  hx: no malignancy   Hx of HTN, HLD, CAD s/p stent placement, PE, breast cancer, DM    Past medical, family, and social history reviewed as documented in chart with pertinent positive medical, family, and social history detailed in HPI.    Review of patient's allergies indicates:   Allergen Reactions    Sulfa (sulfonamide antibiotics)      Other reaction(s): Itching       Past Medical History:   Diagnosis Date    ALLERGIC RHINITIS 4/30/2012    Arthritis     Gimenez's esophagus     Breast cancer     right, s/p right mastectomy>20yr ago    Cataract     Diabetes mellitus type II     Diverticulitis     Diverticulosis     Fuchs' corneal dystrophy     Glaucoma     Hernia, hiatal     Hyperlipidemia     Hypertension     Macular degeneration     Pulmonary embolism     Seizures     Skin cancer of face 12/2014    Dr M Weil     UTI (lower urinary tract infection)      Past Surgical History:   Procedure Laterality Date    angeogram      APPENDECTOMY      BREAST SURGERY      cardio stent   1/2015    Dr Dodson    COLONOSCOPY  around 2003    COLONOSCOPY N/A 1/13/2016    Procedure: COLONOSCOPY;  Surgeon: Mraio Bhakta MD;  Location: [facility];  Service: Endoscopy;  Laterality: N/A; repeat in 5 years for surveillance    CORNEAL TRANSPLANT      EYE SURGERY      HIP ARTHROPLASTY Right 6/16/2023    Procedure: ARTHROPLASTY, HIP;  Surgeon: Josh Douglas II, MD;  Location: [facility] OR;  Service:  "Orthopedics;  Laterality: Right;    HYSTERECTOMY      ovaries removed    MASTECTOMY      right >20 years ago    STOMACH SURGERY      UPPER GASTROINTESTINAL ENDOSCOPY  01/13/2016    Dr. Moya     Family History   Problem Relation Age of Onset    Mental illness Mother     Liver cancer Mother     Early death Father     Heart disease Father     Diabetes Daughter     Early death Paternal Uncle     Heart disease Paternal Uncle     Celiac disease Sister     No Known Problems Son     Breast cancer Neg Hx     Ovarian cancer Neg Hx     Colon cancer Neg Hx     Colon polyps Neg Hx     Esophageal cancer Neg Hx     Stomach cancer Neg Hx     Ulcerative colitis Neg Hx      Social History     Tobacco Use    Smoking status: Never    Smokeless tobacco: Never   Substance Use Topics    Alcohol use: No    Drug use: No        Review of Systems   Genitourinary:  Negative for difficulty urinating, dysuria, frequency, hematuria, pelvic pain and urgency.       OBJECTIVE:     Anticoagulation: Plavix 75 mg, aspirin 81 mg    Estimated body mass index is 21.24 kg/m² as calculated from the following:    Height as of this encounter: 5' 3" (1.6 m).    Weight as of this encounter: 54.4 kg (119 lb 14.9 oz).    Vital Signs (Most Recent)  Pulse: (!) 111 (08/22/23 1130)  Resp: 18 (08/22/23 1130)  BP: (!) 161/103 (08/22/23 1130)    Physical Exam  Vitals reviewed.   Constitutional:       General: She is not in acute distress.     Appearance: Normal appearance. She is not ill-appearing or toxic-appearing.   HENT:      Head: Normocephalic and atraumatic.   Eyes:      General: No scleral icterus.  Cardiovascular:      Rate and Rhythm: Normal rate and regular rhythm.   Pulmonary:      Effort: Pulmonary effort is normal. No respiratory distress.   Abdominal:      Palpations: Abdomen is soft.   Skin:     Coloration: Skin is not jaundiced.   Neurological:      General: No focal deficit present.      Mental Status: She is alert and oriented to person, place, and " time.   Psychiatric:         Mood and Affect: Mood normal.         Behavior: Behavior normal.         BMP  Lab Results   Component Value Date     08/16/2023    K 2.8 (LL) 08/16/2023     08/16/2023    CO2 25 08/16/2023    BUN 9 08/16/2023    CREATININE 0.6 08/16/2023    CALCIUM 8.8 08/16/2023    ANIONGAP 12 08/16/2023    ESTGFRAFRICA >60 07/06/2022    EGFRNONAA >60 07/06/2022       Lab Results   Component Value Date    WBC 12.71 (H) 08/16/2023    HGB 11.3 (L) 08/16/2023    HCT 35.7 (L) 08/16/2023    MCV 90 08/16/2023     08/16/2023       Imaging:  Per HPI    ASSESSMENT     1. Recurrent UTI    2. Acute cystitis with hematuria    3. Incomplete bladder emptying    4. Hypertension associated with diabetes    5. Coronary artery disease involving native coronary artery of native heart without angina pectoris    6. S/P drug eluting coronary stent placement LCX 1/26/15    7. History of pulmonary embolism    8. History of right breast cancer    9. Type 2 diabetes mellitus with other circulatory complication, without long-term current use of insulin      PLAN:     - Yung catheter removed   - Will return tomorrow for PVR check, if <300 no need for ynug replacement    - Given gross hematuria, discussed need for cysto, scheduled 8/30  - Has normal appearing upper tracts on CT      Mirna Harden MD

## 2023-08-22 NOTE — H&P (VIEW-ONLY)
[facility] Note    PCP: Josh Berman MD    Chief Complaint: incomplete bladder emptying      SUBJECTIVE:       History of Present Illness:  Genny Watson is a 87 y.o. female who presents to clinic for incomplete bladder emptying. She is Established  to our clinic.     Patient recently had episodes of gross hematuria in setting of a UTI. Most recently in ED on 8/10. She has undergone a CT which showed only bladder wall thickening.   Culture 8/3/23: E coli     Currently has a yung in place x 2 weeks. No further hematuria.   Has not been using vaginal estrogen cream recently.   Having a BM every 2-4 days.  Not drinking enough water.     2/6/23  Has been using vaginal estrogen cream twice a week.   No UTIs since last visit.   No hematuria or dysuria.   Has a bowel movement every 2-3 days.     cc    8/3/22  Patient seen by NP in March and PVR was <300 cc. Was then seen again in June with inability to urinate and 350 cc drained at that time.   Both times has had a positive urine culture however were asymptomatic.  She has no bothersome urinary complaints.   Her renal function is normal.     Has been using vaginal estrogen cream.     Bladder scan today: 342 cc (unable to void)    11/29/21  Had E coli UTI in August. Unsure if she was having symptoms at that time.   Today her symptoms are frequency. No incontinence. No dysuria. No gross hematuria.   Having a daily bowel movement.   Drinks 4-5 glasses a day of water. Drinks 1 cup of coffee in the am.     Cr 6/29/21: 0.8  PVR today 305 cc       5/27/21  Patient presents for hematuria. She states her urine looked orange, no blood in urine and no clots. She thinks maybe she had bleeding from her hemorrhoids.   She had a UA done on 5/17 which showed 6 RBC, >100 WBC, nitrite negative. Urine culture no growth.   She underwent CT Urogram which showed no hydro, no stones, no filling defects. Her bladder is fairly distended.     She has  no significant bothersome urinary complaints.   No history of stones.  She does have UTIs and her symptoms include dysuria. Usually goes to urgent care for this.      She a bowel movement every 3 days.   Drinks 2 bottles of water a day, 1 cup of coffee.   She states she feels like she is emptying her bladder better at home, she has a shy bladder.     UA today: +blood and leuks, nitrite negative  PVR today: 390 cc (catheterized)    Last urine culture: E coli (10/6/20)    Lab Results   Component Value Date    CREATININE 0.6 08/16/2023     Family  hx: no malignancy   Hx of HTN, HLD, CAD s/p stent placement, PE, breast cancer, DM    Past medical, family, and social history reviewed as documented in chart with pertinent positive medical, family, and social history detailed in HPI.    Review of patient's allergies indicates:   Allergen Reactions    Sulfa (sulfonamide antibiotics)      Other reaction(s): Itching       Past Medical History:   Diagnosis Date    ALLERGIC RHINITIS 4/30/2012    Arthritis     Gimenez's esophagus     Breast cancer     right, s/p right mastectomy>20yr ago    Cataract     Diabetes mellitus type II     Diverticulitis     Diverticulosis     Fuchs' corneal dystrophy     Glaucoma     Hernia, hiatal     Hyperlipidemia     Hypertension     Macular degeneration     Pulmonary embolism     Seizures     Skin cancer of face 12/2014    Dr M Weil     UTI (lower urinary tract infection)      Past Surgical History:   Procedure Laterality Date    angeogram      APPENDECTOMY      BREAST SURGERY      cardio stent   1/2015    Dr Dodson    COLONOSCOPY  around 2003    COLONOSCOPY N/A 1/13/2016    Procedure: COLONOSCOPY;  Surgeon: Mario Bhakta MD;  Location: [facility];  Service: Endoscopy;  Laterality: N/A; repeat in 5 years for surveillance    CORNEAL TRANSPLANT      EYE SURGERY      HIP ARTHROPLASTY Right 6/16/2023    Procedure: ARTHROPLASTY, HIP;  Surgeon: Josh Douglas II, MD;  Location: [facility] OR;  Service:  "Orthopedics;  Laterality: Right;    HYSTERECTOMY      ovaries removed    MASTECTOMY      right >20 years ago    STOMACH SURGERY      UPPER GASTROINTESTINAL ENDOSCOPY  01/13/2016    Dr. Moya     Family History   Problem Relation Age of Onset    Mental illness Mother     Liver cancer Mother     Early death Father     Heart disease Father     Diabetes Daughter     Early death Paternal Uncle     Heart disease Paternal Uncle     Celiac disease Sister     No Known Problems Son     Breast cancer Neg Hx     Ovarian cancer Neg Hx     Colon cancer Neg Hx     Colon polyps Neg Hx     Esophageal cancer Neg Hx     Stomach cancer Neg Hx     Ulcerative colitis Neg Hx      Social History     Tobacco Use    Smoking status: Never    Smokeless tobacco: Never   Substance Use Topics    Alcohol use: No    Drug use: No        Review of Systems   Genitourinary:  Negative for difficulty urinating, dysuria, frequency, hematuria, pelvic pain and urgency.       OBJECTIVE:     Anticoagulation: Plavix 75 mg, aspirin 81 mg    Estimated body mass index is 21.24 kg/m² as calculated from the following:    Height as of this encounter: 5' 3" (1.6 m).    Weight as of this encounter: 54.4 kg (119 lb 14.9 oz).    Vital Signs (Most Recent)  Pulse: (!) 111 (08/22/23 1130)  Resp: 18 (08/22/23 1130)  BP: (!) 161/103 (08/22/23 1130)    Physical Exam  Vitals reviewed.   Constitutional:       General: She is not in acute distress.     Appearance: Normal appearance. She is not ill-appearing or toxic-appearing.   HENT:      Head: Normocephalic and atraumatic.   Eyes:      General: No scleral icterus.  Cardiovascular:      Rate and Rhythm: Normal rate and regular rhythm.   Pulmonary:      Effort: Pulmonary effort is normal. No respiratory distress.   Abdominal:      Palpations: Abdomen is soft.   Skin:     Coloration: Skin is not jaundiced.   Neurological:      General: No focal deficit present.      Mental Status: She is alert and oriented to person, place, and " time.   Psychiatric:         Mood and Affect: Mood normal.         Behavior: Behavior normal.         BMP  Lab Results   Component Value Date     08/16/2023    K 2.8 (LL) 08/16/2023     08/16/2023    CO2 25 08/16/2023    BUN 9 08/16/2023    CREATININE 0.6 08/16/2023    CALCIUM 8.8 08/16/2023    ANIONGAP 12 08/16/2023    ESTGFRAFRICA >60 07/06/2022    EGFRNONAA >60 07/06/2022       Lab Results   Component Value Date    WBC 12.71 (H) 08/16/2023    HGB 11.3 (L) 08/16/2023    HCT 35.7 (L) 08/16/2023    MCV 90 08/16/2023     08/16/2023       Imaging:  Per HPI    ASSESSMENT     1. Recurrent UTI    2. Acute cystitis with hematuria    3. Incomplete bladder emptying    4. Hypertension associated with diabetes    5. Coronary artery disease involving native coronary artery of native heart without angina pectoris    6. S/P drug eluting coronary stent placement LCX 1/26/15    7. History of pulmonary embolism    8. History of right breast cancer    9. Type 2 diabetes mellitus with other circulatory complication, without long-term current use of insulin      PLAN:     - Yung catheter removed   - Will return tomorrow for PVR check, if <300 no need for yung replacement    - Given gross hematuria, discussed need for cysto, scheduled 8/30  - Has normal appearing upper tracts on CT      Mirna Harden MD

## 2023-08-23 ENCOUNTER — CLINICAL SUPPORT (OUTPATIENT)
Dept: UROLOGY | Facility: CLINIC | Age: 88
End: 2023-08-23
Payer: MEDICARE

## 2023-08-23 DIAGNOSIS — R33.9 INCOMPLETE BLADDER EMPTYING: Primary | ICD-10-CM

## 2023-08-23 LAB — POC RESIDUAL URINE VOLUME: 154 ML (ref 0–100)

## 2023-08-23 PROCEDURE — 99999PBSHW POCT BLADDER SCAN: Mod: PBBFAC,,,

## 2023-08-23 PROCEDURE — 99999PBSHW POCT BLADDER SCAN: ICD-10-PCS | Mod: PBBFAC,,,

## 2023-08-23 PROCEDURE — 99499 NO LOS: ICD-10-PCS | Mod: S$PBB,,, | Performed by: STUDENT IN AN ORGANIZED HEALTH CARE EDUCATION/TRAINING PROGRAM

## 2023-08-23 PROCEDURE — 51798 US URINE CAPACITY MEASURE: CPT | Mod: PBBFAC,PO

## 2023-08-23 PROCEDURE — 99499 UNLISTED E&M SERVICE: CPT | Mod: S$PBB,,, | Performed by: STUDENT IN AN ORGANIZED HEALTH CARE EDUCATION/TRAINING PROGRAM

## 2023-08-23 NOTE — PROGRESS NOTES
Procedure Order to Urology [[id number]]    Electronically signed by: Mirna Harden MD on 08/22/23 1147 Status: Active   Ordering user: Mirna Harden MD 08/22/23 1147 Authorized by: Mirna Harden MD   Ordering mode: Standard   Frequency:  08/22/23 -     Diagnoses   Recurrent UTI [N39.0]   Questionnaire    Question Answer   Procedure Cystoscopy Comment - 8/30   Facility Name: [facility], Please order Urine POCT without micro . Local sedation (no urine Dr Metcalf or Dr harden)

## 2023-08-23 NOTE — PROGRESS NOTES
Patient arrived to clinic for PVR. Patient used the bathroom before arriving to clinic. Two patient identifier done, to exam table, bladder scan done, resulted 154 ml. Result given to provider, continue with plan for cystoscope, patient verbally understood.

## 2023-08-28 ENCOUNTER — OFFICE VISIT (OUTPATIENT)
Dept: FAMILY MEDICINE | Facility: CLINIC | Age: 88
End: 2023-08-28
Attending: FAMILY MEDICINE
Payer: MEDICARE

## 2023-08-28 VITALS
BODY MASS INDEX: 20.95 KG/M2 | OXYGEN SATURATION: 98 % | SYSTOLIC BLOOD PRESSURE: 132 MMHG | HEIGHT: 63 IN | HEART RATE: 81 BPM | RESPIRATION RATE: 17 BRPM | WEIGHT: 118.25 LBS | TEMPERATURE: 98 F | DIASTOLIC BLOOD PRESSURE: 84 MMHG

## 2023-08-28 DIAGNOSIS — D50.9 IRON DEFICIENCY ANEMIA, UNSPECIFIED IRON DEFICIENCY ANEMIA TYPE: ICD-10-CM

## 2023-08-28 DIAGNOSIS — E87.6 HYPOKALEMIA: ICD-10-CM

## 2023-08-28 DIAGNOSIS — R55 VASOVAGAL SYNCOPE: Primary | ICD-10-CM

## 2023-08-28 DIAGNOSIS — S00.01XA ABRASION, SCALP W/O INFECTION: ICD-10-CM

## 2023-08-28 DIAGNOSIS — R91.8 LUNG INFILTRATE ON CT: ICD-10-CM

## 2023-08-28 DIAGNOSIS — E11.59 TYPE 2 DIABETES MELLITUS WITH OTHER CIRCULATORY COMPLICATION, WITHOUT LONG-TERM CURRENT USE OF INSULIN: ICD-10-CM

## 2023-08-28 PROCEDURE — 99215 PR OFFICE/OUTPT VISIT, EST, LEVL V, 40-54 MIN: ICD-10-PCS | Mod: S$PBB,,, | Performed by: FAMILY MEDICINE

## 2023-08-28 PROCEDURE — 99999 PR PBB SHADOW E&M-EST. PATIENT-LVL IV: ICD-10-PCS | Mod: PBBFAC,,, | Performed by: FAMILY MEDICINE

## 2023-08-28 PROCEDURE — 99215 OFFICE O/P EST HI 40 MIN: CPT | Mod: S$PBB,,, | Performed by: FAMILY MEDICINE

## 2023-08-28 PROCEDURE — 99999 PR PBB SHADOW E&M-EST. PATIENT-LVL IV: CPT | Mod: PBBFAC,,, | Performed by: FAMILY MEDICINE

## 2023-08-28 PROCEDURE — 99214 OFFICE O/P EST MOD 30 MIN: CPT | Mod: PBBFAC,PN | Performed by: FAMILY MEDICINE

## 2023-08-28 RX ORDER — METOPROLOL SUCCINATE 25 MG/1
25 TABLET, EXTENDED RELEASE ORAL DAILY
Qty: 90 TABLET | Refills: 3 | Status: SHIPPED | OUTPATIENT
Start: 2023-08-28

## 2023-08-28 RX ORDER — METOPROLOL SUCCINATE 50 MG/1
50 TABLET, EXTENDED RELEASE ORAL DAILY
COMMUNITY
End: 2023-08-28

## 2023-08-28 NOTE — PROGRESS NOTES
Subjective:       Patient ID: Genny Watson is a 87 y.o. female.    Chief Complaint: Hospital Follow Up (Pt states that she is here for her hospital follow up syncope/)    87-year-old female coming in for a series of hospital visits to the emergency room at [facility].  She 1st presented August 8th 2023 following a fall from the toilet.  Initial blood pressure was elevated but reduced to normal while being evaluated.  She had CT of the head and C-spine that were unremarkable and an abrasion to the left parieto-occipital area for which a few absorbable sutures were placed.  She next returned August 10, 2023 complaining of blood in the urine and was on Macrobid per Urology.  CT of the abdomen and pelvis was done and she was changed to Cipro 500 b.i.d. for 10 days.  She then returned again August 16, 2023 with another syncopal episode following a bowel movement with nothing found other than a potassium of 2.8.  She had not had any vomiting or diarrhea and she is not on a diuretic.  No cause for the low potassium was found and she was given a replacement but not rechecked while in the hospital, she still needs to have the potassium checked.  She has been having problems with impactions and has started taking MiraLax daily which has resolved her bowel problems but she is now having laxative induced loose bowels to the verge of diarrhea.  They have now reduced her to Monday Wednesday Friday and that seems to be settling down.  She does have a history of iron deficiency and has been on iron for at least two years with no check recently.  It is possible she may be able to come off of the iron and reduce the incidence of some of her constipation and impactions.  She is been having some headaches since her initial fall and head abrasion describing a left side throbbing headache behind the eye that occurs intermittently and is not present now.    Past Medical History:  4/30/2012: ALLERGIC RHINITIS  No date:  Arthritis  No date: Gimenez's esophagus  No date: Breast cancer      Comment:  right, s/p right mastectomy>20yr ago  No date: Cataract  No date: Diabetes mellitus type II  No date: Diverticulitis  No date: Diverticulosis  No date: Fuchs' corneal dystrophy  No date: Glaucoma  No date: Hernia, hiatal  No date: Hyperlipidemia  No date: Hypertension  No date: Macular degeneration  No date: Pulmonary embolism  No date: Seizures  12/2014: Skin cancer of face      Comment:  Dr M Weil   No date: UTI (lower urinary tract infection)    Past Surgical History:  No date: angeogram  No date: APPENDECTOMY  No date: BREAST SURGERY  1/2015: cardio stent       Comment:  Dr Dodson  around 2003: COLONOSCOPY  1/13/2016: COLONOSCOPY; N/A      Comment:  Procedure: COLONOSCOPY;  Surgeon: Mario Bhakta MD;                Location: [facility] ENDO;  Service: Endoscopy;  Laterality:                N/A; repeat in 5 years for surveillance  No date: CORNEAL TRANSPLANT  No date: EYE SURGERY  6/16/2023: HIP ARTHROPLASTY; Right      Comment:  Procedure: ARTHROPLASTY, HIP;  Surgeon: Josh Douglas II, MD;  Location: [facility] OR;  Service: Orthopedics;                 Laterality: Right;  No date: HYSTERECTOMY      Comment:  ovaries removed  No date: MASTECTOMY      Comment:  right >20 years ago  No date: STOMACH SURGERY  01/13/2016: UPPER GASTROINTESTINAL ENDOSCOPY      Comment:  Dr. Bhakta    Current Outpatient Medications on File Prior to Visit:  ascorbic acid, vitamin C, (VITAMIN C) 500 MG tablet, Take 500 mg by mouth once daily., Disp: , Rfl:   aspirin (ECOTRIN) 81 MG EC tablet, Take 81 mg by mouth once daily., Disp: , Rfl:   atorvastatin (LIPITOR) 80 MG tablet, TAKE 1 TABLET DAILY, Disp: 90 tablet, Rfl: 3  clopidogreL (PLAVIX) 75 mg tablet, Take 1 tablet (75 mg total) by mouth once daily., Disp: 90 tablet, Rfl: 3  ferrous sulfate (FEOSOL) 325 mg (65 mg iron) Tab tablet, Take 325 mg by mouth daily with breakfast., Disp: , Rfl:    latanoprost 0.005 % ophthalmic solution, Place 1 drop into both eyes every evening., Disp: , Rfl:   metFORMIN (GLUCOPHAGE-XR) 500 MG ER 24hr tablet, TAKE 1 TABLET EVERY EVENING, Disp: 90 tablet, Rfl: 1  prednisoLONE acetate (PRED FORTE) 1 % DrpS, Place 1 drop into the right eye once daily. , Disp: , Rfl:   TRAVATAN Z 0.004 % ophthalmic solution, Place 1 drop into both eyes every evening. , Disp: , Rfl:   VIT C/E/ZN/COPPR/LUTEIN/ZEAXAN (PRESERVISION AREDS 2 ORAL), Take 1 capsule by mouth 2 (two) times daily., Disp: , Rfl:   [DISCONTINUED] metoprolol succinate (TOPROL-XL) 50 MG 24 hr tablet, Take 50 mg by mouth once daily. Take 25 mg one time daily in the am, Disp: , Rfl:   loratadine (CLARITIN) 10 mg tablet, Take 1 tablet (10 mg total) by mouth once daily. (Patient not taking: Reported on 1/6/2023), Disp: 30 tablet, Rfl: 11  pantoprazole (PROTONIX) 40 MG tablet, Take 1 tablet (40 mg total) by mouth once daily., Disp: 90 tablet, Rfl: 3    No current facility-administered medications on file prior to visit.          Review of Systems   Constitutional:  Negative for chills, diaphoresis and fever.   Gastrointestinal:  Positive for constipation. Negative for abdominal distention, anal bleeding, blood in stool, nausea and vomiting.   Neurological:  Positive for syncope.       Objective:      Physical Exam  Vitals and nursing note reviewed.   Constitutional:       General: She is not in acute distress.     Appearance: Normal appearance. She is normal weight. She is not ill-appearing, toxic-appearing or diaphoretic.      Comments: Good blood pressure control   Normal weight with a BMI of 20.9 she is down 6.3 lb from her last visit with me January 6, 2023.    She is in no distress   HENT:      Head: Normocephalic.      Comments: Small healing abrasion to the left parieto-occipital scalp, one suture remaining, just the knot imbedded in some overlying dried blood  Cardiovascular:      Rate and Rhythm: Normal rate and regular  rhythm.      Heart sounds: Normal heart sounds. No murmur heard.     No friction rub. No gallop.   Pulmonary:      Effort: Pulmonary effort is normal. No respiratory distress.      Breath sounds: Normal breath sounds. No stridor. No wheezing, rhonchi or rales.   Chest:      Chest wall: No tenderness.   Neurological:      General: No focal deficit present.      Mental Status: She is alert and oriented to person, place, and time. Mental status is at baseline.   Psychiatric:         Mood and Affect: Mood normal.         Behavior: Behavior normal.         Thought Content: Thought content normal.         Judgment: Judgment normal.         Assessment:       1. Vasovagal syncope    2. Abrasion, scalp w/o infection    3. Hypokalemia    4. Iron deficiency anemia, unspecified iron deficiency anemia type    5. Type 2 diabetes mellitus with other circulatory complication, without long-term current use of insulin    6. Lung infiltrate on CT    7. BMI 20.0-20.9, adult        Plan:       1. Vasovagal syncope  Suspect due to fecal impactions    2. Abrasion, scalp w/o infection  Healing    3. Hypokalemia  Uncertain etiology, patient is not having diarrhea or vomiting in his not taking any diuretics.  Recheck potassium level  - Basic Metabolic Panel; Future    4. Iron deficiency anemia, unspecified iron deficiency anemia type  May be contributing to her constipation/impactions.  If her iron levels were satisfactory we can discontinue the supplement  - Iron and TIBC; Future    5. Type 2 diabetes mellitus with other circulatory complication, without long-term current use of insulin  Await A1c and chemistry panel as well as microalbumin  - Basic Metabolic Panel; Future  - Hemoglobin A1C; Future  - Microalbumin/Creatinine Ratio, Urine; Future    6. Lung infiltrate on CT  Noted a vague infiltrate in the right upper lung visible on CT of the head.  The same area was noted in a CTA of the chest done in 2015.  She already has an appointment  with Pulmonary to evaluate this, she may need a full CT of the chest although at her age of 87 she may elect to not pursue it    7. BMI 20.0-20.9, adult  Satisfactory weight    I spent 40 minutes on this encounter.  This time includes face-to-face time, orders, chart review, test review, and documentation.

## 2023-08-30 ENCOUNTER — HOSPITAL ENCOUNTER (OUTPATIENT)
Facility: HOSPITAL | Age: 88
Discharge: HOME | End: 2023-08-30
Attending: STUDENT IN AN ORGANIZED HEALTH CARE EDUCATION/TRAINING PROGRAM | Admitting: STUDENT IN AN ORGANIZED HEALTH CARE EDUCATION/TRAINING PROGRAM
Payer: MEDICARE

## 2023-08-30 VITALS
BODY MASS INDEX: 21.09 KG/M2 | OXYGEN SATURATION: 100 % | TEMPERATURE: 98 F | RESPIRATION RATE: 18 BRPM | WEIGHT: 119 LBS | DIASTOLIC BLOOD PRESSURE: 109 MMHG | HEIGHT: 63 IN | SYSTOLIC BLOOD PRESSURE: 171 MMHG | HEART RATE: 108 BPM

## 2023-08-30 DIAGNOSIS — N39.0 RECURRENT UTI: Primary | ICD-10-CM

## 2023-08-30 PROCEDURE — 25000003 PHARM REV CODE 250: Performed by: STUDENT IN AN ORGANIZED HEALTH CARE EDUCATION/TRAINING PROGRAM

## 2023-08-30 PROCEDURE — 52000 PR CYSTOURETHROSCOPY: ICD-10-PCS | Mod: ,,, | Performed by: STUDENT IN AN ORGANIZED HEALTH CARE EDUCATION/TRAINING PROGRAM

## 2023-08-30 PROCEDURE — 52000 CYSTOURETHROSCOPY: CPT | Performed by: STUDENT IN AN ORGANIZED HEALTH CARE EDUCATION/TRAINING PROGRAM

## 2023-08-30 PROCEDURE — 52000 CYSTOURETHROSCOPY: CPT | Mod: ,,, | Performed by: STUDENT IN AN ORGANIZED HEALTH CARE EDUCATION/TRAINING PROGRAM

## 2023-08-30 RX ORDER — LIDOCAINE HYDROCHLORIDE 20 MG/ML
JELLY TOPICAL
Status: DISCONTINUED | OUTPATIENT
Start: 2023-08-30 | End: 2023-08-30 | Stop reason: HOSPADM

## 2023-08-30 RX ORDER — CEPHALEXIN 500 MG/1
500 CAPSULE ORAL 4 TIMES DAILY
Qty: 8 CAPSULE | Refills: 0 | Status: SHIPPED | OUTPATIENT
Start: 2023-08-30 | End: 2023-09-01

## 2023-08-30 NOTE — DISCHARGE SUMMARY
[facility]  Discharge Note  Short Stay    Admit Date: 8/30/2023    Discharge Date and Time: 08/30/2023 10:06 AM      Attending Physician: Mirna Harden MD     Discharge Provider: Mirna Harden    Diagnoses:  Active Hospital Problems    Diagnosis  POA    *Recurrent UTI [N39.0]  Yes      Resolved Hospital Problems   No resolved problems to display.       Discharged Condition: good    Hospital Course: Patient was admitted for cysto and tolerated the procedure well with no complications. The patient was discharged home in good condition on the same day.       Final Diagnoses: Same as principal problem.    Disposition: Home or Self Care    Follow up/Patient Instructions:    Medications:  Reconciled Home Medications:   Current Discharge Medication List        START taking these medications    Details   cephALEXin (KEFLEX) 500 MG capsule Take 1 capsule (500 mg total) by mouth 4 (four) times daily. for 2 days  Qty: 8 capsule, Refills: 0           CONTINUE these medications which have NOT CHANGED    Details   aspirin (ECOTRIN) 81 MG EC tablet Take 81 mg by mouth once daily.      metoprolol succinate (TOPROL-XL) 25 MG 24 hr tablet Take 1 tablet (25 mg total) by mouth once daily.  Qty: 90 tablet, Refills: 3    Comments: .pharmacy:  note reduction from 50mg to 25mg      ascorbic acid, vitamin C, (VITAMIN C) 500 MG tablet Take 500 mg by mouth once daily.      atorvastatin (LIPITOR) 80 MG tablet TAKE 1 TABLET DAILY  Qty: 90 tablet, Refills: 3      clopidogreL (PLAVIX) 75 mg tablet Take 1 tablet (75 mg total) by mouth once daily.  Qty: 90 tablet, Refills: 3      ferrous sulfate (FEOSOL) 325 mg (65 mg iron) Tab tablet Take 325 mg by mouth daily with breakfast.      latanoprost 0.005 % ophthalmic solution Place 1 drop into both eyes every evening.      loratadine (CLARITIN) 10 mg tablet Take 1 tablet (10 mg total) by mouth once daily.  Qty: 30 tablet, Refills: 11    Associated Diagnoses: Seasonal allergic  rhinitis, unspecified trigger      metFORMIN (GLUCOPHAGE-XR) 500 MG ER 24hr tablet TAKE 1 TABLET EVERY EVENING  Qty: 90 tablet, Refills: 1    Associated Diagnoses: Type 2 diabetes mellitus with other circulatory complication, without long-term current use of insulin      pantoprazole (PROTONIX) 40 MG tablet Take 1 tablet (40 mg total) by mouth once daily.  Qty: 90 tablet, Refills: 3    Associated Diagnoses: Gimenez's esophagus without dysplasia      prednisoLONE acetate (PRED FORTE) 1 % DrpS Place 1 drop into the right eye once daily.       TRAVATAN Z 0.004 % ophthalmic solution Place 1 drop into both eyes every evening.       VIT C/E/ZN/COPPR/LUTEIN/ZEAXAN (PRESERVISION AREDS 2 ORAL) Take 1 capsule by mouth 2 (two) times daily.           Discharge Procedure Orders   Call MD for:  temperature >100.4     Activity as tolerated      Follow-up Information       Mirna Harden MD Follow up in 6 month(s).    Specialty: Urology  Why: assess symptoms  Contact information:  [address] [place] [address] [address]IVE  [address]  [facility] [id number] [phone]                               Mirna Harden MD  Urology Department

## 2023-08-30 NOTE — PLAN OF CARE
Discharge instructions given to pt/dtr, verbalized understanding.  Tolerating fluids.  Voided x1.  BP high, dtr states runs high bc of nerves.  Prescription sent to pharmacy per dr bella.  Wheeled out to dtr  and assisted in vehicle per RN in no distress.  Walker in pt's possession.

## 2023-08-30 NOTE — OP NOTE
[facility] [place]  Operative Note    Date: 08/30/2023    Pre-Op Diagnosis:   - Recurrent UTI  - Gross hematuria     Patient Active Problem List   Diagnosis    Hypertension associated with diabetes    Type 2 diabetes mellitus, dx 4/2012    Hyperlipidemia associated with type 2 diabetes mellitus    ALLERGIC RHINITIS    History of right breast cancer    Arthritis    Abnormal EKG    FH: CAD (coronary artery disease)    Abnormal nuclear stress test    CAD (coronary artery disease)    History of pulmonary embolism    Abnormal CT scan, chest    Esophageal mass    S/P drug eluting coronary stent placement LCX 1/26/15    Leg edema, left    Varicose veins of lower extremities with other complications    Screen for colon cancer    Microalbuminuria    Juvenile idiopathic scoliosis of thoracolumbar region    White coat syndrome with diagnosis of hypertension    Intermittent lightheadedness    Gimenez's esophagus without dysplasia    Incomplete bladder emptying    Acute cystitis with hematuria    Traumatic closed displaced fracture of base of neck of right femur, initial encounter    Postprocedural hypotension    Postoperative anemia    Recurrent UTI       Post-Op Diagnosis: same    Procedure(s) Performed:   1.  Cystoscopy     Specimen(s): none    Staff Surgeon: Mirna Harden MD    Anesthesia: Local anesthesia topical 2% lidocaine gel    Indications: Genny Waston is a 87 y.o. female with recurrent UTIs and an episode of gross hematuria.    Findings:   - no tumors or lesions within the bladder concerning for malignancy   - mild trabeculations  - debris present within the bladder   - severe vaginal atrophy with no significant prolapse     Estimated Blood Loss: min    Procedure in Detail:  After risks, benefits and possible complications of cystoscopy were explained, the patient elected to undergo the procedure and informed consent was obtained. All questions were answered in the brendan-operative area. The  patient was transferred to the cystoscopy suite and placed in the dorsal lithotomy position and prepped and draped in the usual sterile fashion.  Time out was performed.     A flexible cystoscope was introduced into the bladder per urethra. This passed easily.  The entire urethra was visualized which showed no masses or strictures.  Findings as above.     The patient tolerated the procedure well and was transferred to recovery in stable condition.    Disposition:  The patient will follow up in 6 months.      Mirna Harden MD

## 2023-09-02 PROCEDURE — G0179 MD RECERTIFICATION HHA PT: HCPCS | Mod: ,,, | Performed by: FAMILY MEDICINE

## 2023-09-02 PROCEDURE — G0179 PR HOME HEALTH MD RECERTIFICATION: ICD-10-PCS | Mod: ,,, | Performed by: FAMILY MEDICINE

## 2023-09-03 ENCOUNTER — HOSPITAL ENCOUNTER (OUTPATIENT)
Facility: HOSPITAL | Age: 88
Discharge: HOME | End: 2023-09-04
Attending: STUDENT IN AN ORGANIZED HEALTH CARE EDUCATION/TRAINING PROGRAM | Admitting: HOSPITALIST
Payer: MEDICARE

## 2023-09-03 DIAGNOSIS — R06.02 SHORTNESS OF BREATH: ICD-10-CM

## 2023-09-03 DIAGNOSIS — I16.0 HYPERTENSIVE URGENCY: Primary | ICD-10-CM

## 2023-09-03 LAB
ALBUMIN SERPL BCP-MCNC: 4.1 G/DL (ref 3.5–5.2)
ALP SERPL-CCNC: 99 U/L (ref 55–135)
ALT SERPL W/O P-5'-P-CCNC: 18 U/L (ref 10–44)
ANION GAP SERPL CALC-SCNC: 14 MMOL/L (ref 8–16)
AST SERPL-CCNC: 20 U/L (ref 10–40)
BASOPHILS # BLD AUTO: 0.04 K/UL (ref 0–0.2)
BASOPHILS NFR BLD: 0.4 % (ref 0–1.9)
BILIRUB SERPL-MCNC: 0.5 MG/DL (ref 0.1–1)
BUN SERPL-MCNC: 23 MG/DL (ref 8–23)
CALCIUM SERPL-MCNC: 9.8 MG/DL (ref 8.7–10.5)
CHLORIDE SERPL-SCNC: 98 MMOL/L (ref 95–110)
CO2 SERPL-SCNC: 25 MMOL/L (ref 23–29)
CREAT SERPL-MCNC: 0.8 MG/DL (ref 0.5–1.4)
DIFFERENTIAL METHOD: ABNORMAL
EOSINOPHIL # BLD AUTO: 0 K/UL (ref 0–0.5)
EOSINOPHIL NFR BLD: 0.3 % (ref 0–8)
ERYTHROCYTE [DISTWIDTH] IN BLOOD BY AUTOMATED COUNT: 14.7 % (ref 11.5–14.5)
EST. GFR  (NO RACE VARIABLE): >60 ML/MIN/1.73 M^2
GLUCOSE SERPL-MCNC: 134 MG/DL (ref 70–110)
HCT VFR BLD AUTO: 41 % (ref 37–48.5)
HGB BLD-MCNC: 13.1 G/DL (ref 12–16)
IMM GRANULOCYTES # BLD AUTO: 0.02 K/UL (ref 0–0.04)
IMM GRANULOCYTES NFR BLD AUTO: 0.2 % (ref 0–0.5)
LYMPHOCYTES # BLD AUTO: 1.9 K/UL (ref 1–4.8)
LYMPHOCYTES NFR BLD: 21.4 % (ref 18–48)
MCH RBC QN AUTO: 28.9 PG (ref 27–31)
MCHC RBC AUTO-ENTMCNC: 32 G/DL (ref 32–36)
MCV RBC AUTO: 90 FL (ref 82–98)
MONOCYTES # BLD AUTO: 0.9 K/UL (ref 0.3–1)
MONOCYTES NFR BLD: 10.1 % (ref 4–15)
NEUTROPHILS # BLD AUTO: 6 K/UL (ref 1.8–7.7)
NEUTROPHILS NFR BLD: 67.6 % (ref 38–73)
NRBC BLD-RTO: 0 /100 WBC
PLATELET # BLD AUTO: 295 K/UL (ref 150–450)
PMV BLD AUTO: 9.8 FL (ref 9.2–12.9)
POCT GLUCOSE: 146 MG/DL (ref 70–110)
POTASSIUM SERPL-SCNC: 3.7 MMOL/L (ref 3.5–5.1)
PROT SERPL-MCNC: 7.8 G/DL (ref 6–8.4)
RBC # BLD AUTO: 4.54 M/UL (ref 4–5.4)
SODIUM SERPL-SCNC: 137 MMOL/L (ref 136–145)
TROPONIN I SERPL DL<=0.01 NG/ML-MCNC: <0.006 NG/ML (ref 0–0.03)
TROPONIN I SERPL DL<=0.01 NG/ML-MCNC: <0.006 NG/ML (ref 0–0.03)
WBC # BLD AUTO: 8.94 K/UL (ref 3.9–12.7)

## 2023-09-03 PROCEDURE — 80053 COMPREHEN METABOLIC PANEL: CPT | Performed by: NURSE PRACTITIONER

## 2023-09-03 PROCEDURE — G0378 HOSPITAL OBSERVATION PER HR: HCPCS

## 2023-09-03 PROCEDURE — 25000003 PHARM REV CODE 250: Performed by: NURSE PRACTITIONER

## 2023-09-03 PROCEDURE — 36415 COLL VENOUS BLD VENIPUNCTURE: CPT | Performed by: NURSE PRACTITIONER

## 2023-09-03 PROCEDURE — 63600175 PHARM REV CODE 636 W HCPCS: Performed by: NURSE PRACTITIONER

## 2023-09-03 PROCEDURE — 99285 EMERGENCY DEPT VISIT HI MDM: CPT | Mod: 25

## 2023-09-03 PROCEDURE — 84484 ASSAY OF TROPONIN QUANT: CPT | Mod: 91 | Performed by: NURSE PRACTITIONER

## 2023-09-03 PROCEDURE — 94761 N-INVAS EAR/PLS OXIMETRY MLT: CPT

## 2023-09-03 PROCEDURE — 93010 ELECTROCARDIOGRAM REPORT: CPT | Mod: ,,, | Performed by: GENERAL PRACTICE

## 2023-09-03 PROCEDURE — 84484 ASSAY OF TROPONIN QUANT: CPT | Performed by: NURSE PRACTITIONER

## 2023-09-03 PROCEDURE — 82962 GLUCOSE BLOOD TEST: CPT

## 2023-09-03 PROCEDURE — 25500020 PHARM REV CODE 255

## 2023-09-03 PROCEDURE — 85025 COMPLETE CBC W/AUTO DIFF WBC: CPT | Performed by: NURSE PRACTITIONER

## 2023-09-03 PROCEDURE — 99900035 HC TECH TIME PER 15 MIN (STAT)

## 2023-09-03 PROCEDURE — 93005 ELECTROCARDIOGRAM TRACING: CPT

## 2023-09-03 PROCEDURE — 93010 EKG 12-LEAD: ICD-10-PCS | Mod: ,,, | Performed by: GENERAL PRACTICE

## 2023-09-03 PROCEDURE — 96374 THER/PROPH/DIAG INJ IV PUSH: CPT | Mod: 59

## 2023-09-03 RX ORDER — SODIUM,POTASSIUM PHOSPHATES 280-250MG
2 POWDER IN PACKET (EA) ORAL
Status: DISCONTINUED | OUTPATIENT
Start: 2023-09-03 | End: 2023-09-04 | Stop reason: HOSPADM

## 2023-09-03 RX ORDER — PANTOPRAZOLE SODIUM 40 MG/1
40 TABLET, DELAYED RELEASE ORAL NIGHTLY
Status: DISCONTINUED | OUTPATIENT
Start: 2023-09-03 | End: 2023-09-04 | Stop reason: HOSPADM

## 2023-09-03 RX ORDER — ONDANSETRON 2 MG/ML
4 INJECTION INTRAMUSCULAR; INTRAVENOUS EVERY 8 HOURS PRN
Status: DISCONTINUED | OUTPATIENT
Start: 2023-09-03 | End: 2023-09-04 | Stop reason: HOSPADM

## 2023-09-03 RX ORDER — ASPIRIN 81 MG/1
81 TABLET ORAL DAILY
Status: DISCONTINUED | OUTPATIENT
Start: 2023-09-04 | End: 2023-09-04 | Stop reason: HOSPADM

## 2023-09-03 RX ORDER — SODIUM CHLORIDE 0.9 % (FLUSH) 0.9 %
10 SYRINGE (ML) INJECTION EVERY 8 HOURS PRN
Status: DISCONTINUED | OUTPATIENT
Start: 2023-09-03 | End: 2023-09-04 | Stop reason: HOSPADM

## 2023-09-03 RX ORDER — IBUPROFEN 200 MG
16 TABLET ORAL
Status: DISCONTINUED | OUTPATIENT
Start: 2023-09-03 | End: 2023-09-04 | Stop reason: HOSPADM

## 2023-09-03 RX ORDER — LABETALOL HYDROCHLORIDE 5 MG/ML
10 INJECTION, SOLUTION INTRAVENOUS
Status: COMPLETED | OUTPATIENT
Start: 2023-09-03 | End: 2023-09-03

## 2023-09-03 RX ORDER — ACETAMINOPHEN 500 MG
1000 TABLET ORAL EVERY 6 HOURS PRN
Status: DISCONTINUED | OUTPATIENT
Start: 2023-09-03 | End: 2023-09-04 | Stop reason: HOSPADM

## 2023-09-03 RX ORDER — ACETAMINOPHEN 325 MG/1
650 TABLET ORAL EVERY 6 HOURS PRN
Status: DISCONTINUED | OUTPATIENT
Start: 2023-09-03 | End: 2023-09-04 | Stop reason: HOSPADM

## 2023-09-03 RX ORDER — GLUCAGON 1 MG
1 KIT INJECTION
Status: DISCONTINUED | OUTPATIENT
Start: 2023-09-03 | End: 2023-09-04 | Stop reason: HOSPADM

## 2023-09-03 RX ORDER — ATORVASTATIN CALCIUM 40 MG/1
80 TABLET, FILM COATED ORAL DAILY
Status: DISCONTINUED | OUTPATIENT
Start: 2023-09-04 | End: 2023-09-04 | Stop reason: HOSPADM

## 2023-09-03 RX ORDER — LANOLIN ALCOHOL/MO/W.PET/CERES
800 CREAM (GRAM) TOPICAL
Status: DISCONTINUED | OUTPATIENT
Start: 2023-09-03 | End: 2023-09-04 | Stop reason: HOSPADM

## 2023-09-03 RX ORDER — IPRATROPIUM BROMIDE AND ALBUTEROL SULFATE 2.5; .5 MG/3ML; MG/3ML
3 SOLUTION RESPIRATORY (INHALATION) EVERY 4 HOURS PRN
Status: DISCONTINUED | OUTPATIENT
Start: 2023-09-03 | End: 2023-09-04 | Stop reason: HOSPADM

## 2023-09-03 RX ORDER — SIMETHICONE 80 MG
1 TABLET,CHEWABLE ORAL 4 TIMES DAILY PRN
Status: DISCONTINUED | OUTPATIENT
Start: 2023-09-03 | End: 2023-09-04 | Stop reason: HOSPADM

## 2023-09-03 RX ORDER — TALC
9 POWDER (GRAM) TOPICAL NIGHTLY PRN
Status: DISCONTINUED | OUTPATIENT
Start: 2023-09-03 | End: 2023-09-04 | Stop reason: HOSPADM

## 2023-09-03 RX ORDER — ACETAMINOPHEN 325 MG/1
650 TABLET ORAL EVERY 4 HOURS PRN
Status: DISCONTINUED | OUTPATIENT
Start: 2023-09-03 | End: 2023-09-04 | Stop reason: HOSPADM

## 2023-09-03 RX ORDER — NALOXONE HCL 0.4 MG/ML
0.02 VIAL (ML) INJECTION
Status: DISCONTINUED | OUTPATIENT
Start: 2023-09-03 | End: 2023-09-04 | Stop reason: HOSPADM

## 2023-09-03 RX ORDER — MAG HYDROX/ALUMINUM HYD/SIMETH 200-200-20
30 SUSPENSION, ORAL (FINAL DOSE FORM) ORAL 4 TIMES DAILY PRN
Status: DISCONTINUED | OUTPATIENT
Start: 2023-09-03 | End: 2023-09-04 | Stop reason: HOSPADM

## 2023-09-03 RX ORDER — METOPROLOL SUCCINATE 25 MG/1
25 TABLET, EXTENDED RELEASE ORAL NIGHTLY
Status: DISCONTINUED | OUTPATIENT
Start: 2023-09-03 | End: 2023-09-04 | Stop reason: HOSPADM

## 2023-09-03 RX ORDER — LATANOPROST 50 UG/ML
1 SOLUTION/ DROPS OPHTHALMIC NIGHTLY
Status: DISCONTINUED | OUTPATIENT
Start: 2023-09-03 | End: 2023-09-04 | Stop reason: HOSPADM

## 2023-09-03 RX ORDER — INSULIN ASPART 100 [IU]/ML
1-10 INJECTION, SOLUTION INTRAVENOUS; SUBCUTANEOUS
Status: DISCONTINUED | OUTPATIENT
Start: 2023-09-03 | End: 2023-09-04 | Stop reason: HOSPADM

## 2023-09-03 RX ORDER — IBUPROFEN 200 MG
24 TABLET ORAL
Status: DISCONTINUED | OUTPATIENT
Start: 2023-09-03 | End: 2023-09-04 | Stop reason: HOSPADM

## 2023-09-03 RX ORDER — CLOPIDOGREL BISULFATE 75 MG/1
75 TABLET ORAL DAILY
Status: DISCONTINUED | OUTPATIENT
Start: 2023-09-04 | End: 2023-09-04 | Stop reason: HOSPADM

## 2023-09-03 RX ADMIN — LABETALOL HYDROCHLORIDE 10 MG: 5 INJECTION INTRAVENOUS at 05:09

## 2023-09-03 RX ADMIN — PANTOPRAZOLE SODIUM 40 MG: 40 TABLET, DELAYED RELEASE ORAL at 09:09

## 2023-09-03 RX ADMIN — LATANOPROST 1 DROP: 50 SOLUTION OPHTHALMIC at 10:09

## 2023-09-03 RX ADMIN — METOPROLOL SUCCINATE 25 MG: 25 TABLET, EXTENDED RELEASE ORAL at 09:09

## 2023-09-03 RX ADMIN — IOHEXOL 75 ML: 350 INJECTION, SOLUTION INTRAVENOUS at 04:09

## 2023-09-04 VITALS
SYSTOLIC BLOOD PRESSURE: 147 MMHG | TEMPERATURE: 98 F | RESPIRATION RATE: 16 BRPM | BODY MASS INDEX: 20.12 KG/M2 | HEIGHT: 63 IN | DIASTOLIC BLOOD PRESSURE: 92 MMHG | OXYGEN SATURATION: 100 % | WEIGHT: 113.56 LBS | HEART RATE: 75 BPM

## 2023-09-04 LAB
ALBUMIN SERPL BCP-MCNC: 3.5 G/DL (ref 3.5–5.2)
ALP SERPL-CCNC: 89 U/L (ref 55–135)
ALT SERPL W/O P-5'-P-CCNC: 15 U/L (ref 10–44)
ANION GAP SERPL CALC-SCNC: 11 MMOL/L (ref 8–16)
AST SERPL-CCNC: 17 U/L (ref 10–40)
BASOPHILS # BLD AUTO: 0.04 K/UL (ref 0–0.2)
BASOPHILS NFR BLD: 0.5 % (ref 0–1.9)
BILIRUB SERPL-MCNC: 0.5 MG/DL (ref 0.1–1)
BUN SERPL-MCNC: 18 MG/DL (ref 8–23)
CALCIUM SERPL-MCNC: 9.2 MG/DL (ref 8.7–10.5)
CHLORIDE SERPL-SCNC: 101 MMOL/L (ref 95–110)
CO2 SERPL-SCNC: 26 MMOL/L (ref 23–29)
CREAT SERPL-MCNC: 0.7 MG/DL (ref 0.5–1.4)
DIFFERENTIAL METHOD: ABNORMAL
EOSINOPHIL # BLD AUTO: 0.1 K/UL (ref 0–0.5)
EOSINOPHIL NFR BLD: 1.2 % (ref 0–8)
ERYTHROCYTE [DISTWIDTH] IN BLOOD BY AUTOMATED COUNT: 14.6 % (ref 11.5–14.5)
EST. GFR  (NO RACE VARIABLE): >60 ML/MIN/1.73 M^2
GLUCOSE SERPL-MCNC: 105 MG/DL (ref 70–110)
HCT VFR BLD AUTO: 36.3 % (ref 37–48.5)
HGB BLD-MCNC: 11.2 G/DL (ref 12–16)
IMM GRANULOCYTES # BLD AUTO: 0.02 K/UL (ref 0–0.04)
IMM GRANULOCYTES NFR BLD AUTO: 0.3 % (ref 0–0.5)
LYMPHOCYTES # BLD AUTO: 2.2 K/UL (ref 1–4.8)
LYMPHOCYTES NFR BLD: 28.7 % (ref 18–48)
MAGNESIUM SERPL-MCNC: 2 MG/DL (ref 1.6–2.6)
MCH RBC QN AUTO: 28 PG (ref 27–31)
MCHC RBC AUTO-ENTMCNC: 30.9 G/DL (ref 32–36)
MCV RBC AUTO: 91 FL (ref 82–98)
MONOCYTES # BLD AUTO: 0.9 K/UL (ref 0.3–1)
MONOCYTES NFR BLD: 11.1 % (ref 4–15)
NEUTROPHILS # BLD AUTO: 4.5 K/UL (ref 1.8–7.7)
NEUTROPHILS NFR BLD: 58.2 % (ref 38–73)
NRBC BLD-RTO: 0 /100 WBC
PHOSPHATE SERPL-MCNC: 4 MG/DL (ref 2.7–4.5)
PLATELET # BLD AUTO: 246 K/UL (ref 150–450)
PMV BLD AUTO: 9.5 FL (ref 9.2–12.9)
POCT GLUCOSE: 94 MG/DL (ref 70–110)
POTASSIUM SERPL-SCNC: 3.5 MMOL/L (ref 3.5–5.1)
PROT SERPL-MCNC: 6.5 G/DL (ref 6–8.4)
RBC # BLD AUTO: 4 M/UL (ref 4–5.4)
SODIUM SERPL-SCNC: 138 MMOL/L (ref 136–145)
WBC # BLD AUTO: 7.73 K/UL (ref 3.9–12.7)

## 2023-09-04 PROCEDURE — 80053 COMPREHEN METABOLIC PANEL: CPT | Performed by: NURSE PRACTITIONER

## 2023-09-04 PROCEDURE — G0378 HOSPITAL OBSERVATION PER HR: HCPCS

## 2023-09-04 PROCEDURE — 25000003 PHARM REV CODE 250: Performed by: NURSE PRACTITIONER

## 2023-09-04 PROCEDURE — 36415 COLL VENOUS BLD VENIPUNCTURE: CPT | Performed by: NURSE PRACTITIONER

## 2023-09-04 PROCEDURE — 94761 N-INVAS EAR/PLS OXIMETRY MLT: CPT

## 2023-09-04 PROCEDURE — 83735 ASSAY OF MAGNESIUM: CPT | Performed by: NURSE PRACTITIONER

## 2023-09-04 PROCEDURE — 85025 COMPLETE CBC W/AUTO DIFF WBC: CPT | Performed by: NURSE PRACTITIONER

## 2023-09-04 PROCEDURE — 84100 ASSAY OF PHOSPHORUS: CPT | Performed by: NURSE PRACTITIONER

## 2023-09-04 RX ADMIN — ASPIRIN 81 MG: 81 TABLET, COATED ORAL at 09:09

## 2023-09-04 RX ADMIN — POTASSIUM BICARBONATE 50 MEQ: 977.5 TABLET, EFFERVESCENT ORAL at 09:09

## 2023-09-04 RX ADMIN — CLOPIDOGREL BISULFATE 75 MG: 75 TABLET, FILM COATED ORAL at 09:09

## 2023-09-04 RX ADMIN — ATORVASTATIN CALCIUM 80 MG: 40 TABLET, FILM COATED ORAL at 09:09

## 2023-09-04 NOTE — ASSESSMENT & PLAN NOTE
"Patient's FSGs are controlled on current medication regimen.  Last A1c reviewed-   Lab Results   Component Value Date    HGBA1C 5.1 08/28/2023     Most recent fingerstick glucose reviewed- No results for input(s): "POCTGLUCOSE" in the last 24 hours.  Current correctional scale  Medium  Maintain anti-hyperglycemic dose as follows-   Antihyperglycemics (From admission, onward)    Start     Stop Route Frequency Ordered    09/03/23 2039  insulin aspart U-100 pen 1-10 Units         -- SubQ Before meals & nightly PRN 09/03/23 1942        Hold Oral hypoglycemics while patient is in the hospital.  "

## 2023-09-04 NOTE — PLAN OF CARE
Pt clear for DC from case management standpoint. Discharging to home       09/04/23 1417   Final Note   Assessment Type Final Discharge Note   Anticipated Discharge Disposition Home

## 2023-09-04 NOTE — NURSING
Awake alert and oriented to person only. Eating dinner with minimal assistance from daughter at bedside. Denies any complaints. Questions answered and encouraged per daughters x2 at bedside. VSS, room air.

## 2023-09-04 NOTE — PHARMACY MED REC
"Admission Medication History     The home medication history was taken by Elijah Coleman.    You may go to "Admission" then "Reconcile Home Medications" tabs to review and/or act upon these items.     The home medication list has been updated by the Pharmacy department.   Please read ALL comments highlighted in yellow.   Please address this information as you see fit.    Feel free to contact us if you have any questions or require assistance.      The medications listed below were removed from the home medication list. Please reorder if appropriate:  Patient reports no longer taking the following medication(s):  Loratadine 10 mg  Prednisolone Drops  Travatan Drops    Medications listed below were obtained from: Patient/family and Analytic software- [employer]  No current facility-administered medications on file prior to encounter.     Current Outpatient Medications on File Prior to Encounter   Medication Sig Dispense Refill    ascorbic acid, vitamin C, (VITAMIN C) 500 MG tablet Take 500 mg by mouth once daily.      aspirin (ECOTRIN) 81 MG EC tablet Take 81 mg by mouth once daily.      atorvastatin (LIPITOR) 80 MG tablet TAKE 1 TABLET DAILY (Patient taking differently: Take 80 mg by mouth once daily. TAKE 1 TABLET DAILY) 90 tablet 3    clopidogreL (PLAVIX) 75 mg tablet Take 1 tablet (75 mg total) by mouth once daily. 90 tablet 3    ferrous sulfate (FEOSOL) 325 mg (65 mg iron) Tab tablet Take 325 mg by mouth daily with breakfast.      latanoprost 0.005 % ophthalmic solution Place 1 drop into both eyes every evening.      metFORMIN (GLUCOPHAGE-XR) 500 MG ER 24hr tablet TAKE 1 TABLET EVERY EVENING (Patient taking differently: Take 500 mg by mouth every evening.) 90 tablet 1    metoprolol succinate (TOPROL-XL) 25 MG 24 hr tablet Take 1 tablet (25 mg total) by mouth once daily. (Patient taking differently: Take 25 mg by mouth every evening.) 90 tablet 3    pantoprazole (PROTONIX) 40 MG tablet Take 1 tablet (40 mg total) by " mouth once daily. (Patient taking differently: Take 40 mg by mouth every evening.) 90 tablet 3    VIT C/E/ZN/COPPR/LUTEIN/ZEAXAN (PRESERVISION AREDS 2 ORAL) Take 1 capsule by mouth 2 (two) times daily.      [DISCONTINUED] loratadine (CLARITIN) 10 mg tablet Take 1 tablet (10 mg total) by mouth once daily. (Patient not taking: Reported on 1/6/2023) 30 tablet 11    [DISCONTINUED] prednisoLONE acetate (PRED FORTE) 1 % DrpS Place 1 drop into the right eye once daily.       [DISCONTINUED] TRAVATAN Z 0.004 % ophthalmic solution Place 1 drop into both eyes every evening.                Elijah Coleman  EXT 1924                 .

## 2023-09-04 NOTE — HPI
Genny Watson is an 87-year-old female who presents emergency room for evaluation of shortness of breath and high blood pressure.  Patient was seen at local urgent care earlier today where she underwent D-dimer testing which was reported to her as positive.  She was referred to the emergency room for further testing and evaluation.  Patient was recently discharged from rehab status post hip fracture.  Family also endorses her to anti hypertensives were stopped and she was placed on metoprolol.  Patient reports shortness of breath is intermittent.  No fever or chills.  No known sick contacts or travel.  No aggravating alleviating factors.  Previous medical history includes diabetes, recent right hip fracture, coronary artery disease, hypertension.  ER workup:  CBC and CMP were unremarkable.  CTA was negative for PE or any acute pathology.  Patient be admitted to Hospital Medicine for treatment and management.

## 2023-09-04 NOTE — ED PROVIDER NOTES
Encounter Date: 9/3/2023       History     Chief Complaint   Patient presents with    Shortness of Breath     Sent from urgent care / d-dimer elevated      Patient is a 87 y.o. female who presents to the ED 09/04/2023 with a chief complaint of intermittent shortness of breath for 3 days.  She states it is mostly at nighttime.  The family feels she gets anxious at this time.  She does not feel like she is anxious.  She states these last for approximately 30 minutes on and off for she complains and gets better complaints and gets better.  She is had slight nausea.  She is not had any chest pain or lower extremity swelling.  She denies any fever.  She does have a history of blood clots.  She had a positive D-dimer at the urgent care and was sent over for CT scan.  She is not on any blood thinners at this time.  She currently does not feel short of breath or having any chest pain or have any symptoms at this time.  She notes her blood pressure gets high during these events.  She is currently only taking metoprolol and was taken off her amlodipine and losartan at her recent admission at the rehab for hip fracture.  Past medical and surgical history noted below.               Review of patient's allergies indicates:   Allergen Reactions    Sulfa (sulfonamide antibiotics)      Other reaction(s): Itching     Past Medical History:   Diagnosis Date    ALLERGIC RHINITIS 4/30/2012    Arthritis     Gimenez's esophagus     Breast cancer     right, s/p right mastectomy>20yr ago    Cataract     Diabetes mellitus type II     Diverticulitis     Diverticulosis     Fuchs' corneal dystrophy     Glaucoma     Hernia, hiatal     Hyperlipidemia     Hypertension     Macular degeneration     Pulmonary embolism     Seizures     Skin cancer of face 12/2014    Dr M Weil     UTI (lower urinary tract infection)      Past Surgical History:   Procedure Laterality Date    angeogram      APPENDECTOMY      BREAST SURGERY      cardio stent   1/2015     Dr Dodson    COLONOSCOPY  around 2003    COLONOSCOPY N/A 1/13/2016    Procedure: COLONOSCOPY;  Surgeon: Mario Bhakta MD;  Location: [facility];  Service: Endoscopy;  Laterality: N/A; repeat in 5 years for surveillance    CORNEAL TRANSPLANT      CYSTOSCOPY N/A 8/30/2023    Procedure: CYSTOSCOPY;  Surgeon: Mirna Harden MD;  Location: [facility];  Service: Urology;  Laterality: N/A;    EYE SURGERY      HIP ARTHROPLASTY Right 6/16/2023    Procedure: ARTHROPLASTY, HIP;  Surgeon: Josh Douglas II, MD;  Location: [facility] OR;  Service: Orthopedics;  Laterality: Right;    HYSTERECTOMY      ovaries removed    MASTECTOMY      right >20 years ago    STOMACH SURGERY      UPPER GASTROINTESTINAL ENDOSCOPY  01/13/2016    Dr. Bhakta     Family History   Problem Relation Age of Onset    Mental illness Mother     Liver cancer Mother     Early death Father     Heart disease Father     Diabetes Daughter     Early death Paternal Uncle     Heart disease Paternal Uncle     Celiac disease Sister     No Known Problems Son     Breast cancer Neg Hx     Ovarian cancer Neg Hx     Colon cancer Neg Hx     Colon polyps Neg Hx     Esophageal cancer Neg Hx     Stomach cancer Neg Hx     Ulcerative colitis Neg Hx      Social History     Tobacco Use    Smoking status: Never    Smokeless tobacco: Never   Substance Use Topics    Alcohol use: No    Drug use: No     Review of Systems   Constitutional:  Negative for chills and fever.   HENT:  Negative for sore throat.    Respiratory:  Positive for shortness of breath. Negative for cough and chest tightness.    Cardiovascular:  Negative for chest pain.   Gastrointestinal:  Positive for nausea. Negative for abdominal pain and vomiting.   Genitourinary:  Negative for dysuria.   Musculoskeletal:  Negative for arthralgias and myalgias.   Skin:  Negative for rash and wound.   Neurological:  Negative for syncope.   Hematological:  Does not bruise/bleed easily.       Physical Exam     Initial Vitals  [09/03/23 1440]   BP Pulse Resp Temp SpO2   (!) 205/104 92 (!) 24 97.4 °F (36.3 °C) 98 %      MAP       --         Physical Exam    Nursing note and vitals reviewed.  Constitutional: Vital signs are normal.   HENT:   Head: Normocephalic and atraumatic.   Eyes: Pupils are equal, round, and reactive to light.   Neck: Neck supple.   Cardiovascular:  Normal rate, regular rhythm, normal heart sounds and intact distal pulses.     Exam reveals no gallop and no friction rub.       No murmur heard.  No lower extremity swelling.   Pulmonary/Chest: Breath sounds normal. She has no wheezes. She has no rhonchi. She has no rales.   Abdominal: Abdomen is soft. Bowel sounds are normal. There is no abdominal tenderness.   Musculoskeletal:      Cervical back: Neck supple.     Neurological: She is alert and oriented to person, place, and time. She has normal strength.   Skin: Skin is warm, dry and intact.   Psychiatric: She has a normal mood and affect. Her speech is normal and behavior is normal.         ED Course   Procedures  Labs Reviewed   CBC W/ AUTO DIFFERENTIAL - Abnormal; Notable for the following components:       Result Value    RDW 14.7 (*)     All other components within normal limits   COMPREHENSIVE METABOLIC PANEL - Abnormal; Notable for the following components:    Glucose 134 (*)     All other components within normal limits   POCT GLUCOSE - Abnormal; Notable for the following components:    POCT Glucose 146 (*)     All other components within normal limits   TROPONIN I   TROPONIN I   B-TYPE NATRIURETIC PEPTIDE   POCT GLUCOSE, HAND-HELD DEVICE     EKG Readings: (Independently Interpreted)   Initial Reading: No STEMI. Rhythm: Normal Sinus Rhythm. Ectopy: No Ectopy. Conduction: 1st Degree AV Block. ST Segments: Normal ST Segments. T Waves: Normal. Axis: Left Axis Deviation.   Reviewed by dr kern as well.        Imaging Results              CTA Chest Non-Coronary (PE Studies) (Final result)  Result time 09/03/23  16:59:07      Final result by Tk Parrish MD (09/03/23 16:59:07)                   Impression:      No evidence for pulmonary embolus or other acute intrathoracic process.    Mildly enlarged subcarinal lymph node, nonspecific.  Consider follow-up in 3-6 months.    Right mastectomy.  Right lung radiation fibrosis.    Large hiatal hernia.    Chronic T12 and L1 compression fractures.      Electronically signed by: Tk Parrish MD  Date:    09/03/2023  Time:    16:59               Narrative:    EXAMINATION:  CTA CHEST NON CORONARY (PE STUDIES)    CLINICAL HISTORY:  Pulmonary embolism (PE) suspected, positive D-dimer;    TECHNIQUE:  Low dose axial images, sagittal and coronal reformations were obtained from the thoracic inlet to the lung bases following the IV administration of 75 mL of Omnipaque 350.  Contrast timing was optimized to evaluate the pulmonary arteries.  MIP images were performed.    COMPARISON:  01/30/2015    FINDINGS:  There are no pulmonary arterial filling defects to indicate the presence of pulmonary thromboemboli.    There is a mildly enlarged subcarinal lymph node measuring 1.7 cm in short axis.    A large hiatal hernia is present.    Moderate right upper lobe bronchiectasis is present accompanied by parenchymal volume loss, coarse interstitial changes, with interval progression and compatible with progressive radiation fibrosis.  Similar mild fibrotic changes are present within the right middle lobe medially.    There is no acute consolidation.  No pleural effusion.  There is some mild lower lobe ground-glass change and septal thickening for which a mild degree of interstitial pulmonary edema is a consideration.    There has been a right mastectomy.  The bones are demineralized.  There are moderate compression fractures of T12 and L1, which appear chronic.                                       Medications   sodium chloride 0.9% flush 10 mL (has no administration in time range)    albuterol-ipratropium 2.5 mg-0.5 mg/3 mL nebulizer solution 3 mL (has no administration in time range)   melatonin tablet 9 mg (has no administration in time range)   ondansetron injection 4 mg (has no administration in time range)   simethicone chewable tablet 80 mg (has no administration in time range)   aluminum-magnesium hydroxide-simethicone 200-200-20 mg/5 mL suspension 30 mL (has no administration in time range)   acetaminophen tablet 650 mg (has no administration in time range)   acetaminophen tablet 1,000 mg (has no administration in time range)   naloxone 0.4 mg/mL injection 0.02 mg (has no administration in time range)   potassium bicarbonate disintegrating tablet 50 mEq (50 mEq Oral Given 9/4/23 0913)   potassium bicarbonate disintegrating tablet 35 mEq (has no administration in time range)   potassium bicarbonate disintegrating tablet 60 mEq (has no administration in time range)   magnesium oxide tablet 800 mg (has no administration in time range)   magnesium oxide tablet 800 mg (has no administration in time range)   potassium, sodium phosphates 280-160-250 mg packet 2 packet (has no administration in time range)   potassium, sodium phosphates 280-160-250 mg packet 2 packet (has no administration in time range)   potassium, sodium phosphates 280-160-250 mg packet 2 packet (has no administration in time range)   insulin aspart U-100 pen 1-10 Units (has no administration in time range)   glucose chewable tablet 16 g (has no administration in time range)   glucose chewable tablet 24 g (has no administration in time range)   glucagon (human recombinant) injection 1 mg (has no administration in time range)   acetaminophen tablet 650 mg (has no administration in time range)   dextrose 10% bolus 125 mL 125 mL (has no administration in time range)   dextrose 10% bolus 250 mL 250 mL (has no administration in time range)   aspirin EC tablet 81 mg (81 mg Oral Given 9/4/23 0908)   atorvastatin tablet 80 mg (80  mg Oral Given 9/4/23 0908)   clopidogreL tablet 75 mg (75 mg Oral Given 9/4/23 0908)   latanoprost 0.005 % ophthalmic solution 1 drop (1 drop Both Eyes Given 9/3/23 2235)   metoprolol succinate (TOPROL-XL) 24 hr tablet 25 mg (25 mg Oral Given 9/3/23 2113)   pantoprazole EC tablet 40 mg (40 mg Oral Given 9/3/23 2113)   iohexoL (OMNIPAQUE 350) 350 mg iodine/mL injection (75 mLs Intravenous Given 9/3/23 1632)   labetaloL injection 10 mg (10 mg Intravenous Given 9/3/23 1715)     Medical Decision Making  Amount and/or Complexity of Data Reviewed  Labs: ordered.  Radiology: ordered.    Risk  Prescription drug management.    Critical Care  Total time providing critical care: 35 minutes         APC / Resident Notes:   Patient is a 87 y.o. female who presents to the ED 09/04/2023  who underwent emergent evaluation for intermittent shortness of breath for the last 3 nights but not having any shortness of breath or symptoms on my exam.  Patient is however hypertensive.  She is given IV labetalol.  No signs of ischemia on EKG.  Troponin normal.  Do not think ACS.  CTA of chest without evidence of pulmonary embolism or congestive heart failure.  I do not think PE or congestive heart failure.  I do not think pneumonia or other acute infectious process requiring antibiotics at this time.  Blood pressure improved in the emergency department with single dose of labetalol.  Recommend observation for hypertensive urgency.  Patient likely needs her blood pressure medication restarted.  Case discussed with hospital medicine team who is agreeable to this plan of care and accepting of admission.  Case also discussed with Dr. Prater who is agreeable to plan of care.             Attending Attestation:   Physician Attestation Statement for Resident:  As the supervising MD  .  -: Patient presents to ED with shortness of breath and uncontrolled blood pressure   -: Patient in no acute respiratory distress on my evaluation, no active chest  pain   -: Patient administered IV labetalol, workup initiated with no significant end-organ damage.  Patient admitted to hospitalist team for hypertensive urgency.                              Medical Decision Making:   Differential Diagnosis:   PE  Hypertensive crisis  Hypertensive urgency       Clinical Impression:   Final diagnoses:  [R06.02] Shortness of breath  [I16.0] Hypertensive urgency (Primary)        ED Disposition Condition    Observation Stable                Hue Barnes NP  09/04/23 1016       Hue Barnes NP  09/04/23 1017       Vito Prater Jr.,   09/04/23 1407

## 2023-09-04 NOTE — SUBJECTIVE & OBJECTIVE
Past Medical History:   Diagnosis Date    ALLERGIC RHINITIS 4/30/2012    Arthritis     Gimenez's esophagus     Breast cancer     right, s/p right mastectomy>20yr ago    Cataract     Diabetes mellitus type II     Diverticulitis     Diverticulosis     Fuchs' corneal dystrophy     Glaucoma     Hernia, hiatal     Hyperlipidemia     Hypertension     Macular degeneration     Pulmonary embolism     Seizures     Skin cancer of face 12/2014    Dr M Weil     UTI (lower urinary tract infection)        Past Surgical History:   Procedure Laterality Date    angeogram      APPENDECTOMY      BREAST SURGERY      cardio stent   1/2015    Dr Dodson    COLONOSCOPY  around 2003    COLONOSCOPY N/A 1/13/2016    Procedure: COLONOSCOPY;  Surgeon: Mario Bhakta MD;  Location: [facility];  Service: Endoscopy;  Laterality: N/A; repeat in 5 years for surveillance    CORNEAL TRANSPLANT      CYSTOSCOPY N/A 8/30/2023    Procedure: CYSTOSCOPY;  Surgeon: Mirna Harden MD;  Location: [facility];  Service: Urology;  Laterality: N/A;    EYE SURGERY      HIP ARTHROPLASTY Right 6/16/2023    Procedure: ARTHROPLASTY, HIP;  Surgeon: Josh Douglas II, MD;  Location: [facility] OR;  Service: Orthopedics;  Laterality: Right;    HYSTERECTOMY      ovaries removed    MASTECTOMY      right >20 years ago    STOMACH SURGERY      UPPER GASTROINTESTINAL ENDOSCOPY  01/13/2016    Dr. Bhakta       Review of patient's allergies indicates:   Allergen Reactions    Sulfa (sulfonamide antibiotics)      Other reaction(s): Itching       No current facility-administered medications on file prior to encounter.     Current Outpatient Medications on File Prior to Encounter   Medication Sig    ascorbic acid, vitamin C, (VITAMIN C) 500 MG tablet Take 500 mg by mouth once daily.    aspirin (ECOTRIN) 81 MG EC tablet Take 81 mg by mouth once daily.    atorvastatin (LIPITOR) 80 MG tablet TAKE 1 TABLET DAILY (Patient taking differently: Take 80 mg by mouth once daily. TAKE 1  TABLET DAILY)    clopidogreL (PLAVIX) 75 mg tablet Take 1 tablet (75 mg total) by mouth once daily.    ferrous sulfate (FEOSOL) 325 mg (65 mg iron) Tab tablet Take 325 mg by mouth daily with breakfast.    latanoprost 0.005 % ophthalmic solution Place 1 drop into both eyes every evening.    metFORMIN (GLUCOPHAGE-XR) 500 MG ER 24hr tablet TAKE 1 TABLET EVERY EVENING (Patient taking differently: Take 500 mg by mouth every evening.)    metoprolol succinate (TOPROL-XL) 25 MG 24 hr tablet Take 1 tablet (25 mg total) by mouth once daily. (Patient taking differently: Take 25 mg by mouth every evening.)    pantoprazole (PROTONIX) 40 MG tablet Take 1 tablet (40 mg total) by mouth once daily. (Patient taking differently: Take 40 mg by mouth every evening.)    VIT C/E/ZN/COPPR/LUTEIN/ZEAXAN (PRESERVISION AREDS 2 ORAL) Take 1 capsule by mouth 2 (two) times daily.    [DISCONTINUED] loratadine (CLARITIN) 10 mg tablet Take 1 tablet (10 mg total) by mouth once daily. (Patient not taking: Reported on 1/6/2023)    [DISCONTINUED] prednisoLONE acetate (PRED FORTE) 1 % DrpS Place 1 drop into the right eye once daily.     [DISCONTINUED] TRAVATAN Z 0.004 % ophthalmic solution Place 1 drop into both eyes every evening.      Family History       Problem Relation (Age of Onset)    Celiac disease Sister    Diabetes Daughter    Early death Father, Paternal Uncle    Heart disease Father, Paternal Uncle    Liver cancer Mother    Mental illness Mother    No Known Problems Son          Tobacco Use    Smoking status: Never    Smokeless tobacco: Never   Substance and Sexual Activity    Alcohol use: No    Drug use: No    Sexual activity: Never     Review of Systems   Constitutional:  Positive for activity change. Negative for chills, diaphoresis and fever.   HENT:  Negative for congestion, nosebleeds and tinnitus.    Eyes:  Negative for photophobia and visual disturbance.   Respiratory:  Positive for shortness of breath. Negative for cough, chest  tightness and wheezing.    Cardiovascular:  Negative for chest pain, palpitations and leg swelling.   Gastrointestinal:  Negative for abdominal distention, abdominal pain, constipation, diarrhea, nausea and vomiting.   Endocrine: Negative for cold intolerance and heat intolerance.   Genitourinary:  Negative for difficulty urinating, dysuria, frequency, hematuria and urgency.   Musculoskeletal:  Negative for arthralgias, back pain and myalgias.   Skin:  Negative for pallor, rash and wound.   Allergic/Immunologic: Negative for immunocompromised state.   Neurological:  Negative for dizziness, tremors, facial asymmetry, speech difficulty and weakness.   Hematological:  Negative for adenopathy. Does not bruise/bleed easily.   Psychiatric/Behavioral:  Negative for confusion and sleep disturbance. The patient is not nervous/anxious.      Objective:     Vital Signs (Most Recent):  Temp: 97.6 °F (36.4 °C) (09/03/23 1725)  Pulse: 86 (09/03/23 1847)  Resp: 20 (09/03/23 1725)  BP: (!) 162/91 (09/03/23 1847)  SpO2: 96 % (09/03/23 1847) Vital Signs (24h Range):  Temp:  [97.4 °F (36.3 °C)-97.6 °F (36.4 °C)] 97.6 °F (36.4 °C)  Pulse:  [80-92] 86  Resp:  [20-24] 20  SpO2:  [96 %-100 %] 96 %  BP: (157-218)/() 162/91     Weight: 53.5 kg (118 lb)  Body mass index is 20.9 kg/m².     Physical Exam  Vitals and nursing note reviewed.   Constitutional:       General: She is not in acute distress.     Appearance: She is well-developed. She is ill-appearing. She is not diaphoretic.   HENT:      Head: Normocephalic.      Mouth/Throat:      Mouth: Mucous membranes are moist.      Pharynx: Oropharynx is clear.   Eyes:      General: No scleral icterus.     Conjunctiva/sclera: Conjunctivae normal.      Pupils: Pupils are equal, round, and reactive to light.   Neck:      Vascular: No JVD.   Cardiovascular:      Rate and Rhythm: Normal rate and regular rhythm.      Heart sounds: Normal heart sounds. No murmur heard.     No friction rub. No  "gallop.   Pulmonary:      Effort: Pulmonary effort is normal. No respiratory distress.      Breath sounds: Normal breath sounds. No wheezing or rales.   Abdominal:      General: Bowel sounds are normal. There is no distension.      Palpations: Abdomen is soft.      Tenderness: There is no abdominal tenderness. There is no guarding or rebound.   Musculoskeletal:         General: No tenderness. Normal range of motion.      Cervical back: Normal range of motion and neck supple.   Lymphadenopathy:      Cervical: No cervical adenopathy.   Skin:     General: Skin is warm and dry.      Capillary Refill: Capillary refill takes less than 2 seconds.      Coloration: Skin is not pale.      Findings: No erythema or rash.   Neurological:      Mental Status: She is alert and oriented to person, place, and time.      Cranial Nerves: No cranial nerve deficit.      Sensory: No sensory deficit.      Coordination: Coordination normal.      Deep Tendon Reflexes: Reflexes normal.   Psychiatric:         Behavior: Behavior normal.         Thought Content: Thought content normal.         Judgment: Judgment normal.              CRANIAL NERVES     CN III, IV, VI   Pupils are equal, round, and reactive to light.       Significant Labs: All pertinent labs within the past 24 hours have been reviewed.  CBC:   Recent Labs   Lab 09/03/23  1511   WBC 8.94   HGB 13.1   HCT 41.0        CMP:   Recent Labs   Lab 09/03/23  1511      K 3.7   CL 98   CO2 25   *   BUN 23   CREATININE 0.8   CALCIUM 9.8   PROT 7.8   ALBUMIN 4.1   BILITOT 0.5   ALKPHOS 99   AST 20   ALT 18   ANIONGAP 14     Cardiac Markers: No results for input(s): "CKMB", "MYOGLOBIN", "BNP", "TROPISTAT" in the last 48 hours.    Significant Imaging: I have reviewed all pertinent imaging results/findings within the past 24 hours.    CTA:   Impression:     No evidence for pulmonary embolus or other acute intrathoracic process.     Mildly enlarged subcarinal lymph node, " nonspecific.  Consider follow-up in 3-6 months.     Right mastectomy.  Right lung radiation fibrosis.     Large hiatal hernia.     Chronic T12 and L1 compression fractures.

## 2023-09-04 NOTE — H&P
[facility] Medicine  History & Physical    Patient Name: Genny Watson  MRN: [id number]  Patient Class: OP- Observation  Admission Date: 9/3/2023  Attending Physician: Sue Haile MD   Primary Care Provider: Josh Berman MD         Patient information was obtained from patient, relative(s) and ER records.     Subjective:     Principal Problem:Hypertension associated with diabetes    Chief Complaint:   Chief Complaint   Patient presents with    Shortness of Breath     Sent from urgent care / d-dimer elevated         HPI: Genny Watson is an 87-year-old female who presents emergency room for evaluation of shortness of breath and high blood pressure.  Patient was seen at local urgent care earlier today where she underwent D-dimer testing which was reported to her as positive.  She was referred to the emergency room for further testing and evaluation.  Patient was recently discharged from rehab status post hip fracture.  Family also endorses her to anti hypertensives were stopped and she was placed on metoprolol.  Patient reports shortness of breath is intermittent.  No fever or chills.  No known sick contacts or travel.  No aggravating alleviating factors.  Previous medical history includes diabetes, recent right hip fracture, coronary artery disease, hypertension.  ER workup:  CBC and CMP were unremarkable.  CTA was negative for PE or any acute pathology.  Patient be admitted to Hospital Medicine for treatment and management.      Past Medical History:   Diagnosis Date    ALLERGIC RHINITIS 4/30/2012    Arthritis     Gimenez's esophagus     Breast cancer     right, s/p right mastectomy>20yr ago    Cataract     Diabetes mellitus type II     Diverticulitis     Diverticulosis     Fuchs' corneal dystrophy     Glaucoma     Hernia, hiatal     Hyperlipidemia     Hypertension     Macular degeneration     Pulmonary embolism     Seizures     Skin cancer of face  12/2014    Dr M Weil     UTI (lower urinary tract infection)        Past Surgical History:   Procedure Laterality Date    angeogram      APPENDECTOMY      BREAST SURGERY      cardio stent   1/2015    Dr Dodson    COLONOSCOPY  around 2003    COLONOSCOPY N/A 1/13/2016    Procedure: COLONOSCOPY;  Surgeon: Mario Bhakta MD;  Location[facility];  Service: Endoscopy;  Laterality: N/A; repeat in 5 years for surveillance    CORNEAL TRANSPLANT      CYSTOSCOPY N/A 8/30/2023    Procedure: CYSTOSCOPY;  Surgeon: Mirna Harden MD;  Location: [facility];  Service: Urology;  Laterality: N/A;    EYE SURGERY      HIP ARTHROPLASTY Right 6/16/2023    Procedure: ARTHROPLASTY, HIP;  Surgeon: Josh Douglas II, MD;  Location: [facility] OR;  Service: Orthopedics;  Laterality: Right;    HYSTERECTOMY      ovaries removed    MASTECTOMY      right >20 years ago    STOMACH SURGERY      UPPER GASTROINTESTINAL ENDOSCOPY  01/13/2016    Dr. Bhakta       Review of patient's allergies indicates:   Allergen Reactions    Sulfa (sulfonamide antibiotics)      Other reaction(s): Itching       No current facility-administered medications on file prior to encounter.     Current Outpatient Medications on File Prior to Encounter   Medication Sig    ascorbic acid, vitamin C, (VITAMIN C) 500 MG tablet Take 500 mg by mouth once daily.    aspirin (ECOTRIN) 81 MG EC tablet Take 81 mg by mouth once daily.    atorvastatin (LIPITOR) 80 MG tablet TAKE 1 TABLET DAILY (Patient taking differently: Take 80 mg by mouth once daily. TAKE 1 TABLET DAILY)    clopidogreL (PLAVIX) 75 mg tablet Take 1 tablet (75 mg total) by mouth once daily.    ferrous sulfate (FEOSOL) 325 mg (65 mg iron) Tab tablet Take 325 mg by mouth daily with breakfast.    latanoprost 0.005 % ophthalmic solution Place 1 drop into both eyes every evening.    metFORMIN (GLUCOPHAGE-XR) 500 MG ER 24hr tablet TAKE 1 TABLET EVERY EVENING (Patient taking differently: Take 500 mg by  mouth every evening.)    metoprolol succinate (TOPROL-XL) 25 MG 24 hr tablet Take 1 tablet (25 mg total) by mouth once daily. (Patient taking differently: Take 25 mg by mouth every evening.)    pantoprazole (PROTONIX) 40 MG tablet Take 1 tablet (40 mg total) by mouth once daily. (Patient taking differently: Take 40 mg by mouth every evening.)    VIT C/E/ZN/COPPR/LUTEIN/ZEAXAN (PRESERVISION AREDS 2 ORAL) Take 1 capsule by mouth 2 (two) times daily.    [DISCONTINUED] loratadine (CLARITIN) 10 mg tablet Take 1 tablet (10 mg total) by mouth once daily. (Patient not taking: Reported on 1/6/2023)    [DISCONTINUED] prednisoLONE acetate (PRED FORTE) 1 % DrpS Place 1 drop into the right eye once daily.     [DISCONTINUED] TRAVATAN Z 0.004 % ophthalmic solution Place 1 drop into both eyes every evening.      Family History       Problem Relation (Age of Onset)    Celiac disease Sister    Diabetes Daughter    Early death Father, Paternal Uncle    Heart disease Father, Paternal Uncle    Liver cancer Mother    Mental illness Mother    No Known Problems Son          Tobacco Use    Smoking status: Never    Smokeless tobacco: Never   Substance and Sexual Activity    Alcohol use: No    Drug use: No    Sexual activity: Never     Review of Systems   Constitutional:  Positive for activity change. Negative for chills, diaphoresis and fever.   HENT:  Negative for congestion, nosebleeds and tinnitus.    Eyes:  Negative for photophobia and visual disturbance.   Respiratory:  Positive for shortness of breath. Negative for cough, chest tightness and wheezing.    Cardiovascular:  Negative for chest pain, palpitations and leg swelling.   Gastrointestinal:  Negative for abdominal distention, abdominal pain, constipation, diarrhea, nausea and vomiting.   Endocrine: Negative for cold intolerance and heat intolerance.   Genitourinary:  Negative for difficulty urinating, dysuria, frequency, hematuria and urgency.   Musculoskeletal:   Negative for arthralgias, back pain and myalgias.   Skin:  Negative for pallor, rash and wound.   Allergic/Immunologic: Negative for immunocompromised state.   Neurological:  Negative for dizziness, tremors, facial asymmetry, speech difficulty and weakness.   Hematological:  Negative for adenopathy. Does not bruise/bleed easily.   Psychiatric/Behavioral:  Negative for confusion and sleep disturbance. The patient is not nervous/anxious.      Objective:     Vital Signs (Most Recent):  Temp: 97.6 °F (36.4 °C) (09/03/23 1725)  Pulse: 86 (09/03/23 1847)  Resp: 20 (09/03/23 1725)  BP: (!) 162/91 (09/03/23 1847)  SpO2: 96 % (09/03/23 1847) Vital Signs (24h Range):  Temp:  [97.4 °F (36.3 °C)-97.6 °F (36.4 °C)] 97.6 °F (36.4 °C)  Pulse:  [80-92] 86  Resp:  [20-24] 20  SpO2:  [96 %-100 %] 96 %  BP: (157-218)/() 162/91     Weight: 53.5 kg (118 lb)  Body mass index is 20.9 kg/m².     Physical Exam  Vitals and nursing note reviewed.   Constitutional:       General: She is not in acute distress.     Appearance: She is well-developed. She is ill-appearing. She is not diaphoretic.   HENT:      Head: Normocephalic.      Mouth/Throat:      Mouth: Mucous membranes are moist.      Pharynx: Oropharynx is clear.   Eyes:      General: No scleral icterus.     Conjunctiva/sclera: Conjunctivae normal.      Pupils: Pupils are equal, round, and reactive to light.   Neck:      Vascular: No JVD.   Cardiovascular:      Rate and Rhythm: Normal rate and regular rhythm.      Heart sounds: Normal heart sounds. No murmur heard.     No friction rub. No gallop.   Pulmonary:      Effort: Pulmonary effort is normal. No respiratory distress.      Breath sounds: Normal breath sounds. No wheezing or rales.   Abdominal:      General: Bowel sounds are normal. There is no distension.      Palpations: Abdomen is soft.      Tenderness: There is no abdominal tenderness. There is no guarding or rebound.   Musculoskeletal:         General: No tenderness.  "Normal range of motion.      Cervical back: Normal range of motion and neck supple.   Lymphadenopathy:      Cervical: No cervical adenopathy.   Skin:     General: Skin is warm and dry.      Capillary Refill: Capillary refill takes less than 2 seconds.      Coloration: Skin is not pale.      Findings: No erythema or rash.   Neurological:      Mental Status: She is alert and oriented to person, place, and time.      Cranial Nerves: No cranial nerve deficit.      Sensory: No sensory deficit.      Coordination: Coordination normal.      Deep Tendon Reflexes: Reflexes normal.   Psychiatric:         Behavior: Behavior normal.         Thought Content: Thought content normal.         Judgment: Judgment normal.              CRANIAL NERVES     CN III, IV, VI   Pupils are equal, round, and reactive to light.       Significant Labs: All pertinent labs within the past 24 hours have been reviewed.  CBC:   Recent Labs   Lab 09/03/23  1511   WBC 8.94   HGB 13.1   HCT 41.0        CMP:   Recent Labs   Lab 09/03/23  1511      K 3.7   CL 98   CO2 25   *   BUN 23   CREATININE 0.8   CALCIUM 9.8   PROT 7.8   ALBUMIN 4.1   BILITOT 0.5   ALKPHOS 99   AST 20   ALT 18   ANIONGAP 14     Cardiac Markers: No results for input(s): "CKMB", "MYOGLOBIN", "BNP", "TROPISTAT" in the last 48 hours.    Significant Imaging: I have reviewed all pertinent imaging results/findings within the past 24 hours.    CTA:   Impression:     No evidence for pulmonary embolus or other acute intrathoracic process.     Mildly enlarged subcarinal lymph node, nonspecific.  Consider follow-up in 3-6 months.     Right mastectomy.  Right lung radiation fibrosis.     Large hiatal hernia.     Chronic T12 and L1 compression fractures.    Assessment/Plan:     * Hypertension associated with diabetes  Acute on Chronic  Chronic, uncontrolled.  Latest blood pressure and vitals reviewed-     Temp:  [97.4 °F (36.3 °C)-97.6 °F (36.4 °C)]   Pulse:  [80-92]   Resp:  " "[20-24]   BP: (157-218)/()   SpO2:  [96 %-100 %] .   Home meds for hypertension were reviewed and noted below-  Hypertension Medications             metoprolol succinate (TOPROL-XL) 25 MG 24 hr tablet Take 1 tablet (25 mg total) by mouth once daily.          While in the hospital, will manage blood pressure as follows; Continue home antihypertensive regimen    Will utilize p.r.n. blood pressure medication only if patient's blood pressure greater than 140/90 and she develops symptoms such as worsening chest pain or shortness of breath.      CAD (coronary artery disease)  Chronic problem  Stable   Continuous telemetry monitoring      Type 2 diabetes mellitus, dx 4/2012  Patient's FSGs are controlled on current medication regimen.  Last A1c reviewed-   Lab Results   Component Value Date    HGBA1C 5.1 08/28/2023     Most recent fingerstick glucose reviewed- No results for input(s): "POCTGLUCOSE" in the last 24 hours.  Current correctional scale  Medium  Maintain anti-hyperglycemic dose as follows-   Antihyperglycemics (From admission, onward)    Start     Stop Route Frequency Ordered    09/03/23 2039  insulin aspart U-100 pen 1-10 Units         -- SubQ Before meals & nightly PRN 09/03/23 1942        Hold Oral hypoglycemics while patient is in the hospital.      VTE Risk Mitigation (From admission, onward)         Ordered     IP VTE HIGH RISK PATIENT  Once         09/03/23 1942     Place sequential compression device  Until discontinued         09/03/23 1942     Place BRANDON hose  Until discontinued         09/03/23 1942                     Tk De Santiago NP  Department of Hospital Medicine  [facility] - ED  "

## 2023-09-04 NOTE — PLAN OF CARE
Pt accepted with [facility]. SABINO 9/5. No new orders needed to due obs admission       09/04/23 1416   Post-Acute Status   Post-Acute Authorization Home Health   Home Health Status Set-up Complete/Auth obtained

## 2023-09-04 NOTE — NURSING
AVS reviewed with pt's daughters. All questions answered, no needs voiced at this time. IV removed with catheter intact. Tele monitor removed and returned to monitor room. Discharged to care of daughters.

## 2023-09-04 NOTE — NURSING
Nurses Note -- 4 Eyes      9/4/2023   12:54 AM      Skin assessed during: Admit      [x] No Altered Skin Integrity Present    []Prevention Measures Documented      [] Yes- Altered Skin Integrity Present or Discovered   [] LDA Added if Not in Epic (Describe Wound)   [] New Altered Skin Integrity was Present on Admit and Documented in LDA   [] Wound Image Taken    Wound Care Consulted? No    Attending Nurse:  Liza Davis RN    Second RN/Staff Member:  Mayra Robison RN

## 2023-09-04 NOTE — PLAN OF CARE
[facility] - Med/Surg  Initial Discharge Assessment       Primary Care Provider: Josh Berman MD    Admission Diagnosis: Hypertensive urgency [I16.0]    Admission Date: 9/3/2023  Expected Discharge Date: 9/4/2023    Transition of Care Barriers: None    Payor: MEDICARE / Plan: MEDICARE PART A & B / Product Type: Government /     Extended Emergency Contact Information  Primary Emergency Contact: Radha Watson  Address: [address]           [place] [address] [facility] [place]  Home Phone: [phone]  Relation: Daughter  Preferred language: English   needed? No  Secondary Emergency Contact: Lisa Schaeffer  Address: UNKNOWN   [facility] [place]  Home Phone: [phone]  Mobile Phone: [phone]  Relation: Daughter  Preferred language: English   needed? No    Discharge Plan A: Home Health  Discharge Plan B: Home with family      [id number] [place] - [address]  [address] [address]  Phone: [phone] Fax: [phone]    [facility] - [place], [place] - [facility]  [address] [address]  Phone: [phone] Fax: [phone]      DC assessment completed with patient and family at bedside. Pt lives at listed address with her son and daughter in law- they are staying with pt temporarily. Pt's daughter, Radha, is with patient everyday due to her working from pt's home. Lisa, another child, lives close by and is available as needed. Denies POA. NOK 6 children. Verified pcp as dr. Berman. Reports she was just seen in his clinic last week. [facility]acy [facility] [address]. Active with [facility] and wants to continue upon DC. DME- RW, wc, bsc, nebulizer, and sc. Reports being modified independent but does need assistance with showering. Denies HD/outpt services. Reports taking home medications as prescribed and can afford them. Verified insurance on file.  Prescription coverage is . Denies recent inpt stay in last 30 days. DC plan is home with continued HH        Initial Assessment (most recent)       Adult Discharge Assessment - 09/04/23 1352          Discharge Assessment    Assessment Type Discharge Planning Assessment     Confirmed/corrected address, phone number and insurance Yes     Confirmed Demographics Correct on Facesheet     Source of Information patient;family     Does patient/caregiver understand observation status Yes     Communicated NILDA with patient/caregiver Yes     Reason For Admission HTN     People in Home child(katie), adult     Facility Arrived From: ER     Do you expect to return to your current living situation? Yes     Do you have help at home or someone to help you manage your care at home? Yes     Who are your caregiver(s) and their phone number(s)? multiple family members     Prior to hospitilization cognitive status: Alert/Oriented     Current cognitive status: Alert/Oriented     Walking or Climbing Stairs ambulation difficulty, requires equipment     Dressing/Bathing bathing difficulty, requires equipment;bathing difficulty, assistance 1 person     Equipment Currently Used at Home walker, rolling;wheelchair;bedside commode;nebulizer;shower chair     Readmission within 30 days? No     Patient currently being followed by outpatient case management? No     Do you currently have service(s) that help you manage your care at home? Yes     Name and Contact number of agency [facility]     Is the pt/caregiver preference to resume services with current agency Yes     Do you take prescription medications? Yes     Do you have prescription coverage? Yes     Coverage      Do you have any problems affording any of your prescribed medications? No     Is the patient taking medications as prescribed? yes     Who is going to help you get home at discharge? daughter     How do you get to doctors appointments? family or friend will provide     Are  you on dialysis? No     Do you take coumadin? No     DME Needed Upon Discharge  none     Discharge Plan discussed with: Patient;Adult children     Transition of Care Barriers None     Discharge Plan A Home Health     Discharge Plan B Home with family

## 2023-09-04 NOTE — ASSESSMENT & PLAN NOTE
Acute on Chronic  Chronic, uncontrolled.  Latest blood pressure and vitals reviewed-     Temp:  [97.4 °F (36.3 °C)-97.6 °F (36.4 °C)]   Pulse:  [80-92]   Resp:  [20-24]   BP: (157-218)/()   SpO2:  [96 %-100 %] .   Home meds for hypertension were reviewed and noted below-  Hypertension Medications             metoprolol succinate (TOPROL-XL) 25 MG 24 hr tablet Take 1 tablet (25 mg total) by mouth once daily.          While in the hospital, will manage blood pressure as follows; Continue home antihypertensive regimen    Will utilize p.r.n. blood pressure medication only if patient's blood pressure greater than 140/90 and she develops symptoms such as worsening chest pain or shortness of breath.

## 2023-09-04 NOTE — PLAN OF CARE
DIANE explained to pt/family. Pt/family verbalized understanding and Lisa signed form.     09/04/23 1358   DIANE Message   Medicare Outpatient and Observation Notification regarding financial responsibility Explained to patient/caregiver;Given to patient/caregiver;Signed/date by patient/caregiver   Date DIANE was signed 09/04/23   Time DIANE was signed 1200

## 2023-09-04 NOTE — PLAN OF CARE
Notified pt's HH of DC today ([facility]). No new orders needed since obs admission       09/04/23 7242   Post-Acute Status   Post-Acute Authorization Home Health   Home Health Status Referrals Sent   Patient choice form signed by patient/caregiver List from System Post-Acute Care

## 2023-09-05 ENCOUNTER — OFFICE VISIT (OUTPATIENT)
Dept: PULMONOLOGY | Facility: CLINIC | Age: 88
End: 2023-09-05
Payer: MEDICARE

## 2023-09-05 ENCOUNTER — TELEPHONE (OUTPATIENT)
Dept: FAMILY MEDICINE | Facility: CLINIC | Age: 88
End: 2023-09-05
Payer: MEDICARE

## 2023-09-05 VITALS
WEIGHT: 117.5 LBS | DIASTOLIC BLOOD PRESSURE: 86 MMHG | HEART RATE: 92 BPM | OXYGEN SATURATION: 100 % | BODY MASS INDEX: 20.82 KG/M2 | SYSTOLIC BLOOD PRESSURE: 187 MMHG

## 2023-09-05 DIAGNOSIS — R59.0 LOCALIZED ENLARGED LYMPH NODES: Primary | ICD-10-CM

## 2023-09-05 DIAGNOSIS — R91.8 LUNG MASS: ICD-10-CM

## 2023-09-05 PROCEDURE — 99204 PR OFFICE/OUTPT VISIT, NEW, LEVL IV, 45-59 MIN: ICD-10-PCS | Mod: S$PBB,,, | Performed by: INTERNAL MEDICINE

## 2023-09-05 PROCEDURE — 99213 OFFICE O/P EST LOW 20 MIN: CPT | Mod: PBBFAC,PO | Performed by: INTERNAL MEDICINE

## 2023-09-05 PROCEDURE — 99999 PR PBB SHADOW E&M-EST. PATIENT-LVL III: ICD-10-PCS | Mod: PBBFAC,,, | Performed by: INTERNAL MEDICINE

## 2023-09-05 PROCEDURE — 99204 OFFICE O/P NEW MOD 45 MIN: CPT | Mod: S$PBB,,, | Performed by: INTERNAL MEDICINE

## 2023-09-05 PROCEDURE — 99999 PR PBB SHADOW E&M-EST. PATIENT-LVL III: CPT | Mod: PBBFAC,,, | Performed by: INTERNAL MEDICINE

## 2023-09-05 NOTE — TELEPHONE ENCOUNTER
Spoke with [facility] f/u scheduled for 9/11/23 at 2 pm.

## 2023-09-05 NOTE — DISCHARGE SUMMARY
[facility] Medicine  Discharge Summary      Patient Name: Genny Watson  MRN: [id number]  [facility]: [id number]  Patient Class: OP- Observation  Admission Date: 9/3/2023  Hospital Length of Stay: 0 days  Discharge Date and Time: 9/4/2023  1:06 PM  Attending Physician: No att. providers found   Discharging Provider: Roshan Estrada PA-C  Primary Care Provider: Josh Berman MD    Primary Care Team: Networked reference to record PCT     HPI:   Genny Watson is an 87-year-old female who presents emergency room for evaluation of shortness of breath and high blood pressure.  Patient was seen at local urgent care earlier today where she underwent D-dimer testing which was reported to her as positive.  She was referred to the emergency room for further testing and evaluation.  Patient was recently discharged from rehab status post hip fracture.  Family also endorses her to anti hypertensives were stopped and she was placed on metoprolol.  Patient reports shortness of breath is intermittent.  No fever or chills.  No known sick contacts or travel.  No aggravating alleviating factors.  Previous medical history includes diabetes, recent right hip fracture, coronary artery disease, hypertension.  ER workup:  CBC and CMP were unremarkable.  CTA was negative for PE or any acute pathology.  Patient be admitted to Hospital Medicine for treatment and management.      * No surgery found *      Hospital Course:   Patient was monitored closely throughout course of stay by hospital medicine team. BP medications resumed. BP stabilized throughout course of stay. Electrolytes replaced throughout course of stay. Patient's admission symptoms resolved prior to discharge. Patient to be discharged home with family. She is to follow up with PCP within 3-5 days. Patient verbalized understanding of discharge instructions and return precautions.        Goals of Care Treatment Preferences:  Code Status: Full  Code      Consults:   Consults (From admission, onward)        Status Ordering Provider     IP consult to case management  Once        Provider:  (Not yet assigned)    Completed REMINGTON LEAL          No new Assessment & Plan notes have been filed under this hospital service since the last note was generated.  Service: Hospital Medicine    Final Active Diagnoses:    Diagnosis Date Noted POA    PRINCIPAL PROBLEM:  Hypertension associated with diabetes [E11.59, I15.2] 04/30/2012 Yes    CAD (coronary artery disease) [I25.10] 01/29/2015 Yes    Type 2 diabetes mellitus, dx 4/2012 [E11.9] 04/30/2012 Yes      Problems Resolved During this Admission:       Discharged Condition: fair    Disposition: Home or Self Care    Follow Up:   Follow-up Information     Josh Berman MD Follow up in 3 day(s).    Specialty: Family Medicine  Why: requested apt for hospital follow up  office should be calling you for scheduling  Contact information:  [address]  [place] [address]  [phone]                       Patient Instructions:      Ambulatory referral/consult to Outpatient Case Management   Referral Priority: Routine Referral Type: Consultation   Referral Reason: Specialty Services Required   Number of Visits Requested: 1     Diet Adult Regular     Notify your health care provider if you experience any of the following:  temperature >100.4     Notify your health care provider if you experience any of the following:  persistent nausea and vomiting or diarrhea     Notify your health care provider if you experience any of the following:  severe uncontrolled pain     Notify your health care provider if you experience any of the following:  redness, tenderness, or signs of infection (pain, swelling, redness, odor or green/yellow discharge around incision site)     Notify your health care provider if you experience any of the following:  difficulty breathing or increased cough     Notify your health care  provider if you experience any of the following:  increased confusion or weakness     Activity as tolerated       Significant Diagnostic Studies: Labs:   CMP   Recent Labs   Lab 09/03/23  1511 09/04/23  0516    138   K 3.7 3.5   CL 98 101   CO2 25 26   * 105   BUN 23 18   CREATININE 0.8 0.7   CALCIUM 9.8 9.2   PROT 7.8 6.5   ALBUMIN 4.1 3.5   BILITOT 0.5 0.5   ALKPHOS 99 89   AST 20 17   ALT 18 15   ANIONGAP 14 11    and CBC   Recent Labs   Lab 09/03/23  1511 09/04/23  0516   WBC 8.94 7.73   HGB 13.1 11.2*   HCT 41.0 36.3*    246       Pending Diagnostic Studies:     None         Medications:  Reconciled Home Medications:      Medication List      CHANGE how you take these medications    atorvastatin 80 MG tablet  Commonly known as: LIPITOR  TAKE 1 TABLET DAILY  What changed:   · how much to take  · how to take this  · when to take this     metoprolol succinate 25 MG 24 hr tablet  Commonly known as: TOPROL-XL  Take 1 tablet (25 mg total) by mouth once daily.  What changed: when to take this     pantoprazole 40 MG tablet  Commonly known as: PROTONIX  Take 1 tablet (40 mg total) by mouth once daily.  What changed: when to take this        CONTINUE taking these medications    ascorbic acid (vitamin C) 500 MG tablet  Commonly known as: VITAMIN C  Take 500 mg by mouth once daily.     aspirin 81 MG EC tablet  Commonly known as: ECOTRIN  Take 81 mg by mouth once daily.     clopidogreL 75 mg tablet  Commonly known as: PLAVIX  Take 1 tablet (75 mg total) by mouth once daily.     ferrous sulfate 325 mg (65 mg iron) Tab tablet  Commonly known as: FEOSOL  Take 325 mg by mouth daily with breakfast.     latanoprost 0.005 % ophthalmic solution  Place 1 drop into both eyes every evening.     metFORMIN 500 MG ER 24hr tablet  Commonly known as: GLUCOPHAGE-XR  TAKE 1 TABLET EVERY EVENING     PRESERVISION AREDS 2 ORAL  Take 1 capsule by mouth 2 (two) times daily.            Indwelling Lines/Drains at time of  discharge:   Lines/Drains/Airways     None                 Time spent on the discharge of patient: 34 minutes         Roshan Estrada PA-C  Department of Hospital Medicine  [facility]/Surg

## 2023-09-05 NOTE — TELEPHONE ENCOUNTER
----- Message from Belkys Sprague RN sent at 9/4/2023  1:59 PM CDT -----  Regarding: hospital followu p  Good afternoon,    This patient discharged from [facility] today and needs a hospital follow up scheduled, please. Patient needs to be seen within 7 days from DC. Please contact pt for scheduling. Thank you

## 2023-09-05 NOTE — HOSPITAL COURSE
Patient was monitored closely throughout course of stay by hospital medicine team. BP medications resumed. BP stabilized throughout course of stay. Electrolytes replaced throughout course of stay. Patient's admission symptoms resolved prior to discharge. Patient to be discharged home with family. She is to follow up with PCP within 3-5 days. Patient verbalized understanding of discharge instructions and return precautions.

## 2023-09-05 NOTE — PROGRESS NOTES
Pulmonary Clinic New Patient    HISTORY OF PRESENT ILLNESS   Genny Watson is a 87 y.o. female with a PMH of breast cancer s/p right mastectomy and radiation (1988), DM2, HTN, and CAD on whom we have been consulted for an abnormal chest CT.    The patient was admitted to [facility] 2 days ago for high blood pressure. BP meds were titrated, and the patient was discharged yesterday. During the course of this admission, a CTA of the chest was performed, and, in addition to radiation pneumonitis of the RUL, there was an enlarged subcarinal lymph node.      SMOKING HISTORY  Never smoker.    REVIEW OF SYSTEMS  Feeling well. No dyspnea or cough. No fevers.    PFSH:  Past Medical History:   Diagnosis Date    ALLERGIC RHINITIS 4/30/2012    Arthritis     Gimenez's esophagus     Breast cancer     right, s/p right mastectomy>20yr ago    Cataract     Diabetes mellitus type II     Diverticulitis     Diverticulosis     Fuchs' corneal dystrophy     Glaucoma     Hernia, hiatal     Hyperlipidemia     Hypertension     Macular degeneration     Pulmonary embolism     Seizures     Skin cancer of face 12/2014    Dr M Weil     UTI (lower urinary tract infection)          Past Surgical History:   Procedure Laterality Date    angeogram      APPENDECTOMY      BREAST SURGERY      cardio stent   1/2015    Dr Dodson    COLONOSCOPY  around 2003    COLONOSCOPY N/A 1/13/2016    Procedure: COLONOSCOPY;  Surgeon: Mario Bhakta MD;  Location: [facility];  Service: Endoscopy;  Laterality: N/A; repeat in 5 years for surveillance    CORNEAL TRANSPLANT      CYSTOSCOPY N/A 8/30/2023    Procedure: CYSTOSCOPY;  Surgeon: Mirna Harden MD;  Location: [facility];  Service: Urology;  Laterality: N/A;    EYE SURGERY      HIP ARTHROPLASTY Right 6/16/2023    Procedure: ARTHROPLASTY, HIP;  Surgeon: Josh Douglas II, MD;  Location: [facility] OR;  Service: Orthopedics;  Laterality: Right;    HYSTERECTOMY      ovaries removed    MASTECTOMY      right  >20 years ago    STOMACH SURGERY      UPPER GASTROINTESTINAL ENDOSCOPY  01/13/2016    Dr. Moya     Social History     Tobacco Use    Smoking status: Never    Smokeless tobacco: Never   Substance Use Topics    Alcohol use: No    Drug use: No     Family History   Problem Relation Age of Onset    Mental illness Mother     Liver cancer Mother     Early death Father     Heart disease Father     Diabetes Daughter     Early death Paternal Uncle     Heart disease Paternal Uncle     Celiac disease Sister     No Known Problems Son     Breast cancer Neg Hx     Ovarian cancer Neg Hx     Colon cancer Neg Hx     Colon polyps Neg Hx     Esophageal cancer Neg Hx     Stomach cancer Neg Hx     Ulcerative colitis Neg Hx      Review of patient's allergies indicates:   Allergen Reactions    Sulfa (sulfonamide antibiotics)      Other reaction(s): Itching         VITAL SIGNS (MOST RECENT)       PHYSICAL EXAM  GENERAL: NAD.  HEENT: Pupils equal and reactive. Extraocular movements intact.   NECK: Supple.   HEART: Regular rate and rhythm. No murmur or gallop auscultated.  LUNGS: Clear to auscultation and percussion. Lung excursion symmetrical.  ABDOMEN: Bowel sounds present. Non-tender, no masses palpated.  EXTREMITIES: Normal muscle tone and joint movement, no cyanosis or clubbing.   LYMPHATICS: No adenopathy palpated, no edema.  SKIN: Dry, intact, no lesions.   NEURO: No gross cognitive or motor deficits.  PSYCH: Appropriate affect.    Lab Results   Component Value Date    WBC 7.73 09/04/2023    HGB 11.2 (L) 09/04/2023    HCT 36.3 (L) 09/04/2023     09/04/2023    CHOL 127 01/09/2023    TRIG 92 01/09/2023    HDL 52 01/09/2023    ALT 15 09/04/2023    AST 17 09/04/2023     09/04/2023    K 3.5 09/04/2023     09/04/2023    CREATININE 0.7 09/04/2023    BUN 18 09/04/2023    CO2 26 09/04/2023    TSH 1.057 04/11/2015    INR 1.0 03/18/2015    HGBA1C 5.1 08/28/2023       LAST ARTERIAL BLOOD  GAS  @LABRCNTIP[PH,PO2,PCO2,HCO3,BE@      LAST 7 DAYS MICROBIOLOGY   Microbiology Results (last 7 days)       ** No results found for the last 168 hours. **            MOST RECENT IMAGING  CTA Chest Non-Coronary (PE Studies)  Narrative: EXAMINATION:  CTA CHEST NON CORONARY (PE STUDIES)    CLINICAL HISTORY:  Pulmonary embolism (PE) suspected, positive D-dimer;    TECHNIQUE:  Low dose axial images, sagittal and coronal reformations were obtained from the thoracic inlet to the lung bases following the IV administration of 75 mL of Omnipaque 350.  Contrast timing was optimized to evaluate the pulmonary arteries.  MIP images were performed.    COMPARISON:  01/30/2015    FINDINGS:  There are no pulmonary arterial filling defects to indicate the presence of pulmonary thromboemboli.    There is a mildly enlarged subcarinal lymph node measuring 1.7 cm in short axis.    A large hiatal hernia is present.    Moderate right upper lobe bronchiectasis is present accompanied by parenchymal volume loss, coarse interstitial changes, with interval progression and compatible with progressive radiation fibrosis.  Similar mild fibrotic changes are present within the right middle lobe medially.    There is no acute consolidation.  No pleural effusion.  There is some mild lower lobe ground-glass change and septal thickening for which a mild degree of interstitial pulmonary edema is a consideration.    There has been a right mastectomy.  The bones are demineralized.  There are moderate compression fractures of T12 and L1, which appear chronic.  Impression: No evidence for pulmonary embolus or other acute intrathoracic process.    Mildly enlarged subcarinal lymph node, nonspecific.  Consider follow-up in 3-6 months.    Right mastectomy.  Right lung radiation fibrosis.    Large hiatal hernia.    Chronic T12 and L1 compression fractures.    Electronically signed by: Tk Parrish MD  Date:    09/03/2023  Time:    16:59      CURRENT VISIT  "EKG  No results found for this visit on 09/05/23.    ECHOCARDIOGRAM RESULTS  Results for orders placed in visit on 06/08/21    Echo Color Flow Doppler? Yes    Interpretation Summary  · The estimated PA systolic pressure is 27 mmHg.  · The left ventricle is normal in size with concentric remodeling and normal systolic function.  · The estimated ejection fraction is 60%.  · Indeterminate left ventricular diastolic function.  · Normal right ventricular size with normal right ventricular systolic function.      Pulmonary Function Tests  No results found for: "FEV1", "FVC", "NAE9MIK", "TLC", "DLCO"    CTA chest 9/3/23: No PE. Enlarged station 7 lymph node (1.7 mm). Interstitial fibrosis and bronchiectasis of right apex consistent with radiation fibrosis. Hiatal hernia.    ASSESSMENT & PLAN   Genny Watson is a 87 y.o. female with a PMH of breast cancer s/p right mastectomy and radiation, DM2, HTN, and CAD on whom we have been consulted for an abnormal chest CT.    #Enlarged station 7 lymph node  Non-specific: may occur in the setting of infection, other inflammation, or malignancy. There is no other imaging of this location in the past 8 years.  - repeat CT chest in 3-6 months, as recommended by radiology    #Radiation pneumonitis of right apex  - noted    Follow up in 4 months, after next scan.    Discussed with the patient's 2 daughters. I have personally reviewed recent labs and ABGs, and I have viewed and interpreted the images of recent chest imaging, as above.     Jack Rudd MD  Pulmonary and Critical Care Medicine  [facility]  Date of Service: 09/05/2023  3:19 PM  "

## 2023-09-11 ENCOUNTER — HOSPITAL ENCOUNTER (INPATIENT)
Facility: HOSPITAL | Age: 88
LOS: 3 days | Discharge: HOME HEALTH | DRG: 304 | End: 2023-09-14
Attending: STUDENT IN AN ORGANIZED HEALTH CARE EDUCATION/TRAINING PROGRAM | Admitting: STUDENT IN AN ORGANIZED HEALTH CARE EDUCATION/TRAINING PROGRAM
Payer: MEDICARE

## 2023-09-11 DIAGNOSIS — N39.0 URINARY TRACT INFECTION WITHOUT HEMATURIA, SITE UNSPECIFIED: ICD-10-CM

## 2023-09-11 DIAGNOSIS — U07.1 COVID-19: ICD-10-CM

## 2023-09-11 DIAGNOSIS — R45.1 AGITATION: ICD-10-CM

## 2023-09-11 DIAGNOSIS — R07.9 CHEST PAIN: ICD-10-CM

## 2023-09-11 DIAGNOSIS — A41.9 SEPSIS, DUE TO UNSPECIFIED ORGANISM, UNSPECIFIED WHETHER ACUTE ORGAN DYSFUNCTION PRESENT: ICD-10-CM

## 2023-09-11 DIAGNOSIS — I16.1 HYPERTENSIVE EMERGENCY: Primary | ICD-10-CM

## 2023-09-11 DIAGNOSIS — E87.3 RESPIRATORY ALKALOSIS: ICD-10-CM

## 2023-09-11 LAB
ALBUMIN SERPL BCP-MCNC: 3.6 G/DL (ref 3.5–5.2)
ALLENS TEST: ABNORMAL
ALP SERPL-CCNC: 97 U/L (ref 55–135)
ALT SERPL W/O P-5'-P-CCNC: 25 U/L (ref 10–44)
AMORPH CRY URNS QL MICRO: ABNORMAL
ANION GAP SERPL CALC-SCNC: 13 MMOL/L (ref 8–16)
AST SERPL-CCNC: 27 U/L (ref 10–40)
BACTERIA #/AREA URNS HPF: ABNORMAL /HPF
BASOPHILS # BLD AUTO: 0.04 K/UL (ref 0–0.2)
BASOPHILS NFR BLD: 0.6 % (ref 0–1.9)
BILIRUB SERPL-MCNC: 0.6 MG/DL (ref 0.1–1)
BILIRUB UR QL STRIP: NEGATIVE
BNP SERPL-MCNC: 144 PG/ML (ref 0–99)
BUN SERPL-MCNC: 14 MG/DL (ref 8–23)
CALCIUM SERPL-MCNC: 9.3 MG/DL (ref 8.7–10.5)
CHLORIDE SERPL-SCNC: 100 MMOL/L (ref 95–110)
CLARITY UR: ABNORMAL
CO2 SERPL-SCNC: 24 MMOL/L (ref 23–29)
COLOR UR: ABNORMAL
CREAT SERPL-MCNC: 0.7 MG/DL (ref 0.5–1.4)
DELSYS: ABNORMAL
DIFFERENTIAL METHOD: NORMAL
EOSINOPHIL # BLD AUTO: 0 K/UL (ref 0–0.5)
EOSINOPHIL NFR BLD: 0.3 % (ref 0–8)
ERYTHROCYTE [DISTWIDTH] IN BLOOD BY AUTOMATED COUNT: 13.5 % (ref 11.5–14.5)
EST. GFR  (NO RACE VARIABLE): >60 ML/MIN/1.73 M^2
GLUCOSE SERPL-MCNC: 106 MG/DL (ref 70–110)
GLUCOSE UR QL STRIP: NEGATIVE
HCO3 UR-SCNC: 20.7 MMOL/L (ref 24–28)
HCT VFR BLD AUTO: 39 % (ref 37–48.5)
HGB BLD-MCNC: 12.8 G/DL (ref 12–16)
HGB UR QL STRIP: ABNORMAL
IMM GRANULOCYTES # BLD AUTO: 0.02 K/UL (ref 0–0.04)
IMM GRANULOCYTES NFR BLD AUTO: 0.3 % (ref 0–0.5)
KETONES UR QL STRIP: ABNORMAL
LACTATE SERPL-SCNC: 1.3 MMOL/L (ref 0.5–2.2)
LDH SERPL L TO P-CCNC: 2.55 MMOL/L (ref 0.5–2.2)
LEUKOCYTE ESTERASE UR QL STRIP: ABNORMAL
LYMPHOCYTES # BLD AUTO: 1.7 K/UL (ref 1–4.8)
LYMPHOCYTES NFR BLD: 25.6 % (ref 18–48)
MCH RBC QN AUTO: 28.3 PG (ref 27–31)
MCHC RBC AUTO-ENTMCNC: 32.8 G/DL (ref 32–36)
MCV RBC AUTO: 86 FL (ref 82–98)
MICROSCOPIC COMMENT: ABNORMAL
MODE: ABNORMAL
MONOCYTES # BLD AUTO: 0.7 K/UL (ref 0.3–1)
MONOCYTES NFR BLD: 9.9 % (ref 4–15)
NEUTROPHILS # BLD AUTO: 4.3 K/UL (ref 1.8–7.7)
NEUTROPHILS NFR BLD: 63.3 % (ref 38–73)
NITRITE UR QL STRIP: NEGATIVE
NRBC BLD-RTO: 0 /100 WBC
PCO2 BLDA: 17.6 MMHG (ref 35–45)
PH SMN: 7.68 [PH] (ref 7.35–7.45)
PH UR STRIP: 8 [PH] (ref 5–8)
PLATELET # BLD AUTO: 246 K/UL (ref 150–450)
PMV BLD AUTO: 9.9 FL (ref 9.2–12.9)
PO2 BLDA: 22 MMHG (ref 40–60)
POC BE: 0 MMOL/L
POC SATURATED O2: 58 % (ref 95–100)
POC TCO2: 21 MMOL/L (ref 24–29)
POTASSIUM SERPL-SCNC: 3.2 MMOL/L (ref 3.5–5.1)
PROT SERPL-MCNC: 7.1 G/DL (ref 6–8.4)
PROT UR QL STRIP: ABNORMAL
RBC # BLD AUTO: 4.53 M/UL (ref 4–5.4)
RBC #/AREA URNS HPF: 3 /HPF (ref 0–4)
SALICYLATES SERPL-MCNC: <5 MG/DL (ref 15–30)
SAMPLE: ABNORMAL
SARS-COV-2 RDRP RESP QL NAA+PROBE: POSITIVE
SITE: ABNORMAL
SODIUM SERPL-SCNC: 137 MMOL/L (ref 136–145)
SP GR UR STRIP: 1 (ref 1–1.03)
SP02: 100
SQUAMOUS #/AREA URNS HPF: 6 /HPF
TROPONIN I SERPL DL<=0.01 NG/ML-MCNC: 0.02 NG/ML (ref 0–0.03)
TROPONIN I SERPL DL<=0.01 NG/ML-MCNC: <0.006 NG/ML (ref 0–0.03)
TSH SERPL DL<=0.005 MIU/L-ACNC: 2.55 UIU/ML (ref 0.4–4)
UNIDENT CRYS URNS QL MICRO: ABNORMAL
URN SPEC COLLECT METH UR: ABNORMAL
UROBILINOGEN UR STRIP-ACNC: NEGATIVE EU/DL
WBC # BLD AUTO: 6.8 K/UL (ref 3.9–12.7)
WBC #/AREA URNS HPF: 8 /HPF (ref 0–5)

## 2023-09-11 PROCEDURE — 83605 ASSAY OF LACTIC ACID: CPT | Performed by: STUDENT IN AN ORGANIZED HEALTH CARE EDUCATION/TRAINING PROGRAM

## 2023-09-11 PROCEDURE — 20000000 HC ICU ROOM

## 2023-09-11 PROCEDURE — 93005 ELECTROCARDIOGRAM TRACING: CPT

## 2023-09-11 PROCEDURE — 85025 COMPLETE CBC W/AUTO DIFF WBC: CPT | Performed by: STUDENT IN AN ORGANIZED HEALTH CARE EDUCATION/TRAINING PROGRAM

## 2023-09-11 PROCEDURE — 93010 EKG 12-LEAD: ICD-10-PCS | Mod: ,,, | Performed by: INTERNAL MEDICINE

## 2023-09-11 PROCEDURE — 83605 ASSAY OF LACTIC ACID: CPT

## 2023-09-11 PROCEDURE — 84443 ASSAY THYROID STIM HORMONE: CPT | Performed by: STUDENT IN AN ORGANIZED HEALTH CARE EDUCATION/TRAINING PROGRAM

## 2023-09-11 PROCEDURE — 83880 ASSAY OF NATRIURETIC PEPTIDE: CPT | Performed by: STUDENT IN AN ORGANIZED HEALTH CARE EDUCATION/TRAINING PROGRAM

## 2023-09-11 PROCEDURE — 25000003 PHARM REV CODE 250: Performed by: STUDENT IN AN ORGANIZED HEALTH CARE EDUCATION/TRAINING PROGRAM

## 2023-09-11 PROCEDURE — U0002 COVID-19 LAB TEST NON-CDC: HCPCS | Performed by: STUDENT IN AN ORGANIZED HEALTH CARE EDUCATION/TRAINING PROGRAM

## 2023-09-11 PROCEDURE — 80179 DRUG ASSAY SALICYLATE: CPT | Performed by: STUDENT IN AN ORGANIZED HEALTH CARE EDUCATION/TRAINING PROGRAM

## 2023-09-11 PROCEDURE — 36415 COLL VENOUS BLD VENIPUNCTURE: CPT | Performed by: STUDENT IN AN ORGANIZED HEALTH CARE EDUCATION/TRAINING PROGRAM

## 2023-09-11 PROCEDURE — 87040 BLOOD CULTURE FOR BACTERIA: CPT | Performed by: STUDENT IN AN ORGANIZED HEALTH CARE EDUCATION/TRAINING PROGRAM

## 2023-09-11 PROCEDURE — 99900035 HC TECH TIME PER 15 MIN (STAT)

## 2023-09-11 PROCEDURE — 96375 TX/PRO/DX INJ NEW DRUG ADDON: CPT

## 2023-09-11 PROCEDURE — 96376 TX/PRO/DX INJ SAME DRUG ADON: CPT

## 2023-09-11 PROCEDURE — 82803 BLOOD GASES ANY COMBINATION: CPT

## 2023-09-11 PROCEDURE — 93010 ELECTROCARDIOGRAM REPORT: CPT | Mod: ,,, | Performed by: INTERNAL MEDICINE

## 2023-09-11 PROCEDURE — 84484 ASSAY OF TROPONIN QUANT: CPT | Mod: 91 | Performed by: STUDENT IN AN ORGANIZED HEALTH CARE EDUCATION/TRAINING PROGRAM

## 2023-09-11 PROCEDURE — 96365 THER/PROPH/DIAG IV INF INIT: CPT

## 2023-09-11 PROCEDURE — 51701 INSERT BLADDER CATHETER: CPT

## 2023-09-11 PROCEDURE — 94761 N-INVAS EAR/PLS OXIMETRY MLT: CPT

## 2023-09-11 PROCEDURE — 80053 COMPREHEN METABOLIC PANEL: CPT | Performed by: STUDENT IN AN ORGANIZED HEALTH CARE EDUCATION/TRAINING PROGRAM

## 2023-09-11 PROCEDURE — 81000 URINALYSIS NONAUTO W/SCOPE: CPT | Performed by: STUDENT IN AN ORGANIZED HEALTH CARE EDUCATION/TRAINING PROGRAM

## 2023-09-11 PROCEDURE — 63600175 PHARM REV CODE 636 W HCPCS: Performed by: STUDENT IN AN ORGANIZED HEALTH CARE EDUCATION/TRAINING PROGRAM

## 2023-09-11 PROCEDURE — 27000221 HC OXYGEN, UP TO 24 HOURS

## 2023-09-11 PROCEDURE — 99285 EMERGENCY DEPT VISIT HI MDM: CPT | Mod: 25

## 2023-09-11 RX ORDER — GLUCAGON 1 MG
1 KIT INJECTION
Status: DISCONTINUED | OUTPATIENT
Start: 2023-09-11 | End: 2023-09-11

## 2023-09-11 RX ORDER — LABETALOL HYDROCHLORIDE 5 MG/ML
5 INJECTION, SOLUTION INTRAVENOUS
Status: DISCONTINUED | OUTPATIENT
Start: 2023-09-11 | End: 2023-09-11

## 2023-09-11 RX ORDER — METOPROLOL SUCCINATE 25 MG/1
25 TABLET, EXTENDED RELEASE ORAL NIGHTLY
Status: DISCONTINUED | OUTPATIENT
Start: 2023-09-12 | End: 2023-09-14 | Stop reason: HOSPADM

## 2023-09-11 RX ORDER — IPRATROPIUM BROMIDE AND ALBUTEROL SULFATE 2.5; .5 MG/3ML; MG/3ML
3 SOLUTION RESPIRATORY (INHALATION) EVERY 4 HOURS PRN
Status: DISCONTINUED | OUTPATIENT
Start: 2023-09-11 | End: 2023-09-14 | Stop reason: HOSPADM

## 2023-09-11 RX ORDER — SODIUM,POTASSIUM PHOSPHATES 280-250MG
2 POWDER IN PACKET (EA) ORAL
Status: DISCONTINUED | OUTPATIENT
Start: 2023-09-11 | End: 2023-09-14 | Stop reason: HOSPADM

## 2023-09-11 RX ORDER — PANTOPRAZOLE SODIUM 40 MG/1
40 TABLET, DELAYED RELEASE ORAL NIGHTLY
Status: DISCONTINUED | OUTPATIENT
Start: 2023-09-12 | End: 2023-09-14 | Stop reason: HOSPADM

## 2023-09-11 RX ORDER — ACETAMINOPHEN 325 MG/1
650 TABLET ORAL EVERY 6 HOURS PRN
Status: DISCONTINUED | OUTPATIENT
Start: 2023-09-11 | End: 2023-09-11

## 2023-09-11 RX ORDER — HYDRALAZINE HYDROCHLORIDE 20 MG/ML
10 INJECTION INTRAMUSCULAR; INTRAVENOUS
Status: DISCONTINUED | OUTPATIENT
Start: 2023-09-11 | End: 2023-09-11

## 2023-09-11 RX ORDER — TALC
6 POWDER (GRAM) TOPICAL NIGHTLY PRN
Status: DISCONTINUED | OUTPATIENT
Start: 2023-09-12 | End: 2023-09-11

## 2023-09-11 RX ORDER — NICARDIPINE HYDROCHLORIDE 0.2 MG/ML
5 INJECTION INTRAVENOUS CONTINUOUS
Status: DISCONTINUED | OUTPATIENT
Start: 2023-09-11 | End: 2023-09-11

## 2023-09-11 RX ORDER — LABETALOL HYDROCHLORIDE 5 MG/ML
5 INJECTION, SOLUTION INTRAVENOUS
Status: COMPLETED | OUTPATIENT
Start: 2023-09-11 | End: 2023-09-11

## 2023-09-11 RX ORDER — IBUPROFEN 200 MG
24 TABLET ORAL
Status: DISCONTINUED | OUTPATIENT
Start: 2023-09-12 | End: 2023-09-14 | Stop reason: HOSPADM

## 2023-09-11 RX ORDER — ACETAMINOPHEN 500 MG
1000 TABLET ORAL EVERY 6 HOURS PRN
Status: DISCONTINUED | OUTPATIENT
Start: 2023-09-11 | End: 2023-09-14 | Stop reason: HOSPADM

## 2023-09-11 RX ORDER — IBUPROFEN 200 MG
16 TABLET ORAL
Status: DISCONTINUED | OUTPATIENT
Start: 2023-09-12 | End: 2023-09-14 | Stop reason: HOSPADM

## 2023-09-11 RX ORDER — MUPIROCIN 20 MG/G
OINTMENT TOPICAL 2 TIMES DAILY
Status: DISCONTINUED | OUTPATIENT
Start: 2023-09-11 | End: 2023-09-14 | Stop reason: HOSPADM

## 2023-09-11 RX ORDER — IBUPROFEN 200 MG
24 TABLET ORAL
Status: DISCONTINUED | OUTPATIENT
Start: 2023-09-11 | End: 2023-09-11

## 2023-09-11 RX ORDER — MORPHINE SULFATE 2 MG/ML
2 INJECTION, SOLUTION INTRAMUSCULAR; INTRAVENOUS EVERY 4 HOURS PRN
Status: DISCONTINUED | OUTPATIENT
Start: 2023-09-12 | End: 2023-09-14 | Stop reason: HOSPADM

## 2023-09-11 RX ORDER — SIMETHICONE 80 MG
1 TABLET,CHEWABLE ORAL 4 TIMES DAILY PRN
Status: DISCONTINUED | OUTPATIENT
Start: 2023-09-11 | End: 2023-09-14 | Stop reason: HOSPADM

## 2023-09-11 RX ORDER — GLUCAGON 1 MG
1 KIT INJECTION
Status: DISCONTINUED | OUTPATIENT
Start: 2023-09-12 | End: 2023-09-14 | Stop reason: HOSPADM

## 2023-09-11 RX ORDER — LATANOPROST 50 UG/ML
1 SOLUTION/ DROPS OPHTHALMIC NIGHTLY
Status: DISCONTINUED | OUTPATIENT
Start: 2023-09-12 | End: 2023-09-14 | Stop reason: HOSPADM

## 2023-09-11 RX ORDER — IBUPROFEN 200 MG
16 TABLET ORAL
Status: DISCONTINUED | OUTPATIENT
Start: 2023-09-11 | End: 2023-09-11

## 2023-09-11 RX ORDER — MAG HYDROX/ALUMINUM HYD/SIMETH 200-200-20
30 SUSPENSION, ORAL (FINAL DOSE FORM) ORAL 4 TIMES DAILY PRN
Status: DISCONTINUED | OUTPATIENT
Start: 2023-09-11 | End: 2023-09-14 | Stop reason: HOSPADM

## 2023-09-11 RX ORDER — DEXAMETHASONE SODIUM PHOSPHATE 4 MG/ML
6 INJECTION, SOLUTION INTRA-ARTICULAR; INTRALESIONAL; INTRAMUSCULAR; INTRAVENOUS; SOFT TISSUE ONCE
Status: COMPLETED | OUTPATIENT
Start: 2023-09-11 | End: 2023-09-11

## 2023-09-11 RX ORDER — SODIUM CHLORIDE 0.9 % (FLUSH) 0.9 %
10 SYRINGE (ML) INJECTION
Status: DISCONTINUED | OUTPATIENT
Start: 2023-09-12 | End: 2023-09-14 | Stop reason: HOSPADM

## 2023-09-11 RX ORDER — TALC
9 POWDER (GRAM) TOPICAL NIGHTLY PRN
Status: DISCONTINUED | OUTPATIENT
Start: 2023-09-11 | End: 2023-09-14 | Stop reason: HOSPADM

## 2023-09-11 RX ORDER — ASPIRIN 81 MG/1
81 TABLET ORAL DAILY
Status: DISCONTINUED | OUTPATIENT
Start: 2023-09-12 | End: 2023-09-14 | Stop reason: HOSPADM

## 2023-09-11 RX ORDER — INSULIN ASPART 100 [IU]/ML
1-10 INJECTION, SOLUTION INTRAVENOUS; SUBCUTANEOUS
Status: DISCONTINUED | OUTPATIENT
Start: 2023-09-11 | End: 2023-09-11

## 2023-09-11 RX ORDER — NALOXONE HCL 0.4 MG/ML
0.02 VIAL (ML) INJECTION
Status: DISCONTINUED | OUTPATIENT
Start: 2023-09-11 | End: 2023-09-14 | Stop reason: HOSPADM

## 2023-09-11 RX ORDER — INSULIN ASPART 100 [IU]/ML
0-5 INJECTION, SOLUTION INTRAVENOUS; SUBCUTANEOUS
Status: DISCONTINUED | OUTPATIENT
Start: 2023-09-12 | End: 2023-09-14 | Stop reason: HOSPADM

## 2023-09-11 RX ORDER — ONDANSETRON 2 MG/ML
4 INJECTION INTRAMUSCULAR; INTRAVENOUS EVERY 8 HOURS PRN
Status: DISCONTINUED | OUTPATIENT
Start: 2023-09-12 | End: 2023-09-11

## 2023-09-11 RX ORDER — ONDANSETRON 2 MG/ML
4 INJECTION INTRAMUSCULAR; INTRAVENOUS EVERY 8 HOURS PRN
Status: DISCONTINUED | OUTPATIENT
Start: 2023-09-11 | End: 2023-09-14 | Stop reason: HOSPADM

## 2023-09-11 RX ORDER — LABETALOL HYDROCHLORIDE 5 MG/ML
10 INJECTION, SOLUTION INTRAVENOUS EVERY 6 HOURS PRN
Status: DISCONTINUED | OUTPATIENT
Start: 2023-09-12 | End: 2023-09-14 | Stop reason: HOSPADM

## 2023-09-11 RX ORDER — LANOLIN ALCOHOL/MO/W.PET/CERES
800 CREAM (GRAM) TOPICAL
Status: DISCONTINUED | OUTPATIENT
Start: 2023-09-11 | End: 2023-09-14 | Stop reason: HOSPADM

## 2023-09-11 RX ORDER — ATORVASTATIN CALCIUM 40 MG/1
80 TABLET, FILM COATED ORAL DAILY
Status: DISCONTINUED | OUTPATIENT
Start: 2023-09-12 | End: 2023-09-14 | Stop reason: HOSPADM

## 2023-09-11 RX ORDER — CLOPIDOGREL BISULFATE 75 MG/1
75 TABLET ORAL DAILY
Status: DISCONTINUED | OUTPATIENT
Start: 2023-09-12 | End: 2023-09-14 | Stop reason: HOSPADM

## 2023-09-11 RX ORDER — ACETAMINOPHEN 325 MG/1
650 TABLET ORAL EVERY 4 HOURS PRN
Status: DISCONTINUED | OUTPATIENT
Start: 2023-09-11 | End: 2023-09-14 | Stop reason: HOSPADM

## 2023-09-11 RX ORDER — SODIUM CHLORIDE 0.9 % (FLUSH) 0.9 %
10 SYRINGE (ML) INJECTION EVERY 8 HOURS PRN
Status: DISCONTINUED | OUTPATIENT
Start: 2023-09-11 | End: 2023-09-11

## 2023-09-11 RX ADMIN — CEFTRIAXONE SODIUM 1 G: 1 INJECTION, POWDER, FOR SOLUTION INTRAMUSCULAR; INTRAVENOUS at 05:09

## 2023-09-11 RX ADMIN — LABETALOL HYDROCHLORIDE 5 MG: 5 INJECTION INTRAVENOUS at 05:09

## 2023-09-11 RX ADMIN — NICARDIPINE HYDROCHLORIDE 5 MG/HR: 0.2 INJECTION, SOLUTION INTRAVENOUS at 08:09

## 2023-09-11 RX ADMIN — DEXAMETHASONE SODIUM PHOSPHATE 6 MG: 4 INJECTION, SOLUTION INTRA-ARTICULAR; INTRALESIONAL; INTRAMUSCULAR; INTRAVENOUS; SOFT TISSUE at 04:09

## 2023-09-11 RX ADMIN — SODIUM CHLORIDE 500 ML: 9 INJECTION, SOLUTION INTRAVENOUS at 05:09

## 2023-09-11 RX ADMIN — MUPIROCIN 1 G: 20 OINTMENT TOPICAL at 10:09

## 2023-09-11 NOTE — ED NOTES
Pt daughter at bedside and states pt is upset for needing to have bowel movement. She is suppose to take miralax three times a week and took today. Pt is stating she needs to have bowel movement. Pt has large BM and was cleaned up and all new underpad's and garments applied. Remains on purwik.

## 2023-09-11 NOTE — ED NOTES
Pt is anxious and upset. She states she needs to urinate so placed purewik. She has an old bruise above her LT eye that is green in color. She is alert and oriented and at times has some confusion.

## 2023-09-11 NOTE — ED NOTES
Called Pharmacy to verify Rocephin admin, she is reaching out to SAVITA Rudd and verifying orders

## 2023-09-11 NOTE — ED NOTES
Patient has a 22g in the left fore arm spoke to Dr. Prater about getting a 20g are larger IV and requested a one time labetalol to address BP

## 2023-09-11 NOTE — ED PROVIDER NOTES
Encounter Date: 9/11/2023       History     Chief Complaint   Patient presents with    Hypertension     Argument with family member.  + anxiety.  Tachypnea noted in triage.  Hypertension since event.     87-year-old female history of hypertension presents with agitation.  Associated elevated blood pressure reading and tachycardia.  Patient brought in with EMS.  Patient had stressful family situation earlier and had some chest pain associated with elevated blood pressure reading.  Collateral history obtained from family member.    The history is provided by the patient and a relative.     Review of patient's allergies indicates:   Allergen Reactions    Sulfa (sulfonamide antibiotics)      Other reaction(s): Itching     Past Medical History:   Diagnosis Date    ALLERGIC RHINITIS 4/30/2012    Arthritis     Gimenez's esophagus     Breast cancer     right, s/p right mastectomy>20yr ago    Cataract     Diabetes mellitus type II     Diverticulitis     Diverticulosis     Fuchs' corneal dystrophy     Glaucoma     Hernia, hiatal     Hyperlipidemia     Hypertension     Macular degeneration     Pulmonary embolism     Seizures     Skin cancer of face 12/2014    Dr M Weil     UTI (lower urinary tract infection)      Past Surgical History:   Procedure Laterality Date    angeogram      APPENDECTOMY      BREAST SURGERY      cardio stent   1/2015    Dr Dodson    COLONOSCOPY  around 2003    COLONOSCOPY N/A 1/13/2016    Procedure: COLONOSCOPY;  Surgeon: Mario Bhakta MD;  Location: [facility];  Service: Endoscopy;  Laterality: N/A; repeat in 5 years for surveillance    CORNEAL TRANSPLANT      CYSTOSCOPY N/A 8/30/2023    Procedure: CYSTOSCOPY;  Surgeon: Mirna Harden MD;  Location: [facility] OR;  Service: Urology;  Laterality: N/A;    EYE SURGERY      HIP ARTHROPLASTY Right 6/16/2023    Procedure: ARTHROPLASTY, HIP;  Surgeon: Josh Douglas II, MD;  Location: [facility] OR;  Service: Orthopedics;  Laterality: Right;    HYSTERECTOMY       ovaries removed    MASTECTOMY      right >20 years ago    STOMACH SURGERY      UPPER GASTROINTESTINAL ENDOSCOPY  01/13/2016    Dr. Moya     Family History   Problem Relation Age of Onset    Mental illness Mother     Liver cancer Mother     Early death Father     Heart disease Father     Diabetes Daughter     Early death Paternal Uncle     Heart disease Paternal Uncle     Celiac disease Sister     No Known Problems Son     Breast cancer Neg Hx     Ovarian cancer Neg Hx     Colon cancer Neg Hx     Colon polyps Neg Hx     Esophageal cancer Neg Hx     Stomach cancer Neg Hx     Ulcerative colitis Neg Hx      Social History     Tobacco Use    Smoking status: Never    Smokeless tobacco: Never   Substance Use Topics    Alcohol use: No    Drug use: No     Review of Systems   Unable to perform ROS: Mental status change       Physical Exam     Initial Vitals [09/11/23 1312]   BP Pulse Resp Temp SpO2   (!) 199/104 (!) 115 (!) 24 97.5 °F (36.4 °C) 99 %      MAP       --         Physical Exam    Nursing note and vitals reviewed.  Constitutional:   Chronically ill-appearing   HENT:   Head: Normocephalic and atraumatic.   Eyes: EOM are normal. Right eye exhibits no discharge. Left eye exhibits no discharge.   Neck:   Normal range of motion.  Cardiovascular:  Normal rate and regular rhythm.           Pulmonary/Chest: No stridor. She is in respiratory distress.   Tachypnea   Abdominal: Abdomen is soft. There is no abdominal tenderness.   Musculoskeletal:         General: No edema. Normal range of motion.      Cervical back: Normal range of motion.     Neurological: She is alert.   No focal motor or sensory deficit noted   Skin: Skin is warm. No rash noted. No erythema.   Psychiatric:   Not anxious or agitated         ED Course   Procedures  Labs Reviewed   COMPREHENSIVE METABOLIC PANEL - Abnormal; Notable for the following components:       Result Value    Potassium 3.2 (*)     All other components within normal limits    B-TYPE NATRIURETIC PEPTIDE - Abnormal; Notable for the following components:     (*)     All other components within normal limits   URINALYSIS, REFLEX TO URINE CULTURE - Abnormal; Notable for the following components:    Color, UA Orange (*)     Appearance, UA Cloudy (*)     Protein, UA Trace (*)     Ketones, UA Trace (*)     Occult Blood UA 3+ (*)     Leukocytes, UA 3+ (*)     All other components within normal limits    Narrative:     Specimen Source->Urine   SARS-COV-2 RNA AMPLIFICATION, QUAL - Abnormal; Notable for the following components:    SARS-CoV-2 RNA, Amplification, Qual Positive (*)     All other components within normal limits    Narrative:     Covid critical result(s) called and verbal readback obtained from   Otis Rajan by JOE 09/11/2023 15:43   URINALYSIS MICROSCOPIC - Abnormal; Notable for the following components:    WBC, UA 8 (*)     Bacteria Many (*)     Unclass Mery UA Many (*)     All other components within normal limits    Narrative:     Specimen Source->Urine   ISTAT PROCEDURE - Abnormal; Notable for the following components:    POC PH 7.679 (*)     POC PCO2 17.6 (*)     POC PO2 22 (*)     POC HCO3 20.7 (*)     POC SATURATED O2 58 (*)     POC Lactate 2.55 (*)     POC TCO2 21 (*)     All other components within normal limits   CULTURE, BLOOD   CULTURE, BLOOD   CBC W/ AUTO DIFFERENTIAL   TROPONIN I   TSH   TROPONIN I   LACTIC ACID, PLASMA   SALICYLATE LEVEL     EKG Readings: (Independently Interpreted)   EKG interpreted by myself Vito Prater DO  Rate 90, sinus rhythm with first-degree heart block, QRS 82, , no STEMI       Imaging Results              X-Ray Chest 1 View (Final result)  Result time 09/11/23 17:30:31      Final result by Annmarie Islas MD (09/11/23 17:30:31)                   Impression:      No acute cardiopulmonary abnormality.      Electronically signed by: Annmarie Islas  Date:    09/11/2023  Time:    17:30               Narrative:    EXAMINATION:  XR  CHEST 1 VIEW    CLINICAL HISTORY:  Hypertensive urgency    TECHNIQUE:  Single view of the chest was obtained.    COMPARISON:  CT chest 09/03/2023 and chest radiograph 04/11/2015    FINDINGS:  Normal cardiomediastinal contour. No focal consolidation, pleural effusion or pneumothorax. Unchanged fibrosis at the right lung apex.  Unchanged calcification adjacent to the left humeral head.  Degenerative changes of the bilateral glenohumeral joints.                                       CT Head Without Contrast (Final result)  Result time 09/11/23 15:12:51      Final result by Derrell Puentes MD (09/11/23 15:12:51)                   Impression:      1. There is no acute abnormality.      Electronically signed by: Derrell Puentes MD  Date:    09/11/2023  Time:    15:12               Narrative:    EXAMINATION:  CT HEAD WITHOUT CONTRAST    CLINICAL HISTORY:  Mental status change, unknown cause;    TECHNIQUE:  Routine unenhanced axial images were obtained through the head.  Sagittal and coronal reformatted images were created.  The study is reviewed in bone and soft tissue windows.    COMPARISON:  Head CT dated 08/08/2023, 06/15/2023    FINDINGS:  Intracranial contents: There are chronic changes which will be described but there is no acute abnormality or definite change in the appearance of the brain compared to the prior studies.  There is generalized volume loss with a moderate burden of periventricular and scattered subcortical white matter hypodensity.  These findings likely reflect sequelae of rather advanced chronic small vessel disease.  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  There is no hemorrhage or mass.  The gray-white interface is grossly preserved without acute cortical infarction.  There is no abnormal extra-axial fluid collection.  The basilar cisterns are open.  Cerebellar tonsils are normal position.  Pituitary volume is decreased.    Extracranial contents,  calvarium, soft tissues: There is trace scattered mucosal thickening in the paranasal sinuses.  The mastoid air cells are clear.  The calvarium is normal.                                       Medications   dexAMETHasone injection 6 mg (has no administration in time range)   niCARdipine 40 mg/200 mL (0.2 mg/mL) infusion (has no administration in time range)   labetaloL injection 5 mg (has no administration in time range)   cefTRIAXone (ROCEPHIN) 1 g in dextrose 5 % in water (D5W) 100 mL IVPB (MB+) (1 g Intravenous New Bag 9/11/23 1722)   labetaloL injection 5 mg (5 mg Intravenous Given 9/11/23 1708)   sodium chloride 0.9% bolus 500 mL 500 mL (500 mLs Intravenous New Bag 9/11/23 1705)     Medical Decision Making  87-year-old female presents with agitation and uncontrolled blood pressure.  Associated elevated heart rate.  Patient brought in by EMS.  Collateral history obtained from daughter.  No oxygen requirement on evaluation.  IV blood pressure medication ordered however blood pressure improved spontaneously.    -workup continued, infectious etiology expanded workup given altered mental status.  Patient had evidence of UTI and positive COVID.  Sepsis protocol initiated, IV Rocephin ordered and point of care lactate obtained.  Point of care CO2 17 pH 7.6.  Spoke with pulmonologist Dr. Leon recommends using a bag to breathing to retain CO2 breathing.      Patient's blood pressure continued to rise while in ED after being at 170 systolic with no interventions.  Patient administered multiple doses of IV labetalol, larger IV access obtained and placed on Cardene infusion.    -attempted multiple times to contact [facility] accepting hospitalist for ICU admission.      Hospitalis Dr. Lowe Accepting physician at [facility]. Communication via secure chat.  Patient's family updated and agree with plan    Amount and/or Complexity of Data Reviewed  Independent Historian: caregiver and EMS      Details: Daughter  Labs: ordered. Decision-making details documented in ED Course.  Radiology: ordered and independent interpretation performed.     Details: No large focal infiltrate per my interpretation  ECG/medicine tests: ordered and independent interpretation performed.     Details: No STEMI    Risk  Prescription drug management.  Decision regarding hospitalization.    Critical Care  Total time providing critical care: 45 minutes               ED Course as of 09/11/23 1805   Mon Sep 11, 2023   1502 WBC: 6.80 [KB]   1502 Hemoglobin: 12.8 [KB]   1502 Hematocrit: 39.0 [KB]   1524 BNP(!): 144 [KB]   1524 Troponin I: <0.006 [KB]   1524 Sodium: 137 [KB]   1524 Chloride: 100 [KB]   1524 CO2: 24 [KB]   1524 Glucose: 106 [KB]   1524 Creatinine: 0.7 [KB]   1524 Calcium: 9.3 [KB]   1559 SARS-CoV-2 RNA, Amplification, Qual(!!): Positive [KB]   1638 Leukocytes, UA(!): 3+ [KB]   1638 NITRITE UA: Negative [KB]   1638 Occult Blood UA(!): 3+ [KB]   1638 WBC, UA(!): 8 [KB]   1638 Bacteria, UA(!): Many [KB]   1648 Sepsis protocol initiated at this time.  Concern for bacterial infectious etiology [KB]   1648 This patient does have evidence of infective focus  My overall impression is sepsis.  Source: Urinary Tract  Antibiotics given- Antibiotics (72h ago, onward)    Start     Stop Route Frequency Ordered    09/11/23 1645  cefTRIAXone (ROCEPHIN) 1 g in dextrose 5 % in water (D5W)   100 mL IVPB (MB+)         09/12/23 0444 IV ED 1 Time 09/11/23 1639      Latest lactate reviewed-  @[id number](lactate,poclac)@  Organ dysfunction indicated by Encephalopathy    Fluid challenge Not needed - patient is not hypotensive      Post- resuscitation assessment Yes Perfusion exam was performed within 6 hours of septic shock presentation after bolus shows Adequate tissue perfusion assessed by non-invasive monitoring       Will Not start Pressors-  Map > 65  Source control achieved by: Ceftriaxone   [KB]   1704 POC PH(!): 7.679 [KB]   1704 POC  PCO2(!): 17.6 [KB]   1704 POC PO2(!): 22 [KB]   1704 POC Lactate(!): 2.55 [KB]   1721 POC PH(!): 7.679 [KB]   1722 POC PCO2(!): 17.6 [KB]   1722 POC PO2(!): 22 [KB]   1722 POC Lactate(!): 2.55 [KB]   1757 Attempted multiple times to contact hospitalist on-call at [facility].  Unable to reach physician via secure chat or direct telephone.  Physician listed on-call Dr. Monae is not actually on-call.  He states different physician is.  Spoke with house supervisor here at [facility] multiple times he says Dr. Lowe is on-call however unable to reach him at this time [KB]      ED Course User Index  [KB] Vito Prater Jr.,                       Clinical Impression:   Final diagnoses:  [R07.9] Chest pain  [R45.1] Agitation  [U07.1] COVID-19  [A41.9] Sepsis, due to unspecified organism, unspecified whether acute organ dysfunction present  [N39.0] Urinary tract infection without hematuria, site unspecified  [I16.1] Hypertensive emergency (Primary)  [E87.3] Respiratory alkalosis        ED Disposition Condition    Admit Stable                Vito Prater Jr., DO  09/11/23 1802       Vito Prater Jr., DO  09/11/23 1805

## 2023-09-12 PROBLEM — R29.6 FALLS: Status: ACTIVE | Noted: 2023-09-12
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
## 2023-09-12 LAB
ANION GAP SERPL CALC-SCNC: 12 MMOL/L (ref 8–16)
BUN SERPL-MCNC: 14 MG/DL (ref 8–23)
CALCIUM SERPL-MCNC: 8.8 MG/DL (ref 8.7–10.5)
CHLORIDE SERPL-SCNC: 102 MMOL/L (ref 95–110)
CO2 SERPL-SCNC: 24 MMOL/L (ref 23–29)
CREAT SERPL-MCNC: 0.6 MG/DL (ref 0.5–1.4)
ERYTHROCYTE [DISTWIDTH] IN BLOOD BY AUTOMATED COUNT: 13.9 % (ref 11.5–14.5)
EST. GFR  (NO RACE VARIABLE): >60 ML/MIN/1.73 M^2
GLUCOSE SERPL-MCNC: 125 MG/DL (ref 70–110)
GLUCOSE SERPL-MCNC: 145 MG/DL (ref 70–110)
GLUCOSE SERPL-MCNC: 185 MG/DL (ref 70–110)
HCT VFR BLD AUTO: 39.9 % (ref 37–48.5)
HGB BLD-MCNC: 12.5 G/DL (ref 12–16)
MAGNESIUM SERPL-MCNC: 1.6 MG/DL (ref 1.6–2.6)
MCH RBC QN AUTO: 27.9 PG (ref 27–31)
MCHC RBC AUTO-ENTMCNC: 31.3 G/DL (ref 32–36)
MCV RBC AUTO: 89 FL (ref 82–98)
PHOSPHATE SERPL-MCNC: 3.8 MG/DL (ref 2.7–4.5)
PLATELET # BLD AUTO: 277 K/UL (ref 150–450)
PMV BLD AUTO: 10.3 FL (ref 9.2–12.9)
POTASSIUM SERPL-SCNC: 3.1 MMOL/L (ref 3.5–5.1)
RBC # BLD AUTO: 4.48 M/UL (ref 4–5.4)
SODIUM SERPL-SCNC: 138 MMOL/L (ref 136–145)
TROPONIN I SERPL HS-MCNC: 9.5 PG/ML (ref 0–14.9)
WBC # BLD AUTO: 4.95 K/UL (ref 3.9–12.7)

## 2023-09-12 PROCEDURE — 94761 N-INVAS EAR/PLS OXIMETRY MLT: CPT

## 2023-09-12 PROCEDURE — 84100 ASSAY OF PHOSPHORUS: CPT | Performed by: STUDENT IN AN ORGANIZED HEALTH CARE EDUCATION/TRAINING PROGRAM

## 2023-09-12 PROCEDURE — 12000002 HC ACUTE/MED SURGE SEMI-PRIVATE ROOM

## 2023-09-12 PROCEDURE — 80048 BASIC METABOLIC PNL TOTAL CA: CPT | Performed by: STUDENT IN AN ORGANIZED HEALTH CARE EDUCATION/TRAINING PROGRAM

## 2023-09-12 PROCEDURE — 36415 COLL VENOUS BLD VENIPUNCTURE: CPT | Performed by: STUDENT IN AN ORGANIZED HEALTH CARE EDUCATION/TRAINING PROGRAM

## 2023-09-12 PROCEDURE — 83735 ASSAY OF MAGNESIUM: CPT | Performed by: STUDENT IN AN ORGANIZED HEALTH CARE EDUCATION/TRAINING PROGRAM

## 2023-09-12 PROCEDURE — 84484 ASSAY OF TROPONIN QUANT: CPT | Performed by: STUDENT IN AN ORGANIZED HEALTH CARE EDUCATION/TRAINING PROGRAM

## 2023-09-12 PROCEDURE — 85027 COMPLETE CBC AUTOMATED: CPT | Performed by: STUDENT IN AN ORGANIZED HEALTH CARE EDUCATION/TRAINING PROGRAM

## 2023-09-12 PROCEDURE — 25000003 PHARM REV CODE 250: Performed by: HOSPITALIST

## 2023-09-12 PROCEDURE — 25000003 PHARM REV CODE 250: Performed by: STUDENT IN AN ORGANIZED HEALTH CARE EDUCATION/TRAINING PROGRAM

## 2023-09-12 PROCEDURE — 94799 UNLISTED PULMONARY SVC/PX: CPT

## 2023-09-12 PROCEDURE — 63600175 PHARM REV CODE 636 W HCPCS: Performed by: STUDENT IN AN ORGANIZED HEALTH CARE EDUCATION/TRAINING PROGRAM

## 2023-09-12 PROCEDURE — 99900035 HC TECH TIME PER 15 MIN (STAT)

## 2023-09-12 RX ORDER — L. ACIDOPHILUS/L.BULGARICUS 100MM CELL
1 GRANULES IN PACKET (EA) ORAL 2 TIMES DAILY
Status: DISCONTINUED | OUTPATIENT
Start: 2023-09-12 | End: 2023-09-13

## 2023-09-12 RX ADMIN — MUPIROCIN 1 G: 20 OINTMENT TOPICAL at 08:09

## 2023-09-12 RX ADMIN — LATANOPROST 1 DROP: 50 SOLUTION OPHTHALMIC at 08:09

## 2023-09-12 RX ADMIN — METOPROLOL SUCCINATE 25 MG: 25 TABLET, FILM COATED, EXTENDED RELEASE ORAL at 08:09

## 2023-09-12 RX ADMIN — ATORVASTATIN CALCIUM 80 MG: 40 TABLET, FILM COATED ORAL at 08:09

## 2023-09-12 RX ADMIN — ASPIRIN 81 MG: 81 TABLET, COATED ORAL at 09:09

## 2023-09-12 RX ADMIN — Medication 800 MG: at 05:09

## 2023-09-12 RX ADMIN — MUPIROCIN 1 G: 20 OINTMENT TOPICAL at 09:09

## 2023-09-12 RX ADMIN — LACTOBACILLUS ACIDOPHILUS / LACTOBACILLUS BULGARICUS 1 EACH: 100 MILLION CFU STRENGTH GRANULES at 08:09

## 2023-09-12 RX ADMIN — PANTOPRAZOLE SODIUM 40 MG: 40 TABLET, DELAYED RELEASE ORAL at 08:09

## 2023-09-12 RX ADMIN — CEFTRIAXONE SODIUM 1 G: 1 INJECTION, POWDER, FOR SOLUTION INTRAMUSCULAR; INTRAVENOUS at 04:09

## 2023-09-12 RX ADMIN — CLOPIDOGREL BISULFATE 75 MG: 75 TABLET, FILM COATED ORAL at 09:09

## 2023-09-12 RX ADMIN — POTASSIUM BICARBONATE 35 MEQ: 391 TABLET, EFFERVESCENT ORAL at 05:09

## 2023-09-12 NOTE — ASSESSMENT & PLAN NOTE
Resolved  Discontinue nicardipine drip  Resume patient's home blood pressure medications  P.r.n. labetalol  Continue to trend troponins   Patient has a current diagnosis of hypertensive urgency (without evidence of end organ damage) which is controlled.  Latest blood pressure and vitals reviewed-   Temp:  [97.3 °F (36.3 °C)-97.8 °F (36.6 °C)]   Pulse:  []   Resp:  [7-28]   BP: (109-190)/()   SpO2:  [96 %-100 %] .   Patient currently on IV antihypertensives.   Home meds for hypertension were reviewed and noted below.   Hypertension Medications             metoprolol succinate (TOPROL-XL) 25 MG 24 hr tablet Take 1 tablet (25 mg total) by mouth once daily.          Medication adjustment for hospital antihypertensives is as follows- discontinue nicardipine drip, resume home blood pressure medications    Will aim for controlled BP reduction by medications noted above. Monitor and mitigate end organ damage as indicated.

## 2023-09-12 NOTE — SUBJECTIVE & OBJECTIVE
Past Medical History:   Diagnosis Date    ALLERGIC RHINITIS 4/30/2012    Arthritis     Gimenez's esophagus     Breast cancer     right, s/p right mastectomy>20yr ago    Cataract     Diabetes mellitus type II     Diverticulitis     Diverticulosis     Fuchs' corneal dystrophy     Glaucoma     Hernia, hiatal     Hyperlipidemia     Hypertension     Macular degeneration     Pulmonary embolism     Seizures     Skin cancer of face 12/2014    Dr M Weil     UTI (lower urinary tract infection)        Past Surgical History:   Procedure Laterality Date    angeogram      APPENDECTOMY      BREAST SURGERY      cardio stent   1/2015    Dr Dodson    COLONOSCOPY  around 2003    COLONOSCOPY N/A 1/13/2016    Procedure: COLONOSCOPY;  Surgeon: Mario Bhakta MD;  Location: [facility];  Service: Endoscopy;  Laterality: N/A; repeat in 5 years for surveillance    CORNEAL TRANSPLANT      CYSTOSCOPY N/A 8/30/2023    Procedure: CYSTOSCOPY;  Surgeon: Mirna Harden MD;  Location: [facility];  Service: Urology;  Laterality: N/A;    EYE SURGERY      HIP ARTHROPLASTY Right 6/16/2023    Procedure: ARTHROPLASTY, HIP;  Surgeon: Josh Dougals II, MD;  Location: [facility] OR;  Service: Orthopedics;  Laterality: Right;    HYSTERECTOMY      ovaries removed    MASTECTOMY      right >20 years ago    STOMACH SURGERY      UPPER GASTROINTESTINAL ENDOSCOPY  01/13/2016    Dr. Bhakta       Review of patient's allergies indicates:   Allergen Reactions    Sulfa (sulfonamide antibiotics)      Other reaction(s): Itching       No current facility-administered medications on file prior to encounter.     Current Outpatient Medications on File Prior to Encounter   Medication Sig    ascorbic acid, vitamin C, (VITAMIN C) 500 MG tablet Take 500 mg by mouth once daily.    aspirin (ECOTRIN) 81 MG EC tablet Take 81 mg by mouth once daily.    atorvastatin (LIPITOR) 80 MG tablet TAKE 1 TABLET DAILY (Patient taking differently: Take 80 mg by mouth once daily. TAKE 1  TABLET DAILY)    clopidogreL (PLAVIX) 75 mg tablet Take 1 tablet (75 mg total) by mouth once daily.    ferrous sulfate (FEOSOL) 325 mg (65 mg iron) Tab tablet Take 325 mg by mouth daily with breakfast.    latanoprost 0.005 % ophthalmic solution Place 1 drop into both eyes every evening.    metFORMIN (GLUCOPHAGE-XR) 500 MG ER 24hr tablet TAKE 1 TABLET EVERY EVENING (Patient taking differently: Take 500 mg by mouth every evening.)    metoprolol succinate (TOPROL-XL) 25 MG 24 hr tablet Take 1 tablet (25 mg total) by mouth once daily. (Patient taking differently: Take 25 mg by mouth every evening.)    pantoprazole (PROTONIX) 40 MG tablet Take 1 tablet (40 mg total) by mouth once daily. (Patient taking differently: Take 40 mg by mouth every evening.)    VIT C/E/ZN/COPPR/LUTEIN/ZEAXAN (PRESERVISION AREDS 2 ORAL) Take 1 capsule by mouth 2 (two) times daily.     Family History       Problem Relation (Age of Onset)    Celiac disease Sister    Diabetes Daughter    Early death Father, Paternal Uncle    Heart disease Father, Paternal Uncle    Liver cancer Mother    Mental illness Mother    No Known Problems Son          Tobacco Use    Smoking status: Never    Smokeless tobacco: Never   Substance and Sexual Activity    Alcohol use: No    Drug use: No    Sexual activity: Never     Review of Systems   Constitutional:  Negative for fever.   Respiratory:  Negative for shortness of breath.    Cardiovascular:  Negative for chest pain and palpitations.   Gastrointestinal:  Negative for abdominal pain.   Genitourinary:  Negative for difficulty urinating and dysuria.   Neurological:  Negative for weakness.   Psychiatric/Behavioral:  Positive for agitation.    All other systems reviewed and are negative.    Objective:     Vital Signs (Most Recent):  Temp: 97.4 °F (36.3 °C) (09/11/23 2345)  Pulse: 97 (09/11/23 2345)  Resp: (!) 7 (09/11/23 2345)  BP: 109/60 (09/11/23 2345)  SpO2: 100 % (09/11/23 2345) Vital Signs (24h Range):  Temp:  [97.3  "°F (36.3 °C)-97.5 °F (36.4 °C)] 97.4 °F (36.3 °C)  Pulse:  [] 97  Resp:  [7-44] 7  SpO2:  [99 %-100 %] 100 %  BP: (109-234)/() 109/60     Weight: 52.2 kg (115 lb)  Body mass index is 20.37 kg/m².     Physical Exam  Vitals reviewed.   HENT:      Head: Normocephalic.      Mouth/Throat:      Mouth: Mucous membranes are moist.   Eyes:      Pupils: Pupils are equal, round, and reactive to light.   Cardiovascular:      Rate and Rhythm: Normal rate.   Pulmonary:      Effort: Pulmonary effort is normal.   Abdominal:      Palpations: Abdomen is soft.   Musculoskeletal:         General: Normal range of motion.      Cervical back: Normal range of motion.   Neurological:      General: No focal deficit present.      Mental Status: She is alert and oriented to person, place, and time. Mental status is at baseline.   Psychiatric:         Mood and Affect: Mood normal.              CRANIAL NERVES     CN III, IV, VI   Pupils are equal, round, and reactive to light.       Significant Labs: All pertinent labs within the past 24 hours have been reviewed.  Blood Culture: No results for input(s): "LABBLOO" in the last 48 hours.  CBC:   Recent Labs   Lab 09/11/23  1425   WBC 6.80   HGB 12.8   HCT 39.0        CMP:   Recent Labs   Lab 09/11/23  1426      K 3.2*      CO2 24      BUN 14   CREATININE 0.7   CALCIUM 9.3   PROT 7.1   ALBUMIN 3.6   BILITOT 0.6   ALKPHOS 97   AST 27   ALT 25   ANIONGAP 13     Cardiac Markers:   Recent Labs   Lab 09/11/23  1426   *     Coagulation: No results for input(s): "PT", "INR", "APTT" in the last 48 hours.  Lactic Acid:   Recent Labs   Lab 09/11/23 2029   LACTATE 1.3     Respiratory Culture: No results for input(s): "GSRESP", "RESPIRATORYC" in the last 48 hours.  Troponin:   Recent Labs   Lab 09/11/23  1426 09/11/23  1659   TROPONINI <0.006 0.023     Urine Studies:   Recent Labs   Lab 09/11/23  1450   COLORU Orange*   APPEARANCEUA Cloudy*   PHUR 8.0   SPECGRAV " 1.005   PROTEINUA Trace*   GLUCUA Negative   KETONESU Trace*   BILIRUBINUA Negative   OCCULTUA 3+*   NITRITE Negative   UROBILINOGEN Negative   LEUKOCYTESUR 3+*   RBCUA 3   WBCUA 8*   BACTERIA Many*   SQUAMEPITHEL 6       Significant Imaging: I have reviewed all pertinent imaging results/findings within the past 24 hours.  Imaging Results              X-Ray Chest 1 View (Final result)  Result time 09/11/23 17:30:31      Final result by Annmarie Islas MD (09/11/23 17:30:31)                   Impression:      No acute cardiopulmonary abnormality.      Electronically signed by: Annmarie Islas  Date:    09/11/2023  Time:    17:30               Narrative:    EXAMINATION:  XR CHEST 1 VIEW    CLINICAL HISTORY:  Hypertensive urgency    TECHNIQUE:  Single view of the chest was obtained.    COMPARISON:  CT chest 09/03/2023 and chest radiograph 04/11/2015    FINDINGS:  Normal cardiomediastinal contour. No focal consolidation, pleural effusion or pneumothorax. Unchanged fibrosis at the right lung apex.  Unchanged calcification adjacent to the left humeral head.  Degenerative changes of the bilateral glenohumeral joints.                                       CT Head Without Contrast (Final result)  Result time 09/11/23 15:12:51      Final result by Derrell Puentes MD (09/11/23 15:12:51)                   Impression:      1. There is no acute abnormality.      Electronically signed by: Derrell Puentes MD  Date:    09/11/2023  Time:    15:12               Narrative:    EXAMINATION:  CT HEAD WITHOUT CONTRAST    CLINICAL HISTORY:  Mental status change, unknown cause;    TECHNIQUE:  Routine unenhanced axial images were obtained through the head.  Sagittal and coronal reformatted images were created.  The study is reviewed in bone and soft tissue windows.    COMPARISON:  Head CT dated 08/08/2023, 06/15/2023    FINDINGS:  Intracranial contents: There are chronic changes which will be described but there is no acute abnormality  or definite change in the appearance of the brain compared to the prior studies.  There is generalized volume loss with a moderate burden of periventricular and scattered subcortical white matter hypodensity.  These findings likely reflect sequelae of rather advanced chronic small vessel disease.  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  There is no hemorrhage or mass.  The gray-white interface is grossly preserved without acute cortical infarction.  There is no abnormal extra-axial fluid collection.  The basilar cisterns are open.  Cerebellar tonsils are normal position.  Pituitary volume is decreased.    Extracranial contents, calvarium, soft tissues: There is trace scattered mucosal thickening in the paranasal sinuses.  The mastoid air cells are clear.  The calvarium is normal.

## 2023-09-12 NOTE — ASSESSMENT & PLAN NOTE
"Patient's FSGs are controlled on current medication regimen.  Last A1c reviewed-   Lab Results   Component Value Date    HGBA1C 5.1 08/28/2023     Most recent fingerstick glucose reviewed- No results for input(s): "POCTGLUCOSE" in the last 24 hours.  Current correctional scale  Low  Maintain anti-hyperglycemic dose as follows-   Antihyperglycemics (From admission, onward)    Start     Stop Route Frequency Ordered    09/12/23 0055  insulin aspart U-100 pen 0-5 Units         -- SubQ Before meals & nightly PRN 09/11/23 8956        Hold Oral hypoglycemics while patient is in the hospital.  "

## 2023-09-12 NOTE — H&P
[facility] Medicine  History & Physical    Patient Name: Genny Watson  MRN: [id number]  Patient Class: IP- Inpatient  Admission Date: 9/11/2023  Attending Physician: Layla Apodaca MD  Primary Care Provider: Josh Berman MD         Patient information was obtained from patient and ER records.     Subjective:     Principal Problem:Hypertensive emergency    Chief Complaint:   Chief Complaint   Patient presents with    Hypertension     Argument with family member.  + anxiety.  Tachypnea noted in triage.  Hypertension since event.        HPI: 87-year-old  female with history of hypertension and diabetes was transferred from [facility] on account of hypertensive emergency.  Patient states that she can not recollect why she was brought to the hospital, all she knows is that the ambulance was called by her relative to the ED and she was told she had COVID infection.  Per ED physician's note: 87-year-old female history of hypertension was brought by the EMS to the ED on account of elevated blood pressure reading and tachycardia.    Patient had stressful family situation earlier and had some chest pain associated with elevated blood pressure reading.  Collateral history obtained from family member.  Patient currently denies any chest pain, cough, shortness of breath, palpitation, fevers, headaches, blurring of vision or unilateral weakness of extremities.  She denies any dysuria, suprapubic pain or hematuria.  She does not smoke cigarettes drink alcohol or use any illicit medications.    On presentation to the ED, CBC unremarkable, potassium low at 3.2, COVID positive, lactate negative, troponin negative x2 a urinalysis positive for UTI.  CT brain showed no acute abnormality.  During this history taking , patient is AO x3 and currently denies any symptoms.  She is currently on nicardipine drip and blood pressures are currently in the 100s.      Past Medical  History:   Diagnosis Date    ALLERGIC RHINITIS 4/30/2012    Arthritis     Gimenez's esophagus     Breast cancer     right, s/p right mastectomy>20yr ago    Cataract     Diabetes mellitus type II     Diverticulitis     Diverticulosis     Fuchs' corneal dystrophy     Glaucoma     Hernia, hiatal     Hyperlipidemia     Hypertension     Macular degeneration     Pulmonary embolism     Seizures     Skin cancer of face 12/2014    Dr M Weil     UTI (lower urinary tract infection)        Past Surgical History:   Procedure Laterality Date    angeogram      APPENDECTOMY      BREAST SURGERY      cardio stent   1/2015    Dr Dodson    COLONOSCOPY  around 2003    COLONOSCOPY N/A 1/13/2016    Procedure: COLONOSCOPY;  Surgeon: Mario Bhakta MD;  Location: [facility];  Service: Endoscopy;  Laterality: N/A; repeat in 5 years for surveillance    CORNEAL TRANSPLANT      CYSTOSCOPY N/A 8/30/2023    Procedure: CYSTOSCOPY;  Surgeon: Mirna Harden MD;  Location: [facility];  Service: Urology;  Laterality: N/A;    EYE SURGERY      HIP ARTHROPLASTY Right 6/16/2023    Procedure: ARTHROPLASTY, HIP;  Surgeon: Josh Douglas II, MD;  Location: [facility] OR;  Service: Orthopedics;  Laterality: Right;    HYSTERECTOMY      ovaries removed    MASTECTOMY      right >20 years ago    STOMACH SURGERY      UPPER GASTROINTESTINAL ENDOSCOPY  01/13/2016    Dr. Bhakta       Review of patient's allergies indicates:   Allergen Reactions    Sulfa (sulfonamide antibiotics)      Other reaction(s): Itching       No current facility-administered medications on file prior to encounter.     Current Outpatient Medications on File Prior to Encounter   Medication Sig    ascorbic acid, vitamin C, (VITAMIN C) 500 MG tablet Take 500 mg by mouth once daily.    aspirin (ECOTRIN) 81 MG EC tablet Take 81 mg by mouth once daily.    atorvastatin (LIPITOR) 80 MG tablet TAKE 1 TABLET DAILY (Patient taking differently: Take 80 mg by mouth  once daily. TAKE 1 TABLET DAILY)    clopidogreL (PLAVIX) 75 mg tablet Take 1 tablet (75 mg total) by mouth once daily.    ferrous sulfate (FEOSOL) 325 mg (65 mg iron) Tab tablet Take 325 mg by mouth daily with breakfast.    latanoprost 0.005 % ophthalmic solution Place 1 drop into both eyes every evening.    metFORMIN (GLUCOPHAGE-XR) 500 MG ER 24hr tablet TAKE 1 TABLET EVERY EVENING (Patient taking differently: Take 500 mg by mouth every evening.)    metoprolol succinate (TOPROL-XL) 25 MG 24 hr tablet Take 1 tablet (25 mg total) by mouth once daily. (Patient taking differently: Take 25 mg by mouth every evening.)    pantoprazole (PROTONIX) 40 MG tablet Take 1 tablet (40 mg total) by mouth once daily. (Patient taking differently: Take 40 mg by mouth every evening.)    VIT C/E/ZN/COPPR/LUTEIN/ZEAXAN (PRESERVISION AREDS 2 ORAL) Take 1 capsule by mouth 2 (two) times daily.     Family History       Problem Relation (Age of Onset)    Celiac disease Sister    Diabetes Daughter    Early death Father, Paternal Uncle    Heart disease Father, Paternal Uncle    Liver cancer Mother    Mental illness Mother    No Known Problems Son          Tobacco Use    Smoking status: Never    Smokeless tobacco: Never   Substance and Sexual Activity    Alcohol use: No    Drug use: No    Sexual activity: Never     Review of Systems   Constitutional:  Negative for fever.   Respiratory:  Negative for shortness of breath.    Cardiovascular:  Negative for chest pain and palpitations.   Gastrointestinal:  Negative for abdominal pain.   Genitourinary:  Negative for difficulty urinating and dysuria.   Neurological:  Negative for weakness.   Psychiatric/Behavioral:  Positive for agitation.    All other systems reviewed and are negative.    Objective:     Vital Signs (Most Recent):  Temp: 97.4 °F (36.3 °C) (09/11/23 2345)  Pulse: 97 (09/11/23 2345)  Resp: (!) 7 (09/11/23 2345)  BP: 109/60 (09/11/23 2345)  SpO2: 100 % (09/11/23 2345) Vital  "Signs (24h Range):  Temp:  [97.3 °F (36.3 °C)-97.5 °F (36.4 °C)] 97.4 °F (36.3 °C)  Pulse:  [] 97  Resp:  [7-44] 7  SpO2:  [99 %-100 %] 100 %  BP: (109-234)/() 109/60     Weight: 52.2 kg (115 lb)  Body mass index is 20.37 kg/m².     Physical Exam  Vitals reviewed.   HENT:      Head: Normocephalic.      Mouth/Throat:      Mouth: Mucous membranes are moist.   Eyes:      Pupils: Pupils are equal, round, and reactive to light.   Cardiovascular:      Rate and Rhythm: Normal rate.   Pulmonary:      Effort: Pulmonary effort is normal.   Abdominal:      Palpations: Abdomen is soft.   Musculoskeletal:         General: Normal range of motion.      Cervical back: Normal range of motion.   Neurological:      General: No focal deficit present.      Mental Status: She is alert and oriented to person, place, and time. Mental status is at baseline.   Psychiatric:         Mood and Affect: Mood normal.              CRANIAL NERVES     CN III, IV, VI   Pupils are equal, round, and reactive to light.       Significant Labs: All pertinent labs within the past 24 hours have been reviewed.  Blood Culture: No results for input(s): "LABBLOO" in the last 48 hours.  CBC:   Recent Labs   Lab 09/11/23  1425   WBC 6.80   HGB 12.8   HCT 39.0        CMP:   Recent Labs   Lab 09/11/23  1426      K 3.2*      CO2 24      BUN 14   CREATININE 0.7   CALCIUM 9.3   PROT 7.1   ALBUMIN 3.6   BILITOT 0.6   ALKPHOS 97   AST 27   ALT 25   ANIONGAP 13     Cardiac Markers:   Recent Labs   Lab 09/11/23  1426   *     Coagulation: No results for input(s): "PT", "INR", "APTT" in the last 48 hours.  Lactic Acid:   Recent Labs   Lab 09/11/23  2029   LACTATE 1.3     Respiratory Culture: No results for input(s): "GSRESP", "RESPIRATORYC" in the last 48 hours.  Troponin:   Recent Labs   Lab 09/11/23  1426 09/11/23  1659   TROPONINI <0.006 0.023     Urine Studies:   Recent Labs   Lab 09/11/23  1450   COLORU [place]*   APPEARANCEUA " Cloudy*   PHUR 8.0   SPECGRAV 1.005   PROTEINUA Trace*   GLUCUA Negative   KETONESU Trace*   BILIRUBINUA Negative   OCCULTUA 3+*   NITRITE Negative   UROBILINOGEN Negative   LEUKOCYTESUR 3+*   RBCUA 3   WBCUA 8*   BACTERIA Many*   SQUAMEPITHEL 6       Significant Imaging: I have reviewed all pertinent imaging results/findings within the past 24 hours.  Imaging Results              X-Ray Chest 1 View (Final result)  Result time 09/11/23 17:30:31      Final result by Annmarie Islas MD (09/11/23 17:30:31)                   Impression:      No acute cardiopulmonary abnormality.      Electronically signed by: Annmarie Islas  Date:    09/11/2023  Time:    17:30               Narrative:    EXAMINATION:  XR CHEST 1 VIEW    CLINICAL HISTORY:  Hypertensive urgency    TECHNIQUE:  Single view of the chest was obtained.    COMPARISON:  CT chest 09/03/2023 and chest radiograph 04/11/2015    FINDINGS:  Normal cardiomediastinal contour. No focal consolidation, pleural effusion or pneumothorax. Unchanged fibrosis at the right lung apex.  Unchanged calcification adjacent to the left humeral head.  Degenerative changes of the bilateral glenohumeral joints.                                       CT Head Without Contrast (Final result)  Result time 09/11/23 15:12:51      Final result by Derrell Puentes MD (09/11/23 15:12:51)                   Impression:      1. There is no acute abnormality.      Electronically signed by: Derrell Puentes MD  Date:    09/11/2023  Time:    15:12               Narrative:    EXAMINATION:  CT HEAD WITHOUT CONTRAST    CLINICAL HISTORY:  Mental status change, unknown cause;    TECHNIQUE:  Routine unenhanced axial images were obtained through the head.  Sagittal and coronal reformatted images were created.  The study is reviewed in bone and soft tissue windows.    COMPARISON:  Head CT dated 08/08/2023, 06/15/2023    FINDINGS:  Intracranial contents: There are chronic changes which will be described but  there is no acute abnormality or definite change in the appearance of the brain compared to the prior studies.  There is generalized volume loss with a moderate burden of periventricular and scattered subcortical white matter hypodensity.  These findings likely reflect sequelae of rather advanced chronic small vessel disease.  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  There is no hemorrhage or mass.  The gray-white interface is grossly preserved without acute cortical infarction.  There is no abnormal extra-axial fluid collection.  The basilar cisterns are open.  Cerebellar tonsils are normal position.  Pituitary volume is decreased.    Extracranial contents, calvarium, soft tissues: There is trace scattered mucosal thickening in the paranasal sinuses.  The mastoid air cells are clear.  The calvarium is normal.                                        Assessment/Plan:     * Hypertensive emergency  Resolved  Discontinue nicardipine drip  Resume patient's home blood pressure medications  P.r.n. labetalol  Continue to trend troponins   Patient has a current diagnosis of hypertensive urgency (without evidence of end organ damage) which is controlled.  Latest blood pressure and vitals reviewed-   Temp:  [97.3 °F (36.3 °C)-97.5 °F (36.4 °C)]   Pulse:  []   Resp:  [7-44]   BP: (109-234)/()   SpO2:  [99 %-100 %] .   Patient currently on IV antihypertensives.   Home meds for hypertension were reviewed and noted below.   Hypertension Medications             metoprolol succinate (TOPROL-XL) 25 MG 24 hr tablet Take 1 tablet (25 mg total) by mouth once daily.          Medication adjustment for hospital antihypertensives is as follows- discontinue nicardipine drip, resume home blood pressure medications    Will aim for controlled BP reduction by medications noted above. Monitor and mitigate end organ damage as indicated.    Hypokalemia  Replace on electrolyte scale      UTI (urinary  "tract infection)  Continue IV Rocephin  Urine cultures      COVID-19 virus infection  Patient is currently asymptomatic  Monitor oxygen saturation  Chest x-ray negative for any acute abnormality  Initiate standard COVID protocols; COVID-19 testing ,Infection Control notification  and isolation- respiratory, contact and droplet per protocol    Diagnostics: CBC, CMP, Ferritin, CRP and Portable CXR    Management: Inhaled bronchodilators as needed for shortness of breath.    Advance Care Planning  Current advance care plan has been discussed with patient/family/POA and patient currently wishes Full Code.    Type 2 diabetes mellitus, dx 4/2012  Patient's FSGs are controlled on current medication regimen.  Last A1c reviewed-   Lab Results   Component Value Date    HGBA1C 5.1 08/28/2023     Most recent fingerstick glucose reviewed- No results for input(s): "POCTGLUCOSE" in the last 24 hours.  Current correctional scale  Low  Maintain anti-hyperglycemic dose as follows-   Antihyperglycemics (From admission, onward)    Start     Stop Route Frequency Ordered    09/12/23 0055  insulin aspart U-100 pen 0-5 Units         -- SubQ Before meals & nightly PRN 09/11/23 2356        Hold Oral hypoglycemics while patient is in the hospital.    ACP (advance care planning)  Patient with LAPOST stating full code , confirmed with patient  that she wishes to be full code      VTE Risk Mitigation (From admission, onward)         Ordered     IP VTE HIGH RISK PATIENT  Once         09/11/23 2349     Place sequential compression device  Until discontinued         09/11/23 2349     Place sequential compression device  Until discontinued         09/11/23 2349     Place BRANDON hose  Until discontinued         09/11/23 2349              Critical care time spent on the evaluation and treatment of severe organ dysfunction, review of pertinent labs and imaging studies, discussions with consulting providers and discussions with patient/family: 30 minutes. "             Layla Apodaca MD  Department of Hospital Medicine  [facility]

## 2023-09-12 NOTE — SUBJECTIVE & OBJECTIVE
Interval History: patient seen and examined. She has no complaints at this time. Waiting on floor bed.     Review of Systems   Constitutional:  Positive for activity change and appetite change. Negative for chills and fever.   HENT:  Positive for congestion, sinus pressure and sinus pain.    Respiratory: Negative.     Cardiovascular: Negative.    Gastrointestinal:  Positive for diarrhea.   Genitourinary: Negative.    Musculoskeletal:  Positive for arthralgias and myalgias.   Neurological:  Positive for weakness.   Psychiatric/Behavioral:  Positive for confusion.      Objective:     Vital Signs (Most Recent):  Temp: 97.8 °F (36.6 °C) (09/12/23 1701)  Pulse: 84 (09/12/23 1800)  Resp: 19 (09/12/23 1800)  BP: 138/65 (09/12/23 1800)  SpO2: 100 % (09/12/23 1800) Vital Signs (24h Range):  Temp:  [97.3 °F (36.3 °C)-97.8 °F (36.6 °C)] 97.8 °F (36.6 °C)  Pulse:  [] 84  Resp:  [7-28] 19  SpO2:  [96 %-100 %] 100 %  BP: (109-190)/() 138/65     Weight: 52.2 kg (115 lb)  Body mass index is 20.37 kg/m².    Intake/Output Summary (Last 24 hours) at 9/12/2023 1837  Last data filed at 9/12/2023 0547  Gross per 24 hour   Intake 265.19 ml   Output --   Net 265.19 ml         Physical Exam  Constitutional:       Appearance: She is ill-appearing.   HENT:      Head: Normocephalic and atraumatic.      Mouth/Throat:      Mouth: Mucous membranes are moist.      Pharynx: Oropharynx is clear.   Cardiovascular:      Rate and Rhythm: Normal rate and regular rhythm.   Pulmonary:      Effort: Pulmonary effort is normal. No respiratory distress.      Breath sounds: No wheezing or rales.   Abdominal:      General: Bowel sounds are normal.      Palpations: Abdomen is soft.      Tenderness: There is no abdominal tenderness. There is no guarding.   Musculoskeletal:         General: No swelling or signs of injury. Normal range of motion.      Cervical back: Normal range of motion and neck supple.   Lymphadenopathy:      Cervical: No cervical  adenopathy.   Skin:     General: Skin is warm and dry.      Capillary Refill: Capillary refill takes 2 to 3 seconds.      Coloration: Skin is pale.      Findings: Bruising present.   Neurological:      Mental Status: She is alert and oriented to person, place, and time. Mental status is at baseline.      Cranial Nerves: No cranial nerve deficit.   Psychiatric:         Mood and Affect: Mood normal.         Behavior: Behavior normal.             Significant Labs: All pertinent labs within the past 24 hours have been reviewed.  Recent Lab Results         09/12/23  0716   09/12/23  0323   09/11/23 2029        Anion Gap   12         BUN   14         Calcium   8.8         Chloride   102         CO2   24         Creatinine   0.6         eGFR   >60.0         Glucose   185         Hematocrit   39.9         Hemoglobin   12.5         Lactate, Jaime     1.3  Comment: Falsely low lactic acid results can be found in samples   containing >=13.0 mg/dL total bilirubin and/or >=3.5 mg/dL   direct bilirubin.         Magnesium    1.6         MCH   27.9         MCHC   31.3         MCV   89         MPV   10.3         Phosphorus   3.8         Platelets   277         POC Glucose 145           Potassium   3.1         RBC   4.48         RDW   13.9         Sodium   138         Troponin I High Sensitivity   9.5  Comment: Troponin results differ between methods. Do not use   results between Troponin methods interchangeably.    Alkaline Phospatase levels above 400 U/L may   cause false positive results.    Access hsTnI should not be used for patients taking   Asfotase barbara (Strensiq).           WBC   4.95                 Significant Imaging: I have reviewed all pertinent imaging results/findings within the past 24 hours.

## 2023-09-12 NOTE — PROGRESS NOTES
[facility]  Progress Note    Patient Name: Genny Watson  MRN: [id number]  Patient Class: IP- Inpatient   Admission Date: 9/11/2023  Length of Stay: 1 days  Attending Physician: Randi Ramirez MD  Primary Care Provider: Josh Berman MD        Subjective:     Principal Problem:Hypertensive emergency        HPI:  87-year-old  female with history of hypertension and diabetes was transferred from [facility] on account of hypertensive emergency.  Patient states that she can not recollect why she was brought to the hospital, all she knows is that the ambulance was called by her relative to the ED and she was told she had COVID infection.  Per ED physician's note: 87-year-old female history of hypertension was brought by the EMS to the ED on account of elevated blood pressure reading and tachycardia.    Patient had stressful family situation earlier and had some chest pain associated with elevated blood pressure reading.  Collateral history obtained from family member.  Patient currently denies any chest pain, cough, shortness of breath, palpitation, fevers, headaches, blurring of vision or unilateral weakness of extremities.  She denies any dysuria, suprapubic pain or hematuria.  She does not smoke cigarettes drink alcohol or use any illicit medications.    On presentation to the ED, CBC unremarkable, potassium low at 3.2, COVID positive, lactate negative, troponin negative x2 a urinalysis positive for UTI.  CT brain showed no acute abnormality.  During this history taking , patient is AO x3 and currently denies any symptoms.  She is currently on nicardipine drip and blood pressures are currently in the 100s.      Overview/Hospital Course:  Patient admitted to ICU on cardene infusion for hypertensive emergency. The infusion was discontinued within 1-2 hours. She also found to have UTI. Culture pending. On Rocephin. She went to urgent care on 9/10/23  to get a covid test which was negative. She had sinus congestion and myalgias. She tested positive for covid 9/11/23.  Family and patient report multiple falls at home. PT/OT consulted. Vitals are stable. Patient ok to transfer to floor.       Interval History: patient seen and examined. She has no complaints at this time. Waiting on floor bed.     Review of Systems   Constitutional:  Positive for activity change and appetite change. Negative for chills and fever.   HENT:  Positive for congestion, sinus pressure and sinus pain.    Respiratory: Negative.     Cardiovascular: Negative.    Gastrointestinal:  Positive for diarrhea.   Genitourinary: Negative.    Musculoskeletal:  Positive for arthralgias and myalgias.   Neurological:  Positive for weakness.   Psychiatric/Behavioral:  Positive for confusion.      Objective:     Vital Signs (Most Recent):  Temp: 97.8 °F (36.6 °C) (09/12/23 1701)  Pulse: 84 (09/12/23 1800)  Resp: 19 (09/12/23 1800)  BP: 138/65 (09/12/23 1800)  SpO2: 100 % (09/12/23 1800) Vital Signs (24h Range):  Temp:  [97.3 °F (36.3 °C)-97.8 °F (36.6 °C)] 97.8 °F (36.6 °C)  Pulse:  [] 84  Resp:  [7-28] 19  SpO2:  [96 %-100 %] 100 %  BP: (109-190)/() 138/65     Weight: 52.2 kg (115 lb)  Body mass index is 20.37 kg/m².    Intake/Output Summary (Last 24 hours) at 9/12/2023 1837  Last data filed at 9/12/2023 0547  Gross per 24 hour   Intake 265.19 ml   Output --   Net 265.19 ml         Physical Exam  Constitutional:       Appearance: She is ill-appearing.   HENT:      Head: Normocephalic and atraumatic.      Mouth/Throat:      Mouth: Mucous membranes are moist.      Pharynx: Oropharynx is clear.   Cardiovascular:      Rate and Rhythm: Normal rate and regular rhythm.   Pulmonary:      Effort: Pulmonary effort is normal. No respiratory distress.      Breath sounds: No wheezing or rales.   Abdominal:      General: Bowel sounds are normal.      Palpations: Abdomen is soft.      Tenderness: There is no  abdominal tenderness. There is no guarding.   Musculoskeletal:         General: No swelling or signs of injury. Normal range of motion.      Cervical back: Normal range of motion and neck supple.   Lymphadenopathy:      Cervical: No cervical adenopathy.   Skin:     General: Skin is warm and dry.      Capillary Refill: Capillary refill takes 2 to 3 seconds.      Coloration: Skin is pale.      Findings: Bruising present.   Neurological:      Mental Status: She is alert and oriented to person, place, and time. Mental status is at baseline.      Cranial Nerves: No cranial nerve deficit.   Psychiatric:         Mood and Affect: Mood normal.         Behavior: Behavior normal.             Significant Labs: All pertinent labs within the past 24 hours have been reviewed.  Recent Lab Results         09/12/23  0716   09/12/23  0323   09/11/23 2029        Anion Gap   12         BUN   14         Calcium   8.8         Chloride   102         CO2   24         Creatinine   0.6         eGFR   >60.0         Glucose   185         Hematocrit   39.9         Hemoglobin   12.5         Lactate, Jaime     1.3  Comment: Falsely low lactic acid results can be found in samples   containing >=13.0 mg/dL total bilirubin and/or >=3.5 mg/dL   direct bilirubin.         Magnesium    1.6         MCH   27.9         MCHC   31.3         MCV   89         MPV   10.3         Phosphorus   3.8         Platelets   277         POC Glucose 145           Potassium   3.1         RBC   4.48         RDW   13.9         Sodium   138         Troponin I High Sensitivity   9.5  Comment: Troponin results differ between methods. Do not use   results between Troponin methods interchangeably.    Alkaline Phospatase levels above 400 U/L may   cause false positive results.    Access hsTnI should not be used for patients taking   Asfotase barbara (Strensiq).           WBC   4.95                 Significant Imaging: I have reviewed all pertinent imaging results/findings within the  past 24 hours.      Assessment/Plan:      * Hypertensive emergency  Resolved  Discontinue nicardipine drip  Resume patient's home blood pressure medications  P.r.n. labetalol  Continue to trend troponins   Patient has a current diagnosis of hypertensive urgency (without evidence of end organ damage) which is controlled.  Latest blood pressure and vitals reviewed-   Temp:  [97.3 °F (36.3 °C)-97.8 °F (36.6 °C)]   Pulse:  []   Resp:  [7-28]   BP: (109-190)/()   SpO2:  [96 %-100 %] .   Patient currently on IV antihypertensives.   Home meds for hypertension were reviewed and noted below.   Hypertension Medications             metoprolol succinate (TOPROL-XL) 25 MG 24 hr tablet Take 1 tablet (25 mg total) by mouth once daily.          Medication adjustment for hospital antihypertensives is as follows- discontinue nicardipine drip, resume home blood pressure medications    Will aim for controlled BP reduction by medications noted above. Monitor and mitigate end organ damage as indicated.    Falls    PT/OT consult for dc planning     COVID-19 virus infection  Patient is currently asymptomatic  Monitor oxygen saturation  Chest x-ray negative for any acute abnormality  Initiate standard COVID protocols; COVID-19 testing ,Infection Control notification  and isolation- respiratory, contact and droplet per protocol    Diagnostics: CBC, CMP, Ferritin, CRP and Portable CXR    Management: Inhaled bronchodilators as needed for shortness of breath.    Advance Care Planning  Current advance care plan has been discussed with patient/family/POA and patient currently wishes Full Code.    UTI (urinary tract infection)  Continue IV Rocephin  Urine cultures pending      Hypokalemia  Replace on electrolyte scale      ACP (advance care planning)  Patient with MACIEL stating full code , confirmed with patient  that she wishes to be full code    Type 2 diabetes mellitus, dx 4/2012  Patient's FSGs are controlled on current medication  "regimen.  Last A1c reviewed-   Lab Results   Component Value Date    HGBA1C 5.1 08/28/2023     Most recent fingerstick glucose reviewed- No results for input(s): "POCTGLUCOSE" in the last 24 hours.  Current correctional scale  Low  Maintain anti-hyperglycemic dose as follows-   Antihyperglycemics (From admission, onward)    Start     Stop Route Frequency Ordered    09/12/23 0055  insulin aspart U-100 pen 0-5 Units         -- SubQ Before meals & nightly PRN 09/11/23 2356        Hold Oral hypoglycemics while patient is in the hospital.      VTE Risk Mitigation (From admission, onward)         Ordered     IP VTE HIGH RISK PATIENT  Once         09/11/23 2349     Place sequential compression device  Until discontinued         09/11/23 2349     Place BRANDON hose  Until discontinued         09/11/23 2349                Discharge Planning   NILDA: 9/14/2023     Code Status: Full Code   Is the patient medically ready for discharge?:     Reason for patient still in hospital (select all that apply): Laboratory test, Treatment and PT / OT recommendations  Discharge Plan A: Home Health, Home with family            Critical care time spent on the evaluation and treatment of severe organ dysfunction, review of pertinent labs and imaging studies, discussions with consulting providers and discussions with patient/family: 30 minutes.      Randi Ramirez MD  Department of Hospital Medicine   [facility]  "

## 2023-09-12 NOTE — ASSESSMENT & PLAN NOTE
Resolved  Discontinue nicardipine drip  Resume patient's home blood pressure medications  P.r.n. labetalol  Continue to trend troponins   Patient has a current diagnosis of hypertensive urgency (without evidence of end organ damage) which is controlled.  Latest blood pressure and vitals reviewed-   Temp:  [97.3 °F (36.3 °C)-97.5 °F (36.4 °C)]   Pulse:  []   Resp:  [7-44]   BP: (109-234)/()   SpO2:  [99 %-100 %] .   Patient currently on IV antihypertensives.   Home meds for hypertension were reviewed and noted below.   Hypertension Medications             metoprolol succinate (TOPROL-XL) 25 MG 24 hr tablet Take 1 tablet (25 mg total) by mouth once daily.          Medication adjustment for hospital antihypertensives is as follows- discontinue nicardipine drip, resume home blood pressure medications    Will aim for controlled BP reduction by medications noted above. Monitor and mitigate end organ damage as indicated.

## 2023-09-12 NOTE — NURSING
Nurses Note -- 4 Eyes      9/11/2023   10:30PM      Skin assessed during: Admit      [x] No Altered Skin Integrity Present    [x]Prevention Measures Documented      [] Yes- Altered Skin Integrity Present or Discovered   [] LDA Added if Not in Epic (Describe Wound)   [] New Altered Skin Integrity was Present on Admit and Documented in LDA   [] Wound Image Taken    Wound Care Consulted? No    Attending Nurse:  Josiane Doyle RN/Staff Member:  Josiane Boyce/Susan Sprague

## 2023-09-12 NOTE — PLAN OF CARE
[facility]  Initial Discharge Assessment       Primary Care Provider: Josh Berman MD    Admission Diagnosis: Hypertensive emergency [I16.1]    Admission Date: 9/11/2023  Expected Discharge Date: 9/14/2023     complete discharge assessment via phone with Pt daughter Radha Watson ([phone]). Demographics, PCP, and insurance verified. Pt does have home health with [facility] and plans to resume upon discharge. No dialysis. Pt daughter reports her ability to complete ADLs without assistance. Pt  daughter verbalized plan to discharge home via family transport. Pt has no other needs to be addressed at this time.     Transition of Care Barriers: None    Payor: MEDICARE / Plan: MEDICARE PART A & B / Product Type: Government /     Extended Emergency Contact Information  Primary Emergency Contact: Radha Watson  Address: [address]           [place] [address] [place]  Home Phone: [phone]  Relation: Daughter  Preferred language: English   needed? No  Secondary Emergency Contact: Lisa Schaeffer  Address: UNKNOWN   [facility]  Home Phone: [phone]  Mobile Phone: [phone]  Relation: Daughter  Preferred language: English   needed? No    Discharge Plan A: Home Health, Home with family  Discharge Plan B: Home with family      [id number] [place] - [address]  [address] [place] [address]  Phone: [phone] Fax: [phone]    [facility] - [address] [address]  Phone: [phone] Fax: [phone]      Initial Assessment (most recent)       Adult Discharge Assessment - 09/12/23 1508          Discharge Assessment    Assessment Type Discharge Planning Assessment     Confirmed/corrected address, phone number and insurance Yes     Confirmed Demographics Correct on Facesheet     Source of Information  family     If unable to respond/provide information was family/caregiver contacted? Yes     Contact Name/Number Radha Watson (Daughter)  [phone]     Communicated NILDA with patient/caregiver Date not available/Unable to determine     Reason For Admission Hypertensive emergency     People in Home child(katie), adult     Do you expect to return to your current living situation? Yes     Do you have help at home or someone to help you manage your care at home? Yes     Who are your caregiver(s) and their phone number(s)? Radha Watson (Daughter)  [phone] Lisa Schaeffer (Daughter) [phone]     Home Accessibility wheelchair accessible     Home Layout Able to live on 1st floor     Equipment Currently Used at Home walker, rolling;wheelchair;shower chair;nebulizer     Readmission within 30 days? Yes     Patient currently being followed by outpatient case management? No     Do you currently have service(s) that help you manage your care at home? Yes     Name and Contact number of agency [facility] home health     Is the pt/caregiver preference to resume services with current agency Yes     Do you take prescription medications? Yes     Do you have prescription coverage? Yes     Coverage MEDICARE - MEDICARE PART A & B     Do you have any problems affording any of your prescribed medications? No     Is the patient taking medications as prescribed? yes     Who is going to help you get home at discharge? Radha Watson (Daughter)  [phone] Lisa Schaeffer (Daughter) [phone]     How do you get to doctors appointments? family or friend will provide     Are you on dialysis? No     Do you take coumadin? No     DME Needed Upon Discharge  none     Discharge Plan discussed with: Adult children     Name(s) and Number(s) Radha Watson (Daughter)  [phone]     Transition of Care Barriers None     Discharge Plan A Home Health;Home with family     Discharge Plan B Home with family

## 2023-09-12 NOTE — CONSULTS
"[facility]  Adult Nutrition   Consult Note (Initial Assessment)     SUMMARY     Recommendations  Recommendation/Intervention:   1. Recommend Glucerna Shake BID.   2. Continue current diet and add DM restricitions if Blood glucose becomes elevated.   3. RD to monitor and provide recommendations prn.    Goals: Pt to consume/tolerate >75% of meals and ONS. Maintain body weight.  Nutrition Goal Status: progressing towards goal    Dietitian Rounds Brief  Pt sleeping. Per RN pt ate BK well about 75% no issues shewing or swallowing. No NFPE done but pt appear malnourished.    Diet order:   Current Diet Order: Cardiac                 Evaluation of Received Nutrient/Fluid Intake  Energy Calories Required: meeting needs  Protein Required: meeting needs  Fluid Required: meeting needs  Tolerance: tolerating     % Intake of Estimated Energy Needs: 75 - 100 %  % Meal Intake: 75 - 100 %      Intake/Output Summary (Last 24 hours) at 9/12/2023 1245  Last data filed at 9/12/2023 0547  Gross per 24 hour   Intake 865.19 ml   Output --   Net 865.19 ml        Anthropometrics  Temp: 97.4 °F (36.3 °C)  Height Method: Stated  Height: 5' 3" (160 cm)  Height (inches): 63 in  Weight Method: Standard Scale  Weight: 52.2 kg (115 lb)  Weight (lb): 115 lb  Ideal Body Weight (IBW), Female: 115 lb  % Ideal Body Weight, Female (lb): 100 %  BMI (Calculated): 20.4  BMI Grade: 18.5-24.9 - normal       Estimated/Assessed Needs  Weight Used For Calorie Calculations: 52.2 kg (115 lb 1.3 oz)  Energy Calorie Requirements (kcal): 5716-7315 (25-30 kcal/ kg bw)  Energy Need Method: Kcal/kg  Protein Requirements: 52-63 (1.0-1.2gm/ kg bw)  Weight Used For Protein Calculations: 52.2 kg (115 lb 1.3 oz)     Estimated Fluid Requirement Method: RDA Method  RDA Method (mL): 1305       Reason for Assessment  Reason For Assessment: consult  Relevant Medical History: HTN, DM  Interdisciplinary Rounds: did not attend    Nutrition/Diet History  Patient " Reported Diet/Restrictions/Preferences: diabetic diet  Food Allergies: NKFA  Factors Affecting Nutritional Intake: None identified at this time    Nutrition Risk Screen  Nutrition Risk Screen: no indicators present     MST Score: 0  Have you recently lost weight without trying?: No  Weight loss score: 0  Have you been eating poorly because of a decreased appetite?: No  Appetite score: 0       Weight History:  Wt Readings from Last 10 Encounters:   09/11/23 52.2 kg (115 lb)   09/05/23 53.3 kg (117 lb 8.1 oz)   09/03/23 51.5 kg (113 lb 8.6 oz)   08/25/23 54 kg (119 lb)   08/28/23 53.7 kg (118 lb 4.4 oz)   08/22/23 54.4 kg (119 lb 14.9 oz)   08/16/23 54.4 kg (120 lb)   08/10/23 54.4 kg (120 lb)   08/08/23 54.4 kg (120 lb)   07/06/23 53.5 kg (118 lb)        Lab/Procedures/Meds: Pertinent Labs/Meds Reviewed    Medications:Pertinent Medications Reviewed  Scheduled Meds:   aspirin  81 mg Oral Daily    atorvastatin  80 mg Oral Daily    cefTRIAXone (ROCEPHIN) IVPB  1 g Intravenous Q24H    clopidogreL  75 mg Oral Daily    latanoprost  1 drop Both Eyes QHS    metoprolol succinate  25 mg Oral QHS    mupirocin   Nasal BID    pantoprazole  40 mg Oral QHS     Continuous Infusions:  PRN Meds:.acetaminophen, acetaminophen, albuterol-ipratropium, aluminum-magnesium hydroxide-simethicone, dextrose 50%, dextrose 50%, glucagon (human recombinant), glucose, glucose, insulin aspart U-100, labetalol, magnesium oxide, magnesium oxide, melatonin, morphine, naloxone, ondansetron, potassium bicarbonate, potassium bicarbonate, potassium bicarbonate, potassium, sodium phosphates, potassium, sodium phosphates, potassium, sodium phosphates, simethicone, sodium chloride 0.9%    Labs: Pertinent Labs Reviewed  Clinical Chemistry:  Recent Labs   Lab 09/11/23  1426 09/12/23  0323    138   K 3.2* 3.1*    102   CO2 24 24    185*   BUN 14 14   CREATININE 0.7 0.6   CALCIUM 9.3 8.8   PROT 7.1  --    ALBUMIN 3.6  --    BILITOT 0.6  --   "  ALKPHOS 97  --    AST 27  --    ALT 25  --    ANIONGAP 13 12   MG  --  1.6   PHOS  --  3.8     CBC:   Recent Labs   Lab 09/12/23  0323   WBC 4.95   RBC 4.48   HGB 12.5   HCT 39.9      MCV 89   MCH 27.9   MCHC 31.3*     Lipid Panel:  No results for input(s): "CHOL", "HDL", "LDLCALC", "TRIG", "CHOLHDL" in the last 168 hours.  Cardiac Profile:  Recent Labs   Lab 09/11/23  1426 09/11/23  1659   *  --    TROPONINI <0.006 0.023     Inflammatory Labs:  No results for input(s): "CRP" in the last 168 hours.  Diabetes:  No results for input(s): "HGBA1C", "POCTGLUCOSE" in the last 168 hours.  Thyroid & Parathyroid:  Recent Labs   Lab 09/11/23  1426   TSH 2.547       Monitor and Evaluation  Food and Nutrient Intake: energy intake, food and beverage intake  Food and Nutrient Adminstration: diet order  Knowledge/Beliefs/Attitudes: food and nutrition knowledge/skill, beliefs and attitudes  Physical Activity and Function: nutrition-related ADLs and IADLs  Anthropometric Measurements: height/length, weight, weight change, body mass index  Biochemical Data, Medical Tests and Procedures: glucose/endocrine profile, inflammatory profile, lipid profile, gastrointestinal profile  Nutrition-Focused Physical Findings: overall appearance     Nutrition Risk  Level of Risk/Frequency of Follow-up: high     Nutrition Follow-Up  RD Follow-up?: Yes      Elizabeth Lemus, MARIA ALEJANDRA 09/12/2023 12:45 PM      "

## 2023-09-12 NOTE — HPI
87-year-old  female with history of hypertension and diabetes was transferred [facility] on account of hypertensive emergency.  Patient states that she can not recollect why she was brought to the hospital, all she knows is that the ambulance was called by her relative to the ED and she was told she had COVID infection.  Per ED physician's note: 87-year-old female history of hypertension was brought by the EMS to the ED on account of elevated blood pressure reading and tachycardia.    Patient had stressful family situation earlier and had some chest pain associated with elevated blood pressure reading.  Collateral history obtained from family member.  Patient currently denies any chest pain, cough, shortness of breath, palpitation, fevers, headaches, blurring of vision or unilateral weakness of extremities.  She denies any dysuria, suprapubic pain or hematuria.  She does not smoke cigarettes drink alcohol or use any illicit medications.    On presentation to the ED, CBC unremarkable, potassium low at 3.2, COVID positive, lactate negative, troponin negative x2 a urinalysis positive for UTI.  CT brain showed no acute abnormality.  During this history taking , patient is AO x3 and currently denies any symptoms.  She is currently on nicardipine drip and blood pressures are currently in the 100s.

## 2023-09-12 NOTE — ASSESSMENT & PLAN NOTE
Patient is currently asymptomatic  Monitor oxygen saturation  Chest x-ray negative for any acute abnormality  Initiate standard COVID protocols; COVID-19 testing ,Infection Control notification  and isolation- respiratory, contact and droplet per protocol    Diagnostics: CBC, CMP, Ferritin, CRP and Portable CXR    Management: Inhaled bronchodilators as needed for shortness of breath.    Advance Care Planning  Current advance care plan has been discussed with patient/family/POA and patient currently wishes Full Code.

## 2023-09-12 NOTE — PLAN OF CARE
09/12/23 1535   Readmission   Why were you hospitalized in the last 30 days? UTI   Why were you readmitted? New medical problem   When you left the hospital where did you go? Home with Home Health   Did patient/caregiver refused recommended DC plan? No   Tell me about what happened between when you left the hospital and the day you returned. Hypertensive emergency   When did you start not feeling well? 9/11/23   Did you try to see or did see a doctor or nurse before you came? No   Was this a planned readmission? No

## 2023-09-12 NOTE — HOSPITAL COURSE
Patient admitted to ICU on cardene infusion for hypertensive emergency. The infusion was discontinued within 1-2 hours. She was also treated with IV Rocephin for possible UTI. She had sinus congestion and myalgias. She tested positive for covid 9/11/23.  Family and patient report multiple falls at home. PT/OT consulted. Patient was discharged home with home health. Denies any chest pain, SOB or cough on discharge. Norvasc 10 mg added to antihypertensive regime.

## 2023-09-13 ENCOUNTER — PATIENT MESSAGE (OUTPATIENT)
Dept: PRIMARY CARE CLINIC | Facility: CLINIC | Age: 88
End: 2023-09-13
Payer: MEDICARE

## 2023-09-13 ENCOUNTER — TELEPHONE (OUTPATIENT)
Dept: CARDIOLOGY | Facility: CLINIC | Age: 88
End: 2023-09-13
Payer: MEDICARE

## 2023-09-13 LAB
ANION GAP SERPL CALC-SCNC: 4 MMOL/L (ref 8–16)
BUN SERPL-MCNC: 22 MG/DL (ref 8–23)
CALCIUM SERPL-MCNC: 8.9 MG/DL (ref 8.7–10.5)
CHLORIDE SERPL-SCNC: 102 MMOL/L (ref 95–110)
CO2 SERPL-SCNC: 29 MMOL/L (ref 23–29)
CREAT SERPL-MCNC: 0.6 MG/DL (ref 0.5–1.4)
ERYTHROCYTE [DISTWIDTH] IN BLOOD BY AUTOMATED COUNT: 14.2 % (ref 11.5–14.5)
EST. GFR  (NO RACE VARIABLE): >60 ML/MIN/1.73 M^2
GLUCOSE SERPL-MCNC: 115 MG/DL (ref 70–110)
GLUCOSE SERPL-MCNC: 120 MG/DL (ref 70–110)
GLUCOSE SERPL-MCNC: 171 MG/DL (ref 70–110)
HCT VFR BLD AUTO: 36.2 % (ref 37–48.5)
HGB BLD-MCNC: 11.2 G/DL (ref 12–16)
MAGNESIUM SERPL-MCNC: 2.1 MG/DL (ref 1.6–2.6)
MCH RBC QN AUTO: 28.4 PG (ref 27–31)
MCHC RBC AUTO-ENTMCNC: 30.9 G/DL (ref 32–36)
MCV RBC AUTO: 92 FL (ref 82–98)
PHOSPHATE SERPL-MCNC: 2.9 MG/DL (ref 2.7–4.5)
PLATELET # BLD AUTO: 265 K/UL (ref 150–450)
PMV BLD AUTO: 10 FL (ref 9.2–12.9)
POTASSIUM SERPL-SCNC: 4 MMOL/L (ref 3.5–5.1)
RBC # BLD AUTO: 3.95 M/UL (ref 4–5.4)
SODIUM SERPL-SCNC: 135 MMOL/L (ref 136–145)
WBC # BLD AUTO: 7.77 K/UL (ref 3.9–12.7)

## 2023-09-13 PROCEDURE — 25000003 PHARM REV CODE 250: Performed by: STUDENT IN AN ORGANIZED HEALTH CARE EDUCATION/TRAINING PROGRAM

## 2023-09-13 PROCEDURE — 25000003 PHARM REV CODE 250: Performed by: HOSPITALIST

## 2023-09-13 PROCEDURE — 97161 PT EVAL LOW COMPLEX 20 MIN: CPT

## 2023-09-13 PROCEDURE — 80048 BASIC METABOLIC PNL TOTAL CA: CPT | Performed by: STUDENT IN AN ORGANIZED HEALTH CARE EDUCATION/TRAINING PROGRAM

## 2023-09-13 PROCEDURE — 83735 ASSAY OF MAGNESIUM: CPT | Performed by: STUDENT IN AN ORGANIZED HEALTH CARE EDUCATION/TRAINING PROGRAM

## 2023-09-13 PROCEDURE — 99900035 HC TECH TIME PER 15 MIN (STAT)

## 2023-09-13 PROCEDURE — 94760 N-INVAS EAR/PLS OXIMETRY 1: CPT

## 2023-09-13 PROCEDURE — 99900031 HC PATIENT EDUCATION (STAT)

## 2023-09-13 PROCEDURE — 85027 COMPLETE CBC AUTOMATED: CPT | Performed by: STUDENT IN AN ORGANIZED HEALTH CARE EDUCATION/TRAINING PROGRAM

## 2023-09-13 PROCEDURE — 97165 OT EVAL LOW COMPLEX 30 MIN: CPT

## 2023-09-13 PROCEDURE — 63600175 PHARM REV CODE 636 W HCPCS: Performed by: STUDENT IN AN ORGANIZED HEALTH CARE EDUCATION/TRAINING PROGRAM

## 2023-09-13 PROCEDURE — 97535 SELF CARE MNGMENT TRAINING: CPT

## 2023-09-13 PROCEDURE — 97116 GAIT TRAINING THERAPY: CPT

## 2023-09-13 PROCEDURE — 12000002 HC ACUTE/MED SURGE SEMI-PRIVATE ROOM

## 2023-09-13 PROCEDURE — 84100 ASSAY OF PHOSPHORUS: CPT | Performed by: STUDENT IN AN ORGANIZED HEALTH CARE EDUCATION/TRAINING PROGRAM

## 2023-09-13 PROCEDURE — 94799 UNLISTED PULMONARY SVC/PX: CPT

## 2023-09-13 PROCEDURE — 94761 N-INVAS EAR/PLS OXIMETRY MLT: CPT

## 2023-09-13 PROCEDURE — 36415 COLL VENOUS BLD VENIPUNCTURE: CPT | Performed by: STUDENT IN AN ORGANIZED HEALTH CARE EDUCATION/TRAINING PROGRAM

## 2023-09-13 RX ORDER — AMLODIPINE BESYLATE 5 MG/1
10 TABLET ORAL DAILY
Status: DISCONTINUED | OUTPATIENT
Start: 2023-09-13 | End: 2023-09-14 | Stop reason: HOSPADM

## 2023-09-13 RX ADMIN — CLOPIDOGREL BISULFATE 75 MG: 75 TABLET, FILM COATED ORAL at 09:09

## 2023-09-13 RX ADMIN — LABETALOL HYDROCHLORIDE 10 MG: 5 INJECTION, SOLUTION INTRAVENOUS at 05:09

## 2023-09-13 RX ADMIN — MUPIROCIN 1 G: 20 OINTMENT TOPICAL at 09:09

## 2023-09-13 RX ADMIN — LABETALOL HYDROCHLORIDE 10 MG: 5 INJECTION, SOLUTION INTRAVENOUS at 12:09

## 2023-09-13 RX ADMIN — METOPROLOL SUCCINATE 25 MG: 25 TABLET, FILM COATED, EXTENDED RELEASE ORAL at 08:09

## 2023-09-13 RX ADMIN — AMLODIPINE BESYLATE 10 MG: 5 TABLET ORAL at 10:09

## 2023-09-13 RX ADMIN — PANTOPRAZOLE SODIUM 40 MG: 40 TABLET, DELAYED RELEASE ORAL at 08:09

## 2023-09-13 RX ADMIN — ASPIRIN 81 MG: 81 TABLET, COATED ORAL at 09:09

## 2023-09-13 RX ADMIN — MUPIROCIN 1 G: 20 OINTMENT TOPICAL at 08:09

## 2023-09-13 RX ADMIN — LACTOBACILLUS ACIDOPHILUS / LACTOBACILLUS BULGARICUS 1 EACH: 100 MILLION CFU STRENGTH GRANULES at 09:09

## 2023-09-13 RX ADMIN — ATORVASTATIN CALCIUM 80 MG: 40 TABLET, FILM COATED ORAL at 08:09

## 2023-09-13 RX ADMIN — LATANOPROST 1 DROP: 50 SOLUTION OPHTHALMIC at 08:09

## 2023-09-13 NOTE — PLAN OF CARE
Problem: Physical Therapy  Goal: Physical Therapy Goal  Description: Goals to be met by: 2023     Patient will increase functional independence with mobility by performin. Supine to sit with Contact Guard Assistance  2. Sit to stand transfer with Contact Guard Assistance  3. Bed to chair transfer with Contact Guard Assistance using Rolling Walker  4. Gait  x 150 feet with Contact Guard Assistance using Rolling Walker.   5. Lower extremity exercise program x20 reps   Outcome: Ongoing, Progressing   PT eval and treat completed. Pt  ambulated within room with RW 30ft min assist. OOB chair

## 2023-09-13 NOTE — PT/OT/SLP EVAL
Physical Therapy Evaluation    Patient Name:  Genny Watson   MRN:  [id number]    Recommendations:     Discharge Recommendations: home health PT   Discharge Equipment Recommendations: none   Barriers to discharge: None    Assessment:     Genny Watson is a 87 y.o. female admitted with a medical diagnosis of Hypertensive emergency.  She presents with the following impairments/functional limitations: weakness, impaired endurance, impaired functional mobility, gait instability, impaired cardiopulmonary response to activity .    Pt seen seated EOB post OT. Pt on RA and interactive. Pt ambulated with RW 30ft min assist. OOB chair, SAT 96%.  Pt to benefit from HHPT.    Rehab Prognosis: Fair; patient would benefit from acute skilled PT services to address these deficits and reach maximum level of function.    Recent Surgery: * No surgery found *      Plan:     During this hospitalization, patient to be seen 5 x/week to address the identified rehab impairments via gait training, therapeutic activities, therapeutic exercises and progress toward the following goals:    Plan of Care Expires:  09/30/23    Subjective   Pt stated that she lives at home with daughter and son and that she is walking with RW  Chief Complaint: none  Patient/Family Comments/goals: get well to go home  Pain/Comfort:  Pain Rating 1: 0/10    Patients cultural, spiritual, Jew conflicts given the current situation:      Living Environment:  Home with daughter  Prior to admission, patients level of function was ambulatory.  Equipment used at home: walker, rolling.  DME owned (not currently used): none.  Upon discharge, patient will have assistance from family.    Objective:     Communicated with nurse Romero prior to session.  Patient found sitting edge of bed with telemetry  upon PT entry to room.    General Precautions: Standard, airborne, contact, droplet, fall  Orthopedic Precautions:N/A   Braces: N/A  Respiratory Status:  Room air    Exams:  Postural Exam:  Patient presented with the following abnormalities:    -       Rounded shoulders  -       Forward head  -       BMI 20  RLE ROM: WFL  RLE Strength: WFL  LLE ROM: WFL  LLE Strength: WFL    Functional Mobility:  Transfers:     Sit to Stand:  minimum assistance with rolling walker  Bed to Chair: minimum assistance with  rolling walker  using  Stand Pivot  Gait: 30ft with RW min assist  SAT 96%      AM-PAC 6 CLICK MOBILITY  Total Score:17       Treatment & Education:  Patient was educated on the importance of OOB activity and functional mobility to negate negative effects of prolonged bed rest during hospitalization, safe transfers and ambulation, and D/C planning   OOB chair post PT with all needs within reach    Patient left up in chair with all lines intact, call button in reach, and nurse Heather notified.    GOALS:   Multidisciplinary Problems       Physical Therapy Goals          Problem: Physical Therapy    Goal Priority Disciplines Outcome Goal Variances Interventions   Physical Therapy Goal     PT, PT/OT Ongoing, Progressing     Description: Goals to be met by: 2023     Patient will increase functional independence with mobility by performin. Supine to sit with Contact Guard Assistance  2. Sit to stand transfer with Contact Guard Assistance  3. Bed to chair transfer with Contact Guard Assistance using Rolling Walker  4. Gait  x 150 feet with Contact Guard Assistance using Rolling Walker.   5. Lower extremity exercise program x20 reps                        History:     Past Medical History:   Diagnosis Date    ALLERGIC RHINITIS 2012    Arthritis     Gimenez's esophagus     Breast cancer     right, s/p right mastectomy>20yr ago    Cataract     Diabetes mellitus type II     Diverticulitis     Diverticulosis     Fuchs' corneal dystrophy     Glaucoma     Hernia, hiatal     Hyperlipidemia     Hypertension     Macular degeneration     Pulmonary embolism      Seizures     Skin cancer of face 12/2014    Dr M Weil     UTI (lower urinary tract infection)        Past Surgical History:   Procedure Laterality Date    angeogram      APPENDECTOMY      BREAST SURGERY      cardio stent   1/2015    Dr Dodson    COLONOSCOPY  around 2003    COLONOSCOPY N/A 1/13/2016    Procedure: COLONOSCOPY;  Surgeon: Mario Bhakta MD;  Location: [facility];  Service: Endoscopy;  Laterality: N/A; repeat in 5 years for surveillance    CORNEAL TRANSPLANT      CYSTOSCOPY N/A 8/30/2023    Procedure: CYSTOSCOPY;  Surgeon: Mirna Harden MD;  Location: [facility];  Service: Urology;  Laterality: N/A;    EYE SURGERY      HIP ARTHROPLASTY Right 6/16/2023    Procedure: ARTHROPLASTY, HIP;  Surgeon: Josh Douglas II, MD;  Location: [facility];  Service: Orthopedics;  Laterality: Right;    HYSTERECTOMY      ovaries removed    MASTECTOMY      right >20 years ago    STOMACH SURGERY      UPPER GASTROINTESTINAL ENDOSCOPY  01/13/2016    Dr. Bhakta       Time Tracking:     PT Received On: 09/13/23  PT Start Time: 1103     PT Stop Time: 1119  PT Total Time (min): 16 min     Billable Minutes: Evaluation 8 and Gait Training 8      09/13/2023

## 2023-09-13 NOTE — PLAN OF CARE
Patient not medically clear for discharge at this time.  Patient to resume home health with [facility] when discharged.         09/13/23 0914   Post-Acute Status   Post-Acute Authorization Home Health   Home Health Status Pending medical clearance/testing   Hospital Resources/Appts/Education Provided Post-Acute resouces added to AVS   Patient choice form signed by patient/caregiver List with quality metrics by geographic area provided   Discharge Plan   Discharge Plan A Home Health   Discharge Plan B Home Health

## 2023-09-13 NOTE — NURSING
Nurses Note -- 4 Eyes      9/13/2023   6:16 AM      Skin assessed during: Transfer      [x] No Altered Skin Integrity Present    []Prevention Measures Documented      [] Yes- Altered Skin Integrity Present or Discovered   [] LDA Added if Not in Epic (Describe Wound)   [] New Altered Skin Integrity was Present on Admit and Documented in LDA   [] Wound Image Taken    Wound Care Consulted? No    Attending Nurse:  Miguel Doyle RN/Staff Member:  janna

## 2023-09-13 NOTE — SUBJECTIVE & OBJECTIVE
Interval History:  Feeling better, blood pressure still not at goal.  No chest pain, palpitations, shortness of breath, headache.    Review of Systems Complete ROS otherwise negative other than stated in HPI.   Objective:     Vital Signs (Most Recent):  Temp: 97.6 °F (36.4 °C) (09/13/23 0715)  Pulse: 77 (09/13/23 0925)  Resp: 16 (09/13/23 0925)  BP: (!) 173/80 (09/13/23 0715)  SpO2: 97 % (09/13/23 0925) Vital Signs (24h Range):  Temp:  [96.7 °F (35.9 °C)-98.2 °F (36.8 °C)] 97.6 °F (36.4 °C)  Pulse:  [68-88] 77  Resp:  [15-28] 16  SpO2:  [96 %-100 %] 97 %  BP: (120-181)/(57-91) 173/80     Weight: 52.2 kg (115 lb)  Body mass index is 20.37 kg/m².    Intake/Output Summary (Last 24 hours) at 9/13/2023 1241  Last data filed at 9/12/2023 2005  Gross per 24 hour   Intake 100 ml   Output --   Net 100 ml         Physical Exam  GENERAL:  Alert and oriented x 3  HEENT:  EOMI. Conjunctivae intact. Posterior pharynx clear, oral mucosa moist  NECK:  Supple   LUNGS:  No respiratory distress. Clear to auscultation bilaterally with good air movement  CARDIAC:  RRR without murmur, rub or gallop  ABDOMEN:  Soft,  Nontender and nondistended, no rebound or guarding, bowel sounds present   EXTREMITIES:   No clubbing, cyanosis or edema           Significant Labs: All pertinent labs within the past 24 hours have been reviewed.  BMP:   Recent Labs   Lab 09/13/23  0440   *   *   K 4.0      CO2 29   BUN 22   CREATININE 0.6   CALCIUM 8.9   MG 2.1     CBC:   Recent Labs   Lab 09/11/23  1425 09/12/23  0323 09/13/23  0440   WBC 6.80 4.95 7.77   HGB 12.8 12.5 11.2*   HCT 39.0 39.9 36.2*    277 265     CMP:   Recent Labs   Lab 09/11/23  1426 09/12/23  0323 09/13/23  0440    138 135*   K 3.2* 3.1* 4.0    102 102   CO2 24 24 29    185* 120*   BUN 14 14 22   CREATININE 0.7 0.6 0.6   CALCIUM 9.3 8.8 8.9   PROT 7.1  --   --    ALBUMIN 3.6  --   --    BILITOT 0.6  --   --    ALKPHOS 97  --   --    AST 27  --    --    ALT 25  --   --    ANIONGAP 13 12 4*     Cardiac Markers:   Recent Labs   Lab 09/11/23  1426   *       Significant Imaging: I have reviewed all pertinent imaging results/findings within the past 24 hours.

## 2023-09-13 NOTE — PLAN OF CARE
09/12/23 2000   Patient Assessment/Suction   Level of Consciousness (AVPU) alert   Respiratory Effort Normal;Unlabored   All Lung Fields Breath Sounds equal bilaterally   PRE-TX-O2   Device (Oxygen Therapy) room air   SpO2 99 %   Pulse Oximetry Type Continuous   $ Pulse Oximetry - Multiple Charge Pulse Oximetry - Multiple   Pulse 88   Resp 18   Aerosol Therapy (SVN) Infant   Respiratory Treatment Status (SVN) PRN treatment, not required   Respiratory Evaluation   $ Care Plan Tech Time 15 min   $ Eval/Re-eval Charges Re-evaluation

## 2023-09-13 NOTE — PROGRESS NOTES
[facility]  Progress Note    Patient Name: Genny Watson  MRN: [id number]  Patient Class: IP- Inpatient   Admission Date: 9/11/2023  Length of Stay: 2 days  Attending Physician: Anuradha Crowley MD  Primary Care Provider: Josh Berman MD        Subjective:     Principal Problem:Hypertensive emergency        HPI:  87-year-old  female with history of hypertension and diabetes was transferred from [facility] on account of hypertensive emergency.  Patient states that she can not recollect why she was brought to the hospital, all she knows is that the ambulance was called by her relative to the ED and she was told she had COVID infection.  Per ED physician's note: 87-year-old female history of hypertension was brought by the EMS to the ED on account of elevated blood pressure reading and tachycardia.    Patient had stressful family situation earlier and had some chest pain associated with elevated blood pressure reading.  Collateral history obtained from family member.  Patient currently denies any chest pain, cough, shortness of breath, palpitation, fevers, headaches, blurring of vision or unilateral weakness of extremities.  She denies any dysuria, suprapubic pain or hematuria.  She does not smoke cigarettes drink alcohol or use any illicit medications.    On presentation to the ED, CBC unremarkable, potassium low at 3.2, COVID positive, lactate negative, troponin negative x2 a urinalysis positive for UTI.  CT brain showed no acute abnormality.  During this history taking , patient is AO x3 and currently denies any symptoms.  She is currently on nicardipine drip and blood pressures are currently in the 100s.      Overview/Hospital Course:  Patient admitted to ICU on cardene infusion for hypertensive emergency. The infusion was discontinued within 1-2 hours. She was also treated with IV Rocephin for possible UTI. She had sinus congestion and myalgias. She  tested positive for covid 9/11/23.  Family and patient report multiple falls at home. PT/OT consulted.       Interval History:  Feeling better, blood pressure still not at goal.  No chest pain, palpitations, shortness of breath, headache.    Review of Systems Complete ROS otherwise negative other than stated in HPI.   Objective:     Vital Signs (Most Recent):  Temp: 97.6 °F (36.4 °C) (09/13/23 0715)  Pulse: 77 (09/13/23 0925)  Resp: 16 (09/13/23 0925)  BP: (!) 173/80 (09/13/23 0715)  SpO2: 97 % (09/13/23 0925) Vital Signs (24h Range):  Temp:  [96.7 °F (35.9 °C)-98.2 °F (36.8 °C)] 97.6 °F (36.4 °C)  Pulse:  [68-88] 77  Resp:  [15-28] 16  SpO2:  [96 %-100 %] 97 %  BP: (120-181)/(57-91) 173/80     Weight: 52.2 kg (115 lb)  Body mass index is 20.37 kg/m².    Intake/Output Summary (Last 24 hours) at 9/13/2023 1241  Last data filed at 9/12/2023 2005  Gross per 24 hour   Intake 100 ml   Output --   Net 100 ml         Physical Exam  GENERAL:  Alert and oriented x 3  HEENT:  EOMI. Conjunctivae intact. Posterior pharynx clear, oral mucosa moist  NECK:  Supple   LUNGS:  No respiratory distress. Clear to auscultation bilaterally with good air movement  CARDIAC:  RRR without murmur, rub or gallop  ABDOMEN:  Soft,  Nontender and nondistended, no rebound or guarding, bowel sounds present   EXTREMITIES:   No clubbing, cyanosis or edema           Significant Labs: All pertinent labs within the past 24 hours have been reviewed.  BMP:   Recent Labs   Lab 09/13/23  0440   *   *   K 4.0      CO2 29   BUN 22   CREATININE 0.6   CALCIUM 8.9   MG 2.1     CBC:   Recent Labs   Lab 09/11/23  1425 09/12/23  0323 09/13/23  0440   WBC 6.80 4.95 7.77   HGB 12.8 12.5 11.2*   HCT 39.0 39.9 36.2*    277 265     CMP:   Recent Labs   Lab 09/11/23  1426 09/12/23  0323 09/13/23  0440    138 135*   K 3.2* 3.1* 4.0    102 102   CO2 24 24 29    185* 120*   BUN 14 14 22   CREATININE 0.7 0.6 0.6   CALCIUM 9.3 8.8  8.9   PROT 7.1  --   --    ALBUMIN 3.6  --   --    BILITOT 0.6  --   --    ALKPHOS 97  --   --    AST 27  --   --    ALT 25  --   --    ANIONGAP 13 12 4*     Cardiac Markers:   Recent Labs   Lab 09/11/23  1426   *       Significant Imaging: I have reviewed all pertinent imaging results/findings within the past 24 hours.      Assessment/Plan:      * Hypertensive emergency  Resolved  Stable off nicardipine drip  Resumed patient's home blood pressure medication, better controlled but not at goal.  Add amlodipine 10 mg daily and monitor       Falls  PT/OT following for dc planning      COVID-19 virus infection  Patient is currently asymptomatic.  Oxygenating well on room air.  Does not require treatment.  Continue isolation per hospital protocol     UTI (urinary tract infection)  Ruled out, stop IV Rocephin        Hypokalemia  Replace on electrolyte scale as needed       Type 2 diabetes mellitus, dx 4/2012        VTE Risk Mitigation (From admission, onward)         Ordered     IP VTE HIGH RISK PATIENT  Once         09/11/23 2349     Place sequential compression device  Until discontinued         09/11/23 2349     Place BRANDON hose  Until discontinued         09/11/23 2349                Discharge Planning   NILDA: 9/14/2023     Code Status: Full Code   Is the patient medically ready for discharge?:     Reason for patient still in hospital (select all that apply): Patient trending condition  Discharge Plan A: Home Health                  Anuradha Crowley MD  Department of Hospital Medicine   [facility]

## 2023-09-13 NOTE — CARE UPDATE
09/13/23 0925   Patient Assessment/Suction   Level of Consciousness (AVPU) alert   Respiratory Effort Unlabored   Expansion/Accessory Muscles/Retractions no use of accessory muscles   All Lung Fields Breath Sounds clear   Rhythm/Pattern, Respiratory unlabored   PRE-TX-O2   Device (Oxygen Therapy) room air   SpO2 97 %   Pulse Oximetry Type Intermittent   $ Pulse Oximetry - Single Charge Pulse Oximetry - Single   Pulse 77   Resp 16   Aerosol Therapy   $ Aerosol Therapy Charges PRN treatment not required   Education   $ Education Bronchodilator;15 min

## 2023-09-13 NOTE — TELEPHONE ENCOUNTER
----- Message from Robert Maier sent at 9/13/2023  9:41 AM CDT -----  Type: Need Medical Advice   Who Called: Lisa daughter of patient  Best callback number: [phone]  Additional Information: Daughter called stated her mom is in the hospital and she has covid, she will callback to reschedule once her mom his feeling better  Please call to further assist, Thanks.

## 2023-09-13 NOTE — NURSING
Spoke with pt's dtr Radha who agreed okay to schedule a hospital follow up at the [facility], located at [address] [place], (same location where her PCP, Dr. Berman is located. Radha texted her sister Lisa who agreed to the date and time of appt as Lisa will be transporting pt to this appt. Radha also indicated that sometimes, she will go along to her appts,s well. Radha indicated that she is the one that assists pt. with medication management. Requested that pt bring all of her medications, ( adiel. If any new ones Rx'd since recent hospital stay).

## 2023-09-13 NOTE — NURSING
Pt transferred to 1102 via wheelchair.  Patient oriented to room and nursing staff notified of arrival.. Bed locked, Bed alarm on, Call light within reach.

## 2023-09-13 NOTE — PT/OT/SLP EVAL
Occupational Therapy   Evaluation    Name: Genny Watson  MRN: [id number]  Admitting Diagnosis: Hypertensive emergency  Recent Surgery: * No surgery found *      Recommendations:     Discharge Recommendations: home health OT  Discharge Equipment Recommendations:  none  Barriers to discharge:  None    Assessment:     Genny Watson is a 87 y.o. female with a medical diagnosis of Hypertensive emergency.  She presents with general weakness. Performance deficits affecting function: weakness, impaired endurance, impaired self care skills, impaired functional mobility, gait instability, impaired balance, decreased safety awareness, decreased lower extremity function, impaired cardiopulmonary response to activity.      Rehab Prognosis: Fair; patient would benefit from acute skilled OT services to address these deficits and reach maximum level of function.       Plan:     Patient to be seen 5 x/week to address the above listed problems via self-care/home management, therapeutic activities, therapeutic exercises  Plan of Care Expires: 10/13/23  Plan of Care Reviewed with: patient    Subjective     Chief Complaint: General weakness  Patient/Family Comments/goals: improved functional mobility and ADL independence.    Occupational Profile:  Living Environment: lives with son and son's significant other in a 1 story home, threshold to enter.  Previous level of function: receiving home zhen therapy at home. Patient reports ambulating in the home using a rolling walker. Requires assistance with getting in/out of shower and lower body bathing/dressing.   Roles and Routines: limited homemaker  Equipment Used at Home: walker, rolling, wheelchair, shower chair  Assistance upon Discharge: Son    Pain/Comfort:  Pain Rating 1: 0/10  Pain Rating Post-Intervention 1: 0/10    Patients cultural, spiritual, Buddhist conflicts given the current situation: no    Objective:     Communicated with: nurse prior to session.   Patient found HOB elevated with telemetry, peripheral IV, PureWick upon OT entry to room.    General Precautions: Standard, airborne, contact, droplet, fall  Orthopedic Precautions: N/A  Braces: N/A  Respiratory Status: Room air    Occupational Performance:    Bed Mobility:    Patient completed Scooting/Bridging with contact guard assistance  Patient completed Supine to Sit with contact guard assistance  Performed unsupported sitting EOB with contact guard assistance.    Functional Mobility/Transfers:  Patient completed Sit <> Stand Transfer with contact guard assistance  with  rolling walker     Activities of Daily Living:  Lower Body Dressing: contact guard assistance to don/doff socks sitting EOB.    Cognitive/Visual Perceptual:  Cognitive/Psychosocial Skills:     -       Oriented to: Person and Place   -       Follows Commands/attention:Follows one-step commands  -       Communication: clear/fluent  -       Memory: Impaired STM  -       Safety awareness/insight to disability: impaired   -       Mood/Affect/Coping skills/emotional control: Cooperative and Pleasant  Visual/Perceptual:      -Intact Acuity    Physical Exam:  Balance:    -       Sitting/Standing: Contact Guard  Upper Extremity Range of Motion:     -       Right Upper Extremity: shoulder flexion up to 90 degrees  -       Left Upper Extremity: shoulder flexion up to 90 degrees  Upper Extremity Strength:    -       Right Upper Extremity: shoulder 2+/5, elbow/wrist 3+/5  -       Left Upper Extremity: shoulder 2+/5, elbow/wrist 3+/5   Strength:    -       Right Upper Extremity: fair  -       Left Upper Extremity: fair  Fine Motor Coordination:    -       Intact    AMPAC 6 Click ADL:  AMPAC Total Score: 19    Treatment & Education:  Patient educated on the purpose of Occupational Therapy and the importance of getting OOB.    Patient left sitting edge of bed with  hand off to Physical Therapist.    GOALS:   Multidisciplinary Problems       Occupational  Therapy Goals          Problem: Occupational Therapy    Goal Priority Disciplines Outcome Interventions   Occupational Therapy Goal     OT, PT/OT     Description: Goals to be met by: 10/13/2023     Patient will increase functional independence with ADLs by performing:    UE Dressing with Supervision.  LE Dressing with Supervision.  Grooming while standing at sink with Supervision.  Toileting from toilet with Supervision for hygiene and clothing management.   Toilet transfer to toilet with Supervision.                         History:     Past Medical History:   Diagnosis Date    ALLERGIC RHINITIS 4/30/2012    Arthritis     Gimenez's esophagus     Breast cancer     right, s/p right mastectomy>20yr ago    Cataract     Diabetes mellitus type II     Diverticulitis     Diverticulosis     Fuchs' corneal dystrophy     Glaucoma     Hernia, hiatal     Hyperlipidemia     Hypertension     Macular degeneration     Pulmonary embolism     Seizures     Skin cancer of face 12/2014    Dr M Weil     UTI (lower urinary tract infection)          Past Surgical History:   Procedure Laterality Date    angeogram      APPENDECTOMY      BREAST SURGERY      cardio stent   1/2015    Dr Dodson    COLONOSCOPY  around 2003    COLONOSCOPY N/A 1/13/2016    Procedure: COLONOSCOPY;  Surgeon: Mario Bhakta MD;  Location: [facility];  Service: Endoscopy;  Laterality: N/A; repeat in 5 years for surveillance    CORNEAL TRANSPLANT      CYSTOSCOPY N/A 8/30/2023    Procedure: CYSTOSCOPY;  Surgeon: Mirna Harden MD;  Location: [facility];  Service: Urology;  Laterality: N/A;    EYE SURGERY      HIP ARTHROPLASTY Right 6/16/2023    Procedure: ARTHROPLASTY, HIP;  Surgeon: Josh Douglas II, MD;  Location: [facility] OR;  Service: Orthopedics;  Laterality: Right;    HYSTERECTOMY      ovaries removed    MASTECTOMY      right >20 years ago    STOMACH SURGERY      UPPER GASTROINTESTINAL ENDOSCOPY  01/13/2016    Dr. Bhakta       Time Tracking:     OT Date of  Treatment: 09/13/23  OT Start Time: 1050  OT Stop Time: 1106  OT Total Time (min): 16 min    Billable Minutes:Evaluation 4  Self Care/Home Management 12    9/13/2023

## 2023-09-14 VITALS
OXYGEN SATURATION: 99 % | HEART RATE: 76 BPM | HEIGHT: 63 IN | WEIGHT: 115 LBS | DIASTOLIC BLOOD PRESSURE: 66 MMHG | RESPIRATION RATE: 18 BRPM | SYSTOLIC BLOOD PRESSURE: 139 MMHG | BODY MASS INDEX: 20.38 KG/M2 | TEMPERATURE: 98 F

## 2023-09-14 DIAGNOSIS — U07.1 COVID-19 VIRUS DETECTED: ICD-10-CM

## 2023-09-14 LAB
ANION GAP SERPL CALC-SCNC: 6 MMOL/L (ref 8–16)
BUN SERPL-MCNC: 19 MG/DL (ref 8–23)
CALCIUM SERPL-MCNC: 9 MG/DL (ref 8.7–10.5)
CHLORIDE SERPL-SCNC: 102 MMOL/L (ref 95–110)
CO2 SERPL-SCNC: 29 MMOL/L (ref 23–29)
CREAT SERPL-MCNC: 0.4 MG/DL (ref 0.5–1.4)
ERYTHROCYTE [DISTWIDTH] IN BLOOD BY AUTOMATED COUNT: 14 % (ref 11.5–14.5)
EST. GFR  (NO RACE VARIABLE): >60 ML/MIN/1.73 M^2
GLUCOSE SERPL-MCNC: 108 MG/DL (ref 70–110)
GLUCOSE SERPL-MCNC: 110 MG/DL (ref 70–110)
GLUCOSE SERPL-MCNC: 136 MG/DL (ref 70–110)
HCT VFR BLD AUTO: 38.6 % (ref 37–48.5)
HGB BLD-MCNC: 12.1 G/DL (ref 12–16)
MAGNESIUM SERPL-MCNC: 1.8 MG/DL (ref 1.6–2.6)
MCH RBC QN AUTO: 28.5 PG (ref 27–31)
MCHC RBC AUTO-ENTMCNC: 31.3 G/DL (ref 32–36)
MCV RBC AUTO: 91 FL (ref 82–98)
PHOSPHATE SERPL-MCNC: 3.8 MG/DL (ref 2.7–4.5)
PLATELET # BLD AUTO: 266 K/UL (ref 150–450)
PMV BLD AUTO: 9.7 FL (ref 9.2–12.9)
POTASSIUM SERPL-SCNC: 3.6 MMOL/L (ref 3.5–5.1)
RBC # BLD AUTO: 4.24 M/UL (ref 4–5.4)
SODIUM SERPL-SCNC: 137 MMOL/L (ref 136–145)
WBC # BLD AUTO: 7.7 K/UL (ref 3.9–12.7)

## 2023-09-14 PROCEDURE — 36415 COLL VENOUS BLD VENIPUNCTURE: CPT | Performed by: STUDENT IN AN ORGANIZED HEALTH CARE EDUCATION/TRAINING PROGRAM

## 2023-09-14 PROCEDURE — 83735 ASSAY OF MAGNESIUM: CPT | Performed by: STUDENT IN AN ORGANIZED HEALTH CARE EDUCATION/TRAINING PROGRAM

## 2023-09-14 PROCEDURE — 97116 GAIT TRAINING THERAPY: CPT | Mod: CQ

## 2023-09-14 PROCEDURE — 85027 COMPLETE CBC AUTOMATED: CPT | Performed by: STUDENT IN AN ORGANIZED HEALTH CARE EDUCATION/TRAINING PROGRAM

## 2023-09-14 PROCEDURE — 97530 THERAPEUTIC ACTIVITIES: CPT

## 2023-09-14 PROCEDURE — 25000003 PHARM REV CODE 250: Performed by: STUDENT IN AN ORGANIZED HEALTH CARE EDUCATION/TRAINING PROGRAM

## 2023-09-14 PROCEDURE — 97535 SELF CARE MNGMENT TRAINING: CPT

## 2023-09-14 PROCEDURE — 84100 ASSAY OF PHOSPHORUS: CPT | Performed by: STUDENT IN AN ORGANIZED HEALTH CARE EDUCATION/TRAINING PROGRAM

## 2023-09-14 PROCEDURE — 25000003 PHARM REV CODE 250: Performed by: HOSPITALIST

## 2023-09-14 PROCEDURE — 80048 BASIC METABOLIC PNL TOTAL CA: CPT | Performed by: STUDENT IN AN ORGANIZED HEALTH CARE EDUCATION/TRAINING PROGRAM

## 2023-09-14 RX ORDER — AMLODIPINE BESYLATE 10 MG/1
10 TABLET ORAL DAILY
Qty: 30 TABLET | Refills: 0 | Status: SHIPPED | OUTPATIENT
Start: 2023-09-15 | End: 2024-01-17 | Stop reason: SDUPTHER

## 2023-09-14 RX ORDER — AMLODIPINE BESYLATE 10 MG/1
10 TABLET ORAL DAILY
Qty: 30 TABLET | Refills: 0 | Status: SHIPPED | OUTPATIENT
Start: 2023-09-15 | End: 2023-09-14 | Stop reason: SDUPTHER

## 2023-09-14 RX ADMIN — AMLODIPINE BESYLATE 10 MG: 5 TABLET ORAL at 08:09

## 2023-09-14 RX ADMIN — MUPIROCIN 1 G: 20 OINTMENT TOPICAL at 08:09

## 2023-09-14 RX ADMIN — CLOPIDOGREL BISULFATE 75 MG: 75 TABLET, FILM COATED ORAL at 08:09

## 2023-09-14 RX ADMIN — ASPIRIN 81 MG: 81 TABLET, COATED ORAL at 08:09

## 2023-09-14 NOTE — PT/OT/SLP PROGRESS
Physical Therapy Treatment    Patient Name:  Genny Watson   MRN:  [id number]    Recommendations:     Discharge Recommendations: home health PT  Discharge Equipment Recommendations: none  Barriers to discharge: None    Assessment:     Genny Watson is a 87 y.o. female admitted with a medical diagnosis of Hypertensive emergency.  She presents with the following impairments/functional limitations: weakness, impaired endurance, impaired functional mobility, gait instability, impaired cardiopulmonary response to activity.    Pt agreeable to visit. Pt required stand by assist for transfer from supine to sitting with verbal cuing for sequencing. Pt ambulated 50' with RW and contact guard assist. Pt reports shortness of breath post ambulation and request back to bed. SpO2 WNL on room air.    Rehab Prognosis: Fair; patient would benefit from acute skilled PT services to address these deficits and reach maximum level of function.    Recent Surgery: * No surgery found *      Plan:     During this hospitalization, patient to be seen 5 x/week to address the identified rehab impairments via gait training, therapeutic activities, therapeutic exercises and progress toward the following goals:    Plan of Care Expires:  09/30/23    Subjective     Chief Complaint: shortness of breath with ambulation  Patient/Family Comments/goals: to go home  Pain/Comfort:  Pain Rating Post-Intervention 1: 0/10      Objective:     Communicated with RN prior to session.  Patient found HOB elevated with telemetry, bed alarm, peripheral IV upon PT entry to room.     General Precautions: Standard, airborne, contact, droplet, fall  Orthopedic Precautions: N/A  Braces: N/A  Respiratory Status: Room air     Functional Mobility:  Bed Mobility:     Bridging: stand by assistance  Supine to Sit: stand by assistance  Sit to Supine: stand by assistance  Transfers:     Sit to Stand:  contact guard assistance with rolling walker  Gait: x 50' with  RW and CGA      AM-PAC 6 CLICK MOBILITY          Treatment & Education:  Pt educated on importance of time OOB, importance of intermittent mobility, safe techniques for transfers/ambulation, discharge recommendations/options, and use of call light for assistance and fall prevention.      Patient left HOB elevated with all lines intact, call button in reach, and bed alarm on..    GOALS:   Multidisciplinary Problems       Physical Therapy Goals          Problem: Physical Therapy    Goal Priority Disciplines Outcome Goal Variances Interventions   Physical Therapy Goal     PT, PT/OT Ongoing, Progressing     Description: Goals to be met by: 2023     Patient will increase functional independence with mobility by performin. Supine to sit with Contact Guard Assistance  2. Sit to stand transfer with Contact Guard Assistance  3. Bed to chair transfer with Contact Guard Assistance using Rolling Walker  4. Gait  x 150 feet with Contact Guard Assistance using Rolling Walker.   5. Lower extremity exercise program x20 reps                        Time Tracking:     PT Received On: 23  PT Start Time: 1015     PT Stop Time: 1026  PT Total Time (min): 11 min     Billable Minutes: Gait Training 11    Treatment Type: Treatment  PT/PTA: PTA     Number of PTA visits since last PT visit: 2023

## 2023-09-14 NOTE — PLAN OF CARE
Problem: Occupational Therapy  Goal: Occupational Therapy Goal  Description: Goals to be met by: 10/13/2023     Patient will increase functional independence with ADLs by performing:    UE Dressing with Supervision.  LE Dressing with Supervision.  Grooming while standing at sink with Supervision.  Toileting from toilet with Supervision for hygiene and clothing management.   Toilet transfer to toilet with Supervision.    Outcome: Ongoing, Progressing

## 2023-09-14 NOTE — DISCHARGE SUMMARY
[facility]  Discharge Summary      Patient Name: Genny Watson  MRN: [id number]  RAVI: [id number]  Patient Class: IP- Inpatient  Admission Date: 9/11/2023  Hospital Length of Stay: 3 days  Discharge Date and Time:  09/14/2023 10:44 AM  Attending Physician: Sudhir Staton MD   Discharging Provider: Sudhir Staton MD  Primary Care Provider: Josh Berman MD    Primary Care Team: Networked reference to record PCT     HPI:   87-year-old  female with history of hypertension and diabetes was transferred from [facility] on account of hypertensive emergency.  Patient states that she can not recollect why she was brought to the hospital, all she knows is that the ambulance was called by her relative to the ED and she was told she had COVID infection.  Per ED physician's note: 87-year-old female history of hypertension was brought by the EMS to the ED on account of elevated blood pressure reading and tachycardia.    Patient had stressful family situation earlier and had some chest pain associated with elevated blood pressure reading.  Collateral history obtained from family member.  Patient currently denies any chest pain, cough, shortness of breath, palpitation, fevers, headaches, blurring of vision or unilateral weakness of extremities.  She denies any dysuria, suprapubic pain or hematuria.  She does not smoke cigarettes drink alcohol or use any illicit medications.    On presentation to the ED, CBC unremarkable, potassium low at 3.2, COVID positive, lactate negative, troponin negative x2 a urinalysis positive for UTI.  CT brain showed no acute abnormality.  During this history taking , patient is AO x3 and currently denies any symptoms.  She is currently on nicardipine drip and blood pressures are currently in the 100s.      * No surgery found *      Hospital Course:   Patient admitted to ICU on cardene infusion for hypertensive emergency. The  infusion was discontinued within 1-2 hours. She was also treated with IV Rocephin for possible UTI. She had sinus congestion and myalgias. She tested positive for covid 9/11/23.  Family and patient report multiple falls at home. PT/OT consulted. Patient was discharged home with home health. Denies any chest pain, SOB or cough on discharge. Norvasc 10 mg added to antihypertensive regime.        Goals of Care Treatment Preferences:  Code Status: Full Code    Physical Exam  Constitutional:       Appearance: Normal appearance.   Eyes:      Conjunctiva/sclera: Conjunctivae normal.      Pupils: Pupils are equal, round, and reactive to light.   Cardiovascular:      Rate and Rhythm: Normal rate and regular rhythm.      Heart sounds: Normal heart sounds. No murmur heard.  Pulmonary:      Effort: No respiratory distress.      Breath sounds: No wheezing or rales.   Abdominal:      General: Abdomen is flat. There is no distension.      Palpations: Abdomen is soft.      Tenderness: There is no abdominal tenderness.   Musculoskeletal:         General: No swelling or deformity.   Skin:     General: Skin is warm.   Neurological:      General: No focal deficit present.      Mental Status: She is alert and oriented to person, place, and time.      Cranial Nerves: No cranial nerve deficit.      Motor: No weakness.         Consults:   Consults (From admission, onward)        Status Ordering Provider     IP consult to dietary  Once        Provider:  (Not yet assigned)    Completed BOB MORRISON          Service: Hospital Medicine    Final Active Diagnoses:    Diagnosis Date Noted POA    PRINCIPAL PROBLEM:  Hypertensive emergency [I16.1] 09/11/2023 Yes    Falls [W19.XXXA] 09/12/2023 Yes    COVID-19 virus infection [U07.1] 09/11/2023 Yes    UTI (urinary tract infection) [N39.0] 08/30/2023 Yes    Hypokalemia [E87.6] 06/15/2020 Yes    ACP (advance care planning) [Z71.89] 01/13/2016 Not Applicable    Type 2 diabetes mellitus, dx  4/2012 [E11.9] 04/30/2012 Yes      Problems Resolved During this Admission:     Hypertensive emergency  Resolved  Stable off nicardipine drip  Resumed patient's home blood pressure medication, better controlled but not at goal.  Added amlodipine 10 mg daily        Falls  PT/OT consulted, discharge home with home health      COVID-19 virus infection  Patient is currently asymptomatic.  Oxygenating well on room air.  Does not require treatment.      UTI (urinary tract infection)  Ruled out, stopped IV Rocephin     Hypokalemia  Replaced     Type 2 diabetes mellitus    Discharged Condition: good    Disposition: Home-Health Care Sv    Follow Up:   Follow-up Information     [facility] Follow up.    Specialty: Primary Care  Why: 9/19 1:30 P.M.  Contact information:  [address]  [facility] [address] [phone]  Additional information:  [address]           [facility] Follow up.    Specialties: Home Health Services, Home Therapy Services, Home Living Aide Services  Contact information:  [address] [id number] [phone]                     Patient Instructions:   No discharge procedures on file.    Significant Diagnostic Studies: Labs:   BMP:   Recent Labs   Lab 09/13/23  0440 09/14/23  0501   * 110   * 137   K 4.0 3.6    102   CO2 29 29   BUN 22 19   CREATININE 0.6 0.4*   CALCIUM 8.9 9.0   MG 2.1 1.8    and CBC   Recent Labs   Lab 09/13/23  0440 09/14/23  0501   WBC 7.77 7.70   HGB 11.2* 12.1   HCT 36.2* 38.6    266       Pending Diagnostic Studies:     None         Medications:  Reconciled Home Medications:      Medication List      START taking these medications    amLODIPine 10 MG tablet  Commonly known as: NORVASC  Take 1 tablet (10 mg total) by mouth once daily.  Start taking on: September 15, 2023        CHANGE how you take these medications    atorvastatin 80 MG tablet  Commonly known as:  LIPITOR  TAKE 1 TABLET DAILY  What changed:   · how much to take  · how to take this  · when to take this     metoprolol succinate 25 MG 24 hr tablet  Commonly known as: TOPROL-XL  Take 1 tablet (25 mg total) by mouth once daily.  What changed: when to take this     pantoprazole 40 MG tablet  Commonly known as: PROTONIX  Take 1 tablet (40 mg total) by mouth once daily.  What changed: when to take this        CONTINUE taking these medications    ascorbic acid (vitamin C) 500 MG tablet  Commonly known as: VITAMIN C  Take 500 mg by mouth once daily.     aspirin 81 MG EC tablet  Commonly known as: ECOTRIN  Take 81 mg by mouth once daily.     clopidogreL 75 mg tablet  Commonly known as: PLAVIX  Take 1 tablet (75 mg total) by mouth once daily.     ferrous sulfate 325 mg (65 mg iron) Tab tablet  Commonly known as: FEOSOL  Take 325 mg by mouth daily with breakfast.     latanoprost 0.005 % ophthalmic solution  Place 1 drop into both eyes every evening.     metFORMIN 500 MG ER 24hr tablet  Commonly known as: GLUCOPHAGE-XR  TAKE 1 TABLET EVERY EVENING     PRESERVISION AREDS 2 ORAL  Take 1 capsule by mouth 2 (two) times daily.            Indwelling Lines/Drains at time of discharge:   Lines/Drains/Airways     Drain  Duration           Female External Urinary Catheter 09/12/23 0330 2 days                Time spent on the discharge of patient: 40 minutes         Sudhir Staton MD  Department of Hospital Medicine  [facility]

## 2023-09-14 NOTE — PLAN OF CARE
Problem: Adult Inpatient Plan of Care  Goal: Plan of Care Review  Outcome: Adequate for Care Transition  Goal: Patient-Specific Goal (Individualized)  Outcome: Adequate for Care Transition  Goal: Absence of Hospital-Acquired Illness or Injury  Outcome: Adequate for Care Transition  Goal: Optimal Comfort and Wellbeing  Outcome: Adequate for Care Transition  Goal: Readiness for Transition of Care  Outcome: Adequate for Care Transition     Problem: Diabetes Comorbidity  Goal: Blood Glucose Level Within Targeted Range  Outcome: Adequate for Care Transition     Problem: Fall Injury Risk  Goal: Absence of Fall and Fall-Related Injury  Outcome: Adequate for Care Transition     Problem: Skin Injury Risk Increased  Goal: Skin Health and Integrity  Outcome: Adequate for Care Transition     Problem: Infection  Goal: Absence of Infection Signs and Symptoms  Outcome: Adequate for Care Transition

## 2023-09-14 NOTE — RESPIRATORY THERAPY
09/13/23 2050   Patient Assessment/Suction   Level of Consciousness (AVPU) alert   Respiratory Effort Normal;Unlabored   Expansion/Accessory Muscles/Retractions no retractions;no use of accessory muscles   All Lung Fields Breath Sounds Anterior:;Posterior:;Lateral:;equal bilaterally;clear   Rhythm/Pattern, Respiratory depth regular;pattern regular;unlabored   PRE-TX-O2   Device (Oxygen Therapy) room air   SpO2 97 %   Pulse Oximetry Type Intermittent   $ Pulse Oximetry - Multiple Charge Pulse Oximetry - Multiple   Pulse 82   Resp 20   Aerosol Therapy   $ Aerosol Therapy Charges PRN treatment not required   Daily Review of Necessity (SVN) completed   Respiratory Treatment Status (SVN) PRN treatment not required   Respiratory Evaluation   $ Care Plan Tech Time 15 min   $ Eval/Re-eval Charges Re-evaluation

## 2023-09-14 NOTE — PLAN OF CARE
DC orders and chart reviewed. No discharge needs noted.  Patient cleared for discharge from .  Patient is discharging home and resuming care with [facility]. DC orders and AVS sent to [facility] via [employer] and Monika notified.  Per Monika, start of care date will be tomorrow, 9/15. Hospital follow up appointment is scheduled and added to AVS.        09/14/23 1103   Final Note   Assessment Type Final Discharge Note   Anticipated Discharge Disposition Home-Health   What phone number can be called within the next 1-3 days to see how you are doing after discharge? [phone]   Hospital Resources/Appts/Education Provided Appointments scheduled and added to AVS;Post-Acute resouces added to AVS   Post-Acute Status   Post-Acute Authorization Home Health   Home Health Status Set-up Complete/Auth obtained   Patient choice form signed by patient/caregiver List with quality metrics by geographic area provided   Discharge Delays None known at this time

## 2023-09-14 NOTE — PLAN OF CARE
Problem: Physical Therapy  Goal: Physical Therapy Goal  Description: Goals to be met by: 2023     Patient will increase functional independence with mobility by performin. Supine to sit with Contact Guard Assistance  2. Sit to stand transfer with Contact Guard Assistance  3. Bed to chair transfer with Contact Guard Assistance using Rolling Walker  4. Gait  x 150 feet with Contact Guard Assistance using Rolling Walker.   5. Lower extremity exercise program x20 reps   Outcome: Ongoing, Progressing

## 2023-09-14 NOTE — PT/OT/SLP PROGRESS
Occupational Therapy   Treatment    Name: Genny Watson  MRN: [id number]  Admitting Diagnosis:  Hypertensive emergency       Recommendations:     Discharge Recommendations: home health OT  Discharge Equipment Recommendations:  none  Barriers to discharge:  None    Assessment:     Genny Watson is a 87 y.o. female with a medical diagnosis of Hypertensive emergency.  She presents with improving medical acuity and functional mobility. Patient participated in bed mobility, LB dressing sitting EOB, toilet transfer, toilet hygiene and ambulation using rolling walker. Performance deficits affecting function are weakness, impaired endurance, impaired self care skills, impaired functional mobility, gait instability, impaired balance, decreased safety awareness, impaired cardiopulmonary response to activity.     Rehab Prognosis:  Fair; patient would benefit from acute skilled OT services to address these deficits and reach maximum level of function.       Plan:     Patient to be seen 5 x/week to address the above listed problems via self-care/home management, therapeutic activities, therapeutic exercises  Plan of Care Expires: 10/13/23  Plan of Care Reviewed with: patient    Subjective     Chief Complaint: General weakness  Patient/Family Comments/goals: improved functional mobility and ADL independence.  Pain/Comfort:  Pain Rating 1: 0/10  Pain Rating Post-Intervention 1: 0/10    Objective:     Communicated with: nurse prior to session.  Patient found HOB elevated with telemetry, peripheral IV upon OT entry to room.    General Precautions: Standard, airborne, contact, droplet, fall    Orthopedic Precautions:N/A  Braces: N/A  Respiratory Status: Room air     Occupational Performance:     Bed Mobility:    Patient completed Scooting/Bridging with contact guard assistance  Patient completed Supine to Sit with contact guard assistance  Patient completed Sit to Supine with contact guard assistance     Functional  Mobility/Transfers:  Patient completed Sit <> Stand Transfer with contact guard assistance  with  rolling walker   Patient completed Toilet Transfer Step Transfer technique with contact guard assistance with  rolling walker  Functional Mobility: ambulated 15 feet in the hospital room and bathroom with contact guard assistance using rolling walker.    Activities of Daily Living:  Lower Body Dressing: minimum assistance to don/doff socks sitting EOB  Toileting: minimum assistance to performing toilet hygiene standing from commode.      [facility] 6 Click ADL: 19    Treatment & Education:  Patient is making positive progress with all OT goals.    Patient left HOB elevated with all lines intact, call button in reach, and bed alarm on    GOALS:   Multidisciplinary Problems       Occupational Therapy Goals          Problem: Occupational Therapy    Goal Priority Disciplines Outcome Interventions   Occupational Therapy Goal     OT, PT/OT Ongoing, Progressing    Description: Goals to be met by: 10/13/2023     Patient will increase functional independence with ADLs by performing:    UE Dressing with Supervision.  LE Dressing with Supervision.  Grooming while standing at sink with Supervision.  Toileting from toilet with Supervision for hygiene and clothing management.   Toilet transfer to toilet with Supervision.                         Time Tracking:     OT Date of Treatment: 09/14/23  OT Start Time: 0944  OT Stop Time: 1007  OT Total Time (min): 23 min    Billable Minutes:Self Care/Home Management 13  Therapeutic Activity 10    OT/CECELIA: OT          9/14/2023

## 2023-09-15 ENCOUNTER — PATIENT OUTREACH (OUTPATIENT)
Dept: ADMINISTRATIVE | Facility: CLINIC | Age: 88
End: 2023-09-15
Payer: MEDICARE

## 2023-09-15 DIAGNOSIS — Z96.641 STATUS POST TOTAL HIP REPLACEMENT, RIGHT: Primary | ICD-10-CM

## 2023-09-15 NOTE — PROGRESS NOTES
C3 nurse spoke with Genny Watson for a TCC post hospital discharge follow up call. The patient has a scheduled HOSFU appointment with [facility] on 09/19/2023 @ 1334.

## 2023-09-16 LAB
BACTERIA BLD CULT: NORMAL
BACTERIA BLD CULT: NORMAL

## 2023-09-18 ENCOUNTER — PATIENT MESSAGE (OUTPATIENT)
Dept: PRIMARY CARE CLINIC | Facility: CLINIC | Age: 88
End: 2023-09-18
Payer: MEDICARE

## 2023-09-19 ENCOUNTER — OFFICE VISIT (OUTPATIENT)
Dept: PRIMARY CARE CLINIC | Facility: CLINIC | Age: 88
End: 2023-09-19
Payer: MEDICARE

## 2023-09-19 ENCOUNTER — OUTPATIENT CASE MANAGEMENT (OUTPATIENT)
Dept: ADMINISTRATIVE | Facility: OTHER | Age: 88
End: 2023-09-19
Payer: MEDICARE

## 2023-09-19 ENCOUNTER — EXTERNAL HOME HEALTH (OUTPATIENT)
Dept: HOME HEALTH SERVICES | Facility: HOSPITAL | Age: 88
End: 2023-09-19
Payer: MEDICARE

## 2023-09-19 VITALS
SYSTOLIC BLOOD PRESSURE: 142 MMHG | HEIGHT: 63 IN | OXYGEN SATURATION: 98 % | RESPIRATION RATE: 17 BRPM | WEIGHT: 114.19 LBS | HEART RATE: 94 BPM | BODY MASS INDEX: 20.23 KG/M2 | TEMPERATURE: 98 F | DIASTOLIC BLOOD PRESSURE: 86 MMHG

## 2023-09-19 DIAGNOSIS — I16.1 HYPERTENSIVE EMERGENCY: Primary | ICD-10-CM

## 2023-09-19 DIAGNOSIS — F41.9 ANXIETY: ICD-10-CM

## 2023-09-19 DIAGNOSIS — R35.0 URINARY FREQUENCY: ICD-10-CM

## 2023-09-19 PROCEDURE — 99215 OFFICE O/P EST HI 40 MIN: CPT | Mod: PBBFAC,PN | Performed by: NURSE PRACTITIONER

## 2023-09-19 PROCEDURE — 99214 OFFICE O/P EST MOD 30 MIN: CPT | Mod: S$PBB,,, | Performed by: NURSE PRACTITIONER

## 2023-09-19 PROCEDURE — 99214 PR OFFICE/OUTPT VISIT, EST, LEVL IV, 30-39 MIN: ICD-10-PCS | Mod: S$PBB,,, | Performed by: NURSE PRACTITIONER

## 2023-09-19 PROCEDURE — 99999 PR PBB SHADOW E&M-EST. PATIENT-LVL V: CPT | Mod: PBBFAC,,, | Performed by: NURSE PRACTITIONER

## 2023-09-19 PROCEDURE — 99999 PR PBB SHADOW E&M-EST. PATIENT-LVL V: ICD-10-PCS | Mod: PBBFAC,,, | Performed by: NURSE PRACTITIONER

## 2023-09-19 RX ORDER — HYDROXYZINE PAMOATE 25 MG/1
25 CAPSULE ORAL EVERY 8 HOURS PRN
Qty: 30 CAPSULE | Refills: 1 | Status: SHIPPED | OUTPATIENT
Start: 2023-09-19 | End: 2023-11-14 | Stop reason: SDUPTHER

## 2023-09-19 RX ORDER — PAROXETINE 10 MG/1
10 TABLET, FILM COATED ORAL EVERY MORNING
Qty: 30 TABLET | Refills: 11 | Status: SHIPPED | OUTPATIENT
Start: 2023-09-19 | End: 2024-09-18

## 2023-09-19 NOTE — PATIENT INSTRUCTIONS
-begin taking Paxil 10mg daily. You may increase your dose to 20mg daily in 2-3 weeks if you aren't noticing a difference. Maximum daily dose is 40mg    -get a clean catch urine sample and bring it to the lab. If it is positive for infection, we will send in a prescription for antibiotic    -continue participating in physical therapy and home health sessions    -hydroxyzine every 8 hours if needed for acute anxiety attacks. Only take if you need it. It may cause some drowsiness, so please be careful    -follow up with PCP/specialists as scheduled

## 2023-09-19 NOTE — PROGRESS NOTES
Transitional Care Note  Subjective:       Patient ID: Genny Watson is a 88 y.o. female.  Chief Complaint: Hospital Follow Up, Anxiety, Insomnia, and Otalgia (Right ear pain pt states its gurgling)    Family and/or Caretaker present at visit?  Yes.  Diagnostic tests reviewed/disposition: I have reviewed all completed as well as pending diagnostic tests at the time of discharge.  Disease/illness education: htn, anxiety  Home health/community services discussion/referrals: Patient has home health established at home .   Establishment or re-establishment of referral orders for community resources: No other necessary community resources.   Discussion with other health care providers: No discussion with other health care providers necessary.   Genny Watson is an 88 year old female with a past medical history of HTN, DM, hiatal hernia, HLD, CAD and UTI, who was recently admitted to [facility] ICU for hypertensive urgency. She was initially placed on a Cardene drip and eventually weaned off. She had a negative cardiac and neuro work up during that time. A urine was collected and showed a possible UTI, and she was placed on Rocephin for two days then stopped. However, no urine culture was performed. She reportedly tested positive for Covid on 9/11/23, and has been relatively asymptomatic. She reported multiple falls at home, and she was evaluated by PT/OT, and set up with home health and outpatient PT. Her blood pressure mediations were resumed and Norvasc was added to her regimen.     Today she reports feeling ok, and denies pain. She states she has been compliant with all of her daily medications. She does get sleepy with her medications, so she started taking her metoprolol at night.   She continues to have urinary frequency, and her family is concerned that she may have a UTI after all.  She has been having several weeks to months of anxiety attacks. She hasn't been sleeping well, and will have episodes where  "she starts hyperventilating, asking to go to the hospital or accusing her family of not helping her. She has been restless and gets worked up easily. Family states that this is been worse since she had hip surgery over the summer, about 4 months ago, but did exist prior.     MAIRA-7 (General Anxiety Disorder-7) from [employer]  on 9/19/2023  ** All calculations should be rechecked by clinician prior to use **    RESULT SUMMARY:  13 points  Moderate anxiety disorder.    Functionally, the patient is "somewhat" having difficulty with life tasks due to their symptoms.      INPUTS:  Feeling nervous, anxious, or on edge -> 2 = More than half the days  Not being able to stop or control worrying -> 2 = More than half the days  Worrying too much about different things -> 2 = More than half the days  Trouble relaxing -> 2 = More than half the days  Being so restless that it's hard to sit still -> 2 = More than half the days  Becoming easily annoyed or irritable -> 1 = Several days  Feeling afraid as if something awful might happen -> 2 = More than half the days  Ask the patient: how difficult have these problems made it to do work, take care of things at home, or get along with other people? -> 1 = Somewhat difficult      She has been able to get her medications filled and has appointments with her providers. She does not have other outpatient needs.      Review of Systems   Constitutional:  Negative for chills and fever.   HENT:  Negative for congestion and sore throat.    Eyes:  Negative for visual disturbance.   Respiratory:  Negative for cough and shortness of breath.    Cardiovascular:  Negative for chest pain and palpitations.   Gastrointestinal:  Negative for abdominal pain, nausea and vomiting.   Endocrine: Negative for cold intolerance and heat intolerance.   Genitourinary:  Positive for frequency. Negative for dysuria and hematuria.   Musculoskeletal:  Negative for arthralgias and myalgias.   Skin:  Negative for " pallor and rash.   Neurological:  Negative for tremors and seizures.   Hematological:  Negative for adenopathy. Does not bruise/bleed easily.   Psychiatric/Behavioral:  Positive for sleep disturbance. Negative for hallucinations. The patient is nervous/anxious.    All other systems reviewed and are negative.      Objective:      Physical Exam  Vitals and nursing note reviewed.   Constitutional:       General: She is awake. She is not in acute distress.     Appearance: Normal appearance. She is well-developed. She is not ill-appearing (elderly appearing).   HENT:      Head: Normocephalic and atraumatic.      Mouth/Throat:      Lips: Pink.      Mouth: Mucous membranes are moist.   Eyes:      Conjunctiva/sclera: Conjunctivae normal.      Pupils: Pupils are equal, round, and reactive to light.   Neck:      Thyroid: No thyromegaly.      Vascular: No JVD.   Cardiovascular:      Rate and Rhythm: Normal rate and regular rhythm.      Heart sounds: Normal heart sounds, S1 normal and S2 normal. No murmur heard.     No friction rub. No gallop.   Pulmonary:      Effort: Pulmonary effort is normal.      Breath sounds: Normal breath sounds.   Abdominal:      General: Bowel sounds are normal. There is no distension.      Palpations: Abdomen is soft. There is no mass.      Tenderness: There is no abdominal tenderness.   Musculoskeletal:         General: Normal range of motion.      Cervical back: Full passive range of motion without pain, normal range of motion and neck supple.   Skin:     General: Skin is warm and dry.      Capillary Refill: Capillary refill takes less than 2 seconds.   Neurological:      General: No focal deficit present.      Mental Status: She is alert and oriented to person, place, and time. Mental status is at baseline.      GCS: GCS eye subscore is 4. GCS verbal subscore is 5. GCS motor subscore is 6.      Cranial Nerves: No cranial nerve deficit.      Sensory: Sensation is intact.      Motor: Motor function  is intact.   Psychiatric:         Attention and Perception: Attention normal.         Mood and Affect: Mood and affect normal.         Speech: Speech normal.         Behavior: Behavior normal. Behavior is cooperative.       Assessment:       1. Hypertensive emergency    2. Urinary frequency    3. Anxiety        Plan:       Genny was seen today for hospital follow up, anxiety, insomnia and otalgia.    Diagnoses and all orders for this visit:    Hypertensive emergency   -resolved   -continue to take medications as prescribed    Urinary frequency  -     POCT Urinalysis, Dipstick, Automated, W/O Scope  -     Urinalysis, Reflex to Urine Culture Urine, Clean Catch  -drop off specimen to lab, if positive for infection will call in abx    Anxiety  -     paroxetine (PAXIL) 10 MG tablet; Take 1 tablet (10 mg total) by mouth every morning.  -     hydrOXYzine pamoate (VISTARIL) 25 MG Cap; Take 1 capsule (25 mg total) by mouth every 8 (eight) hours as needed (anxiety).  -discussed how to initiate paxil and how to titrate. Discussed anxiolytics, including xanax, but due to frailty and propensity for falling, did not feel it was safe.  Did discuss using hydroxyzine and staying safe on it, and using it only for acute issues.

## 2023-09-19 NOTE — LETTER
September 19, 2023             Dear Genny,    Welcome to [facility] Complex Care Management Program.  It was a pleasure talking with you today.  My name is Chrissy Sheldon, and I look forward to being your Care Manager.  My goal is to help you function at the healthiest and highest level possible.  You can contact me directly at [phone] .    As an [facility] patient, some of the services we may be able to provide include:     Development of an individualized care plan with a Registered Nurse   Connection with a   Connection with available resources and services    Coordinate communication among your care team members   Provide coaching and education   Help you understand your doctors treatment plan  Help you obtain information about your insurance coverage.     All services provided by [facility] Complex Care Managers and other care team members are coordinated with and communicated to your primary care team.      As part of your enrollment, you will be receiving education materials and more information about these services in your [facility] account, by phone or through the mail.  If you do not wish to participate or receive information, please contact our office at [phone].      Sincerely,        Chrissy Sheldon RN  [facility]   Out-patient RN Complex Care Manager

## 2023-09-19 NOTE — PROGRESS NOTES
Outpatient Care Management  Initial Patient Assessment    Patient: Genny Watson  MRN: [id number]  Date of Service: 09/19/2023  Completed by: Chrissy Sheldon RN  Referral Date: 09/04/2023  Program: High Risk  Status: Ongoing  Effective Dates: 9/19/2023 - present  Responsible Staff: Chrissy Sheldon RN        Reason for Visit   Patient presents with    OPCM Enrollment Call    Nursing Assessment       Brief Summary:  Genny Watson was referred by [facility] for hypertensive emergency. Patient qualifies for program based on high risk score of 83.4%.   Active problem list, medical, surgical and social history reviewed.  Areas of need identified by patient include fall prevention, and help at home.   Referred to  for assistance.  Med rec done. No needs identified  Lives with daughter Radha   Has a BP cuff   Does have PT/OT set up with HH   Uses a walker  Sent BP log sheet    Will continue to follow for education and management.     Disability Status  Is the patient alert and oriented (person, place, time, and situation)?: -- (Knows days, person, time, not oriented to year)  Hearing Difficulty or Deaf: no  Visual Difficulty or Blind: no  Visual and Hearing Needs Conclusion: -- (Caregiver declines patient using glasses and having hearing issues)  Difficulty Concentrating, Remembering or Making Decisions: no  Communication Difficulty: no  Eating/Swallowing Difficulty: no  Walking or Climbing Stairs Difficulty: yes  Walking or Climbing Stairs: ambulation difficulty, requires equipment; stair climbing difficulty, requires equipment  Dressing/Bathing Difficulty: no  Dressing/Bathing: bathing difficulty, requires equipment; bathing difficulty, assistance 1 person  Toileting : Independent  Continence : Continence - Not a problem (does use depends)  Difficulty Managing Errands Independently: yes  Equipment Currently Used at Home: blood pressure machine; glucometer; walker, rolling  ADL Conclusion  Statement: -- (The patient does need some assitance with ADL's)  Change in Functional Status Since Onset of Current Illness/Injury: no        Spiritual Beliefs  Spiritual, Cultural Beliefs, Yarsani Practices, Values that Affect Care: no      Social History     Socioeconomic History    Marital status:    Tobacco Use    Smoking status: Never    Smokeless tobacco: Never   Substance and Sexual Activity    Alcohol use: No    Drug use: No    Sexual activity: Never     Social Determinants of Health     Financial Resource Strain: Low Risk  (6/17/2023)    Overall Financial Resource Strain (CARDIA)     Difficulty of Paying Living Expenses: Not hard at all   Food Insecurity: No Food Insecurity (6/17/2023)    Hunger Vital Sign     Worried About Running Out of Food in the Last Year: Never true     Ran Out of Food in the Last Year: Never true   Transportation Needs: No Transportation Needs (6/17/2023)    PRAPARE - Transportation     Lack of Transportation (Medical): No     Lack of Transportation (Non-Medical): No   Physical Activity: Inactive (6/17/2023)    Exercise Vital Sign     Days of Exercise per Week: 0 days     Minutes of Exercise per Session: 0 min   Stress: Stress Concern Present (6/17/2023)    [employer] - Occupational Stress Questionnaire     Feeling of Stress : To some extent   Social Connections: Moderately Isolated (6/17/2023)    Social Connection and Isolation Panel [NHANES]     Frequency of Communication with Friends and Family: More than three times a week     Frequency of Social Gatherings with Friends and Family: More than three times a week     Attends Yarsani Services: More than 4 times per year     Active Member of Clubs or Organizations: No     Attends Club or Organization Meetings: Never     Marital Status:    Housing Stability: Low Risk  (6/17/2023)    Housing Stability Vital Sign     Unable to Pay for Housing in the Last Year: No     Number of Places Lived  in the Last Year: 1     Unstable Housing in the Last Year: No       Roles and Relationships  Primary Source of Support/Comfort: child(katie)  Name of Support/Comfort Primary Source: -- (Radha)  Secondary Source of Support/Comfort: child(katie)  Name of Support/Comfort Secondary Source: Lisa      Advance Directives (For Healthcare)  Advance Directive  (If Adv Dir status is received, view document under Adv Dir in header or Chart Review Media tab): Patient does not have Advance Directive, declines information.        Patient Reported Insurance  Verified current insurance plan:: Medicare            9/19/2023    10:08 AM 1/6/2023    10:57 AM 7/6/2022     3:35 PM 1/22/2019    10:37 AM 7/23/2018     3:35 PM 1/22/2018    11:04 AM 1/23/2017    11:00 AM   Depression Patient Health Questionnaire   Over the last two weeks how often have you been bothered by little interest or pleasure in doing things Not at all Not at all Not at all Not at all Not at all Not at all Not at all   Over the last two weeks how often have you been bothered by feeling down, depressed or hopeless Not at all Not at all Not at all Not at all Not at all Not at all Not at all   PHQ-2 Total Score 0 0 0 0 0 0 0       Learning Assessment       09/19/2023 [facility] (9/19/2023 - Present)   Created by Chrissy Sheldon, RN -  (Nurse) Status: Complete                 PRIMARY LEARNER     Primary Learner Name:  Radha RS - 09/19/2023 1022    Relationship:  Family, Co-learner RS - 09/19/2023 1022    Does the primary learner have any barriers to learning?:  No Barriers  - 09/19/2023 1022    What is the preferred language of the primary learner?:  English RS - 09/19/2023 1022    Is an  required?:  No  - 09/19/2023 1022    How does the primary learner prefer to learn new concepts?:  Listening, Reading, Demonstration RS - 09/19/2023 1022    How often do you need to have someone help you read instructions, pamphlets, or written  material from your doctor or pharmacy?:  Never RS - 09/19/2023 1022        CO-LEARNER #1     No question answered        CO-LEARNER #2     No question answered        SPECIAL TOPICS     No question answered        ANSWERED BY:     No question answered        Chrissy Frias, RN -  (Nurse)   09/19/2023 1022

## 2023-09-20 ENCOUNTER — APPOINTMENT (OUTPATIENT)
Dept: LAB | Facility: HOSPITAL | Age: 88
End: 2023-09-20
Attending: NURSE PRACTITIONER
Payer: MEDICARE

## 2023-09-20 ENCOUNTER — PATIENT MESSAGE (OUTPATIENT)
Dept: ADMINISTRATIVE | Facility: OTHER | Age: 88
End: 2023-09-20
Payer: MEDICARE

## 2023-09-20 DIAGNOSIS — R35.0 URINARY FREQUENCY: Primary | ICD-10-CM

## 2023-09-20 LAB
BACTERIA #/AREA URNS HPF: ABNORMAL /HPF
BILIRUB UR QL STRIP: NEGATIVE
CLARITY UR: ABNORMAL
COLOR UR: YELLOW
GLUCOSE UR QL STRIP: NEGATIVE
HGB UR QL STRIP: NEGATIVE
KETONES UR QL STRIP: NEGATIVE
LEUKOCYTE ESTERASE UR QL STRIP: ABNORMAL
MICROSCOPIC COMMENT: ABNORMAL
NITRITE UR QL STRIP: NEGATIVE
NON-SQ EPI CELLS #/AREA URNS HPF: 2 /HPF
PH UR STRIP: 6 [PH] (ref 5–8)
PROT UR QL STRIP: ABNORMAL
RBC #/AREA URNS HPF: 2 /HPF (ref 0–4)
SP GR UR STRIP: 1.01 (ref 1–1.03)
SQUAMOUS #/AREA URNS HPF: 30 /HPF
URN SPEC COLLECT METH UR: ABNORMAL
UROBILINOGEN UR STRIP-ACNC: NEGATIVE EU/DL
WBC #/AREA URNS HPF: 6 /HPF (ref 0–5)
WBC CLUMPS URNS QL MICRO: ABNORMAL

## 2023-09-20 PROCEDURE — 81000 URINALYSIS NONAUTO W/SCOPE: CPT | Performed by: NURSE PRACTITIONER

## 2023-09-21 ENCOUNTER — DOCUMENT SCAN (OUTPATIENT)
Dept: HOME HEALTH SERVICES | Facility: HOSPITAL | Age: 88
End: 2023-09-21
Payer: MEDICARE

## 2023-09-22 ENCOUNTER — DOCUMENT SCAN (OUTPATIENT)
Dept: HOME HEALTH SERVICES | Facility: HOSPITAL | Age: 88
End: 2023-09-22
Payer: MEDICARE

## 2023-09-25 ENCOUNTER — OUTPATIENT CASE MANAGEMENT (OUTPATIENT)
Dept: ADMINISTRATIVE | Facility: OTHER | Age: 88
End: 2023-09-25
Payer: MEDICARE

## 2023-09-25 NOTE — PROGRESS NOTES
Outpatient Care Management   - High Risk Patient Assessment    Patient: Genny Watson  MRN:  [id number]  Date of Service:  9/25/2023  Completed by:  Celine To LCSW  Referral Date: 09/04/2023    Reason for Visit   Patient presents with    Social Work Assessment - High Risk     9/25/2023    Brief Summary:  received a referral from OPCM RN Chrissy Sheldon for the following patient identified psycho-social needs, help at home. Care plan was created in collaboration with patient/caregiver input.  completed the SDOH questionnaire. SW will follow up in one week or PRN.

## 2023-09-25 NOTE — LETTER
September 26, 2023             Dear Ms. Watson,    Welcome to [facility] Complex [facility] Management [facility].  It was a pleasure talking with you today.  My name is Celine To, and I look forward to being your Care Manager.  My goal is to help you function at the healthiest and highest level possible.  You can contact me directly at [phone].    As an [facility] patient, some of the services we may be able to provide include:     Development of an individualized care plan with a Registered Nurse   Connection with a   Connection with available resources and services    Coordinate communication among your care team members   Provide coaching and education   Help you understand your doctors treatment plan  Help you obtain information about your insurance coverage.     All services provided by [facility] Complex Care Managers and other care team members are coordinated with and communicated to your primary care team.      As part of your enrollment, you will be receiving education materials and more information about these services in your [facility] account, by phone or through the mail.  If you do not wish to participate or receive information, please contact our office at [phone].      Sincerely,        Celine To, FORTINO  [facility]   Out-patient  Complex Care Manager

## 2023-10-02 ENCOUNTER — OUTPATIENT CASE MANAGEMENT (OUTPATIENT)
Dept: ADMINISTRATIVE | Facility: OTHER | Age: 88
End: 2023-10-02
Payer: MEDICARE

## 2023-10-02 NOTE — LETTER
Genny Watson  [address] [place] [id number]      Dear Genny Watson,     I am writing from the Outpatient Care Management Department at [facility]. I have been unsuccessful at reaching you since we spoke on 9/25/2023.  I hope you found the assistance previously provided to you helpful.     Please contact me at [phone] from 8:00AM to 4:30 PM on Monday thru Friday.     As you know, [facility] also has a program with a nurse available 24/7 to answer questions or provide medical advice.  [facility] can be reached at [phone].    Sincerely,       Celine To LCSW  Outpatient Care Management

## 2023-10-04 ENCOUNTER — OUTPATIENT CASE MANAGEMENT (OUTPATIENT)
Dept: ADMINISTRATIVE | Facility: OTHER | Age: 88
End: 2023-10-04
Payer: MEDICARE

## 2023-10-04 NOTE — PROGRESS NOTES
Outpatient Care Management  Plan of Care Follow Up Visit    Patient: Genny Watson  MRN: [id number]  Date of Service: 10/04/2023  Completed by: Chrissy Sheldon RN  Referral Date: 09/04/2023    Reason for Visit   Patient presents with    OPCM RN Follow Up Call       Brief Summary: Phone contact made with Radha and Ms Watson today. We discussed her care plan. No new needs identified at this time. Will continue to follow.

## 2023-10-12 NOTE — PROGRESS NOTES
Outpatient Care Management   - Care Plan Follow Up    Patient: Genny Watson  MRN:  [id number]  Date of Service:  10/12/2023  Completed by:  Celine To LCSW  Referral Date: 09/04/2023    Reason for Visit   Patient presents with    Other     10/2/2023  1st attempt to complete Follow-Up  for Outpatient Care Management, left message.  Will mail unable to assess letter.        OPCM SW Follow Up Call    Case Closure     10/12/2023  Brief Summary: MENDEZ telephoned pt for follow up and spoke with the caregiver. Caregiver is in agreement with case closure at this time as there are no additional needs.    Complex Care Plan     Care plan was discussed and completed today with input from patient and/or caregiver.    Patient Instructions     No follow-ups on file.

## 2023-10-19 ENCOUNTER — OUTPATIENT CASE MANAGEMENT (OUTPATIENT)
Dept: ADMINISTRATIVE | Facility: OTHER | Age: 88
End: 2023-10-19
Payer: MEDICARE

## 2023-10-19 NOTE — PROGRESS NOTES
10/19/23 Phone contact made with Ms. Watson  today and we finished discussing HTN care plan. She denies needing any more resources or education on HTN. No other issues identified at this time, contact information provided to Ms. Watson if she has any needs in the future. I will discharge Ms. Watson from \[facility]\"" at this time due to program graduation.  agreesMs. Watson with this plan.

## 2023-11-01 PROCEDURE — G0179 MD RECERTIFICATION HHA PT: HCPCS | Mod: ,,, | Performed by: FAMILY MEDICINE

## 2023-11-01 PROCEDURE — G0179 PR HOME HEALTH MD RECERTIFICATION: ICD-10-PCS | Mod: ,,, | Performed by: FAMILY MEDICINE

## 2023-11-03 ENCOUNTER — TELEPHONE (OUTPATIENT)
Dept: CARDIOLOGY | Facility: CLINIC | Age: 88
End: 2023-11-03
Payer: MEDICARE

## 2023-11-03 NOTE — TELEPHONE ENCOUNTER
----- Message from Pebbles Jackman sent at 11/3/2023  2:41 PM CDT -----  Contact: pt  Type: Needs Medical Advice  Who Called: pt  Best Call Back Number: [phone]    Additional Information: Pt is calling the office trying to see if she still still be taking a med if so pt needs a refill.please call back and advise.

## 2023-11-14 ENCOUNTER — EXTERNAL HOME HEALTH (OUTPATIENT)
Dept: HOME HEALTH SERVICES | Facility: HOSPITAL | Age: 88
End: 2023-11-14
Payer: MEDICARE

## 2023-11-14 DIAGNOSIS — F41.9 ANXIETY: ICD-10-CM

## 2023-11-14 RX ORDER — HYDROXYZINE PAMOATE 25 MG/1
25 CAPSULE ORAL EVERY 8 HOURS PRN
Qty: 270 CAPSULE | Refills: 1 | Status: SHIPPED | OUTPATIENT
Start: 2023-11-14

## 2023-11-14 NOTE — TELEPHONE ENCOUNTER
----- Message from Treva Brooks sent at 11/14/2023 10:10 AM CST -----  Contact: Lisa [phone]  Type: Needs Medical Advice  Who Called:  Pts daughter Lisa Juan Call Back Number:[phone]    Additional Information: Pts daughter is calling to ask if pt needs to continue taking hydrOXYzine pamoate (VISTARIL) 25 MG Cap. If so she will needs a refill. Pls call back and advise

## 2023-11-14 NOTE — TELEPHONE ENCOUNTER
Spoke to Lisa- Ms. Gomes, NP in discharge clinic put patient on Vistaril for anxiety- daughter says it has helped patient. Marin Gil

## 2023-11-27 ENCOUNTER — HOSPITAL ENCOUNTER (EMERGENCY)
Facility: HOSPITAL | Age: 88
Discharge: LEFT BEFORE DISCHARGE | End: 2023-11-27
Payer: MEDICARE

## 2023-11-27 ENCOUNTER — TELEPHONE (OUTPATIENT)
Dept: FAMILY MEDICINE | Facility: CLINIC | Age: 88
End: 2023-11-27
Payer: MEDICARE

## 2023-11-27 VITALS
HEART RATE: 85 BPM | OXYGEN SATURATION: 100 % | RESPIRATION RATE: 18 BRPM | SYSTOLIC BLOOD PRESSURE: 137 MMHG | WEIGHT: 114 LBS | BODY MASS INDEX: 20.2 KG/M2 | TEMPERATURE: 98 F | HEIGHT: 63 IN | DIASTOLIC BLOOD PRESSURE: 72 MMHG

## 2023-11-27 DIAGNOSIS — M25.552 LEFT HIP PAIN: ICD-10-CM

## 2023-11-27 PROCEDURE — 99281 EMR DPT VST MAYX REQ PHY/QHP: CPT | Mod: 25

## 2023-11-27 NOTE — FIRST PROVIDER EVALUATION
Emergency Department TeleTriage Encounter Note      CHIEF COMPLAINT    Chief Complaint   Patient presents with    Fall     Last night     Hip Injury     Bruise to rt. Hip / hit head on wall  / no loc / bruise to rt. Elbow        VITAL SIGNS   Initial Vitals [11/27/23 1046]   BP Pulse Resp Temp SpO2   137/72 85 18 98 °F (36.7 °C) 100 %      MAP       --            ALLERGIES    Review of patient's allergies indicates:   Allergen Reactions    Sulfa (sulfonamide antibiotics)      Other reaction(s): Itching       PROVIDER TRIAGE NOTE  Patient had a trip and fall last night. Complains of pain to the low  back pain and right hip. Hit head on anticoagulants. No LOC, headache or dizziness.       ORDERS  Labs Reviewed - No data to display    ED Orders (720h ago, onward)      None              Virtual Visit Note: The provider triage portion of this emergency department evaluation and documentation was performed via "[employer] a HIPAA-compliant telemedicine application, in concert with a tele-presenter in the room. A face to face patient evaluation with one of my colleagues will occur once the patient is placed in an emergency department room.      DISCLAIMER: This note was prepared with [employer] voice recognition transcription software. Garbled syntax, mangled pronouns, and other bizarre constructions may be attributed to that software system.

## 2023-11-27 NOTE — TELEPHONE ENCOUNTER
Patient went to ED last night   She had CT of Head and xray of hip and lumbar.   Was not given the results    ED was very full   She could not wait any longer.    Results given per radiologist impressions.   No change on Ct scan   No acute fractures of hip or spine.  Instructed to call back if anything changes        ----- Message from Gracie Galaviz sent at 11/27/2023  2:43 PM CST -----  Contact: Jazz Gonzalez  Type:  Needs Medical Advice    Who Called: Daughter Lisa  Symptoms (please be specific): pts daughter is requesting results from pts ct scan   Would the patient rather a call back or a response via [facility]? call  Best Call Back Number:     Additional Information: please advise and thank you.

## 2023-12-05 ENCOUNTER — HOSPITAL ENCOUNTER (OUTPATIENT)
Dept: RADIOLOGY | Facility: HOSPITAL | Age: 88
Discharge: HOME | End: 2023-12-05
Attending: INTERNAL MEDICINE
Payer: MEDICARE

## 2023-12-05 DIAGNOSIS — R59.0 LOCALIZED ENLARGED LYMPH NODES: ICD-10-CM

## 2023-12-05 PROCEDURE — 71250 CT THORAX DX C-: CPT | Mod: TC

## 2023-12-05 PROCEDURE — 71250 CT THORAX DX C-: CPT | Mod: 26,,, | Performed by: RADIOLOGY

## 2023-12-05 PROCEDURE — 71250 CT CHEST WITHOUT CONTRAST: ICD-10-PCS | Mod: 26,,, | Performed by: RADIOLOGY

## 2023-12-07 ENCOUNTER — DOCUMENT SCAN (OUTPATIENT)
Dept: HOME HEALTH SERVICES | Facility: HOSPITAL | Age: 88
End: 2023-12-07
Payer: MEDICARE

## 2023-12-08 ENCOUNTER — DOCUMENT SCAN (OUTPATIENT)
Dept: HOME HEALTH SERVICES | Facility: HOSPITAL | Age: 88
End: 2023-12-08
Payer: MEDICARE

## 2023-12-11 PROBLEM — N39.0 UTI (URINARY TRACT INFECTION): Status: RESOLVED | Noted: 2023-08-30 | Resolved: 2023-12-11

## 2023-12-29 ENCOUNTER — TELEPHONE (OUTPATIENT)
Dept: FAMILY MEDICINE | Facility: CLINIC | Age: 88
End: 2023-12-29
Payer: MEDICARE

## 2023-12-29 DIAGNOSIS — E11.69 HYPERLIPIDEMIA ASSOCIATED WITH TYPE 2 DIABETES MELLITUS: ICD-10-CM

## 2023-12-29 DIAGNOSIS — E78.5 HYPERLIPIDEMIA ASSOCIATED WITH TYPE 2 DIABETES MELLITUS: ICD-10-CM

## 2023-12-29 DIAGNOSIS — E11.59 TYPE 2 DIABETES MELLITUS WITH OTHER CIRCULATORY COMPLICATION, WITHOUT LONG-TERM CURRENT USE OF INSULIN: Primary | ICD-10-CM

## 2023-12-29 RX ORDER — ATORVASTATIN CALCIUM 80 MG/1
TABLET, FILM COATED ORAL
Qty: 90 TABLET | Refills: 3 | Status: SHIPPED | OUTPATIENT
Start: 2023-12-29

## 2023-12-29 NOTE — TELEPHONE ENCOUNTER
----- Message from Tiana Yi sent at 12/29/2023 11:34 AM CST -----  Regarding: Orders  Contact: pt daughter  Needs Medical Advice      Who Called: Lisa      Would the patient rather a call back or a response via [facility]? Call back     Best Call Back Number:  [phone]        Additional Information: Sts is needing orders put in her chart. Is needing to do her labs the same day as her daughter so they can go together.   Please Advise - Thank you

## 2023-12-29 NOTE — TELEPHONE ENCOUNTER
She will be due for a lipid panel early in January and an A1c at the end of February.  I do not know when her daughter is coming in for the lab, we can probably get those two together.  Order is in for lipid panel and A1c.  Everything else is up-to-date.

## 2024-01-04 DIAGNOSIS — E11.59 TYPE 2 DIABETES MELLITUS WITH OTHER CIRCULATORY COMPLICATION, WITHOUT LONG-TERM CURRENT USE OF INSULIN: ICD-10-CM

## 2024-01-04 RX ORDER — METFORMIN HYDROCHLORIDE 500 MG/1
TABLET, EXTENDED RELEASE ORAL
Qty: 90 TABLET | Refills: 1 | Status: SHIPPED | OUTPATIENT
Start: 2024-01-04

## 2024-01-04 NOTE — TELEPHONE ENCOUNTER
Refill Routing Note   Medication(s) are not appropriate for processing by [facility] for the following reason(s):        ED/Hospital Visit since last OV with provider    [facility] action(s):  Defer     Requires labs : Yes             Appointments  past 12m or future 3m with PCP    Date Provider   Last Visit   8/28/2023 Josh Berman MD   Next Visit   1/17/2024 Josh Berman MD   ED visits in past 90 days: 0        Note composed:4:06 PM 01/04/2024

## 2024-01-04 NOTE — TELEPHONE ENCOUNTER
Care Due:                  Date            Visit Type   Department     Provider  --------------------------------------------------------------------------------                                HOSPITAL     [facility] FAMILY  Last Visit: 08-      FOLLOW UP    PRACTICE       Josh CA -                              PRIMARY      [facility]  Next Visit: 01-      CARE ([facility])   PRACTICE       Josh Berman                                                            Last  Test          Frequency    Reason                     Performed    Due Date  --------------------------------------------------------------------------------    HBA1C.......  6 months...  metFORMIN................  08- 02-    [facility] Embedded Care Due Messages. Reference number: [id number].   1/04/2024 1:23:33 AM CST

## 2024-01-06 ENCOUNTER — DOCUMENT SCAN (OUTPATIENT)
Dept: HOME HEALTH SERVICES | Facility: HOSPITAL | Age: 89
End: 2024-01-06
Payer: MEDICARE

## 2024-01-11 ENCOUNTER — OFFICE VISIT (OUTPATIENT)
Dept: PULMONOLOGY | Facility: CLINIC | Age: 89
End: 2024-01-11
Payer: MEDICARE

## 2024-01-11 VITALS
WEIGHT: 117.5 LBS | BODY MASS INDEX: 20.82 KG/M2 | SYSTOLIC BLOOD PRESSURE: 119 MMHG | DIASTOLIC BLOOD PRESSURE: 66 MMHG | OXYGEN SATURATION: 98 % | HEART RATE: 84 BPM | HEIGHT: 63 IN

## 2024-01-11 DIAGNOSIS — R42 INTERMITTENT LIGHTHEADEDNESS: ICD-10-CM

## 2024-01-11 DIAGNOSIS — R59.0 LAD (LYMPHADENOPATHY), MEDIASTINAL: Primary | ICD-10-CM

## 2024-01-11 DIAGNOSIS — Z00.00 ENCOUNTER FOR MEDICARE ANNUAL WELLNESS EXAM: ICD-10-CM

## 2024-01-11 DIAGNOSIS — M19.90 ARTHRITIS: Primary | ICD-10-CM

## 2024-01-11 PROCEDURE — 99213 OFFICE O/P EST LOW 20 MIN: CPT | Mod: PBBFAC,PO | Performed by: INTERNAL MEDICINE

## 2024-01-11 PROCEDURE — 99214 OFFICE O/P EST MOD 30 MIN: CPT | Mod: S$PBB,,, | Performed by: INTERNAL MEDICINE

## 2024-01-11 PROCEDURE — 99999 PR PBB SHADOW E&M-EST. PATIENT-LVL III: CPT | Mod: PBBFAC,,, | Performed by: INTERNAL MEDICINE

## 2024-01-14 ENCOUNTER — HOSPITAL ENCOUNTER (EMERGENCY)
Facility: HOSPITAL | Age: 89
Discharge: HOME | End: 2024-01-14
Attending: EMERGENCY MEDICINE
Payer: MEDICARE

## 2024-01-14 VITALS
BODY MASS INDEX: 21.08 KG/M2 | DIASTOLIC BLOOD PRESSURE: 82 MMHG | SYSTOLIC BLOOD PRESSURE: 137 MMHG | RESPIRATION RATE: 18 BRPM | WEIGHT: 119 LBS | TEMPERATURE: 98 F | OXYGEN SATURATION: 99 % | HEART RATE: 92 BPM

## 2024-01-14 DIAGNOSIS — S09.90XA TRAUMATIC INJURY OF HEAD, INITIAL ENCOUNTER: Primary | ICD-10-CM
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
## 2024-01-14 LAB — POCT GLUCOSE: 107 MG/DL (ref 70–110)

## 2024-01-14 PROCEDURE — 99284 EMERGENCY DEPT VISIT MOD MDM: CPT | Mod: 25

## 2024-01-14 PROCEDURE — 82962 GLUCOSE BLOOD TEST: CPT

## 2024-01-14 NOTE — ED PROVIDER NOTES
Encounter Date: 1/14/2024       History     Chief Complaint   Patient presents with    Head Injury     Fell, striking left side of head on night stand. Denies LOC, takes PLAVIX. Contusion to left eye/left temporal area.      88-year-old female medical history of hypertension diabetes presents today after fall with head trauma.  Patient reports she was getting out of bed to use the restroom and she fell hit the left side of her head on nightstand.  Denies any LOC. she denies any bleeding.  Bruising to her left periorbital region.  Denies any vision changes.  Denies any nausea vomiting. Denies any neck pain chest pain or shortness of breath.  Denies any anticoagulation but does take Plavix.  Patient states she walks with a walker and is at her baseline for that.  Denies any other pain or injuries.    The history is provided by the patient. No  was used.     Review of patient's allergies indicates:   Allergen Reactions    Sulfa (sulfonamide antibiotics)      Other reaction(s): Itching     Past Medical History:   Diagnosis Date    ALLERGIC RHINITIS 4/30/2012    Arthritis     Gimenez's esophagus     Breast cancer     right, s/p right mastectomy>20yr ago    Cataract     Diabetes mellitus type II     Diverticulitis     Diverticulosis     Fuchs' corneal dystrophy     Glaucoma     Hernia, hiatal     Hyperlipidemia     Hypertension     Macular degeneration     Pulmonary embolism     Seizures     Skin cancer of face 12/2014    Dr M Weil     UTI (lower urinary tract infection)      Past Surgical History:   Procedure Laterality Date    angeogram      APPENDECTOMY      BREAST SURGERY      cardio stent   1/2015    Dr Dodson    COLONOSCOPY  around 2003    COLONOSCOPY N/A 1/13/2016    Procedure: COLONOSCOPY;  Surgeon: Mario Bhakta MD;  Location: [facility];  Service: Endoscopy;  Laterality: N/A; repeat in 5 years for surveillance    CORNEAL TRANSPLANT      CYSTOSCOPY N/A 8/30/2023    Procedure: CYSTOSCOPY;   Surgeon: Mirna Harden MD;  Location: [facility] OR;  Service: Urology;  Laterality: N/A;    EYE SURGERY      HIP ARTHROPLASTY Right 6/16/2023    Procedure: ARTHROPLASTY, HIP;  Surgeon: Josh Douglas II, MD;  Location: [facility] OR;  Service: Orthopedics;  Laterality: Right;    HYSTERECTOMY      ovaries removed    MASTECTOMY      right >20 years ago    STOMACH SURGERY      UPPER GASTROINTESTINAL ENDOSCOPY  01/13/2016    Dr. Moya     Family History   Problem Relation Age of Onset    Mental illness Mother     Liver cancer Mother     Early death Father     Heart disease Father     Diabetes Daughter     Early death Paternal Uncle     Heart disease Paternal Uncle     Celiac disease Sister     No Known Problems Son     Breast cancer Neg Hx     Ovarian cancer Neg Hx     Colon cancer Neg Hx     Colon polyps Neg Hx     Esophageal cancer Neg Hx     Stomach cancer Neg Hx     Ulcerative colitis Neg Hx      Social History     Tobacco Use    Smoking status: Never    Smokeless tobacco: Never   Substance Use Topics    Alcohol use: No    Drug use: No     Review of Systems    Physical Exam     Initial Vitals [01/14/24 0447]   BP Pulse Resp Temp SpO2   (!) 177/98 82 18 98.1 °F (36.7 °C) 100 %      MAP       --         Physical Exam    Nursing note and vitals reviewed.  Constitutional: She appears well-developed. She is not diaphoretic. No distress.   HENT:   Head: Normocephalic and atraumatic.   Right Ear: External ear normal.   Left Ear: External ear normal.   Nose: Nose normal.   No Narvaez signs raccoon eyes or hemotympanum.  No midface instability.   Eyes: EOM are normal. Pupils are equal, round, and reactive to light. No scleral icterus.   Mild left periorbital bruising.  No significant swelling.   Neck: Neck supple.   Normal range of motion.  Cardiovascular:  Normal rate, regular rhythm, normal heart sounds and intact distal pulses.     Exam reveals no gallop and no friction rub.       No murmur heard.  Pulmonary/Chest:  Breath sounds normal. No stridor. No respiratory distress. She has no wheezes. She has no rhonchi. She has no rales.   Abdominal: Abdomen is soft. Bowel sounds are normal. She exhibits no distension. There is no abdominal tenderness. There is no rebound and no guarding.   Musculoskeletal:         General: Normal range of motion.      Cervical back: Normal range of motion and neck supple.      Comments: No CT or L-spine tenderness.  No step-offs no deformities.     Neurological: She is alert and oriented to person, place, and time. She has normal strength. No cranial nerve deficit or sensory deficit.   Cranial nerves II through XII grossly intact. Finger to nose normal. Tone normal. Sens intact to light touch. No drift. No disdiadochokinesia. Strength 5/5 bilaterally upper and lower.  Gait deferred.  Speech and cognition is normal.  No focal neurologic deficit.     Skin: Skin is warm and dry. Capillary refill takes less than 2 seconds. No rash noted.   Psychiatric: She has a normal mood and affect.         ED Course   Procedures  Labs Reviewed   POCT GLUCOSE   POCT GLUCOSE MONITORING CONTINUOUS          Imaging Results              CT Maxillofacial Without Contrast (In process)                      CT Head Without Contrast (In process)                      Medications - No data to display  Medical Decision Making  88-year-old female presenting after a fall that occurred just prior to arrival.  The mechanism of injury was a mechanical ground level fall without syncope or near-syncope. The current level of pain is mild.  Offered Tylenol but patient declined.  The patient DOES NOT take blood thinner medications.  There is NOT a laceration associated with the injury.  There was no loss of consciousness, confusion, seizure, or memory impairment.  Denies neck pain.  Denies vomiting, numbness/weakness, fever    Workup:  Imaging: CT Brain and max face    Given history, exam, and workup, low suspicion for ICH, skull fx, spine  fx or other acute spinal syndrome, PTX, pulmonary contusion, cardiac contusion, aortic/vertebral dissection, significant hemorrhage, extremity fracture.    Disposition: Discharge home with strict return precautions and instructions for prompt primary care follow up.        Amount and/or Complexity of Data Reviewed  Radiology: ordered. Decision-making details documented in ED Course.               ED Course as of 01/14/24 0631   Sun Jan 14, 2024   0605 CT Head Without Contrast  IMPRESSION: 1. No acute intracranial hemorrhage. 2. Left frontotemporal scalp hematoma. Dictated and Authenticated by: Royal Alexis MD 01/14/2024 6:00 AM Central Time ([place] & [place])   [BD]   0606 CT Maxillofacial Without Contrast  IMPRESSION: No acute facial bone fracture. Dictated and Authenticated by: Royal Alexis MD 01/14/2024 6:04 AM Central Time ([place] & [place])   [BD]      ED Course User Index  [BD] Cipriano Castaneda MD                           Clinical Impression:  Final diagnoses:  [S09.90XA] Traumatic injury of head, initial encounter (Primary)  [W19.XXXA] Fall, initial encounter          ED Disposition Condition    Discharge Stable          ED Prescriptions    None       Follow-up Information       Follow up With Specialties Details Why Contact Info Additional Information    Josh Berman MD Family Medicine Go in 2 days  [address] [facility] [facility] [address]  [facility] [address]  [phone]       [facility] Emergency Medicine Go to  As needed, If symptoms worsen [address] [place] [address] [address] [place] [address] 1st floor             Cipriano Castaneda MD  01/14/24 0631

## 2024-01-14 NOTE — ED NOTES
Report received, assumed care.  Patient AAOx4, skin warm/dry, respirations even/unlabored.  NAD.  Patient verbalizes readiness for discharge.

## 2024-01-14 NOTE — ED NOTES
Patient rolled out of the bed and hit her head on her side drawer trying to go to he bathroom. Patient is AxOx4 at this time and on Plavix.

## 2024-01-15 ENCOUNTER — HOSPITAL ENCOUNTER (EMERGENCY)
Facility: HOSPITAL | Age: 89
Discharge: HOME | End: 2024-01-15
Attending: EMERGENCY MEDICINE
Payer: MEDICARE

## 2024-01-15 ENCOUNTER — NURSE TRIAGE (OUTPATIENT)
Dept: ADMINISTRATIVE | Facility: CLINIC | Age: 89
End: 2024-01-15
Payer: MEDICARE

## 2024-01-15 VITALS
SYSTOLIC BLOOD PRESSURE: 115 MMHG | WEIGHT: 119 LBS | TEMPERATURE: 99 F | RESPIRATION RATE: 18 BRPM | HEART RATE: 91 BPM | DIASTOLIC BLOOD PRESSURE: 61 MMHG | HEIGHT: 63 IN | OXYGEN SATURATION: 100 % | BODY MASS INDEX: 21.09 KG/M2

## 2024-01-15 DIAGNOSIS — E87.6 HYPOKALEMIA: ICD-10-CM

## 2024-01-15 DIAGNOSIS — N30.01 ACUTE CYSTITIS WITH HEMATURIA: Primary | ICD-10-CM

## 2024-01-15 LAB
ALBUMIN SERPL BCP-MCNC: 3.7 G/DL (ref 3.5–5.2)
ALP SERPL-CCNC: 95 U/L (ref 55–135)
ALT SERPL W/O P-5'-P-CCNC: 23 U/L (ref 10–44)
AMORPH CRY URNS QL MICRO: ABNORMAL
ANION GAP SERPL CALC-SCNC: 14 MMOL/L (ref 8–16)
AST SERPL-CCNC: 28 U/L (ref 10–40)
BACTERIA #/AREA URNS HPF: ABNORMAL /HPF
BASOPHILS # BLD AUTO: 0.04 K/UL (ref 0–0.2)
BASOPHILS NFR BLD: 0.4 % (ref 0–1.9)
BILIRUB SERPL-MCNC: 0.5 MG/DL (ref 0.1–1)
BILIRUB UR QL STRIP: NEGATIVE
BUN SERPL-MCNC: 15 MG/DL (ref 8–23)
CALCIUM SERPL-MCNC: 9 MG/DL (ref 8.7–10.5)
CHLORIDE SERPL-SCNC: 97 MMOL/L (ref 95–110)
CLARITY UR: ABNORMAL
CO2 SERPL-SCNC: 23 MMOL/L (ref 23–29)
COLOR UR: ABNORMAL
CREAT SERPL-MCNC: 0.8 MG/DL (ref 0.5–1.4)
DIFFERENTIAL METHOD BLD: NORMAL
EOSINOPHIL # BLD AUTO: 0.1 K/UL (ref 0–0.5)
EOSINOPHIL NFR BLD: 0.5 % (ref 0–8)
ERYTHROCYTE [DISTWIDTH] IN BLOOD BY AUTOMATED COUNT: 13.1 % (ref 11.5–14.5)
EST. GFR  (NO RACE VARIABLE): >60 ML/MIN/1.73 M^2
GLUCOSE SERPL-MCNC: 166 MG/DL (ref 70–110)
GLUCOSE UR QL STRIP: NEGATIVE
HCT VFR BLD AUTO: 39.3 % (ref 37–48.5)
HGB BLD-MCNC: 13.3 G/DL (ref 12–16)
HGB UR QL STRIP: ABNORMAL
HYALINE CASTS #/AREA URNS LPF: 10 /LPF
IMM GRANULOCYTES # BLD AUTO: 0.03 K/UL (ref 0–0.04)
IMM GRANULOCYTES NFR BLD AUTO: 0.3 % (ref 0–0.5)
KETONES UR QL STRIP: NEGATIVE
LEUKOCYTE ESTERASE UR QL STRIP: ABNORMAL
LYMPHOCYTES # BLD AUTO: 2.7 K/UL (ref 1–4.8)
LYMPHOCYTES NFR BLD: 24.9 % (ref 18–48)
MCH RBC QN AUTO: 29.6 PG (ref 27–31)
MCHC RBC AUTO-ENTMCNC: 33.8 G/DL (ref 32–36)
MCV RBC AUTO: 87 FL (ref 82–98)
MICROSCOPIC COMMENT: ABNORMAL
MONOCYTES # BLD AUTO: 1 K/UL (ref 0.3–1)
MONOCYTES NFR BLD: 9.2 % (ref 4–15)
NEUTROPHILS # BLD AUTO: 7.1 K/UL (ref 1.8–7.7)
NEUTROPHILS NFR BLD: 64.7 % (ref 38–73)
NITRITE UR QL STRIP: NEGATIVE
NRBC BLD-RTO: 0 /100 WBC
PH UR STRIP: 6 [PH] (ref 5–8)
PLATELET # BLD AUTO: 265 K/UL (ref 150–450)
PMV BLD AUTO: 9.7 FL (ref 9.2–12.9)
POTASSIUM SERPL-SCNC: 3 MMOL/L (ref 3.5–5.1)
PROT SERPL-MCNC: 7.1 G/DL (ref 6–8.4)
PROT UR QL STRIP: ABNORMAL
RBC # BLD AUTO: 4.5 M/UL (ref 4–5.4)
RBC #/AREA URNS HPF: >100 /HPF (ref 0–4)
SODIUM SERPL-SCNC: 134 MMOL/L (ref 136–145)
SP GR UR STRIP: 1.01 (ref 1–1.03)
SQUAMOUS #/AREA URNS HPF: 1 /HPF
URN SPEC COLLECT METH UR: ABNORMAL
UROBILINOGEN UR STRIP-ACNC: NEGATIVE EU/DL
WBC # BLD AUTO: 10.88 K/UL (ref 3.9–12.7)
WBC #/AREA URNS HPF: >100 /HPF (ref 0–5)

## 2024-01-15 PROCEDURE — 25000003 PHARM REV CODE 250: Performed by: PHYSICIAN ASSISTANT

## 2024-01-15 PROCEDURE — 94760 N-INVAS EAR/PLS OXIMETRY 1: CPT

## 2024-01-15 PROCEDURE — 80053 COMPREHEN METABOLIC PANEL: CPT | Performed by: NURSE PRACTITIONER

## 2024-01-15 PROCEDURE — 87086 URINE CULTURE/COLONY COUNT: CPT | Performed by: PHYSICIAN ASSISTANT

## 2024-01-15 PROCEDURE — 96367 TX/PROPH/DG ADDL SEQ IV INF: CPT

## 2024-01-15 PROCEDURE — 81000 URINALYSIS NONAUTO W/SCOPE: CPT | Performed by: PHYSICIAN ASSISTANT

## 2024-01-15 PROCEDURE — 96365 THER/PROPH/DIAG IV INF INIT: CPT

## 2024-01-15 PROCEDURE — 99285 EMERGENCY DEPT VISIT HI MDM: CPT | Mod: 25

## 2024-01-15 PROCEDURE — 63600175 PHARM REV CODE 636 W HCPCS: Performed by: PHYSICIAN ASSISTANT

## 2024-01-15 PROCEDURE — 36415 COLL VENOUS BLD VENIPUNCTURE: CPT | Performed by: NURSE PRACTITIONER

## 2024-01-15 PROCEDURE — 85025 COMPLETE CBC W/AUTO DIFF WBC: CPT | Performed by: NURSE PRACTITIONER

## 2024-01-15 RX ORDER — POTASSIUM CHLORIDE 7.45 MG/ML
10 INJECTION INTRAVENOUS
Status: COMPLETED | OUTPATIENT
Start: 2024-01-15 | End: 2024-01-15

## 2024-01-15 RX ORDER — CEFDINIR 300 MG/1
300 CAPSULE ORAL 2 TIMES DAILY
Qty: 14 CAPSULE | Refills: 0 | Status: SHIPPED | OUTPATIENT
Start: 2024-01-15 | End: 2024-01-22

## 2024-01-15 RX ORDER — POTASSIUM CHLORIDE 20 MEQ/1
20 TABLET, EXTENDED RELEASE ORAL DAILY
Qty: 5 TABLET | Refills: 0 | Status: SHIPPED | OUTPATIENT
Start: 2024-01-15 | End: 2024-01-20

## 2024-01-15 RX ADMIN — CEFTRIAXONE SODIUM 1 G: 1 INJECTION, POWDER, FOR SOLUTION INTRAMUSCULAR; INTRAVENOUS at 06:01

## 2024-01-15 RX ADMIN — POTASSIUM BICARBONATE 20 MEQ: 391 TABLET, EFFERVESCENT ORAL at 06:01

## 2024-01-15 RX ADMIN — POTASSIUM CHLORIDE 10 MEQ: 7.46 INJECTION, SOLUTION INTRAVENOUS at 07:01

## 2024-01-15 RX ADMIN — SODIUM CHLORIDE 500 ML: 9 INJECTION, SOLUTION INTRAVENOUS at 06:01

## 2024-01-15 NOTE — TELEPHONE ENCOUNTER
Pt's daughter calling in, pt fell out of bed yesterday and went to hospital. Came home and went to urinate and there was blood in urine. Daughter is not currently with pt but just left pt. Pt states she recently urinated in a cup and urine looked like coffee grounds and was almost black. Does not  remember hitting back or abdomen during call yesterday. Dispo is ED now and have another adult drive. Pt and daughter verbalized understanding and will go. Advised to call back with further concerns.  Reason for Disposition   Passing pure blood or large blood clots (i.e., size > a dime)  (Exception: Cely or small strands.)    Additional Information   Negative: Shock suspected (e.g., cold/pale/clammy skin, too weak to stand, low BP, rapid pulse)   Negative: Sounds like a life-threatening emergency to the triager   Negative: Recent back or abdominal injury   Negative: Recent genital injury   Negative: [1] Unable to urinate (or only a few drops) > 4 hours AND [2] bladder feels very full (e.g., palpable bladder or strong urge to urinate)   Negative: [1] Diffuse (all over) muscle pains in the shoulders, arms, legs, and back AND [2] dark (cola or tea-colored) or red-colored urine    Protocols used: Urine - Blood In-A-

## 2024-01-15 NOTE — FIRST PROVIDER EVALUATION
Emergency Department TeleTriage Encounter Note      CHIEF COMPLAINT    Chief Complaint   Patient presents with    Hematuria     Recent fall / seen in this ED Sunday       VITAL SIGNS   Initial Vitals [01/15/24 1456]   BP Pulse Resp Temp SpO2   (!) 110/55 91 18 98 °F (36.7 °C) 97 %      MAP       --            ALLERGIES    Review of patient's allergies indicates:   Allergen Reactions    Sulfa (sulfonamide antibiotics)      Other reaction(s): Itching       PROVIDER TRIAGE NOTE  88-year-old female presents to the ER with complaints of new onset hematuria since this morning.  She denies any flank pain no dysuria now noticeable blood in her stool.  She reports no history of renal stones in the past.  Her abdominal pain nausea vomiting diarrhea complaints or concerns.      AAOx3, respirations even and non- labored, stable vitals, normal coloration of skin, sitting upright in triage chair, appears in no acute distress.        ORDERS  Labs Reviewed - No data to display    ED Orders (720h ago, onward)      None              Virtual Visit Note: The provider triage portion of this emergency department evaluation and documentation was performed via [employer], a HIPAA-compliant telemedicine application, in concert with a tele-presenter in the room. A face to face patient evaluation with one of my colleagues will occur once the patient is placed in an emergency department room.      DISCLAIMER: This note was prepared with [employer] voice recognition transcription software. Garbled syntax, mangled pronouns, and other bizarre constructions may be attributed to that software system.

## 2024-01-15 NOTE — ED PROVIDER NOTES
Encounter Date: 1/15/2024       History     Chief Complaint   Patient presents with    Hematuria     Recent fall / seen in this ED Sunday     Genny Watson is a 88 y.o. female presenting for evaluation of darkly discolored urine since last night.  She thinks there may be blood in her urine.  She denies any dysuria or flank pain.  She fell yesterday, hitting her head.  She was evaluated here in the ED and had a negative CT scan of her head and face.  She doesn't think she hit her back or flank during the fall.  No abdominal pain, nausea, vomiting.  She has a past medical history of ALLERGIC RHINITIS (4/30/2012), Arthritis, Gimenez's esophagus, Breast cancer, Cataract, Diabetes mellitus type II, Diverticulitis, Diverticulosis, Fuchs' corneal dystrophy, Glaucoma, Hernia, hiatal, Hyperlipidemia, Hypertension, Macular degeneration, Pulmonary embolism, Seizures, Skin cancer of face (12/2014), and UTI (lower urinary tract infection).      The history is provided by the patient and a relative.     Review of patient's allergies indicates:   Allergen Reactions    Sulfa (sulfonamide antibiotics)      Other reaction(s): Itching     Past Medical History:   Diagnosis Date    ALLERGIC RHINITIS 4/30/2012    Arthritis     Gimenez's esophagus     Breast cancer     right, s/p right mastectomy>20yr ago    Cataract     Diabetes mellitus type II     Diverticulitis     Diverticulosis     Fuchs' corneal dystrophy     Glaucoma     Hernia, hiatal     Hyperlipidemia     Hypertension     Macular degeneration     Pulmonary embolism     Seizures     Skin cancer of face 12/2014    Dr M Weil     UTI (lower urinary tract infection)      Past Surgical History:   Procedure Laterality Date    angeogram      APPENDECTOMY      BREAST SURGERY      cardio stent   1/2015    Dr Dodson    COLONOSCOPY  around 2003    COLONOSCOPY N/A 1/13/2016    Procedure: COLONOSCOPY;  Surgeon: Mario Bhakta MD;  Location: [facility];  Service: Endoscopy;   Laterality: N/A; repeat in 5 years for surveillance    CORNEAL TRANSPLANT      CYSTOSCOPY N/A 8/30/2023    Procedure: CYSTOSCOPY;  Surgeon: Mirna Harden MD;  [facility]: [facility] OR;  Service: Urology;  Laterality: N/A;    EYE SURGERY      HIP ARTHROPLASTY Right 6/16/2023    Procedure: ARTHROPLASTY, HIP;  Surgeon: Josh Douglas II, MD;  Location: [facility] OR;  Service: Orthopedics;  Laterality: Right;    HYSTERECTOMY      ovaries removed    MASTECTOMY      right >20 years ago    STOMACH SURGERY      UPPER GASTROINTESTINAL ENDOSCOPY  01/13/2016    Dr. Moya     Family History   Problem Relation Age of Onset    Mental illness Mother     Liver cancer Mother     Early death Father     Heart disease Father     Diabetes Daughter     Early death Paternal Uncle     Heart disease Paternal Uncle     Celiac disease Sister     No Known Problems Son     Breast cancer Neg Hx     Ovarian cancer Neg Hx     Colon cancer Neg Hx     Colon polyps Neg Hx     Esophageal cancer Neg Hx     Stomach cancer Neg Hx     Ulcerative colitis Neg Hx      Social History     Tobacco Use    Smoking status: Never    Smokeless tobacco: Never   Substance Use Topics    Alcohol use: No    Drug use: No     Review of Systems   Constitutional:  Negative for chills and fever.   Respiratory:  Negative for cough, chest tightness, shortness of breath and wheezing.    Cardiovascular:  Negative for chest pain and palpitations.   Gastrointestinal:  Negative for abdominal pain, constipation, diarrhea, nausea and vomiting.   Genitourinary:  Positive for hematuria. Negative for dysuria.   Musculoskeletal:  Negative for arthralgias, back pain, joint swelling, myalgias, neck pain and neck stiffness.   Skin:  Negative for color change, pallor, rash and wound.   Neurological:  Negative for weakness and numbness.   Hematological:  Does not bruise/bleed easily.       Physical Exam     Initial Vitals [01/15/24 1456]   BP Pulse Resp Temp SpO2   (!) 110/55 91 18 98 °F  (36.7 °C) 97 %      MAP       --         Physical Exam    Nursing note and vitals reviewed.  Constitutional: She appears well-developed and well-nourished. She is not diaphoretic. No distress.   Elderly appearing female    HENT:   Head: Normocephalic.   Mouth/Throat: Oropharynx is clear and moist.   Resolving area of ecchymosis noted to left forehead, left eyebrow.     Eyes: Conjunctivae are normal.   Neck: Neck supple.   Normal range of motion.  Cardiovascular:  Normal rate, regular rhythm, normal heart sounds and intact distal pulses.     Exam reveals no gallop and no friction rub.       No murmur heard.  Pulmonary/Chest: Breath sounds normal. No respiratory distress. She has no wheezes. She has no rhonchi. She has no rales.   Abdominal: Abdomen is soft. She exhibits no distension and no mass. There is no abdominal tenderness.   No palpable abdominal tenderness noted.      Musculoskeletal:         General: No tenderness or edema. Normal range of motion.      Cervical back: Normal range of motion and neck supple.      Comments: No reproducible TTP, ecchymosis or deformity noted to left sided flank/back.       Neurological: She is alert and oriented to person, place, and time. She has normal strength. No sensory deficit.   Skin: Skin is warm and dry. No rash and no abscess noted. No erythema.   Psychiatric: She has a normal mood and affect.         ED Course   Procedures  Labs Reviewed   COMPREHENSIVE METABOLIC PANEL - Abnormal; Notable for the following components:       Result Value    Sodium 134 (*)     Potassium 3.0 (*)     Glucose 166 (*)     All other components within normal limits   URINALYSIS, REFLEX TO URINE CULTURE - Abnormal; Notable for the following components:    Color, UA Orange (*)     Appearance, UA Hazy (*)     Protein, UA 2+ (*)     Occult Blood UA 3+ (*)     Leukocytes, UA Trace (*)     All other components within normal limits    Narrative:     Specimen Source->Urine   URINALYSIS MICROSCOPIC -  Abnormal; Notable for the following components:    RBC, UA >100 (*)     WBC, UA >100 (*)     Bacteria Many (*)     Hyaline Casts, UA 10 (*)     All other components within normal limits    Narrative:     Specimen Source->Urine   CULTURE, URINE   CBC W/ AUTO DIFFERENTIAL          Imaging Results              CT Renal Stone Study ABD Pelvis WO (In process)                      Medications   sodium chloride 0.9% bolus 500 mL 500 mL (500 mLs Intravenous New Bag 1/15/24 1837)   cefTRIAXone (ROCEPHIN) 1 g in dextrose 5 % in water (D5W) 100 mL IVPB (MB+) (0 g Intravenous Stopped 1/15/24 1907)   potassium chloride 10 mEq in 100 mL IVPB (10 mEq Intravenous New Bag 1/15/24 1917)   potassium bicarbonate disintegrating tablet 20 mEq (20 mEq Oral Given 1/15/24 1836)     Medical Decision Making  Differential Diagnosis:   UTI   Renal Stone   Renal Mass   Dehydration     Pt emergently evaluated here in the ED.     UA consistent with infection and associated hematuria.  Labs stable without leukocytosis.  Normal renal function.  Some mild hypokalemia.  CT renal stone study shows no evidence for renal stone or underlying renal mass and shows findings consistent with cystitis.  Her vitals are stable.  She is afebrile.  She is given a dose of Rocephin in ED and we feel comfortable discharging her home with a prescription for Cefdinir.  She is also given some potassium supplement for the next few days.  She has an appointment with her PCP on Wednesday.  She voices understanding and is agreeable to the plan.  She is given specific return precautions.     Amount and/or Complexity of Data Reviewed  Labs: ordered. Decision-making details documented in ED Course.  Radiology: ordered. Decision-making details documented in ED Course.    Risk  OTC drugs.  Prescription drug management.                                      Clinical Impression:  Final diagnoses:  [N30.01] Acute cystitis with hematuria (Primary)  [E87.6] Hypokalemia          ED  Disposition Condition    Discharge Stable          ED Prescriptions       Medication Sig Dispense Start Date End Date Auth. Provider    cefdinir (OMNICEF) 300 MG capsule Take 1 capsule (300 mg total) by mouth 2 (two) times daily. for 7 days 14 capsule 1/15/2024 1/22/2024 Flakita Starkey PA-C    potassium chloride SA (K-DUR,KLOR-CON) 20 MEQ tablet Take 1 tablet (20 mEq total) by mouth once daily. for 5 days 5 tablet 1/15/2024 1/20/2024 Flakita Starkey PA-C          Follow-up Information       Follow up With Specialties Details Why Contact Info Additional Information    [facility] -  Emergency Medicine  As needed, If symptoms worsen [address] [place] [address] [address] [place] [address] 1st floor    Josh Berman MD Family Medicine  as scheduled [address] [facility] [address]  [facility] [address]  [phone]                Flakita Starkey PA-C  01/15/24 1955       Flakita Starkey PA-C  01/15/24 2016

## 2024-01-17 ENCOUNTER — OFFICE VISIT (OUTPATIENT)
Dept: FAMILY MEDICINE | Facility: CLINIC | Age: 89
End: 2024-01-17
Attending: FAMILY MEDICINE
Payer: MEDICARE

## 2024-01-17 ENCOUNTER — EXTERNAL HOME HEALTH (OUTPATIENT)
Dept: HOME HEALTH SERVICES | Facility: HOSPITAL | Age: 89
End: 2024-01-17
Payer: MEDICARE

## 2024-01-17 VITALS
OXYGEN SATURATION: 97 % | HEIGHT: 63 IN | HEART RATE: 79 BPM | SYSTOLIC BLOOD PRESSURE: 110 MMHG | TEMPERATURE: 98 F | DIASTOLIC BLOOD PRESSURE: 60 MMHG | WEIGHT: 116.88 LBS | BODY MASS INDEX: 20.71 KG/M2

## 2024-01-17 DIAGNOSIS — E78.5 HYPERLIPIDEMIA ASSOCIATED WITH TYPE 2 DIABETES MELLITUS: ICD-10-CM

## 2024-01-17 DIAGNOSIS — Z95.5 S/P DRUG ELUTING CORONARY STENT PLACEMENT: ICD-10-CM

## 2024-01-17 DIAGNOSIS — E11.59 TYPE 2 DIABETES MELLITUS WITH OTHER CIRCULATORY COMPLICATION, WITHOUT LONG-TERM CURRENT USE OF INSULIN: ICD-10-CM

## 2024-01-17 DIAGNOSIS — E11.59 HYPERTENSION ASSOCIATED WITH DIABETES: ICD-10-CM

## 2024-01-17 DIAGNOSIS — E11.69 HYPERLIPIDEMIA ASSOCIATED WITH TYPE 2 DIABETES MELLITUS: ICD-10-CM

## 2024-01-17 DIAGNOSIS — E87.6 HYPOKALEMIA: Primary | ICD-10-CM

## 2024-01-17 DIAGNOSIS — N30.01 ACUTE CYSTITIS WITH HEMATURIA: ICD-10-CM

## 2024-01-17 DIAGNOSIS — I25.10 CORONARY ARTERY DISEASE INVOLVING NATIVE CORONARY ARTERY OF NATIVE HEART WITHOUT ANGINA PECTORIS: ICD-10-CM

## 2024-01-17 DIAGNOSIS — I15.2 HYPERTENSION ASSOCIATED WITH DIABETES: ICD-10-CM

## 2024-01-17 PROCEDURE — 99214 OFFICE O/P EST MOD 30 MIN: CPT | Mod: S$PBB,,, | Performed by: FAMILY MEDICINE

## 2024-01-17 PROCEDURE — 99213 OFFICE O/P EST LOW 20 MIN: CPT | Mod: PBBFAC,PN | Performed by: FAMILY MEDICINE

## 2024-01-17 PROCEDURE — 99999 PR PBB SHADOW E&M-EST. PATIENT-LVL III: CPT | Mod: PBBFAC,,, | Performed by: FAMILY MEDICINE

## 2024-01-17 RX ORDER — AMLODIPINE BESYLATE 10 MG/1
10 TABLET ORAL DAILY
Qty: 90 TABLET | Refills: 3 | Status: SHIPPED | OUTPATIENT
Start: 2024-01-17

## 2024-01-17 NOTE — PROGRESS NOTES
Subjective:       Patient ID: Genny Watson is a 88 y.o. female.    Chief Complaint: Hypertension    88-year-old female comes in for a six-month follow-up on multiple medical problems.  She was in the emergency room several days ago when she noted some hematuria without dysuria.  She was told she had a urinary tract infection and was given Omnicef, she was also noted to have a low potassium and was given a potassiums supplement but so far her pharmacy has not been able to obtain it.  She has no complaints of nausea vomiting or diarrhea recently and she is not on a diuretic.  She has a history of coronary artery disease with a previous drug-eluting stent, varicose veins, type 2 diabetes without complications and without insulin use, hypertension, hyperlipidemia, previous pulmonary embolism, right breast cancer status post mastectomy and Barretts esophagus without dysplasia.  She is scheduled for a lipid panel and A1c tomorrow but she can not make that lab appointment due to another engagement and would like to rescheduled to Friday.  We will also add a BMP to recheck the potassium level.    Past Medical History:  4/30/2012: ALLERGIC RHINITIS  No date: Arthritis  No date: Gimenez's esophagus  No date: Breast cancer      Comment:  right, s/p right mastectomy>20yr ago  No date: Cataract  No date: Diabetes mellitus type II  No date: Diverticulitis  No date: Diverticulosis  No date: Fuchs' corneal dystrophy  No date: Glaucoma  No date: Hernia, hiatal  No date: Hyperlipidemia  No date: Hypertension  No date: Macular degeneration  No date: Pulmonary embolism  No date: Seizures  12/2014: Skin cancer of face      Comment:  Dr M Weil   No date: UTI (lower urinary tract infection)    Past Surgical History:  No date: angeogram  No date: APPENDECTOMY  No date: BREAST SURGERY  1/2015: cardio stent       Comment:  Dr Dodson  around 2003: COLONOSCOPY  1/13/2016: COLONOSCOPY; N/A      Comment:  Procedure: COLONOSCOPY;   Surgeon: Mario Bhakta MD;                Location: [facility];  Service: Endoscopy;  Laterality:                N/A; repeat in 5 years for surveillance  No date: CORNEAL TRANSPLANT  8/30/2023: CYSTOSCOPY; N/A      Comment:  Procedure: CYSTOSCOPY;  Surgeon: Mirna Harden MD;  Location: [facility];  Service: Urology;                 Laterality: N/A;  No date: EYE SURGERY  6/16/2023: HIP ARTHROPLASTY; Right      Comment:  Procedure: ARTHROPLASTY, HIP;  Surgeon: Josh Douglas II, MD;  Location: [facility] OR;  Service: Orthopedics;                 Laterality: Right;  No date: HYSTERECTOMY      Comment:  ovaries removed  No date: MASTECTOMY      Comment:  right >20 years ago  No date: STOMACH SURGERY  01/13/2016: UPPER GASTROINTESTINAL ENDOSCOPY      Comment:  Dr. Bhakta    Current Outpatient Medications on File Prior to Visit:  ascorbic acid, vitamin C, (VITAMIN C) 500 MG tablet, Take 500 mg by mouth once daily., Disp: , Rfl:   aspirin (ECOTRIN) 81 MG EC tablet, Take 81 mg by mouth once daily., Disp: , Rfl:   atorvastatin (LIPITOR) 80 MG tablet, TAKE 1 TABLET DAILY, Disp: 90 tablet, Rfl: 3  cefdinir (OMNICEF) 300 MG capsule, Take 1 capsule (300 mg total) by mouth 2 (two) times daily. for 7 days, Disp: 14 capsule, Rfl: 0  clopidogreL (PLAVIX) 75 mg tablet, Take 1 tablet (75 mg total) by mouth once daily., Disp: 90 tablet, Rfl: 3  ferrous sulfate (FEOSOL) 325 mg (65 mg iron) Tab tablet, Take 325 mg by mouth daily with breakfast., Disp: , Rfl:   hydrOXYzine pamoate (VISTARIL) 25 MG Cap, Take 1 capsule (25 mg total) by mouth every 8 (eight) hours as needed (anxiety)., Disp: 270 capsule, Rfl: 1  Lactobacillus rhamnosus GG (CULTURELLE) 10 billion cell capsule, Take 1 capsule by mouth once daily., Disp: , Rfl:   latanoprost 0.005 % ophthalmic solution, Place 1 drop into both eyes every evening., Disp: , Rfl:   metFORMIN (GLUCOPHAGE-XR) 500 MG ER 24hr tablet, TAKE 1 TABLET EVERY EVENING,  Disp: 90 tablet, Rfl: 1  metoprolol succinate (TOPROL-XL) 25 MG 24 hr tablet, Take 1 tablet (25 mg total) by mouth once daily. (Patient taking differently: Take 25 mg by mouth every evening.), Disp: 90 tablet, Rfl: 3  pantoprazole (PROTONIX) 40 MG tablet, Take 1 tablet (40 mg total) by mouth once daily. (Patient taking differently: Take 40 mg by mouth every evening.), Disp: 90 tablet, Rfl: 3  paroxetine (PAXIL) 10 MG tablet, Take 1 tablet (10 mg total) by mouth every morning., Disp: 30 tablet, Rfl: 11  polyethylene glycol 3350 (MIRALAX ORAL), Take by mouth. Evry other day, Disp: , Rfl:   VIT C/E/ZN/COPPR/LUTEIN/ZEAXAN (PRESERVISION AREDS 2 ORAL), Take 1 capsule by mouth 2 (two) times daily., Disp: , Rfl:   [DISCONTINUED] amLODIPine (NORVASC) 10 MG tablet, Take 1 tablet (10 mg total) by mouth once daily., Disp: 30 tablet, Rfl: 0  potassium chloride SA (K-DUR,KLOR-CON) 20 MEQ tablet, Take 1 tablet (20 mEq total) by mouth once daily. for 5 days (Patient not taking: Reported on 1/17/2024), Disp: 5 tablet, Rfl: 0    No current facility-administered medications on file prior to visit.          Review of Systems   Constitutional:  Negative for chills, diaphoresis and fever.   Eyes:  Negative for photophobia and visual disturbance.   Respiratory:  Negative for chest tightness and shortness of breath.    Cardiovascular:  Negative for chest pain and palpitations.   Gastrointestinal:  Negative for abdominal pain, blood in stool, diarrhea, nausea and vomiting.   Endocrine: Negative for polydipsia and polyuria.   Genitourinary:  Positive for hematuria (Now resolved). Negative for dysuria and frequency.       Objective:      Physical Exam  Vitals and nursing note reviewed.   Constitutional:       General: She is not in acute distress.     Appearance: Normal appearance. She is normal weight. She is not ill-appearing, toxic-appearing or diaphoretic.      Comments: Good blood pressure control  Normal pulse with regular rhythm    Normal weight with a BMI of 20.7 she is down 1.4 lb from her August 08/20/2023 visit   Cardiovascular:      Rate and Rhythm: Normal rate and regular rhythm.      Heart sounds: Normal heart sounds. No murmur heard.     No friction rub. No gallop.   Pulmonary:      Effort: Pulmonary effort is normal. No respiratory distress.      Breath sounds: Normal breath sounds. No stridor. No wheezing, rhonchi or rales.   Abdominal:      General: Abdomen is flat. Bowel sounds are normal. There is no distension.      Palpations: Abdomen is soft. There is no mass.      Tenderness: There is no abdominal tenderness. There is no right CVA tenderness, left CVA tenderness, guarding or rebound.      Hernia: No hernia is present.   Musculoskeletal:      Right lower leg: No edema.   Neurological:      General: No focal deficit present.      Mental Status: She is alert and oriented to person, place, and time. Mental status is at baseline.   Psychiatric:         Mood and Affect: Mood normal.         Behavior: Behavior normal.         Thought Content: Thought content normal.         Judgment: Judgment normal.         Assessment:       1. Hypokalemia    2. Hypertension associated with diabetes    3. Hyperlipidemia associated with type 2 diabetes mellitus    4. Type 2 diabetes mellitus with other circulatory complication, without long-term current use of insulin    5. Acute cystitis with hematuria    6. Coronary artery disease involving native coronary artery of native heart without angina pectoris    7. S/P drug eluting coronary stent placement LCX 1/26/15        Plan:       1. Hypokalemia  Reschedule her lipid panel and A1c to Friday and add a BMP to recheck potassium level.  No reason for potassium loss noted  - Basic Metabolic Panel; Future    2. Hypertension associated with diabetes  Good control with no changes needed in amlodipine or metoprolol  - amLODIPine (NORVASC) 10 MG tablet; Take 1 tablet (10 mg total) by mouth once daily.   Dispense: 90 tablet; Refill: 3    3. Hyperlipidemia associated with type 2 diabetes mellitus  Lipid panel pending to be done Friday    4. Type 2 diabetes mellitus with other circulatory complication, without long-term current use of insulin  A1c to be done Friday with a BMP    5. Acute cystitis with hematuria  Culture is negative with no growth to date but is not final    6. Coronary artery disease involving native coronary artery of native heart without angina pectoris  Asymptomatic    7. S/P drug eluting coronary stent placement LCX 1/26/15  Asymptomatic on Plavix and aspirin

## 2024-01-18 LAB — BACTERIA UR CULT: NO GROWTH

## 2024-01-19 ENCOUNTER — LAB VISIT (OUTPATIENT)
Dept: LAB | Facility: HOSPITAL | Age: 89
End: 2024-01-19
Attending: FAMILY MEDICINE
Payer: MEDICARE

## 2024-01-19 DIAGNOSIS — E87.6 HYPOKALEMIA: ICD-10-CM

## 2024-01-19 DIAGNOSIS — E78.5 HYPERLIPIDEMIA ASSOCIATED WITH TYPE 2 DIABETES MELLITUS: ICD-10-CM

## 2024-01-19 DIAGNOSIS — E11.69 HYPERLIPIDEMIA ASSOCIATED WITH TYPE 2 DIABETES MELLITUS: ICD-10-CM

## 2024-01-19 DIAGNOSIS — E11.59 TYPE 2 DIABETES MELLITUS WITH OTHER CIRCULATORY COMPLICATION, WITHOUT LONG-TERM CURRENT USE OF INSULIN: ICD-10-CM

## 2024-01-19 LAB
ANION GAP SERPL CALC-SCNC: 10 MMOL/L (ref 8–16)
BUN SERPL-MCNC: 15 MG/DL (ref 8–23)
CALCIUM SERPL-MCNC: 9.7 MG/DL (ref 8.7–10.5)
CHLORIDE SERPL-SCNC: 99 MMOL/L (ref 95–110)
CHOLEST SERPL-MCNC: 132 MG/DL (ref 120–199)
CHOLEST/HDLC SERPL: 2.5 {RATIO} (ref 2–5)
CO2 SERPL-SCNC: 27 MMOL/L (ref 23–29)
CREAT SERPL-MCNC: 0.8 MG/DL (ref 0.5–1.4)
EST. GFR  (NO RACE VARIABLE): >60 ML/MIN/1.73 M^2
ESTIMATED AVG GLUCOSE: 123 MG/DL (ref 68–131)
GLUCOSE SERPL-MCNC: 122 MG/DL (ref 70–110)
HBA1C MFR BLD: 5.9 % (ref 4–5.6)
HDLC SERPL-MCNC: 52 MG/DL (ref 40–75)
HDLC SERPL: 39.4 % (ref 20–50)
LDLC SERPL CALC-MCNC: 58.8 MG/DL (ref 63–159)
NONHDLC SERPL-MCNC: 80 MG/DL
POTASSIUM SERPL-SCNC: 4.4 MMOL/L (ref 3.5–5.1)
SODIUM SERPL-SCNC: 136 MMOL/L (ref 136–145)
TRIGL SERPL-MCNC: 106 MG/DL (ref 30–150)

## 2024-01-19 PROCEDURE — 36415 COLL VENOUS BLD VENIPUNCTURE: CPT | Mod: PO | Performed by: FAMILY MEDICINE

## 2024-01-19 PROCEDURE — 83036 HEMOGLOBIN GLYCOSYLATED A1C: CPT | Performed by: FAMILY MEDICINE

## 2024-01-19 PROCEDURE — 80048 BASIC METABOLIC PNL TOTAL CA: CPT | Performed by: FAMILY MEDICINE

## 2024-01-19 PROCEDURE — 80061 LIPID PANEL: CPT | Performed by: FAMILY MEDICINE

## 2024-01-23 ENCOUNTER — DOCUMENT SCAN (OUTPATIENT)
Dept: HOME HEALTH SERVICES | Facility: HOSPITAL | Age: 89
End: 2024-01-23
Payer: MEDICARE

## 2024-01-27 DIAGNOSIS — K22.70 BARRETT'S ESOPHAGUS WITHOUT DYSPLASIA: ICD-10-CM

## 2024-01-27 NOTE — TELEPHONE ENCOUNTER
No care due was identified.  [facility] Embedded Care Due Messages. Reference number: [id number].   1/27/2024 2:35:05 PM CST

## 2024-01-28 RX ORDER — PANTOPRAZOLE SODIUM 40 MG/1
40 TABLET, DELAYED RELEASE ORAL
Qty: 90 TABLET | Refills: 3 | Status: SHIPPED | OUTPATIENT
Start: 2024-01-28

## 2024-01-29 NOTE — TELEPHONE ENCOUNTER
Refill Decision Note   Genny Watson  is requesting a refill authorization.  Brief Assessment and Rationale for Refill:  Approve     Medication Therapy Plan:        Comments:     Note composed:7:03 PM 01/28/2024

## 2024-02-03 ENCOUNTER — DOCUMENT SCAN (OUTPATIENT)
Dept: HOME HEALTH SERVICES | Facility: HOSPITAL | Age: 89
End: 2024-02-03
Payer: MEDICARE

## 2024-02-08 ENCOUNTER — DOCUMENT SCAN (OUTPATIENT)
Dept: HOME HEALTH SERVICES | Facility: HOSPITAL | Age: 89
End: 2024-02-08
Payer: MEDICARE

## 2024-02-26 ENCOUNTER — HOSPITAL ENCOUNTER (EMERGENCY)
Facility: HOSPITAL | Age: 89
Discharge: HOME | End: 2024-02-26
Attending: EMERGENCY MEDICINE
Payer: MEDICARE

## 2024-02-26 ENCOUNTER — OFFICE VISIT (OUTPATIENT)
Dept: UROLOGY | Facility: CLINIC | Age: 89
End: 2024-02-26
Payer: MEDICARE

## 2024-02-26 VITALS
DIASTOLIC BLOOD PRESSURE: 80 MMHG | HEART RATE: 84 BPM | SYSTOLIC BLOOD PRESSURE: 148 MMHG | RESPIRATION RATE: 17 BRPM | TEMPERATURE: 98 F | OXYGEN SATURATION: 99 %

## 2024-02-26 VITALS — WEIGHT: 116 LBS | BODY MASS INDEX: 20.55 KG/M2 | HEIGHT: 63 IN
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
## 2024-02-26 DIAGNOSIS — N39.0 RECURRENT UTI: Primary | ICD-10-CM

## 2024-02-26 LAB — POC RESIDUAL URINE VOLUME: 65 ML (ref 0–100)

## 2024-02-26 PROCEDURE — 99214 OFFICE O/P EST MOD 30 MIN: CPT | Mod: PBBFAC,PO,25 | Performed by: NURSE PRACTITIONER

## 2024-02-26 PROCEDURE — 99999 PR PBB SHADOW E&M-EST. PATIENT-LVL IV: CPT | Mod: PBBFAC,,, | Performed by: NURSE PRACTITIONER

## 2024-02-26 PROCEDURE — 99214 OFFICE O/P EST MOD 30 MIN: CPT | Mod: S$PBB,,, | Performed by: NURSE PRACTITIONER

## 2024-02-26 PROCEDURE — G2211 COMPLEX E/M VISIT ADD ON: HCPCS | Mod: S$PBB,,, | Performed by: NURSE PRACTITIONER

## 2024-02-26 PROCEDURE — 99284 EMERGENCY DEPT VISIT MOD MDM: CPT | Mod: 25

## 2024-02-26 PROCEDURE — 51798 US URINE CAPACITY MEASURE: CPT | Mod: PBBFAC,PO | Performed by: NURSE PRACTITIONER

## 2024-02-26 PROCEDURE — 99999PBSHW POCT BLADDER SCAN: Mod: PBBFAC,,,

## 2024-02-26 RX ORDER — TRAVOPROST 0.04 MG/ML
1 SOLUTION/ DROPS OPHTHALMIC
COMMUNITY
Start: 2024-01-22

## 2024-02-26 RX ORDER — PREDNISOLONE ACETATE 10 MG/ML
SUSPENSION/ DROPS OPHTHALMIC
COMMUNITY
Start: 2024-01-22

## 2024-02-26 NOTE — PROGRESS NOTES
[facility]  Group: Antione/Lianet/Fina/Dixon  NPs: Janay Garcia/Randi Fitch    Note today written by:Randi Fitch  Date of Service: 02/26/2024      CHIEF COMPLAINT: F/u UTI.    PRESENTING ILLNESS:    Genny Watson is a 88 y.o. female who presents for f/u UTI. Last clinic visit was 8/22/23 with Dr Harden    2/26/24  Pt presents for f/u UTI  Denies UTI symptoms today. Denies dysuria, gross hematuria, flank pain, fever, chills, nausea or vomiting.   Pt has not been using estrace cream as prescribed  Taking probiotics  Drinks 6 glasses water per day  Constipation improved per pt  PVR 65 ml    8/30/23 s/p cysto    8/22/23  Patient recently had episodes of gross hematuria in setting of a UTI. Most recently in ED on 8/10. She has undergone a CT which showed only bladder wall thickening.   Culture 8/3/23: E coli      Currently has a yung in place x 2 weeks. No further hematuria.   Has not been using vaginal estrogen cream recently.   Having a BM every 2-4 days.  Not drinking enough water.      2/6/23  Has been using vaginal estrogen cream twice a week.   No UTIs since last visit.   No hematuria or dysuria.   Has a bowel movement every 2-3 days.      cc     8/3/22  Patient seen by NP in March and PVR was <300 cc. Was then seen again in June with inability to urinate and 350 cc drained at that time.   Both times has had a positive urine culture however were asymptomatic.  She has no bothersome urinary complaints.   Her renal function is normal.      Has been using vaginal estrogen cream.      Bladder scan today: 342 cc (unable to void)     11/29/21  Had E coli UTI in August. Unsure if she was having symptoms at that time.   Today her symptoms are frequency. No incontinence. No dysuria. No gross hematuria.   Having a daily bowel movement.   Drinks 4-5 glasses a day of water. Drinks 1 cup of coffee in the am.      Cr 6/29/21: 0.8  PVR  today 305 cc        5/27/21  Patient presents for hematuria. She states her urine looked orange, no blood in urine and no clots. She thinks maybe she had bleeding from her hemorrhoids.   She had a UA done on 5/17 which showed 6 RBC, >100 WBC, nitrite negative. Urine culture no growth.   She underwent CT Urogram which showed no hydro, no stones, no filling defects. Her bladder is fairly distended.      She has no significant bothersome urinary complaints.   No history of stones.  She does have UTIs and her symptoms include dysuria. Usually goes to urgent care for this.      She a bowel movement every 3 days.   Drinks 2 bottles of water a day, 1 cup of coffee.   She states she feels like she is emptying her bladder better at home, she has a shy bladder.      UA today: +blood and leuks, nitrite negative  PVR today: 390 cc (catheterized)      Urine cultures:   Lab Results   Component Value Date    LABURIN No growth 01/15/2024    LABURIN No growth 08/10/2023    LABURIN ESCHERICHIA COLI  >100,000 cfu/ml   (A) 08/03/2023    LABURIN No growth 06/16/2023    LABURIN No significant growth 05/02/2023    LABURIN ESCHERICHIA COLI  >100,000 cfu/ml   (A) 06/20/2022    LABURIN ESCHERICHIA COLI  >100,000 cfu/ml   (A) 03/18/2022    LABURIN ESCHERICHIA COLI  >100,000 cfu/ml   (A) 08/10/2021    LABURIN No significant growth 05/27/2021    LABURIN No significant growth 05/17/2021         REVIEW OF SYSTEMS: as stated above in hpi    PATIENT HISTORY:  Past Medical History:   Diagnosis Date    ALLERGIC RHINITIS 4/30/2012    Arthritis     Gimenez's esophagus     Breast cancer     right, s/p right mastectomy>20yr ago    Cataract     Diabetes mellitus type II     Diverticulitis     Diverticulosis     Fuchs' corneal dystrophy     Glaucoma     Hernia, hiatal     Hyperlipidemia     Hypertension     Macular degeneration     Pulmonary embolism     Seizures     Skin cancer of face 12/2014    Dr M Weil     UTI (lower urinary tract infection)         Past Surgical History:   Procedure Laterality Date    angeogram      APPENDECTOMY      BREAST SURGERY      cardio stent   1/2015    Dr Dodson    COLONOSCOPY  around 2003    COLONOSCOPY N/A 1/13/2016    Procedure: COLONOSCOPY;  Surgeon: Mario Bhakta MD;  Location: [facility];  Service: Endoscopy;  Laterality: N/A; repeat in 5 years for surveillance    CORNEAL TRANSPLANT      CYSTOSCOPY N/A 8/30/2023    Procedure: CYSTOSCOPY;  Surgeon: Mirna Harden MD;  Location: [facility];  Service: Urology;  Laterality: N/A;    EYE SURGERY      HIP ARTHROPLASTY Right 6/16/2023    Procedure: ARTHROPLASTY, HIP;  Surgeon: Josh Douglas II, MD;  Location: [facility] OR;  Service: Orthopedics;  Laterality: Right;    HYSTERECTOMY      ovaries removed    MASTECTOMY      right >20 years ago    STOMACH SURGERY      UPPER GASTROINTESTINAL ENDOSCOPY  01/13/2016    Dr. hBakta       Allergies:  Sulfa (sulfonamide antibiotics)      PHYSICAL EXAMINATION:    Constitutional: She is oriented to person, place, and time. She appears well-developed and well-nourished.  She is in no apparent distress.  Abdominal:  She exhibits no distension.  There is no CVA tenderness.   Neurological: She is alert and oriented to person, place, and time.   Psych: Cooperative with normal affect.    Physical Exam    LABS:      Lab Results   Component Value Date    CREATININE 0.8 01/19/2024     Lab Results   Component Value Date    HGBA1C 5.9 (H) 01/19/2024       IMPRESSION:    Encounter Diagnoses   Name Primary?    Recurrent UTI Yes       PLAN:  -Pt asymptomatic for UTI today  -PVR stable  -UTI precautions:  Avoid constipation.  Drink lots of water  Void every 2-3 hrs regardless of urge  Void soon after urge arises. Do not hold urine once urge occurs.  OVER THE COUNTER:  Utiva Cranberry supplement- need 36mg of proanthocyanidins (PAC) in supplement- helps to block bacteria from attaching to bladder wall.  Probiotic with Lactobacillus  Continue Estrace-  apply a pea sized amount to vagina 2x weekly at night. No refills needed    Discussed importance of estrace cream for severe vaginal atrophy as per last cysto.    -RTC 6 months    I encouraged her or any of her family members to call or email me with questions and/or concerns.    Visit today is associated with current or anticipated ongoing medical care related to this patient's single serious condition/complex condition.    30 minutes of total time spent on the encounter, which includes face to face time and non-face to face time preparing to see the patient (eg, review of tests), Obtaining and/or reviewing separately obtained history, Documenting clinical information in the electronic or other health record, Independently interpreting results (not separately reported) and communicating results to the patient/family/caregiver, or Care coordination (not separately reported).

## 2024-02-26 NOTE — PATIENT INSTRUCTIONS
UTI precautions:  Avoid constipation.  Drink lots of water  Void every 2-3 hrs regardless of urge  Void soon after urge arises. Do not hold urine once urge occurs.    OVER THE COUNTER:  Utiva Cranberry supplement- need 36mg of proanthocyanidins (PAC) in supplement- helps to block bacteria from attaching to bladder wall.    Probiotic with Lactobacillus    Continue Estrace- apply a pea sized amount to vagina 2x weekly at night.
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
## 2024-04-04 ENCOUNTER — EXTERNAL HOME HEALTH (OUTPATIENT)
Dept: HOME HEALTH SERVICES | Facility: HOSPITAL | Age: 89
End: 2024-04-04
Payer: MEDICARE

## 2024-06-04 DIAGNOSIS — E11.59 HYPERTENSION ASSOCIATED WITH DIABETES: Primary | ICD-10-CM

## 2024-06-04 DIAGNOSIS — I15.2 HYPERTENSION ASSOCIATED WITH DIABETES: Primary | ICD-10-CM

## 2024-06-04 NOTE — TELEPHONE ENCOUNTER
----- Message from Gillian Chacon sent at 6/4/2024  3:56 PM CDT -----  Regarding: Refill Request  Type:  RX Refill Request    Who Called: pt Daughter   Refill or New Rx:Refill    RX Name and Strength: clopidogreL (PLAVIX) 75 mg tablet   How is the patient currently taking it? (ex. 1XDay):1xday  Is this a 30 day or 90 day RX:90    Preferred Pharmacy with phone number:   [id number] - [place]  [address] [facility] [address]  [facility] [address] [address] [address]  Phone: [phone] Fax: [phone]      Local or Mail Order:Local  Ordering Provider: Malika    Would the patient rather a call back or a response via [facility]? Call Back    Best Call Back Number: [phone]      Additional Information: Sts they have been trying to get this filled and the pharmacy is saying they have contacted the office with no response. Currently her mother only has two pills left of RX and need another refill sent over asap.    Have a great day. Thank you!

## 2024-06-05 RX ORDER — CLOPIDOGREL BISULFATE 75 MG/1
75 TABLET ORAL DAILY
Qty: 90 TABLET | Refills: 0 | OUTPATIENT
Start: 2024-06-05

## 2024-06-06 DIAGNOSIS — Z95.5 S/P DRUG ELUTING CORONARY STENT PLACEMENT: Primary | ICD-10-CM

## 2024-06-06 RX ORDER — CLOPIDOGREL BISULFATE 75 MG/1
75 TABLET ORAL DAILY
Qty: 90 TABLET | Refills: 3 | Status: SHIPPED | OUTPATIENT
Start: 2024-06-06

## 2024-06-07 ENCOUNTER — OFFICE VISIT (OUTPATIENT)
Dept: FAMILY MEDICINE | Facility: CLINIC | Age: 89
End: 2024-06-07
Payer: MEDICARE

## 2024-06-07 VITALS
HEIGHT: 63 IN | OXYGEN SATURATION: 97 % | BODY MASS INDEX: 21.56 KG/M2 | SYSTOLIC BLOOD PRESSURE: 128 MMHG | WEIGHT: 121.69 LBS | DIASTOLIC BLOOD PRESSURE: 76 MMHG | HEART RATE: 76 BPM | RESPIRATION RATE: 16 BRPM

## 2024-06-07 DIAGNOSIS — N30.01 ACUTE CYSTITIS WITH HEMATURIA: ICD-10-CM

## 2024-06-07 DIAGNOSIS — R35.0 URINARY FREQUENCY: ICD-10-CM
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
## 2024-06-07 LAB
BILIRUBIN, UA POC OHS: NEGATIVE
BLOOD, UA POC OHS: ABNORMAL
CLARITY, UA POC OHS: CLEAR
COLOR, UA POC OHS: YELLOW
GLUCOSE, UA POC OHS: NEGATIVE
KETONES, UA POC OHS: NEGATIVE
LEUKOCYTES, UA POC OHS: ABNORMAL
NITRITE, UA POC OHS: NEGATIVE
PH, UA POC OHS: 7
PROTEIN, UA POC OHS: NEGATIVE
SPECIFIC GRAVITY, UA POC OHS: 1.02
UROBILINOGEN, UA POC OHS: 0.2

## 2024-06-07 PROCEDURE — 99999 PR PBB SHADOW E&M-EST. PATIENT-LVL V: CPT | Mod: PBBFAC,,,

## 2024-06-07 PROCEDURE — 99215 OFFICE O/P EST HI 40 MIN: CPT | Mod: PBBFAC,PO

## 2024-06-07 PROCEDURE — 99214 OFFICE O/P EST MOD 30 MIN: CPT | Mod: S$PBB,,,

## 2024-06-07 PROCEDURE — 81003 URINALYSIS AUTO W/O SCOPE: CPT | Mod: PBBFAC,PO

## 2024-06-07 PROCEDURE — 99999PBSHW POCT URINALYSIS(INSTRUMENT): Mod: PBBFAC,,,

## 2024-06-07 PROCEDURE — 87086 URINE CULTURE/COLONY COUNT: CPT

## 2024-06-07 RX ORDER — MUPIROCIN 20 MG/G
OINTMENT TOPICAL 3 TIMES DAILY
Qty: 30 G | Refills: 0 | Status: SHIPPED | OUTPATIENT
Start: 2024-06-07

## 2024-06-07 RX ORDER — NITROFURANTOIN 25; 75 MG/1; MG/1
100 CAPSULE ORAL 2 TIMES DAILY
Qty: 10 CAPSULE | Refills: 0 | Status: SHIPPED | OUTPATIENT
Start: 2024-06-07

## 2024-06-07 NOTE — PATIENT INSTRUCTIONS
Renan Royal,     If you are due for any health screening(s) below please notify me so we can arrange them to be ordered and scheduled. Most healthy patients at your age complete them, but you are free to accept or refuse.     If you can't do it, I'll definitely understand. If you can, I'd certainly appreciate it!    All of your core healthy metrics are met.      Your diabetic retinal eye exam is due     Diabetes is the #1 cause of blindness in the [place] - early detection before signs or symptoms develop can prevent debilitating blindness.     Our records indicate that you may be overdue for your annual diabetic eye exam. Eye screening can help identify patients at risk for developing vision loss which is common in diabetes. This simple screening is an important step to keeping you healthy and preventing complications from diabetes.     This recommended diabetic eye exam should take place once per year and can prevent and treat diabetes complications in the eye before developing symptoms. This can be done with a special camera is used to take photographs of the back of your eye without having to dilate them, or you can see an eye doctor for a full dilated exam.     If you recently had your yearly diabetic eye exam performed outside of [facility], please let your Health care team know so that they can update your health record.

## 2024-06-07 NOTE — PROGRESS NOTES
Subjective:       Patient ID: Genny Watson is a 88 y.o. female.    Chief Complaint: Fall (Fell last night at home - notes blood pressure was 170/100 and glucose was 126 at the time - dizziness yesterday and today - bruises on Left side of arm and thigh - denies going to the hospital )      Genny Watson is a 88 y.o. female with DM2, HTN, HLP, previous falls who presents to clinic with daughters for evaluation of fall which occurred last night. She states she was getting up to go to the restroom, when she became dizzy and fell onto her left side. Cut the left forearm on the way down, possibly on the night stand. Denies arm or hip pain on the left side. Notes she has had home health and PT for her frequent falls in the past. Reports noticing dizziness primarily when getting up in the morning. Daughter notes pt has had difficulty with orthostatic hypotension in the past. Pt states she does not drink much water throughout the day, but she does eat well balanced meals.         Review of Systems   Constitutional:  Negative for appetite change and fatigue.   Respiratory:  Negative for shortness of breath.    Cardiovascular:  Negative for chest pain.   Skin:  Positive for color change and wound.   Neurological:  Positive for light-headedness. Negative for dizziness and headaches.         Past Medical History:   Diagnosis Date    ALLERGIC RHINITIS 4/30/2012    Arthritis     Gimenez's esophagus     Breast cancer     right, s/p right mastectomy>20yr ago    Cataract     Diabetes mellitus type II     Diverticulitis     Diverticulosis     Fuchs' corneal dystrophy     Glaucoma     Hernia, hiatal     Hyperlipidemia     Hypertension     Macular degeneration     Pulmonary embolism     Seizures     Skin cancer of face 12/2014    Dr M Weil     UTI (lower urinary tract infection)        Review of patient's allergies indicates:   Allergen Reactions    Sulfa (sulfonamide antibiotics)      Other reaction(s): Itching          Current Outpatient Medications:     amLODIPine (NORVASC) 10 MG tablet, Take 1 tablet (10 mg total) by mouth once daily., Disp: 90 tablet, Rfl: 3    ascorbic acid, vitamin C, (VITAMIN C) 500 MG tablet, Take 500 mg by mouth once daily., Disp: , Rfl:     aspirin (ECOTRIN) 81 MG EC tablet, Take 81 mg by mouth once daily., Disp: , Rfl:     atorvastatin (LIPITOR) 80 MG tablet, TAKE 1 TABLET DAILY, Disp: 90 tablet, Rfl: 3    clopidogreL (PLAVIX) 75 mg tablet, Take 1 tablet (75 mg total) by mouth once daily., Disp: 90 tablet, Rfl: 3    ferrous sulfate (FEOSOL) 325 mg (65 mg iron) Tab tablet, Take 325 mg by mouth daily with breakfast., Disp: , Rfl:     hydrOXYzine pamoate (VISTARIL) 25 MG Cap, Take 1 capsule (25 mg total) by mouth every 8 (eight) hours as needed (anxiety)., Disp: 270 capsule, Rfl: 1    Lactobacillus rhamnosus GG (CULTURELLE) 10 billion cell capsule, Take 1 capsule by mouth once daily., Disp: , Rfl:     latanoprost 0.005 % ophthalmic solution, Place 1 drop into both eyes every evening., Disp: , Rfl:     metFORMIN (GLUCOPHAGE-XR) 500 MG ER 24hr tablet, TAKE 1 TABLET EVERY EVENING, Disp: 90 tablet, Rfl: 1    metoprolol succinate (TOPROL-XL) 25 MG 24 hr tablet, Take 1 tablet (25 mg total) by mouth once daily. (Patient taking differently: Take 25 mg by mouth every evening.), Disp: 90 tablet, Rfl: 3    pantoprazole (PROTONIX) 40 MG tablet, TAKE 1 TABLET DAILY, Disp: 90 tablet, Rfl: 3    paroxetine (PAXIL) 10 MG tablet, Take 1 tablet (10 mg total) by mouth every morning., Disp: 30 tablet, Rfl: 11    polyethylene glycol 3350 (MIRALAX ORAL), Take by mouth. Evry other day, Disp: , Rfl:     prednisoLONE acetate (PRED FORTE) 1 % DrpS, , Disp: , Rfl:     TRAVATAN Z 0.004 % ophthalmic solution, 1 drop., Disp: , Rfl:     VIT C/E/ZN/COPPR/LUTEIN/ZEAXAN (PRESERVISION AREDS 2 ORAL), Take 1 capsule by mouth 2 (two) times daily., Disp: , Rfl:     mupirocin (BACTROBAN) 2 % ointment, Apply topically 3 (three) times  "daily., Disp: 30 g, Rfl: 0    nitrofurantoin, macrocrystal-monohydrate, (MACROBID) 100 MG capsule, Take 1 capsule (100 mg total) by mouth 2 (two) times daily., Disp: 10 capsule, Rfl: 0    Objective:        Physical Exam  Vitals reviewed.   Constitutional:       Appearance: Normal appearance.   HENT:      Head: Normocephalic and atraumatic.   Eyes:      Conjunctiva/sclera: Conjunctivae normal.   Cardiovascular:      Rate and Rhythm: Normal rate and regular rhythm.      Heart sounds: Normal heart sounds.   Pulmonary:      Effort: Pulmonary effort is normal.      Breath sounds: Normal breath sounds.   Musculoskeletal:         General: Normal range of motion.      Cervical back: Normal range of motion and neck supple.   Skin:     General: Skin is warm and dry.          Neurological:      Mental Status: She is alert and oriented to person, place, and time.   Psychiatric:         Mood and Affect: Mood normal.         Behavior: Behavior normal.         Thought Content: Thought content normal.         Judgment: Judgment normal.           Visit Vitals  /76 (BP Location: Right arm, Patient Position: Sitting, BP Method: Pediatric (Manual))   Pulse 76   Resp 16   Ht 5' 3" (1.6 m)   Wt 55.2 kg (121 lb 11.1 oz)   SpO2 97%   BMI 21.56 kg/m²      Assessment:         1. Fall, initial encounter    2. Urinary frequency    3. Acute cystitis with hematuria        Plan:         Genny was seen today for fall.    Diagnoses and all orders for this visit:    Fall, initial encounter  -     Ambulatory referral/consult to Home Health; Future  -     Recommend slow changes in position to avoid dizziness. Increase water intake to stay well hydrated.   -     Keep wound clean and dry.     Urinary frequency  -     POCT Urinalysis(Instrument)  -     CULTURE, URINE    Acute cystitis with hematuria  -     nitrofurantoin, macrocrystal-monohydrate, (MACROBID) 100 MG capsule; Take 1 capsule (100 mg total) by mouth 2 (two) times daily.    Other " orders  -     mupirocin (BACTROBAN) 2 % ointment; Apply topically 3 (three) times daily.       Follow up as scheduled with PCP.         Family Medicine Physician Assistant     Future Appointments       Date Provider Specialty Appt Notes    7/15/2024 Steffi Amezcua, PA Family Medicine AWV    7/19/2024 Josh Berman MD Family Medicine 6 mo    8/26/2024 Randi Fitch, SAVITA Urology 6 mo f/u             All of your core healthy metrics are met.       I spent a total of 25 minutes on the day of the visit.This includes face to face time and non-face to face time preparing to see the patient (eg, review of tests), obtaining and/or reviewing separately obtained history, documenting clinical information in the electronic or other health record, independently interpreting results and communicating results to the patient/family/caregiver, or care coordinator.

## 2024-06-08 LAB
BACTERIA UR CULT: NORMAL
BACTERIA UR CULT: NORMAL

## 2024-06-13 ENCOUNTER — TELEPHONE (OUTPATIENT)
Dept: FAMILY MEDICINE | Facility: CLINIC | Age: 89
End: 2024-06-13
Payer: MEDICARE

## 2024-06-13 NOTE — TELEPHONE ENCOUNTER
----- Message from Doug Ervin sent at 6/13/2024 12:03 PM CDT -----  Contact: self  Type: Sooner Appointment Request        Caller is requesting a sooner appointment. Caller declined first available appointment listed below. Caller will not accept being placed on the waitlist and is requesting a message be sent to doctor.        Name of Caller: [facility]   Best Call Back Number: [phone] (Nurse Ladonna)  Additional Information: Pt is having othrothidaic hypertension she is currently admitted to PT services. However the [facility]  would like a nurse to be requested for assessment for the pt. Thanks

## 2024-06-25 ENCOUNTER — TELEPHONE (OUTPATIENT)
Dept: FAMILY MEDICINE | Facility: CLINIC | Age: 89
End: 2024-06-25
Payer: MEDICARE

## 2024-06-25 ENCOUNTER — LAB VISIT (OUTPATIENT)
Dept: LAB | Facility: HOSPITAL | Age: 89
End: 2024-06-25
Attending: FAMILY MEDICINE
Payer: MEDICARE

## 2024-06-25 DIAGNOSIS — N30.01 ACUTE HEMORRHAGIC CYSTITIS: Primary | ICD-10-CM

## 2024-06-25 LAB
ANION GAP SERPL CALC-SCNC: 7 MMOL/L (ref 8–16)
BUN SERPL-MCNC: 19 MG/DL (ref 8–23)
CALCIUM SERPL-MCNC: 9 MG/DL (ref 8.7–10.5)
CHLORIDE SERPL-SCNC: 102 MMOL/L (ref 95–110)
CO2 SERPL-SCNC: 27 MMOL/L (ref 23–29)
CREAT SERPL-MCNC: 0.7 MG/DL (ref 0.5–1.4)
EST. GFR  (NO RACE VARIABLE): >60 ML/MIN/1.73 M^2
GLUCOSE SERPL-MCNC: 163 MG/DL (ref 70–110)
POTASSIUM SERPL-SCNC: 4 MMOL/L (ref 3.5–5.1)
SODIUM SERPL-SCNC: 136 MMOL/L (ref 136–145)

## 2024-06-25 PROCEDURE — 80048 BASIC METABOLIC PNL TOTAL CA: CPT | Performed by: FAMILY MEDICINE

## 2024-06-25 PROCEDURE — 36415 COLL VENOUS BLD VENIPUNCTURE: CPT | Performed by: FAMILY MEDICINE

## 2024-06-25 NOTE — TELEPHONE ENCOUNTER
----- Message from Tomas Clinton sent at 6/25/2024  9:21 AM CDT -----  Regarding: advice  Type:  Needs Medical Advice    Who Called: jet antonio    Best Call Back Number: [phone]    Additional Information: jet st that pt had a fall on Sunday with no injures.   please call to discuss.

## 2024-06-27 ENCOUNTER — TELEPHONE (OUTPATIENT)
Dept: FAMILY MEDICINE | Facility: CLINIC | Age: 89
End: 2024-06-27
Payer: MEDICARE

## 2024-06-27 NOTE — TELEPHONE ENCOUNTER
Spoke to [facility]/[place] staff- advised they talked to the office and given verbal order for bmp- they said it was done due to dizziness. They said it came from 's office when they spoke to his nurse. There is no documentation of this. Patient saw Ms. Bhandari 6/7/24 and did have dizziness.

## 2024-06-27 NOTE — TELEPHONE ENCOUNTER
"----- Message from Josh Berman MD sent at 6/25/2024  8:38 PM CDT -----  I do not recall giving an order for this BMP today.  The person who took the order works in the lab in [place] and said it was a "signed paper order" but apparently did not keep a copy of the order.  I do not know who's lab this is    Other than the elevated blood sugar which would have been perfectly good for a nonfasting test it looks okay.  I do not know what I am looking for.  "

## 2024-07-06 ENCOUNTER — DOCUMENT SCAN (OUTPATIENT)
Dept: HOME HEALTH SERVICES | Facility: HOSPITAL | Age: 89
End: 2024-07-06
Payer: MEDICARE

## 2024-07-12 ENCOUNTER — EXTERNAL HOME HEALTH (OUTPATIENT)
Dept: HOME HEALTH SERVICES | Facility: HOSPITAL | Age: 89
End: 2024-07-12
Payer: MEDICARE

## 2024-07-12 ENCOUNTER — DOCUMENT SCAN (OUTPATIENT)
Dept: HOME HEALTH SERVICES | Facility: HOSPITAL | Age: 89
End: 2024-07-12
Payer: MEDICARE

## 2024-07-15 ENCOUNTER — OFFICE VISIT (OUTPATIENT)
Dept: FAMILY MEDICINE | Facility: CLINIC | Age: 89
End: 2024-07-15
Payer: MEDICARE

## 2024-07-15 VITALS
HEIGHT: 63 IN | HEART RATE: 79 BPM | TEMPERATURE: 98 F | BODY MASS INDEX: 21.61 KG/M2 | WEIGHT: 121.94 LBS | OXYGEN SATURATION: 97 % | SYSTOLIC BLOOD PRESSURE: 110 MMHG | DIASTOLIC BLOOD PRESSURE: 60 MMHG

## 2024-07-15 DIAGNOSIS — Z00.00 ENCOUNTER FOR PREVENTIVE HEALTH EXAMINATION: ICD-10-CM

## 2024-07-15 DIAGNOSIS — Z85.3 HISTORY OF RIGHT BREAST CANCER: ICD-10-CM

## 2024-07-15 DIAGNOSIS — R26.9 ABNORMALITY OF GAIT AND MOBILITY: ICD-10-CM

## 2024-07-15 DIAGNOSIS — I25.10 CORONARY ARTERY DISEASE INVOLVING NATIVE CORONARY ARTERY OF NATIVE HEART WITHOUT ANGINA PECTORIS: ICD-10-CM

## 2024-07-15 DIAGNOSIS — R80.9 MICROALBUMINURIA: ICD-10-CM

## 2024-07-15 DIAGNOSIS — E11.59 TYPE 2 DIABETES MELLITUS WITH OTHER CIRCULATORY COMPLICATION, WITHOUT LONG-TERM CURRENT USE OF INSULIN: ICD-10-CM

## 2024-07-15 DIAGNOSIS — Z99.89 DEPENDENCE ON OTHER ENABLING MACHINES AND DEVICES: ICD-10-CM

## 2024-07-15 DIAGNOSIS — E11.59 HYPERTENSION ASSOCIATED WITH DIABETES: ICD-10-CM

## 2024-07-15 DIAGNOSIS — E11.69 HYPERLIPIDEMIA ASSOCIATED WITH TYPE 2 DIABETES MELLITUS: ICD-10-CM

## 2024-07-15 DIAGNOSIS — Z74.09 OTHER REDUCED MOBILITY: ICD-10-CM

## 2024-07-15 DIAGNOSIS — Z00.00 ENCOUNTER FOR MEDICARE ANNUAL WELLNESS EXAM: Primary | ICD-10-CM

## 2024-07-15 DIAGNOSIS — E78.5 HYPERLIPIDEMIA ASSOCIATED WITH TYPE 2 DIABETES MELLITUS: ICD-10-CM

## 2024-07-15 DIAGNOSIS — I15.2 HYPERTENSION ASSOCIATED WITH DIABETES: ICD-10-CM

## 2024-07-15 PROBLEM — I95.81 POSTPROCEDURAL HYPOTENSION: Status: RESOLVED | Noted: 2023-06-17 | Resolved: 2024-07-15

## 2024-07-15 PROBLEM — U07.1 COVID-19 VIRUS INFECTION: Status: RESOLVED | Noted: 2023-09-11 | Resolved: 2024-07-15

## 2024-07-15 PROBLEM — N30.01 ACUTE CYSTITIS WITH HEMATURIA: Status: RESOLVED | Noted: 2023-06-16 | Resolved: 2024-07-15

## 2024-07-15 PROBLEM — S72.041A: Status: RESOLVED | Noted: 2023-06-16 | Resolved: 2024-07-15

## 2024-07-15 PROBLEM — D64.9 POSTOPERATIVE ANEMIA: Status: RESOLVED | Noted: 2023-06-17 | Resolved: 2024-07-15

## 2024-07-15 PROBLEM — I16.1 HYPERTENSIVE EMERGENCY: Status: RESOLVED | Noted: 2023-09-11 | Resolved: 2024-07-15

## 2024-07-15 PROCEDURE — 99215 OFFICE O/P EST HI 40 MIN: CPT | Mod: PBBFAC,PN | Performed by: PHYSICIAN ASSISTANT

## 2024-07-15 PROCEDURE — G0439 PPPS, SUBSEQ VISIT: HCPCS | Mod: ,,, | Performed by: PHYSICIAN ASSISTANT

## 2024-07-15 PROCEDURE — 99999 PR PBB SHADOW E&M-EST. PATIENT-LVL V: CPT | Mod: PBBFAC,,, | Performed by: PHYSICIAN ASSISTANT

## 2024-07-15 RX ORDER — TIMOLOL MALEATE 5 MG/ML
1 SOLUTION/ DROPS OPHTHALMIC 2 TIMES DAILY
COMMUNITY
Start: 2024-07-01

## 2024-07-15 NOTE — PATIENT INSTRUCTIONS
Counseling and Referral of Other Preventative  (Italic type indicates deductible and co-insurance are waived)    Patient Name: Genny Watson  Today's Date: 7/15/2024    Health Maintenance       Date Due Completion Date    Shingles Vaccine (1 of 2) Never done ---    RSV Vaccine (Age 60+ and Pregnant patients) (1 - 1-dose 60+ series) Never done ---    Eye Exam 02/14/2023 2/14/2022    Override on 6/4/2018: Done (Dr. Alfaro - Eye center- sent to scan 06/05/2018)    Override on 4/17/2017: Done (Dr Darren Alfaro   report sent to scanning   kb)    Override on 1/16/2017: Done (Dr Darren Alfaro    report sent to scanning   kb)    Override on 10/3/2016: Done (Dr. Darren Alfaro- sent to scanning)    Override on 5/18/2015: Done (Dr. Alfrao)    Override on 2/20/2015: Done (Dr Alfaro)    Override on 2/7/2014: Done    Override on 3/4/2013: Done (sees Dr. Wright every three months.)    COVID-19 Vaccine (5 - 2023-24 season) 09/01/2023 12/15/2021    Hemoglobin A1c 07/19/2024 1/19/2024    Override on 9/23/2013: Done    Diabetes Urine Screening 08/28/2024 8/28/2023    Influenza Vaccine (1) 09/01/2024 11/8/2023    Lipid Panel 01/19/2025 1/19/2024    Aspirin/Antiplatelet Therapy 06/07/2025 6/7/2024    TETANUS VACCINE 06/26/2026 6/26/2016        No orders of the defined types were placed in this encounter.    The following information is provided to all patients.  This information is to help you find resources for any of the problems found today that may be affecting your health:                  Living healthy guide: www.[facility].[place].gov      Understanding Diabetes: www.diabetes.org      Eating healthy: www.cdc.gov/healthyweight      CDC home safety checklist: www.cdc.gov/steadi/patient.html      Agency on Aging: www.goea.[place].gov      Alcoholics anonymous (AA): www.aa.org      Physical Activity: www.king.nih.gov/ze3epfw      Tobacco use: www.quitwithusla.org

## 2024-07-15 NOTE — PROGRESS NOTES
"  Genny Watson presented for an initial Medicare AWV today. The following components were reviewed and updated:    Medical history  Family History  Social history  Allergies and Current Medications  Health Risk Assessment  Health Maintenance  Care Team    **See Completed Assessments for Annual Wellness visit with in the encounter summary    The following assessments were completed:  Depression Screening  Cognitive function Screening  Timed Get Up Test  Whisper Test      Opioid documentation:      Patient does not have a current opioid prescription.          Vitals:    07/15/24 1000   BP: 110/60   BP Location: Right arm   Patient Position: Sitting   BP Method: Medium (Manual)   Pulse: 79   Temp: 97.8 °F (36.6 °C)   TempSrc: Oral   SpO2: 97%   Weight: 55.3 kg (121 lb 14.6 oz)   Height: 5' 3" (1.6 m)     Body mass index is 21.6 kg/m².       Physical Exam  Constitutional:       Appearance: Normal appearance.   HENT:      Head: Normocephalic and atraumatic.   Pulmonary:      Effort: Pulmonary effort is normal.   Neurological:      General: No focal deficit present.      Mental Status: She is alert and oriented to person, place, and time. Mental status is at baseline.   Psychiatric:         Mood and Affect: Mood normal.         Behavior: Behavior normal.         Thought Content: Thought content normal.         Judgment: Judgment normal.            Diagnoses and health risks identified today and associated recommendations/orders:  Genny was seen today for health risk assessment.    Diagnoses and all orders for this visit:    Encounter for Medicare annual wellness exam  -     Ambulatory Referral/Consult to Enhanced Annual Wellness Visit (eAWV)    Hypertension associated with diabetes  Comments:  Controlled, continue current regimen    Hyperlipidemia associated with type 2 diabetes mellitus  Comments:  Controlled, continue current regimen    Type 2 diabetes mellitus with other circulatory complication, without " long-term current use of insulin  Comments:  Stable, follow-up with PCP    Coronary artery disease involving native coronary artery of native heart without angina pectoris  Comments:  Stable, continue regimen    History of right breast cancer  Comments:  In remission, continue to monitor    Microalbuminuria  Comments:  Stable, continue to monitor    Dependence on other enabling machines and devices  Comments:  Stable, patient fall risk.  Continue use of walker    Abnormality of gait and mobility    Other reduced mobility    Encounter for preventive health examination  Comments:  Patient requested PCP due foot exam         Provided Genny with a 5-10 year written screening schedule and personal prevention plan. Recommendations were developed using the USPSTF age appropriate recommendations. Education, counseling, and referrals were provided as needed.  After Visit Summary printed and given to patient which includes a list of additional screenings\tests needed.    No follow-ups on file.      EMA Boland    I offered to discuss advanced care planning, including how to pick a person who would make decisions for you if you were unable to make them for yourself, called a health care power of , and what kind of decisions you might make such as use of life sustaining treatments such as ventilators and tube feeding when faced with a life limiting illness recorded on a living will that they will need to know. (How you want to be cared for as you near the end of your natural life)     X Patient is interested in learning more about how to make advanced directives.  I provided them paperwork and offered to discuss this with them.

## 2024-07-19 ENCOUNTER — OFFICE VISIT (OUTPATIENT)
Dept: FAMILY MEDICINE | Facility: CLINIC | Age: 89
End: 2024-07-19
Attending: FAMILY MEDICINE
Payer: MEDICARE

## 2024-07-19 VITALS
SYSTOLIC BLOOD PRESSURE: 116 MMHG | WEIGHT: 122 LBS | BODY MASS INDEX: 21.62 KG/M2 | OXYGEN SATURATION: 98 % | HEART RATE: 72 BPM | DIASTOLIC BLOOD PRESSURE: 74 MMHG | HEIGHT: 63 IN | TEMPERATURE: 98 F

## 2024-07-19 DIAGNOSIS — F41.9 ANXIETY: ICD-10-CM

## 2024-07-19 DIAGNOSIS — E11.59 HYPERTENSION ASSOCIATED WITH DIABETES: ICD-10-CM

## 2024-07-19 DIAGNOSIS — I25.10 CORONARY ARTERY DISEASE INVOLVING NATIVE CORONARY ARTERY OF NATIVE HEART WITHOUT ANGINA PECTORIS: ICD-10-CM

## 2024-07-19 DIAGNOSIS — E11.59 TYPE 2 DIABETES MELLITUS WITH OTHER CIRCULATORY COMPLICATION, WITHOUT LONG-TERM CURRENT USE OF INSULIN: Primary | ICD-10-CM

## 2024-07-19 DIAGNOSIS — E11.69 HYPERLIPIDEMIA ASSOCIATED WITH TYPE 2 DIABETES MELLITUS: ICD-10-CM

## 2024-07-19 DIAGNOSIS — I15.2 HYPERTENSION ASSOCIATED WITH DIABETES: ICD-10-CM

## 2024-07-19 DIAGNOSIS — E78.5 HYPERLIPIDEMIA ASSOCIATED WITH TYPE 2 DIABETES MELLITUS: ICD-10-CM

## 2024-07-19 DIAGNOSIS — R80.9 MICROALBUMINURIA: ICD-10-CM

## 2024-07-19 DIAGNOSIS — Z95.5 S/P DRUG ELUTING CORONARY STENT PLACEMENT: ICD-10-CM

## 2024-07-19 PROCEDURE — 99214 OFFICE O/P EST MOD 30 MIN: CPT | Mod: PBBFAC,PN | Performed by: FAMILY MEDICINE

## 2024-07-19 PROCEDURE — 99999 PR PBB SHADOW E&M-EST. PATIENT-LVL IV: CPT | Mod: PBBFAC,,, | Performed by: FAMILY MEDICINE

## 2024-07-19 PROCEDURE — 99214 OFFICE O/P EST MOD 30 MIN: CPT | Mod: S$PBB,,, | Performed by: FAMILY MEDICINE

## 2024-07-19 RX ORDER — PAROXETINE 10 MG/1
10 TABLET, FILM COATED ORAL EVERY MORNING
Qty: 30 TABLET | Refills: 11 | Status: SHIPPED | OUTPATIENT
Start: 2024-07-19 | End: 2025-07-19

## 2024-07-19 RX ORDER — METFORMIN HYDROCHLORIDE 500 MG/1
500 TABLET, EXTENDED RELEASE ORAL NIGHTLY
Qty: 90 TABLET | Refills: 3 | Status: SHIPPED | OUTPATIENT
Start: 2024-07-19

## 2024-07-19 RX ORDER — METOPROLOL SUCCINATE 25 MG/1
25 TABLET, EXTENDED RELEASE ORAL NIGHTLY
Qty: 90 TABLET | Refills: 3 | Status: SHIPPED | OUTPATIENT
Start: 2024-07-19

## 2024-07-19 NOTE — PROGRESS NOTES
Subjective:       Patient ID: Genny Watson is a 88 y.o. female.    Chief Complaint: Follow-up (6 month)    88-year-old female coming in associated with her daughter who is also being seen for follow-up on multiple medical problems.  She has a history of type 2 diabetes diagnosed in 2012 and taking a single metformin 500 mg daily.  She has hypertension on amlodipine 10 mg daily and metoprolol extended release 25 mg daily as well as hyperlipidemia on Lipitor 80 mg daily.  She has a history of reflux and Gimenez's esophagitis followed by GI and a greater than 20 year history of right breast cancer status post a right mastectomy.  She has coronary artery disease with a drug-eluting stent placed in 2015.  She is status post a cornea transplant appendectomy and hysterectomy in addition to her right mastectomy.  She recently had a minor eye procedure with Dr. Alfaro and had her eye exam.  Records of the exam will be requested.  She is due for an A1c and a urine microalbumin/creatinine ratio.    Past Medical History:  01/31/2015: Abnormal CT scan, chest  01/26/2015: Abnormal nuclear stress test  04/30/2012: ALLERGIC RHINITIS  No date: Arthritis  No date: Gimenez's esophagus  No date: Breast cancer      Comment:  right, s/p right mastectomy>20yr ago  No date: Cataract  No date: Diabetes mellitus type II  No date: Diverticulitis  No date: Diverticulosis  No date: Fuchs' corneal dystrophy  No date: Glaucoma  No date: Hernia, hiatal  No date: Hyperlipidemia  No date: Hypertension  09/11/2023: Hypertensive emergency  No date: Macular degeneration  No date: Myocardial infarction  06/17/2023: Postprocedural hypotension  No date: Pulmonary embolism  No date: Renal manifestation of secondary diabetes mellitus  12/2014: Skin cancer of face      Comment:  Dr M Weil   No date: UTI (lower urinary tract infection)    Past Surgical History:  No date: angeogram  No date: APPENDECTOMY  No date: BREAST SURGERY  1/2015: cardio  stent       Comment:  Dr Dodson  around 2003: COLONOSCOPY  1/13/2016: COLONOSCOPY; N/A      Comment:  Procedure: COLONOSCOPY;  Surgeon: Mario Bhakta MD;                Location: [facility];  Service: Endoscopy;  Laterality:                N/A; repeat in 5 years for surveillance  No date: CORNEAL TRANSPLANT  8/30/2023: CYSTOSCOPY; N/A      Comment:  Procedure: CYSTOSCOPY;  Surgeon: Mirna Harden MD;  Location: [facility];  Service: Urology;                 Laterality: N/A;  No date: EYE SURGERY  6/16/2023: HIP ARTHROPLASTY; Right      Comment:  Procedure: ARTHROPLASTY, HIP;  Surgeon: Josh Douglas II, MD;  Location: [facility] OR;  Service: Orthopedics;                 Laterality: Right;  No date: HYSTERECTOMY      Comment:  ovaries removed  No date: MASTECTOMY      Comment:  right >20 years ago  No date: STOMACH SURGERY  01/13/2016: UPPER GASTROINTESTINAL ENDOSCOPY      Comment:  Dr. Bhakta    Current Outpatient Medications on File Prior to Visit:  amLODIPine (NORVASC) 10 MG tablet, Take 1 tablet (10 mg total) by mouth once daily., Disp: 90 tablet, Rfl: 3  ascorbic acid, vitamin C, (VITAMIN C) 500 MG tablet, Take 500 mg by mouth once daily., Disp: , Rfl:   aspirin (ECOTRIN) 81 MG EC tablet, Take 81 mg by mouth once daily., Disp: , Rfl:   atorvastatin (LIPITOR) 80 MG tablet, TAKE 1 TABLET DAILY, Disp: 90 tablet, Rfl: 3  clopidogreL (PLAVIX) 75 mg tablet, Take 1 tablet (75 mg total) by mouth once daily., Disp: 90 tablet, Rfl: 3  ferrous sulfate (FEOSOL) 325 mg (65 mg iron) Tab tablet, Take 325 mg by mouth daily with breakfast., Disp: , Rfl:   hydrOXYzine pamoate (VISTARIL) 25 MG Cap, Take 1 capsule (25 mg total) by mouth every 8 (eight) hours as needed (anxiety)., Disp: 270 capsule, Rfl: 1  Lactobacillus rhamnosus GG (CULTURELLE) 10 billion cell capsule, Take 1 capsule by mouth once daily., Disp: , Rfl:   latanoprost 0.005 % ophthalmic solution, Place 1 drop into both eyes every  evening., Disp: , Rfl:   pantoprazole (PROTONIX) 40 MG tablet, TAKE 1 TABLET DAILY, Disp: 90 tablet, Rfl: 3  prednisoLONE acetate (PRED FORTE) 1 % DrpS, , Disp: , Rfl:   timolol maleate 0.5% (TIMOPTIC) 0.5 % Drop, Place 1 drop into the left eye 2 (two) times daily., Disp: , Rfl:   TRAVATAN Z 0.004 % ophthalmic solution, 1 drop., Disp: , Rfl:   VIT C/E/ZN/COPPR/LUTEIN/ZEAXAN (PRESERVISION AREDS 2 ORAL), Take 1 capsule by mouth 2 (two) times daily., Disp: , Rfl:   [DISCONTINUED] metFORMIN (GLUCOPHAGE-XR) 500 MG ER 24hr tablet, TAKE 1 TABLET EVERY EVENING, Disp: 90 tablet, Rfl: 1  [DISCONTINUED] metoprolol succinate (TOPROL-XL) 25 MG 24 hr tablet, Take 1 tablet (25 mg total) by mouth once daily. (Patient taking differently: Take 25 mg by mouth every evening.), Disp: 90 tablet, Rfl: 3  [DISCONTINUED] paroxetine (PAXIL) 10 MG tablet, Take 1 tablet (10 mg total) by mouth every morning., Disp: 30 tablet, Rfl: 11    No current facility-administered medications on file prior to visit.          Review of Systems   Constitutional:  Negative for chills, diaphoresis and fever.   Respiratory:  Negative for chest tightness and shortness of breath.    Cardiovascular:  Negative for chest pain and palpitations.   Gastrointestinal:  Negative for constipation and diarrhea.   Endocrine: Negative for polydipsia and polyuria.       Objective:      Physical Exam  Vitals and nursing note reviewed.   Constitutional:       General: She is not in acute distress.     Appearance: Normal appearance. She is normal weight. She is not ill-appearing, toxic-appearing or diaphoretic.      Comments: Good blood pressure control   Normal pulse with regular rhythm   Normal weight with a BMI of 21.6 she is up 5.2 lb from her January 17, 2024 visit   Cardiovascular:      Rate and Rhythm: Normal rate and regular rhythm.      Pulses: Normal pulses.           Dorsalis pedis pulses are 2+ on the right side and 2+ on the left side.        Posterior tibial pulses  are 2+ on the right side and 2+ on the left side.      Heart sounds: Normal heart sounds. No murmur heard.     No friction rub. No gallop.   Pulmonary:      Effort: Pulmonary effort is normal. No respiratory distress.      Breath sounds: Normal breath sounds. No stridor. No wheezing, rhonchi or rales.   Chest:      Chest wall: No tenderness.   Abdominal:      General: Abdomen is flat. There is no distension.      Palpations: Abdomen is soft. There is no mass.      Tenderness: There is no abdominal tenderness. There is no right CVA tenderness, left CVA tenderness, guarding or rebound.      Hernia: No hernia is present.   Musculoskeletal:      Right lower leg: No edema.      Left lower leg: No edema.      Right foot: Normal range of motion. No deformity, bunion, Charcot foot, foot drop or prominent metatarsal heads.      Left foot: Normal range of motion. No deformity, bunion, Charcot foot, foot drop or prominent metatarsal heads.   Feet:      Right foot:      Protective Sensation: 9 sites tested.  9 sites sensed.      Skin integrity: Skin integrity normal. No ulcer, blister, skin breakdown, erythema, warmth, callus, dry skin or fissure.      Toenail Condition: Fungal disease present.     Left foot:      Protective Sensation: 9 sites tested.  9 sites sensed.      Skin integrity: Skin integrity normal. No ulcer, blister, skin breakdown, erythema, warmth, callus, dry skin or fissure.      Toenail Condition: Fungal disease present.  Skin:     Capillary Refill: Capillary refill takes less than 2 seconds.      Coloration: Skin is not jaundiced or pale.      Findings: No bruising, erythema, lesion or rash.   Neurological:      General: No focal deficit present.      Mental Status: She is alert and oriented to person, place, and time. Mental status is at baseline.      Comments: Proprioception intact all 10 toes   Psychiatric:         Mood and Affect: Mood normal.         Behavior: Behavior normal.         Thought Content:  Thought content normal.         Judgment: Judgment normal.         Assessment:       1. Type 2 diabetes mellitus with other circulatory complication, without long-term current use of insulin    2. Anxiety    3. Hypertension associated with diabetes    4. Coronary artery disease involving native coronary artery of native heart without angina pectoris    5. Hyperlipidemia associated with type 2 diabetes mellitus    6. S/P drug eluting coronary stent placement LCX 1/26/15    7. Microalbuminuria    8. BMI 21.0-21.9, adult        Plan:       1. Type 2 diabetes mellitus with other circulatory complication, without long-term current use of insulin  A1c ordered and awaiting results along with urine microalbumin level  - metFORMIN (GLUCOPHAGE-XR) 500 MG ER 24hr tablet; Take 1 tablet (500 mg total) by mouth every evening.  Dispense: 90 tablet; Refill: 3  - Hemoglobin A1C; Future  - Microalbumin/Creatinine Ratio, Urine; Future    2. Anxiety  Well controlled, needs refill on Paxil, sent to pharmacy  - paroxetine (PAXIL) 10 MG tablet; Take 1 tablet (10 mg total) by mouth every morning.  Dispense: 30 tablet; Refill: 11    3. Hypertension associated with diabetes  Good control with no changes needed refill Toprol sent to pharmacy  - metoprolol succinate (TOPROL-XL) 25 MG 24 hr tablet; Take 1 tablet (25 mg total) by mouth every evening.  Dispense: 90 tablet; Refill: 3  - Microalbumin/Creatinine Ratio, Urine; Future    4. Coronary artery disease involving native coronary artery of native heart without angina pectoris  Asymptomatic and well controlled  - metoprolol succinate (TOPROL-XL) 25 MG 24 hr tablet; Take 1 tablet (25 mg total) by mouth every evening.  Dispense: 90 tablet; Refill: 3    5. Hyperlipidemia associated with type 2 diabetes mellitus  Lab Results   Component Value Date    CHOL 132 01/19/2024    CHOL 127 01/09/2023    CHOL 132 02/28/2022     Lab Results   Component Value Date    HDL 52 01/19/2024    HDL 52 01/09/2023     HDL 53 02/28/2022     Lab Results   Component Value Date    LDLCALC 58.8 (L) 01/19/2024    LDLCALC 56.6 (L) 01/09/2023    LDLCALC 60.8 (L) 02/28/2022     Lab Results   Component Value Date    TRIG 106 01/19/2024    TRIG 92 01/09/2023    TRIG 91 02/28/2022       Lab Results   Component Value Date    CHOLHDL 39.4 01/19/2024    CHOLHDL 40.9 01/09/2023    CHOLHDL 40.2 02/28/2022     Well controlled, no changes needed recheck due in January 6. S/P drug eluting coronary stent placement LCX 1/26/15  Asymptomatic and well controlled    7. Microalbuminuria  Await urine microalbumin/creatinine ratio results.  She was given the kit to collect at home if necessary    8. BMI 21.0-21.9, adult  Good weight, I did discuss with her the research that indicates a BMI of 27 would lead to greater longevity and that gaining a little more weight to give her a cushion in case of illness could benefit her.

## 2024-07-22 ENCOUNTER — APPOINTMENT (OUTPATIENT)
Dept: LAB | Facility: HOSPITAL | Age: 89
End: 2024-07-22
Attending: FAMILY MEDICINE
Payer: MEDICARE

## 2024-07-22 DIAGNOSIS — E11.59 TYPE 2 DIABETES MELLITUS WITH CIRCULATORY DISORDER, WITHOUT LONG-TERM CURRENT USE OF INSULIN: Primary | ICD-10-CM

## 2024-07-22 DIAGNOSIS — I15.2 HYPERTENSION ASSOCIATED WITH DIABETES: ICD-10-CM

## 2024-07-22 DIAGNOSIS — E11.59 HYPERTENSION ASSOCIATED WITH DIABETES: ICD-10-CM

## 2024-07-22 LAB
ALBUMIN/CREAT UR: 137.5 UG/MG (ref 0–30)
CREAT UR-MCNC: 32 MG/DL (ref 15–325)
MICROALBUMIN UR DL<=1MG/L-MCNC: 44 UG/ML

## 2024-07-22 PROCEDURE — 82043 UR ALBUMIN QUANTITATIVE: CPT | Performed by: FAMILY MEDICINE

## 2024-07-22 PROCEDURE — 82570 ASSAY OF URINE CREATININE: CPT | Performed by: FAMILY MEDICINE

## 2024-08-12 DIAGNOSIS — F41.9 ANXIETY: ICD-10-CM

## 2024-08-12 RX ORDER — PAROXETINE 10 MG/1
10 TABLET, FILM COATED ORAL EVERY MORNING
Qty: 30 TABLET | Refills: 5 | Status: SHIPPED | OUTPATIENT
Start: 2024-08-12 | End: 2025-08-12

## 2024-08-12 NOTE — TELEPHONE ENCOUNTER
No care due was identified.  [facility] Embedded Care Due Messages. Reference number: [id number].   8/12/2024 1:34:44 PM CDT

## 2024-08-12 NOTE — TELEPHONE ENCOUNTER
----- Message from Diann Chase sent at 8/12/2024 12:54 PM CDT -----  Type:  RX Refill Request    Who Called:  pt  Refill or New Rx:  REFILL  RX Name and Strength:  paroxetine (PAXIL) 10 MG tablet  How is the patient currently taking it? (ex. 1XDay):  As directed  Is this a 30 day or 90 day RX:  30  Preferred Pharmacy with phone number:    [id number] - [place]  [facility] [address] [facility] [address]  [facility] [address] [address] [address]  Phone: [phone] Fax: [phone]        Local or Mail Order: local   Ordering Provider:  avinash Juan Call Back Number:  [phone]  Additional Information:  Please call back to advise. Thanks.

## 2024-08-26 ENCOUNTER — OFFICE VISIT (OUTPATIENT)
Dept: UROLOGY | Facility: CLINIC | Age: 89
End: 2024-08-26
Payer: MEDICARE

## 2024-08-26 VITALS — BODY MASS INDEX: 21.61 KG/M2 | HEIGHT: 63 IN | WEIGHT: 121.94 LBS

## 2024-08-26 DIAGNOSIS — N39.0 RECURRENT UTI: ICD-10-CM

## 2024-08-26 DIAGNOSIS — N95.2 VAGINAL ATROPHY: Primary | ICD-10-CM

## 2024-08-26 PROCEDURE — G2211 COMPLEX E/M VISIT ADD ON: HCPCS | Mod: S$PBB,,, | Performed by: NURSE PRACTITIONER

## 2024-08-26 PROCEDURE — 99999 PR PBB SHADOW E&M-EST. PATIENT-LVL IV: CPT | Mod: PBBFAC,,, | Performed by: NURSE PRACTITIONER

## 2024-08-26 PROCEDURE — 99214 OFFICE O/P EST MOD 30 MIN: CPT | Mod: S$PBB,,, | Performed by: NURSE PRACTITIONER

## 2024-08-26 PROCEDURE — 99214 OFFICE O/P EST MOD 30 MIN: CPT | Mod: PBBFAC,PO | Performed by: NURSE PRACTITIONER

## 2024-08-26 RX ORDER — ESTRADIOL 0.1 MG/G
1 CREAM VAGINAL
Qty: 42.5 G | Refills: 3 | Status: SHIPPED | OUTPATIENT
Start: 2024-08-26

## 2024-08-26 NOTE — PROGRESS NOTES
[facility]  Group: Antione/Lianet/Fina/Dixon  NPs: Janay Garcia/Randi Fitch    Note today written by:Randi Fitch  Date of Service: 08/26/2024      CHIEF COMPLAINT: F/u UTI.    PRESENTING ILLNESS:    Genny Watson is a 88 y.o. female who presents for f/u UTI. Last clinic visit was 2/26/24 8/26/24  No UTIs since last visit  Using estrace cream 2 x per week as ordered, needs refill  Taking probiotic  Voiding every 2 hours  Denies constipation; + occasional diarrhea  Good water intake  Denies UTI symptoms today. Denies dysuria, gross hematuria, flank pain, fever, chills, nausea or vomiting.    2/26/24  Pt presents for f/u UTI  Denies UTI symptoms today. Denies dysuria, gross hematuria, flank pain, fever, chills, nausea or vomiting.   Pt has not been using estrace cream as prescribed  Taking probiotics  Drinks 6 glasses water per day  Constipation improved per pt  PVR 65 ml    8/30/23 s/p cysto    8/22/23  Patient recently had episodes of gross hematuria in setting of a UTI. Most recently in ED on 8/10. She has undergone a CT which showed only bladder wall thickening.   Culture 8/3/23: E coli      Currently has a yung in place x 2 weeks. No further hematuria.   Has not been using vaginal estrogen cream recently.   Having a BM every 2-4 days.  Not drinking enough water.      2/6/23  Has been using vaginal estrogen cream twice a week.   No UTIs since last visit.   No hematuria or dysuria.   Has a bowel movement every 2-3 days.      cc     8/3/22  Patient seen by NP in March and PVR was <300 cc. Was then seen again in June with inability to urinate and 350 cc drained at that time.   Both times has had a positive urine culture however were asymptomatic.  She has no bothersome urinary complaints.   Her renal function is normal.      Has been using vaginal estrogen cream.      Bladder scan today: 342 cc (unable to void)      11/29/21  Had E coli UTI in August. Unsure if she was having symptoms at that time.   Today her symptoms are frequency. No incontinence. No dysuria. No gross hematuria.   Having a daily bowel movement.   Drinks 4-5 glasses a day of water. Drinks 1 cup of coffee in the am.      Cr 6/29/21: 0.8  PVR today 305 cc        5/27/21  Patient presents for hematuria. She states her urine looked orange, no blood in urine and no clots. She thinks maybe she had bleeding from her hemorrhoids.   She had a UA done on 5/17 which showed 6 RBC, >100 WBC, nitrite negative. Urine culture no growth.   She underwent CT Urogram which showed no hydro, no stones, no filling defects. Her bladder is fairly distended.      She has no significant bothersome urinary complaints.   No history of stones.  She does have UTIs and her symptoms include dysuria. Usually goes to urgent care for this.      She a bowel movement every 3 days.   Drinks 2 bottles of water a day, 1 cup of coffee.   She states she feels like she is emptying her bladder better at home, she has a shy bladder.      UA today: +blood and leuks, nitrite negative  PVR today: 390 cc (catheterized)      Urine cultures:   Lab Results   Component Value Date    LABURIN  06/07/2024     Multiple organisms isolated. None in predominance.  Repeat if    LABURIN clinically necessary. 06/07/2024    LABURIN No growth 01/15/2024    LABURIN No growth 08/10/2023    LABURIN ESCHERICHIA COLI  >100,000 cfu/ml   (A) 08/03/2023    LABURIN No growth 06/16/2023    LABURIN No significant growth 05/02/2023    LABURIN ESCHERICHIA COLI  >100,000 cfu/ml   (A) 06/20/2022    LABURIN ESCHERICHIA COLI  >100,000 cfu/ml   (A) 03/18/2022    LABURIN ESCHERICHIA COLI  >100,000 cfu/ml   (A) 08/10/2021         REVIEW OF SYSTEMS: as stated above in hpi    PATIENT HISTORY:  Past Medical History:   Diagnosis Date    Abnormal CT scan, chest 01/31/2015    Abnormal nuclear stress test 01/26/2015    ALLERGIC RHINITIS 04/30/2012     Arthritis     Gimenez's esophagus     Breast cancer     right, s/p right mastectomy>20yr ago    Cataract     Diabetes mellitus type II     Diverticulitis     Diverticulosis     Fuchs' corneal dystrophy     Glaucoma     Hernia, hiatal     Hyperlipidemia     Hypertension     Hypertensive emergency 09/11/2023    Macular degeneration     Myocardial infarction     Postprocedural hypotension 06/17/2023    Pulmonary embolism     Renal manifestation of secondary diabetes mellitus     Skin cancer of face 12/2014    Dr M Weil     UTI (lower urinary tract infection)        Past Surgical History:   Procedure Laterality Date    angeogram      APPENDECTOMY      BREAST SURGERY      cardio stent   1/2015    Dr Dodson    COLONOSCOPY  around 2003    COLONOSCOPY N/A 1/13/2016    Procedure: COLONOSCOPY;  Surgeon: Mario Bhakta MD;  Location: [facility];  Service: Endoscopy;  Laterality: N/A; repeat in 5 years for surveillance    CORNEAL TRANSPLANT      CYSTOSCOPY N/A 8/30/2023    Procedure: CYSTOSCOPY;  Surgeon: Mirna Harden MD;  Location: [facility];  Service: Urology;  Laterality: N/A;    EYE SURGERY      HIP ARTHROPLASTY Right 6/16/2023    Procedure: ARTHROPLASTY, HIP;  Surgeon: Josh Douglas II, MD;  Location: [facility] OR;  Service: Orthopedics;  Laterality: Right;    HYSTERECTOMY      ovaries removed    MASTECTOMY      right >20 years ago    STOMACH SURGERY      UPPER GASTROINTESTINAL ENDOSCOPY  01/13/2016    Dr. Bhakta       Allergies:  Sulfa (sulfonamide antibiotics)      PHYSICAL EXAMINATION:    Constitutional: She is oriented to person, place, and time. She appears well-developed and well-nourished.  She is in no apparent distress.  Abdominal:  She exhibits no distension.  There is no CVA tenderness.   Neurological: She is alert and oriented to person, place, and time.   Psych: Cooperative with normal affect.    Physical Exam    LABS:      Lab Results   Component Value Date    CREATININE 0.7 06/25/2024     Lab  Results   Component Value Date    HGBA1C 6.0 (H) 07/19/2024       IMPRESSION:    Encounter Diagnoses   Name Primary?    Vaginal atrophy Yes    Recurrent UTI        PLAN:  -No UTI symptoms today    -Continue UTI precautions:  Avoid constipation.  Drink lots of water  Void every 2-3 hrs regardless of urge  Void soon after urge arises. Do not hold urine once urge occurs.  OVER THE COUNTER:  Utiva Cranberry supplement- need 36mg of proanthocyanidins (PAC) in supplement- helps to block bacteria from attaching to bladder wall.  Probiotic with Lactobacillus  Continue Estrace- apply a pea sized amount to vagina 2x weekly at night. Refilled to pharmacy    -RTC 6 months (PVR)    I encouraged her or any of her family members to call or email me with questions and/or concerns.    Visit today is associated with current or anticipated ongoing medical care related to this patient's single serious condition/complex condition, recurrent UTI    30 minutes of total time spent on the encounter, which includes face to face time and non-face to face time preparing to see the patient (eg, review of tests), Obtaining and/or reviewing separately obtained history, Documenting clinical information in the electronic or other health record, Independently interpreting results (not separately reported) and communicating results to the patient/family/caregiver, or Care coordination (not separately reported).

## 2024-08-26 NOTE — PATIENT INSTRUCTIONS
UTI precautions:  Avoid constipation.  Drink lots of water  Void every 2 hrs regardless of urge  Void soon after urge arises. Do not hold urine once urge occurs.      OVER THE COUNTER:  Utiva Cranberry supplement- need 36mg of proanthocyanidins (PAC) in supplement- helps to block bacteria from attaching to bladder wall.  Probiotic with Lactobacillus  Continue Estrace- apply a pea sized amount to vagina 2x weekly at night.

## 2024-10-12 DIAGNOSIS — I15.2 HYPERTENSION ASSOCIATED WITH DIABETES: ICD-10-CM

## 2024-10-12 DIAGNOSIS — E11.59 HYPERTENSION ASSOCIATED WITH DIABETES: ICD-10-CM

## 2024-10-12 NOTE — TELEPHONE ENCOUNTER
No care due was identified.  [facility] Embedded Care Due Messages. Reference number: [id number].   10/12/2024 8:09:17 AM CDT

## 2024-10-13 RX ORDER — AMLODIPINE BESYLATE 10 MG/1
10 TABLET ORAL
Qty: 90 TABLET | Refills: 3 | Status: SHIPPED | OUTPATIENT
Start: 2024-10-13

## 2024-10-13 NOTE — TELEPHONE ENCOUNTER
Refill Decision Note   Genny Watson  is requesting a refill authorization.  Brief Assessment and Rationale for Refill:  Approve     Medication Therapy Plan:         Comments:     Note composed:6:30 AM 10/13/2024

## 2024-11-04 LAB
LEFT EYE DM RETINOPATHY: NEGATIVE
RIGHT EYE DM RETINOPATHY: NEGATIVE

## 2024-11-13 DIAGNOSIS — F41.9 ANXIETY: ICD-10-CM

## 2024-11-13 RX ORDER — HYDROXYZINE PAMOATE 25 MG/1
25 CAPSULE ORAL EVERY 8 HOURS PRN
Qty: 270 CAPSULE | Refills: 0 | Status: SHIPPED | OUTPATIENT
Start: 2024-11-13

## 2024-11-13 NOTE — TELEPHONE ENCOUNTER
Spoke with patient.  Information given per Dr Berman    She will had her daughter call to schedule appt with a new PCP

## 2024-11-13 NOTE — TELEPHONE ENCOUNTER
I am going to be retiring at the end of the year and she will need to establish with a new primary care physician.  The medication she is taking for anxiety is on a restricted list by Medicare due to an increased risk of falls from the sedation.  I can not promise that one of the new doctors will let her have it at all and would strongly suggest she try to reduce it use as much as possible.  An alternative would be BuSpar which is used for anxiety but it is not used as needed, it has to be taken on a regular daily dose.

## 2024-12-04 DIAGNOSIS — K22.70 BARRETT'S ESOPHAGUS WITHOUT DYSPLASIA: ICD-10-CM

## 2024-12-04 RX ORDER — PANTOPRAZOLE SODIUM 40 MG/1
40 TABLET, DELAYED RELEASE ORAL DAILY
Qty: 90 TABLET | Refills: 1 | Status: SHIPPED | OUTPATIENT
Start: 2024-12-04

## 2024-12-04 NOTE — TELEPHONE ENCOUNTER
Care Due:                  Date            Visit Type   Department     Provider  --------------------------------------------------------------------------------                                EP -                              PRIMARY      [facility]  Last Visit: 07-      CARE ([facility])   PRACTICE       Josh Berman  Next Visit: None Scheduled  None         None Found                                                            Last  Test          Frequency    Reason                     Performed    Due Date  --------------------------------------------------------------------------------    HBA1C.......  6 months...  metFORMIN................  07- 01-    [facility] Embedded Care Due Messages. Reference number: [id number].   12/04/2024 4:21:58 PM CST

## 2024-12-23 NOTE — TELEPHONE ENCOUNTER
Spoke with pt she stated that her daughter Radha calls and makes their appointments, I called pt's daughter no answer LVM to give the clinic a call back.

## 2024-12-24 ENCOUNTER — HOSPITAL ENCOUNTER (EMERGENCY)
Facility: HOSPITAL | Age: 89
Discharge: HOME | End: 2024-12-24
Attending: STUDENT IN AN ORGANIZED HEALTH CARE EDUCATION/TRAINING PROGRAM
Payer: MEDICARE

## 2024-12-24 ENCOUNTER — TELEPHONE (OUTPATIENT)
Dept: UROLOGY | Facility: CLINIC | Age: 89
End: 2024-12-24
Payer: MEDICARE

## 2024-12-24 VITALS
HEIGHT: 63 IN | OXYGEN SATURATION: 98 % | SYSTOLIC BLOOD PRESSURE: 121 MMHG | WEIGHT: 115 LBS | TEMPERATURE: 98 F | HEART RATE: 72 BPM | BODY MASS INDEX: 20.38 KG/M2 | DIASTOLIC BLOOD PRESSURE: 68 MMHG | RESPIRATION RATE: 18 BRPM

## 2024-12-24 DIAGNOSIS — N30.00 ACUTE CYSTITIS WITHOUT HEMATURIA: Primary | ICD-10-CM

## 2024-12-24 LAB
ALBUMIN SERPL BCP-MCNC: 3.9 G/DL (ref 3.5–5.2)
ALP SERPL-CCNC: 98 U/L (ref 40–150)
ALT SERPL W/O P-5'-P-CCNC: 22 U/L (ref 10–44)
ANION GAP SERPL CALC-SCNC: 11 MMOL/L (ref 8–16)
AST SERPL-CCNC: 26 U/L (ref 10–40)
BACTERIA #/AREA URNS HPF: ABNORMAL /HPF
BASOPHILS # BLD AUTO: 0.05 K/UL (ref 0–0.2)
BASOPHILS NFR BLD: 0.5 % (ref 0–1.9)
BILIRUB SERPL-MCNC: 1 MG/DL (ref 0.1–1)
BILIRUB UR QL STRIP: NEGATIVE
BUN SERPL-MCNC: 15 MG/DL (ref 8–23)
CALCIUM SERPL-MCNC: 9.3 MG/DL (ref 8.7–10.5)
CHLORIDE SERPL-SCNC: 102 MMOL/L (ref 95–110)
CLARITY UR: ABNORMAL
CO2 SERPL-SCNC: 22 MMOL/L (ref 23–29)
COLOR UR: ABNORMAL
CREAT SERPL-MCNC: 0.7 MG/DL (ref 0.5–1.4)
DIFFERENTIAL METHOD BLD: ABNORMAL
EOSINOPHIL # BLD AUTO: 0.1 K/UL (ref 0–0.5)
EOSINOPHIL NFR BLD: 1.2 % (ref 0–8)
ERYTHROCYTE [DISTWIDTH] IN BLOOD BY AUTOMATED COUNT: 12.8 % (ref 11.5–14.5)
EST. GFR  (NO RACE VARIABLE): >60 ML/MIN/1.73 M^2
GLUCOSE SERPL-MCNC: 133 MG/DL (ref 70–110)
GLUCOSE UR QL STRIP: NEGATIVE
HCT VFR BLD AUTO: 43.4 % (ref 37–48.5)
HCV AB SERPL QL IA: NEGATIVE
HGB BLD-MCNC: 14.3 G/DL (ref 12–16)
HGB UR QL STRIP: ABNORMAL
HIV 1+2 AB+HIV1 P24 AG SERPL QL IA: NEGATIVE
HYALINE CASTS #/AREA URNS LPF: 0 /LPF
IMM GRANULOCYTES # BLD AUTO: 0.05 K/UL (ref 0–0.04)
IMM GRANULOCYTES NFR BLD AUTO: 0.5 % (ref 0–0.5)
KETONES UR QL STRIP: NEGATIVE
LEUKOCYTE ESTERASE UR QL STRIP: ABNORMAL
LYMPHOCYTES # BLD AUTO: 2.1 K/UL (ref 1–4.8)
LYMPHOCYTES NFR BLD: 21.5 % (ref 18–48)
MCH RBC QN AUTO: 29.6 PG (ref 27–31)
MCHC RBC AUTO-ENTMCNC: 32.9 G/DL (ref 32–36)
MCV RBC AUTO: 90 FL (ref 82–98)
MICROSCOPIC COMMENT: ABNORMAL
MONOCYTES # BLD AUTO: 0.9 K/UL (ref 0.3–1)
MONOCYTES NFR BLD: 9 % (ref 4–15)
NEUTROPHILS # BLD AUTO: 6.4 K/UL (ref 1.8–7.7)
NEUTROPHILS NFR BLD: 67.3 % (ref 38–73)
NITRITE UR QL STRIP: NEGATIVE
NRBC BLD-RTO: 0 /100 WBC
PH UR STRIP: 7 [PH] (ref 5–8)
PLATELET # BLD AUTO: 244 K/UL (ref 150–450)
PMV BLD AUTO: 9.6 FL (ref 9.2–12.9)
POTASSIUM SERPL-SCNC: 4.2 MMOL/L (ref 3.5–5.1)
PROT SERPL-MCNC: 7.2 G/DL (ref 6–8.4)
PROT UR QL STRIP: ABNORMAL
RBC # BLD AUTO: 4.83 M/UL (ref 4–5.4)
RBC #/AREA URNS HPF: >100 /HPF (ref 0–4)
SODIUM SERPL-SCNC: 135 MMOL/L (ref 136–145)
SP GR UR STRIP: 1.01 (ref 1–1.03)
UNIDENT CRYS URNS QL MICRO: 3
URN SPEC COLLECT METH UR: ABNORMAL
UROBILINOGEN UR STRIP-ACNC: NEGATIVE EU/DL
WBC # BLD AUTO: 9.56 K/UL (ref 3.9–12.7)
WBC #/AREA URNS HPF: >100 /HPF (ref 0–5)
WBC CLUMPS URNS QL MICRO: ABNORMAL

## 2024-12-24 PROCEDURE — 99284 EMERGENCY DEPT VISIT MOD MDM: CPT | Mod: 25

## 2024-12-24 PROCEDURE — 86803 HEPATITIS C AB TEST: CPT | Performed by: STUDENT IN AN ORGANIZED HEALTH CARE EDUCATION/TRAINING PROGRAM

## 2024-12-24 PROCEDURE — 80053 COMPREHEN METABOLIC PANEL: CPT | Performed by: STUDENT IN AN ORGANIZED HEALTH CARE EDUCATION/TRAINING PROGRAM

## 2024-12-24 PROCEDURE — 96374 THER/PROPH/DIAG INJ IV PUSH: CPT

## 2024-12-24 PROCEDURE — 81000 URINALYSIS NONAUTO W/SCOPE: CPT | Performed by: STUDENT IN AN ORGANIZED HEALTH CARE EDUCATION/TRAINING PROGRAM

## 2024-12-24 PROCEDURE — 85025 COMPLETE CBC W/AUTO DIFF WBC: CPT | Performed by: STUDENT IN AN ORGANIZED HEALTH CARE EDUCATION/TRAINING PROGRAM

## 2024-12-24 PROCEDURE — 63600175 PHARM REV CODE 636 W HCPCS: Performed by: STUDENT IN AN ORGANIZED HEALTH CARE EDUCATION/TRAINING PROGRAM

## 2024-12-24 PROCEDURE — 36415 COLL VENOUS BLD VENIPUNCTURE: CPT | Performed by: STUDENT IN AN ORGANIZED HEALTH CARE EDUCATION/TRAINING PROGRAM

## 2024-12-24 PROCEDURE — 87389 HIV-1 AG W/HIV-1&-2 AB AG IA: CPT | Performed by: STUDENT IN AN ORGANIZED HEALTH CARE EDUCATION/TRAINING PROGRAM

## 2024-12-24 RX ORDER — CEFUROXIME AXETIL 500 MG/1
500 TABLET ORAL 2 TIMES DAILY
Qty: 14 TABLET | Refills: 0 | Status: SHIPPED | OUTPATIENT
Start: 2024-12-24 | End: 2024-12-31

## 2024-12-24 RX ORDER — CEFTRIAXONE 1 G/1
1 INJECTION, POWDER, FOR SOLUTION INTRAMUSCULAR; INTRAVENOUS
Status: COMPLETED | OUTPATIENT
Start: 2024-12-24 | End: 2024-12-24

## 2024-12-24 RX ADMIN — CEFTRIAXONE SODIUM 1 G: 1 INJECTION, POWDER, FOR SOLUTION INTRAMUSCULAR; INTRAVENOUS at 02:12

## 2024-12-24 NOTE — TELEPHONE ENCOUNTER
----- Message from Lidia sent at 12/24/2024 10:10 AM CST -----  Contact: araceli (daughter)  Type:  Same Day Appointment Request    Caller is requesting a same day appointment.  Caller declined first available appointment listed below.    Name of Caller:Araceli     When is the first available appointment? Monday 12/30    Symptoms: UTI symptoms and confusion    Best Call Back Number:     Additional Information: please call to advise    Thanks        Pharmacy:     [id number] - [place] - [address] AT [facility] [address]  [address]  [place] [address]  Phone: [phone] Fax: [phone]      Thanks

## 2024-12-24 NOTE — ED PROVIDER NOTES
Jesus Izaguirre Jr. is a 76 y.o. male.     Chief Complaint   Patient presents with   • Hyperlipidemia       HPI     Patient presents to the office today to follow-up on 2 weeks of antibiotics for his bilateral lower extremity ulcers.  States that the ulcers have improved.  Has had an increase in pain.  Swelling seems to be worse.  Blood work done at her last office visit showed an increase in his hemoglobin A1c at 8.7.  Taking Janumet.  Not adhering to proper diet and exercise.  Also suffers from neuropathy.  Taking Lasix 40 mg daily.    The following portions of the patient's history were reviewed and updated as appropriate: allergies, current medications, past family history, past medical history, past social history, past surgical history and problem list.    Review of Systems   Constitutional: Negative.    HENT: Negative.    Eyes: Negative.    Respiratory: Negative.    Cardiovascular: Positive for leg swelling.   Gastrointestinal: Negative.    Endocrine: Negative.    Genitourinary: Positive for frequency.   Musculoskeletal: Positive for gait problem.   Skin: Positive for wound.   Allergic/Immunologic: Negative.    Hematological: Negative.  Does not bruise/bleed easily.   Psychiatric/Behavioral: Positive for sleep disturbance.   All other systems reviewed and are negative.    I have reviewed and agree with documentation of above ROS.    Objective  Vitals:    09/03/19 1356   BP: 118/72   Pulse: 76   Resp: 17   Temp: 97.6 °F (36.4 °C)   SpO2: 93%       Physical Exam   Constitutional: He is oriented to person, place, and time. He appears well-developed and well-nourished. No distress.   HENT:   Head: Normocephalic and atraumatic.   Right Ear: External ear normal.   Left Ear: External ear normal.   Nose: Nose normal.   Mouth/Throat: Oropharynx is clear and moist.   Eyes: Conjunctivae and EOM are normal. Pupils are equal, round, and reactive to light. Right eye exhibits no discharge. Left eye exhibits no discharge.  Encounter Date: 12/24/2024       History     Chief Complaint   Patient presents with    Urinary Frequency    Back Pain     Family said she has been hallucinating as well. Pt thinks she has a uti     HPI    Genny Watson is a 89 y.o. female with a past medical history of type 2 diabetes, hypertension, hyperlipidemia, and recurrent UTIs that presents emergency department for concern for UTI.  Per daughter who is at the bedside, patient has intermittently been saying odd things but in general is answering questions appropriately.  Never having any fevers.  Patient is having urinary frequency and urgency for the past 3-4 days.  Also endorsing lower back pain.  Not having any fevers, nausea, vomiting, flank pain, or hematuria.  Eating and drinking well.  No GI symptoms otherwise.    Review of patient's allergies indicates:   Allergen Reactions    Sulfa (sulfonamide antibiotics)      Other reaction(s): Itching     Past Medical History:   Diagnosis Date    Abnormal CT scan, chest 01/31/2015    Abnormal nuclear stress test 01/26/2015    ALLERGIC RHINITIS 04/30/2012    Arthritis     Gimenez's esophagus     Breast cancer     right, s/p right mastectomy>20yr ago    Cataract     Diabetes mellitus type II     Diverticulitis     Diverticulosis     Fuchs' corneal dystrophy     Glaucoma     Hernia, hiatal     Hyperlipidemia     Hypertension     Hypertensive emergency 09/11/2023    Macular degeneration     Myocardial infarction     Postprocedural hypotension 06/17/2023    Pulmonary embolism     Renal manifestation of secondary diabetes mellitus     Skin cancer of face 12/2014    Dr M Weil     UTI (lower urinary tract infection)      Past Surgical History:   Procedure Laterality Date    angeogram      APPENDECTOMY      BREAST SURGERY      cardio stent   1/2015    Dr Dodson    COLONOSCOPY  around 2003    COLONOSCOPY N/A 1/13/2016    Procedure: COLONOSCOPY;  Surgeon: Mario Bhakta MD;  Location: [facility];  Service:  Endoscopy;  Laterality: N/A; repeat in 5 years for surveillance    CORNEAL TRANSPLANT      CYSTOSCOPY N/A 8/30/2023    Procedure: CYSTOSCOPY;  Surgeon: Mirna Harden MD;  Location[facility] OR;  Service: Urology;  Laterality: N/A;    EYE SURGERY      HIP ARTHROPLASTY Right 6/16/2023    Procedure: ARTHROPLASTY, HIP;  Surgeon: Josh Douglas II, MD;  Location: [facility] OR;  Service: Orthopedics;  Laterality: Right;    HYSTERECTOMY      ovaries removed    MASTECTOMY      right >20 years ago    STOMACH SURGERY      UPPER GASTROINTESTINAL ENDOSCOPY  01/13/2016    Dr. Moya     Family History   Problem Relation Name Age of Onset    Mental illness Mother      Liver cancer Mother      Early death Father      Heart disease Father      Celiac disease Sister      Diabetes Daughter      Diabetes Daughter      Hypertension Son      Hypertension Son      Hypertension Son      Early death Paternal Uncle      Heart disease Paternal Uncle      Breast cancer Neg Hx      Ovarian cancer Neg Hx      Colon cancer Neg Hx      Colon polyps Neg Hx      Esophageal cancer Neg Hx      Stomach cancer Neg Hx      Ulcerative colitis Neg Hx       Social History     Tobacco Use    Smoking status: Never    Smokeless tobacco: Never   Substance Use Topics    Alcohol use: No    Drug use: No     Review of Systems   Constitutional:  Negative for fever.   HENT:  Negative for sore throat.    Respiratory:  Negative for shortness of breath.    Cardiovascular:  Negative for chest pain.   Gastrointestinal:  Negative for nausea.   Genitourinary:  Positive for frequency and urgency. Negative for difficulty urinating, dysuria and hematuria.   Musculoskeletal:  Positive for back pain. Negative for neck pain.   Skin:  Negative for rash.   Neurological:  Negative for dizziness, weakness, numbness and headaches.   Hematological:  Does not bruise/bleed easily.       Physical Exam     Initial Vitals [12/24/24 1229]   BP Pulse Resp Temp SpO2   128/70 75 17 97.9  No scleral icterus.   Cardiovascular: Normal rate, regular rhythm and normal heart sounds.   Pulmonary/Chest: Effort normal and breath sounds normal. No respiratory distress. He has no wheezes. He has no rales.   Abdominal: Soft. Bowel sounds are normal. He exhibits no distension. There is no tenderness.   Musculoskeletal:   Bilateral lower extremities are edematous and erythematous.  Large ulcers that are actively draining clear liquid are noted.   Neurological: He is alert and oriented to person, place, and time.   Skin: Skin is warm and dry. He is not diaphoretic.   Nursing note and vitals reviewed.        Current Outpatient Medications:   •  amLODIPine-benazepril (LOTREL) 10-40 MG per capsule, Take 1 capsule by mouth Daily., Disp: 30 capsule, Rfl: 11  •  apixaban (ELIQUIS) 5 MG tablet tablet, Take 5 mg by mouth., Disp: , Rfl:   •  aspirin 325 MG tablet, Take 1 tablet by mouth Daily., Disp: 90 tablet, Rfl: 3  •  atorvastatin (LIPITOR) 80 MG tablet, Take 1 tablet by mouth every night at bedtime., Disp: 90 tablet, Rfl: 1  •  cephalexin (KEFLEX) 500 MG capsule, Take 1 capsule by mouth 2 (Two) Times a Day for 14 days., Disp: 28 capsule, Rfl: 0  •  furosemide (LASIX) 40 MG tablet, Take 2 tablets by mouth Daily., Disp: 180 tablet, Rfl: 0  •  glucose blood test strip, Amber Contour Next Test In Vitro Strip; Patient Sig: Amber Contour Next Test In Vitro Strip TEST ONCE DAILY; 50; 3; 04-Mar-2015; Active, Disp: 100 each, Rfl: 3  •  HYDROcodone-acetaminophen (NORCO) 7.5-325 MG per tablet, , Disp: , Rfl:   •  propafenone (RYTHMOL) 150 MG tablet, Take 1 tablet by mouth 2 (Two) Times a Day., Disp: 60 tablet, Rfl: 6  •  SITagliptin-MetFORMIN HCl ER (JANUMET XR)  MG tablet, Take 2 tablets by mouth Daily., Disp: 180 tablet, Rfl: 3  •  vitamin B-12 (CYANOCOBALAMIN) 1000 MCG tablet, Take  by mouth., Disp: , Rfl:   •  vitamin D (CHOLECALCIFEROL) 400 UNITS tablet, Take 400 Units by mouth daily., Disp: , Rfl:   •  omega-3 acid  ethyl esters (LOVAZA) 1 g capsule, , Disp: , Rfl:   •  sulfamethoxazole-trimethoprim (BACTRIM DS,SEPTRA DS) 800-160 MG per tablet, , Disp: , Rfl:   Current outpatient and discharge medications have been reconciled for the patient.  Reviewed by: Shailesh Souza MD      Procedures    Lab Results (most recent)     None                  Jesus was seen today for hyperlipidemia.    Diagnoses and all orders for this visit:    Type 2 diabetes mellitus with diabetic polyneuropathy, without long-term current use of insulin (CMS/[facility])    Neuropathy    Bilateral lower extremity edema    Multiple wounds of skin    Essential hypertension  -     furosemide (LASIX) 40 MG tablet; Take 2 tablets by mouth Daily.      Patient has wound care scheduled for Monday.  I am very concerned about the patient's skin ulcers.  Completed Keflex.  Advised patient to continue wrapping his legs at night with Ace wrap.  Discussed concerning signs and symptoms and reasons for an ER visit.  I discussed with the patient my desire to potentially send the patient to the ER but patient lobbied for waiting until wound care on Monday.  Will increase Lasix to 40 mg twice daily to see if this can help.    Return for As needed.      Shailesh Souza MD       °F (36.6 °C) 97 %      MAP       --         Physical Exam    Nursing note and vitals reviewed.  Constitutional: She appears well-developed and well-nourished.   HENT:   Head: Normocephalic and atraumatic.   Eyes: EOM are normal. Pupils are equal, round, and reactive to light.   Neck:   Normal range of motion.  Cardiovascular:  Normal rate, regular rhythm and normal heart sounds.           Pulmonary/Chest: Breath sounds normal. No respiratory distress.   Abdominal: Abdomen is soft. She exhibits no distension. There is no abdominal tenderness. There is no rebound and no guarding.   Musculoskeletal:         General: Normal range of motion.      Cervical back: Normal range of motion.     Neurological: She is alert and oriented to person, place, and time. She has normal strength. No cranial nerve deficit or sensory deficit. GCS score is 15. GCS eye subscore is 4. GCS verbal subscore is 5. GCS motor subscore is 6.   Skin: Capillary refill takes less than 2 seconds. No rash noted.   Psychiatric: She has a normal mood and affect. Thought content normal.         ED Course   Procedures  Labs Reviewed   URINALYSIS - Abnormal       Result Value    Specimen UA Urine, Catheterized      Color, UA [place] (*)     Appearance, UA Cloudy (*)     pH, UA 7.0      Specific Gravity, UA 1.010      Protein, UA 1+ (*)     Glucose, UA Negative      Ketones, UA Negative      Bilirubin (UA) Negative      Occult Blood UA 2+ (*)     Nitrite, UA Negative      Urobilinogen, UA Negative      Leukocytes, UA 3+ (*)    CBC W/ AUTO DIFFERENTIAL - Abnormal    WBC 9.56      RBC 4.83      Hemoglobin 14.3      Hematocrit 43.4      MCV 90      MCH 29.6      MCHC 32.9      RDW 12.8      Platelets 244      MPV 9.6      Immature Granulocytes 0.5      Gran # (ANC) 6.4      Immature Grans (Abs) 0.05 (*)     Lymph # 2.1      Mono # 0.9      Eos # 0.1      Baso # 0.05      nRBC 0      Gran % 67.3      Lymph % 21.5      Mono % 9.0      Eosinophil % 1.2      Basophil  % 0.5      Differential Method Automated      Narrative:     Collection has been rescheduled by [facility] at 12/24/2024 13:38 Reason:   Unable to collect   COMPREHENSIVE METABOLIC PANEL - Abnormal    Sodium 135 (*)     Potassium 4.2      Chloride 102      CO2 22 (*)     Glucose 133 (*)     BUN 15      Creatinine 0.7      Calcium 9.3      Total Protein 7.2      Albumin 3.9      Total Bilirubin 1.0      Alkaline Phosphatase 98      AST 26      ALT 22      eGFR >60      Anion Gap 11      Narrative:     Collection has been rescheduled by [facility] at 12/24/2024 13:38 Reason:   Unable to collect   URINALYSIS MICROSCOPIC - Abnormal    RBC, UA >100 (*)     WBC, UA >100 (*)     WBC Clumps, UA Many (*)     Bacteria Occasional      Hyaline Casts, UA 0      Unclass Mery UA 3      Microscopic Comment SEE COMMENT     HEPATITIS C ANTIBODY    Narrative:     Collection has been rescheduled by [facility] at 12/24/2024 13:38 Reason:   Unable to collect   HIV 1 / 2 ANTIBODY    Narrative:     Collection has been rescheduled by [facility] at 12/24/2024 13:38 Reason:   Unable to collect          Imaging Results    None          Medications   cefTRIAXone injection 1 g (1 g Intravenous Given 12/24/24 1411)     Medical Decision Making  Genny Watson is a 89 y.o. female with a past medical history of type 2 diabetes, hypertension, hyperlipidemia, and recurrent UTIs that presents emergency department for concern for UTI.  Vitals were stable.  Nontoxic.  Abdominal exam benign.  No CVA tenderness.  Minimal midline back tenderness.  Appears to be at neurologic baseline.  Answering questions appropriately.  Presentation most consistent with UTI.  Doubt pyelonephritis given the reassuring exam.  No leukocytosis seen on CBC.  CMP is unremarkable.  UA is grossly infected.  This was a catheterized specimen.  Given Rocephin.  Prescribing Ceftin and discharging home.  Return precautions given.  Instructed follow up with PCP.    Amount and/or Complexity of Data  Reviewed  Labs: ordered.    Risk  Prescription drug management.                                      Clinical Impression:  Final diagnoses:  [N30.00] Acute cystitis without hematuria (Primary)          ED Disposition Condition    Discharge Stable          ED Prescriptions       Medication Sig Dispense Start Date End Date Auth. Provider    cefUROXime (CEFTIN) 500 MG tablet Take 1 tablet (500 mg total) by mouth 2 (two) times daily. for 7 days 14 tablet 12/24/2024 12/31/2024 Idris Jade MD          Follow-up Information       Follow up With Specialties Details Why Contact Info Additional Information    [facility] - ED Emergency Medicine  As needed, If symptoms worsen [address] [place] [address] [address] [place] [address] 1st floor    Josh Berman MD Family Medicine Call in 2 days Follow up on ED visit [address] [facility] [address]  [facility] [address]  [phone]                Idris Jade MD  12/24/24 8268

## 2024-12-24 NOTE — TELEPHONE ENCOUNTER
Call placed to pt daughterRadha.  Advised to NP out of the office this week, and 12/30/24 is next available.   Recommended UC or ED.  Pt daughter v/u with no further questions or concerns voiced at this time.

## 2024-12-24 NOTE — ED NOTES
Pt identifiers checked and accurate with Genny Watson     Pt presents to ED with complaints of foul smelling urine and urinary frequency. Pt denies all other complaints at this time.

## 2024-12-24 NOTE — DISCHARGE INSTRUCTIONS
Please return to the emergency department if you have new or worsening symptoms.  Follow up with the primary care doctor in 3-5 days.  Begin taking Ceftin twice daily for 7 days.

## 2024-12-27 RX ORDER — ATORVASTATIN CALCIUM 80 MG/1
TABLET, FILM COATED ORAL
Qty: 90 TABLET | Refills: 0 | Status: SHIPPED | OUTPATIENT
Start: 2024-12-27

## 2025-01-09 ENCOUNTER — PATIENT OUTREACH (OUTPATIENT)
Dept: ADMINISTRATIVE | Facility: HOSPITAL | Age: 89
End: 2025-01-09
Payer: MEDICARE

## 2025-01-09 ENCOUNTER — OFFICE VISIT (OUTPATIENT)
Dept: FAMILY MEDICINE | Facility: CLINIC | Age: 89
End: 2025-01-09
Payer: MEDICARE

## 2025-01-09 VITALS
OXYGEN SATURATION: 98 % | WEIGHT: 129 LBS | BODY MASS INDEX: 22.86 KG/M2 | SYSTOLIC BLOOD PRESSURE: 108 MMHG | DIASTOLIC BLOOD PRESSURE: 68 MMHG | HEIGHT: 63 IN | TEMPERATURE: 97 F | HEART RATE: 77 BPM

## 2025-01-09 DIAGNOSIS — K22.70 BARRETT'S ESOPHAGUS WITHOUT DYSPLASIA: ICD-10-CM

## 2025-01-09 DIAGNOSIS — Z79.82 ASPIRIN LONG-TERM USE: ICD-10-CM

## 2025-01-09 DIAGNOSIS — I25.10 CORONARY ARTERY DISEASE INVOLVING NATIVE CORONARY ARTERY OF NATIVE HEART WITHOUT ANGINA PECTORIS: ICD-10-CM

## 2025-01-09 DIAGNOSIS — E78.5 HYPERLIPIDEMIA ASSOCIATED WITH TYPE 2 DIABETES MELLITUS: ICD-10-CM

## 2025-01-09 DIAGNOSIS — E11.69 HYPERLIPIDEMIA ASSOCIATED WITH TYPE 2 DIABETES MELLITUS: ICD-10-CM

## 2025-01-09 DIAGNOSIS — N39.0 URINARY TRACT INFECTION WITHOUT HEMATURIA, SITE UNSPECIFIED: ICD-10-CM

## 2025-01-09 DIAGNOSIS — Z51.81 VISIT FOR MONITORING PLAVIX THERAPY: ICD-10-CM

## 2025-01-09 DIAGNOSIS — I70.0 AORTIC ATHEROSCLEROSIS: ICD-10-CM

## 2025-01-09 DIAGNOSIS — Z95.5 S/P DRUG ELUTING CORONARY STENT PLACEMENT: ICD-10-CM

## 2025-01-09 DIAGNOSIS — F41.9 ANXIETY: ICD-10-CM

## 2025-01-09 DIAGNOSIS — Z99.89 USES WALKER: ICD-10-CM

## 2025-01-09 DIAGNOSIS — Z79.02 VISIT FOR MONITORING PLAVIX THERAPY: ICD-10-CM

## 2025-01-09 DIAGNOSIS — K21.9 GASTROESOPHAGEAL REFLUX DISEASE, UNSPECIFIED WHETHER ESOPHAGITIS PRESENT: ICD-10-CM

## 2025-01-09 DIAGNOSIS — R80.9 TYPE 2 DIABETES MELLITUS WITH DIABETIC MICROALBUMINURIA, WITHOUT LONG-TERM CURRENT USE OF INSULIN: Primary | ICD-10-CM

## 2025-01-09 DIAGNOSIS — I10 ESSENTIAL HYPERTENSION: ICD-10-CM

## 2025-01-09 DIAGNOSIS — Z95.5 STENTED CORONARY ARTERY: ICD-10-CM

## 2025-01-09 DIAGNOSIS — E11.29 TYPE 2 DIABETES MELLITUS WITH DIABETIC MICROALBUMINURIA, WITHOUT LONG-TERM CURRENT USE OF INSULIN: Primary | ICD-10-CM

## 2025-01-09 DIAGNOSIS — Z94.7 HISTORY OF CORNEAL TRANSPLANT: ICD-10-CM

## 2025-01-09 LAB
BILIRUBIN, UA POC OHS: NEGATIVE
BLOOD, UA POC OHS: NEGATIVE
CLARITY, UA POC OHS: ABNORMAL
COLOR, UA POC OHS: YELLOW
GLUCOSE, UA POC OHS: NEGATIVE
KETONES, UA POC OHS: NEGATIVE
LEUKOCYTES, UA POC OHS: ABNORMAL
NITRITE, UA POC OHS: NEGATIVE
PH, UA POC OHS: 7
PROTEIN, UA POC OHS: 30
SPECIFIC GRAVITY, UA POC OHS: 1.01
UROBILINOGEN, UA POC OHS: 0.2

## 2025-01-09 PROCEDURE — 99214 OFFICE O/P EST MOD 30 MIN: CPT | Mod: PBBFAC,PN | Performed by: FAMILY MEDICINE

## 2025-01-09 PROCEDURE — 99214 OFFICE O/P EST MOD 30 MIN: CPT | Mod: S$PBB,,, | Performed by: FAMILY MEDICINE

## 2025-01-09 PROCEDURE — 81003 URINALYSIS AUTO W/O SCOPE: CPT | Mod: PBBFAC,PN | Performed by: FAMILY MEDICINE

## 2025-01-09 PROCEDURE — G2211 COMPLEX E/M VISIT ADD ON: HCPCS | Mod: S$PBB,,, | Performed by: FAMILY MEDICINE

## 2025-01-09 PROCEDURE — 99999 PR PBB SHADOW E&M-EST. PATIENT-LVL IV: CPT | Mod: PBBFAC,,, | Performed by: FAMILY MEDICINE

## 2025-01-09 PROCEDURE — 87086 URINE CULTURE/COLONY COUNT: CPT | Performed by: FAMILY MEDICINE

## 2025-01-09 PROCEDURE — 99999PBSHW POCT URINALYSIS(INSTRUMENT): Mod: PBBFAC,,,

## 2025-01-09 RX ORDER — CLOPIDOGREL BISULFATE 75 MG/1
75 TABLET ORAL DAILY
Qty: 90 TABLET | Refills: 1 | Status: SHIPPED | OUTPATIENT
Start: 2025-01-09

## 2025-01-09 RX ORDER — METOPROLOL SUCCINATE 25 MG/1
25 TABLET, EXTENDED RELEASE ORAL NIGHTLY
Qty: 90 TABLET | Refills: 1 | Status: SHIPPED | OUTPATIENT
Start: 2025-01-09

## 2025-01-09 RX ORDER — PAROXETINE 10 MG/1
10 TABLET, FILM COATED ORAL EVERY MORNING
Qty: 30 TABLET | Refills: 5 | Status: SHIPPED | OUTPATIENT
Start: 2025-01-09 | End: 2026-01-09

## 2025-01-09 RX ORDER — AMLODIPINE BESYLATE 10 MG/1
10 TABLET ORAL DAILY
Qty: 90 TABLET | Refills: 1 | Status: SHIPPED | OUTPATIENT
Start: 2025-01-09

## 2025-01-09 RX ORDER — ATORVASTATIN CALCIUM 80 MG/1
TABLET, FILM COATED ORAL
Qty: 90 TABLET | Refills: 1 | Status: SHIPPED | OUTPATIENT
Start: 2025-01-09

## 2025-01-09 RX ORDER — PANTOPRAZOLE SODIUM 40 MG/1
40 TABLET, DELAYED RELEASE ORAL DAILY
Qty: 90 TABLET | Refills: 1 | Status: SHIPPED | OUTPATIENT
Start: 2025-01-09

## 2025-01-09 RX ORDER — METFORMIN HYDROCHLORIDE 500 MG/1
500 TABLET, EXTENDED RELEASE ORAL NIGHTLY
Qty: 90 TABLET | Refills: 1 | Status: SHIPPED | OUTPATIENT
Start: 2025-01-09

## 2025-01-09 NOTE — PATIENT INSTRUCTIONS
All medications will be sent to pharm after 5:30 pm today       Follow up 3 months with labs (fasting 8 hr)

## 2025-01-09 NOTE — PROGRESS NOTES
"Population Health Chart Review & Patient Outreach Details      Additional Pop Health Notes:    Chart Routed "CC'd" from Jameson Andino III, MD to retrieve and upload external Report           Updates Requested / Reviewed:      Updated Care Coordination Note, Care Everywhere, , and Care Team Updated         Health Maintenance Topics Overdue:      [facility] Score: 1     Eye Exam    Shingles/Zoster Vaccine  RSV Vaccine                  Health Maintenance Topic(s) Outreach Outcomes & Actions Taken:    Eye Exam - Outreach Outcomes & Actions Taken  : External Records Requested & Care Team Updated if Applicable    "

## 2025-01-09 NOTE — LETTER
AUTHORIZATION FOR RELEASE OF   CONFIDENTIAL INFORMATION    Darren Alfaro MD    We are seeing Genny Watson, date of birth 1935, in the clinic at [facility]. Jameson Andino III, MD is the patient's PCP. Genny Watson has an outstanding lab/procedure at the time we reviewed her chart. In order to help keep her health information updated, she has authorized us to request the following medical record(s):          EYE EXAM            Please fax records to [phone] or email to [email or web address].    Thank you So much!               If you have any questions, please contact Daxa Casey, Care Coordinator   at [phone].            Patient Name: Genny Watson  : 1935  Patient Phone #: [phone]

## 2025-01-10 PROBLEM — K21.9 GASTROESOPHAGEAL REFLUX DISEASE: Status: ACTIVE | Noted: 2025-01-10

## 2025-01-10 PROBLEM — Z79.82 ASPIRIN LONG-TERM USE: Status: ACTIVE | Noted: 2025-01-10

## 2025-01-10 PROBLEM — Z94.7 HISTORY OF CORNEAL TRANSPLANT: Status: ACTIVE | Noted: 2025-01-10

## 2025-01-10 PROBLEM — Z79.02 VISIT FOR MONITORING PLAVIX THERAPY: Status: ACTIVE | Noted: 2025-01-10

## 2025-01-10 PROBLEM — Z99.89 USES WALKER: Status: ACTIVE | Noted: 2025-01-10

## 2025-01-10 PROBLEM — Z51.81 VISIT FOR MONITORING PLAVIX THERAPY: Status: ACTIVE | Noted: 2025-01-10

## 2025-01-10 PROBLEM — I70.0 AORTIC ATHEROSCLEROSIS: Status: ACTIVE | Noted: 2025-01-10

## 2025-01-11 LAB
BACTERIA UR CULT: NORMAL
BACTERIA UR CULT: NORMAL

## 2025-01-11 NOTE — PROGRESS NOTES
Subjective:       Patient ID: Genny Watson is a 89 y.o. female.    Chief Complaint: Hospital Follow Up and Establish Care (Discharged 12/24)    New patient visit.  Emergency room follow-up seeing Dr. Berman previously.  Needs refill of amlodipine hydroxyzine and pantoprazole.  ER visit December 24th.  Prior corneal transplant which failed.  Very poor vision.  Anxiety on Paxil.  Uses aspirin and Plavix.  Type 2 diabetes.  Uses walker.  Hypertension which is controlled.  Hyperlipidemia.  UTI discharge from ER on Ceftin for 7 days.  History of coronary stent.  Diabetes mellitus type 2 with microalbuminuria.  Gastroesophageal reflux disease without dysphagia.  History of Fuchs corneal dystrophy.  Urinalysis today shows leukocytes once again.  GFR is over 60.  CMP glucose 133 CBC normal.  Culture and sensitivity done by ER was not helpful.  She did have 3+ leukocytes at the ER.    Physical examination.  Vital signs noted.  Elderly female in wheelchair.  Cornea is cloudy.  Neck without bruit supple.  Chest clear.  Heart regular rate rhythm.  Abdomen bowel sounds positive soft no CVA tenderness.  Extremities without edema.        Objective:        Assessment:       1. Type 2 diabetes mellitus with diabetic microalbuminuria, without long-term current use of insulin    2. Anxiety    3. Gimenez's esophagus without dysplasia    4. Essential hypertension    5. Coronary artery disease involving native coronary artery of native heart without angina pectoris    6. S/P drug eluting coronary stent placement    7. Urinary tract infection without hematuria, site unspecified    8. Uses walker    9. Hyperlipidemia associated with type 2 diabetes mellitus    10. Stented coronary artery    11. Gastroesophageal reflux disease, unspecified whether esophagitis present    12. History of corneal transplant    13. Aspirin long-term use    14. Visit for monitoring Plavix therapy    15. Aortic atherosclerosis        Plan:       Type 2  diabetes mellitus with diabetic microalbuminuria, without long-term current use of insulin  -     Cancel: Hemoglobin A1C; Future; Expected date: 01/09/2025  -     Cancel: Comprehensive Metabolic Panel; Future; Expected date: 01/09/2025  -     Cancel: Lipid Panel; Future; Expected date: 01/09/2025  -     metFORMIN (GLUCOPHAGE-XR) 500 MG ER 24hr tablet; Take 1 tablet (500 mg total) by mouth every evening.  Dispense: 90 tablet; Refill: 1    Anxiety  -     paroxetine (PAXIL) 10 MG tablet; Take 1 tablet (10 mg total) by mouth every morning.  Dispense: 30 tablet; Refill: 5    Gimenez's esophagus without dysplasia  -     pantoprazole (PROTONIX) 40 MG tablet; Take 1 tablet (40 mg total) by mouth once daily.  Dispense: 90 tablet; Refill: 1    Essential hypertension  -     Cancel: Hemoglobin A1C; Future; Expected date: 01/09/2025  -     Cancel: Comprehensive Metabolic Panel; Future; Expected date: 01/09/2025  -     Cancel: Lipid Panel; Future; Expected date: 01/09/2025  -     Hemoglobin A1C; Future; Expected date: 01/09/2025  -     Comprehensive Metabolic Panel; Future; Expected date: 01/09/2025  -     Lipid Panel; Future; Expected date: 01/09/2025  -     metoprolol succinate (TOPROL-XL) 25 MG 24 hr tablet; Take 1 tablet (25 mg total) by mouth every evening.  Dispense: 90 tablet; Refill: 1  -     amLODIPine (NORVASC) 10 MG tablet; Take 1 tablet (10 mg total) by mouth once daily.  Dispense: 90 tablet; Refill: 1    Coronary artery disease involving native coronary artery of native heart without angina pectoris  -     metoprolol succinate (TOPROL-XL) 25 MG 24 hr tablet; Take 1 tablet (25 mg total) by mouth every evening.  Dispense: 90 tablet; Refill: 1    S/P drug eluting coronary stent placement  -     clopidogreL (PLAVIX) 75 mg tablet; Take 1 tablet (75 mg total) by mouth once daily.  Dispense: 90 tablet; Refill: 1    Urinary tract infection without hematuria, site unspecified  -     Urine Culture High Risk  -     POCT  Urinalysis(Instrument)    Uses walker    Hyperlipidemia associated with type 2 diabetes mellitus  -     Hemoglobin A1C; Future; Expected date: 01/09/2025  -     Comprehensive Metabolic Panel; Future; Expected date: 01/09/2025  -     Lipid Panel; Future; Expected date: 01/09/2025    Stented coronary artery    Gastroesophageal reflux disease, unspecified whether esophagitis present    History of corneal transplant    Aspirin long-term use    Visit for monitoring Plavix therapy    Aortic atherosclerosis    Other orders  -     atorvastatin (LIPITOR) 80 MG tablet; TAKE 1 TABLET DAILY  Dispense: 90 tablet; Refill: 1    Will send a urine culture and sensitivity be sure the infection is cleared.  Get a copy of eye exam from Dr. Alfaro.  Follow-up in 3 months with my nurse practitioner with an A1c CMP and lipids.  RSV vaccine recommended.  Refill medications 90 day supply with 1 refill.

## 2025-01-14 ENCOUNTER — PATIENT OUTREACH (OUTPATIENT)
Dept: ADMINISTRATIVE | Facility: HOSPITAL | Age: 89
End: 2025-01-14
Payer: MEDICARE

## 2025-01-27 ENCOUNTER — TELEPHONE (OUTPATIENT)
Dept: UROLOGY | Facility: CLINIC | Age: 89
End: 2025-01-27
Payer: MEDICARE

## 2025-01-27 NOTE — TELEPHONE ENCOUNTER
----- Message from Darian sent at 1/27/2025  9:52 AM CST -----  Contact: Daughter  Type:  Sooner Appointment Request    Caller is requesting a sooner appointment.  Caller declined first available appointment listed below.  Caller will not accept being placed on the waitlist and is requesting a message be sent to doctor.    Name of Caller:  Daughter/Lisa  When is the first available appointment?  2/3  Symptoms:  poss UTI  Would the patient rather a call back or a response via [facility]? Call  Best Call Back Number:  [phone]  Additional Information:

## 2025-01-27 NOTE — TELEPHONE ENCOUNTER
Spoke with patient's daughter, Lisa, explained if patient is having UTI symptoms associated with confusion she will need to bring her to the ED or Urgent Care. Explained I moved f/u appointment to 02/03 but would not recommend waiting this long to be seen for UTI. Patient's daughter voiced understanding and stated she will bring patient to Urgent Care. Explained to please bring all records if not an [facility] facility

## 2025-02-03 ENCOUNTER — OFFICE VISIT (OUTPATIENT)
Dept: UROLOGY | Facility: CLINIC | Age: 89
End: 2025-02-03
Payer: MEDICARE

## 2025-02-03 VITALS — WEIGHT: 128.06 LBS | BODY MASS INDEX: 22.69 KG/M2 | HEIGHT: 63 IN

## 2025-02-03 DIAGNOSIS — N39.0 RECURRENT UTI: Primary | ICD-10-CM

## 2025-02-03 LAB — POC RESIDUAL URINE VOLUME: 141 ML (ref 0–100)

## 2025-02-03 PROCEDURE — 99999PBSHW POCT BLADDER SCAN: Mod: PBBFAC,,,

## 2025-02-03 PROCEDURE — 51798 US URINE CAPACITY MEASURE: CPT | Mod: PBBFAC,PO | Performed by: NURSE PRACTITIONER

## 2025-02-03 PROCEDURE — G2211 COMPLEX E/M VISIT ADD ON: HCPCS | Mod: S$PBB,,, | Performed by: NURSE PRACTITIONER

## 2025-02-03 PROCEDURE — 99214 OFFICE O/P EST MOD 30 MIN: CPT | Mod: PBBFAC,PO | Performed by: NURSE PRACTITIONER

## 2025-02-03 PROCEDURE — 99999 PR PBB SHADOW E&M-EST. PATIENT-LVL IV: CPT | Mod: PBBFAC,,, | Performed by: NURSE PRACTITIONER

## 2025-02-03 PROCEDURE — 99214 OFFICE O/P EST MOD 30 MIN: CPT | Mod: S$PBB,,, | Performed by: NURSE PRACTITIONER

## 2025-02-03 NOTE — PROGRESS NOTES
[facility]  Group: Antione/Lianet/Fina/Dixon  NPs: Janay Garcia/Randi Fitch    Note today written by:Randi Fitch  Date of Service: 02/03/2025      CHIEF COMPLAINT: F/u UTI.    PRESENTING ILLNESS:    Genny Watson is a 89 y.o. female who presents for f/u UTI. Last clinic visit was 8/26/24    2/3/25  Went to ED 12/24 for urinary frequency, back pain and hallucinations. Diagnosed with UTI based on UA. No culture obtained. Rocephin given in ED and d/c home with ceftin  F/u with PCP 1/9/25. Urine culture 1/9/25, resulted MO  1/27/25 seen at urgent care for urinary frequency. Unable to void. No specimen collected. No antibiotics given    Pt presents today for f/u UTI  Denies UTI symptoms today  No longer having urinary frequency  Denies urinary incontinence.  Voids every 2-3 hours.  Unable to void in clinic  Bladder scan 141 ml    Restarted estrace cream twice a week, restarted last week.  Was not using when UTI occurred last month or in December.    + diarrhea, has not followed up with GI or PCP regarding ongoing diarrhea    8/26/24  No UTIs since last visit  Using estrace cream 2 x per week as ordered, needs refill  Taking probiotic  Voiding every 2 hours  Denies constipation; + occasional diarrhea  Good water intake  Denies UTI symptoms today. Denies dysuria, gross hematuria, flank pain, fever, chills, nausea or vomiting.    2/26/24  Pt presents for f/u UTI  Denies UTI symptoms today. Denies dysuria, gross hematuria, flank pain, fever, chills, nausea or vomiting.   Pt has not been using estrace cream as prescribed  Taking probiotics  Drinks 6 glasses water per day  Constipation improved per pt  PVR 65 ml    8/30/23 s/p cysto    8/22/23  Patient recently had episodes of gross hematuria in setting of a UTI. Most recently in ED on 8/10. She has undergone a CT which showed only bladder wall thickening.   Culture 8/3/23: E coli      Currently  has a yung in place x 2 weeks. No further hematuria.   Has not been using vaginal estrogen cream recently.   Having a BM every 2-4 days.  Not drinking enough water.      2/6/23  Has been using vaginal estrogen cream twice a week.   No UTIs since last visit.   No hematuria or dysuria.   Has a bowel movement every 2-3 days.      cc     8/3/22  Patient seen by NP in March and PVR was <300 cc. Was then seen again in June with inability to urinate and 350 cc drained at that time.   Both times has had a positive urine culture however were asymptomatic.  She has no bothersome urinary complaints.   Her renal function is normal.      Has been using vaginal estrogen cream.      Bladder scan today: 342 cc (unable to void)     11/29/21  Had E coli UTI in August. Unsure if she was having symptoms at that time.   Today her symptoms are frequency. No incontinence. No dysuria. No gross hematuria.   Having a daily bowel movement.   Drinks 4-5 glasses a day of water. Drinks 1 cup of coffee in the am.      Cr 6/29/21: 0.8  PVR today 305 cc        5/27/21  Patient presents for hematuria. She states her urine looked orange, no blood in urine and no clots. She thinks maybe she had bleeding from her hemorrhoids.   She had a UA done on 5/17 which showed 6 RBC, >100 WBC, nitrite negative. Urine culture no growth.   She underwent CT Urogram which showed no hydro, no stones, no filling defects. Her bladder is fairly distended.      She has no significant bothersome urinary complaints.   No history of stones.  She does have UTIs and her symptoms include dysuria. Usually goes to urgent care for this.      She a bowel movement every 3 days.   Drinks 2 bottles of water a day, 1 cup of coffee.   She states she feels like she is emptying her bladder better at home, she has a shy bladder.      UA today: +blood and leuks, nitrite negative  PVR today: 390 cc (catheterized)      Urine cultures:   Lab Results   Component Value Date    LABURIN   01/09/2025     Multiple organisms isolated. None in predominance.  Repeat if    LABURIN clinically necessary. 01/09/2025    LABURIN  06/07/2024     Multiple organisms isolated. None in predominance.  Repeat if    LABURIN clinically necessary. 06/07/2024    LABURIN No growth 01/15/2024    LABURIN No growth 08/10/2023    LABURIN ESCHERICHIA COLI  >100,000 cfu/ml   (A) 08/03/2023    LABURIN No growth 06/16/2023    LABURIN No significant growth 05/02/2023    LABURIN ESCHERICHIA COLI  >100,000 cfu/ml   (A) 06/20/2022         REVIEW OF SYSTEMS: as stated above in hpi    PATIENT HISTORY:  Past Medical History:   Diagnosis Date    Abnormal CT scan, chest 01/31/2015    Abnormal nuclear stress test 01/26/2015    ALLERGIC RHINITIS 04/30/2012    Arthritis     Gimenez's esophagus     Breast cancer     right, s/p right mastectomy>20yr ago    Cataract     Diabetes mellitus type II     Diverticulitis     Diverticulosis     Fuchs' corneal dystrophy     Glaucoma     Hernia, hiatal     Hyperlipidemia     Hypertension     Hypertensive emergency 09/11/2023    Macular degeneration     Myocardial infarction     Postprocedural hypotension 06/17/2023    Pulmonary embolism     Renal manifestation of secondary diabetes mellitus     Skin cancer of face 12/2014    Dr M Weil     UTI (lower urinary tract infection)        Past Surgical History:   Procedure Laterality Date    angeogram      APPENDECTOMY      BREAST SURGERY      cardio stent   1/2015    Dr Dodson    COLONOSCOPY  around 2003    COLONOSCOPY N/A 1/13/2016    Procedure: COLONOSCOPY;  Surgeon: Mario Bhakta MD;  Location: [facility];  Service: Endoscopy;  Laterality: N/A; repeat in 5 years for surveillance    CORNEAL TRANSPLANT      CYSTOSCOPY N/A 8/30/2023    Procedure: CYSTOSCOPY;  Surgeon: Mirna Harden MD;  Location: [facility] OR;  Service: Urology;  Laterality: N/A;    EYE SURGERY      HIP ARTHROPLASTY Right 6/16/2023    Procedure: ARTHROPLASTY, HIP;  Surgeon: Josh VASQUEZ  Misael HOWE MD;  Location: [facility];  Service: Orthopedics;  Laterality: Right;    HYSTERECTOMY      ovaries removed    MASTECTOMY      right >20 years ago    STOMACH SURGERY      UPPER GASTROINTESTINAL ENDOSCOPY  01/13/2016    Dr. Moya       Allergies:  Sulfa (sulfonamide antibiotics)      PHYSICAL EXAMINATION:    Constitutional: She is oriented to person, place, and time. She appears well-developed and well-nourished.  She is in no apparent distress.  Abdominal:  She exhibits no distension.  There is no CVA tenderness.   Neurological: She is alert and oriented to person, place, and time.   Psych: Cooperative with normal affect.    Physical Exam    LABS:      Lab Results   Component Value Date    CREATININE 0.7 12/24/2024     Lab Results   Component Value Date    HGBA1C 6.0 (H) 07/19/2024       IMPRESSION:    Encounter Diagnoses   Name Primary?    Recurrent UTI Yes         PLAN:  -Offered IO cath specimen since unable to void in clinic.  Pt prefers to go to lab to give micro UA and culture  Will call with results and treat based on results    -Continue UTI precautions:  Avoid constipation.  Drink lots of water  Void every 2-3 hrs regardless of urge  Void soon after urge arises. Do not hold urine once urge occurs.  OVER THE COUNTER:  Utiva Cranberry supplement- need 36mg of proanthocyanidins (PAC) in supplement- helps to block bacteria from attaching to bladder wall.  Probiotic with Lactobacillus  Continue Estrace- apply a pea sized amount to vagina 2x weekly at night. No refills needed    -RTC 6 months (PVR)    I encouraged her or any of her family members to call or email me with questions and/or concerns.    Visit today is associated with current or anticipated ongoing medical care related to this patient's single serious condition/complex condition, recurrent UTI    30 minutes of total time spent on the encounter, which includes face to face time and non-face to face time preparing to see the patient (eg,  review of tests), Obtaining and/or reviewing separately obtained history, Documenting clinical information in the electronic or other health record, Independently interpreting results (not separately reported) and communicating results to the patient/family/caregiver, or Care coordination (not separately reported).

## 2025-02-03 NOTE — PATIENT INSTRUCTIONS
UTI precautions:  Avoid constipation.  Drink lots of water  Void every 2-3 hrs regardless of urge  Void soon after urge arises. Do not hold urine once urge occurs.    OVER THE COUNTER:  Utiva Cranberry supplement- need 36mg of proanthocyanidins (PAC) in supplement- helps to block bacteria from attaching to bladder wall.  Probiotic with Lactobacillus    Prescription:  Estrace- apply a pea sized amount to vagina 2x weekly at night.

## 2025-02-05 ENCOUNTER — LAB VISIT (OUTPATIENT)
Dept: LAB | Facility: HOSPITAL | Age: 89
End: 2025-02-05
Attending: NURSE PRACTITIONER
Payer: MEDICARE

## 2025-02-05 ENCOUNTER — OFFICE VISIT (OUTPATIENT)
Dept: CARDIOLOGY | Facility: CLINIC | Age: 89
End: 2025-02-05
Payer: MEDICARE

## 2025-02-05 VITALS
OXYGEN SATURATION: 96 % | DIASTOLIC BLOOD PRESSURE: 80 MMHG | WEIGHT: 131 LBS | BODY MASS INDEX: 23.21 KG/M2 | HEART RATE: 82 BPM | SYSTOLIC BLOOD PRESSURE: 138 MMHG

## 2025-02-05 DIAGNOSIS — N39.0 RECURRENT UTI: ICD-10-CM

## 2025-02-05 DIAGNOSIS — E11.59 HYPERTENSION ASSOCIATED WITH DIABETES: ICD-10-CM

## 2025-02-05 DIAGNOSIS — I15.2 HYPERTENSION ASSOCIATED WITH DIABETES: ICD-10-CM

## 2025-02-05 DIAGNOSIS — E11.59 TYPE 2 DIABETES MELLITUS WITH OTHER CIRCULATORY COMPLICATION, WITHOUT LONG-TERM CURRENT USE OF INSULIN: ICD-10-CM

## 2025-02-05 DIAGNOSIS — I25.10 CORONARY ARTERY DISEASE INVOLVING NATIVE CORONARY ARTERY OF NATIVE HEART WITHOUT ANGINA PECTORIS: Primary | ICD-10-CM

## 2025-02-05 LAB
BACTERIA #/AREA URNS HPF: ABNORMAL /HPF
MICROSCOPIC COMMENT: ABNORMAL
RBC #/AREA URNS HPF: 24 /HPF (ref 0–4)
SQUAMOUS #/AREA URNS HPF: 3 /HPF
WBC #/AREA URNS HPF: >100 /HPF (ref 0–5)
WBC CLUMPS URNS QL MICRO: ABNORMAL

## 2025-02-05 PROCEDURE — 99213 OFFICE O/P EST LOW 20 MIN: CPT | Mod: PBBFAC,PN | Performed by: NURSE PRACTITIONER

## 2025-02-05 PROCEDURE — 99214 OFFICE O/P EST MOD 30 MIN: CPT | Mod: S$PBB,,, | Performed by: NURSE PRACTITIONER

## 2025-02-05 PROCEDURE — 93010 ELECTROCARDIOGRAM REPORT: CPT | Mod: S$PBB,,, | Performed by: INTERNAL MEDICINE

## 2025-02-05 PROCEDURE — 99999 PR PBB SHADOW E&M-EST. PATIENT-LVL III: CPT | Mod: PBBFAC,,, | Performed by: NURSE PRACTITIONER

## 2025-02-05 PROCEDURE — 87086 URINE CULTURE/COLONY COUNT: CPT | Performed by: NURSE PRACTITIONER

## 2025-02-05 PROCEDURE — 81000 URINALYSIS NONAUTO W/SCOPE: CPT | Performed by: NURSE PRACTITIONER

## 2025-02-05 PROCEDURE — 87186 SC STD MICRODIL/AGAR DIL: CPT | Performed by: NURSE PRACTITIONER

## 2025-02-05 PROCEDURE — 93005 ELECTROCARDIOGRAM TRACING: CPT | Mod: PBBFAC,PN | Performed by: INTERNAL MEDICINE

## 2025-02-05 NOTE — ASSESSMENT & PLAN NOTE
Asymptomatic.  EKG when stable chronic abnormalities without any significant change from previous.

## 2025-02-05 NOTE — PROGRESS NOTES
Subjective:    Patient ID:  Genny Watson is a 89 y.o. female.  Chief Complaint   Patient presents with    Hypertension    Hyperlipidemia    Coronary Artery Disease       HPI:  Patient presents today for follow-up appointment.  Patient has no complaints of chest pain, dizziness, shortness of breath, palpitations, or syncope.  Patient has been doing well and taking medications as ordered.  Denies any falls or head injuries.  Denies any blood in the stool or in the urine.  Patient's daughter is with her today and states that she does get weak at times and has to use a walker.  Today during her visit she walked quite a ways from the parking garage into the office and did become fatigued however she denied any chest discomfort or trouble breathing.      Review of patient's allergies indicates:   Allergen Reactions    Sulfa (sulfonamide antibiotics)      Other reaction(s): Itching       Past Medical History:   Diagnosis Date    Abnormal CT scan, chest 01/31/2015    Abnormal nuclear stress test 01/26/2015    ALLERGIC RHINITIS 04/30/2012    Arthritis     Gimenez's esophagus     Breast cancer     right, s/p right mastectomy>20yr ago    Cataract     Diabetes mellitus type II     Diverticulitis     Diverticulosis     Fuchs' corneal dystrophy     Glaucoma     Hernia, hiatal     Hyperlipidemia     Hypertension     Hypertensive emergency 09/11/2023    Macular degeneration     Myocardial infarction     Postprocedural hypotension 06/17/2023    Pulmonary embolism     Renal manifestation of secondary diabetes mellitus     Skin cancer of face 12/2014    Dr M Weil     UTI (lower urinary tract infection)      Past Surgical History:   Procedure Laterality Date    angeogram      APPENDECTOMY      BREAST SURGERY      cardio stent   1/2015    Dr Dodson    COLONOSCOPY  around 2003    COLONOSCOPY N/A 1/13/2016    Procedure: COLONOSCOPY;  Surgeon: Mario Bhakta MD;  Location: [facility];  Service: Endoscopy;  Laterality: N/A;  repeat in 5 years for surveillance    CORNEAL TRANSPLANT      CYSTOSCOPY N/A 8/30/2023    Procedure: CYSTOSCOPY;  Surgeon: Mirna Harden MD;  Location: [facility] OR;  Service: Urology;  Laterality: N/A;    EYE SURGERY      HIP ARTHROPLASTY Right 6/16/2023    Procedure: ARTHROPLASTY, HIP;  Surgeon: Josh Douglas II, MD;  Location: [facility] OR;  Service: Orthopedics;  Laterality: Right;    HYSTERECTOMY      ovaries removed    MASTECTOMY      right >20 years ago    STOMACH SURGERY      UPPER GASTROINTESTINAL ENDOSCOPY  01/13/2016    Dr. Moya     Social History     Tobacco Use    Smoking status: Never    Smokeless tobacco: Never   Substance Use Topics    Alcohol use: No    Drug use: No     Family History   Problem Relation Name Age of Onset    Mental illness Mother      Liver cancer Mother      Early death Father      Heart disease Father      Celiac disease Sister      Diabetes Daughter      Diabetes Daughter      Hypertension Son      Hypertension Son      Hypertension Son      Early death Paternal Uncle      Heart disease Paternal Uncle      Breast cancer Neg Hx      Ovarian cancer Neg Hx      Colon cancer Neg Hx      Colon polyps Neg Hx      Esophageal cancer Neg Hx      Stomach cancer Neg Hx      Ulcerative colitis Neg Hx          Review of Systems:   Per HPI         Objective:        Vitals:    02/05/25 1459   BP: 138/80   Pulse: 82       Lab Results   Component Value Date    WBC 9.56 12/24/2024    HGB 14.3 12/24/2024    HCT 43.4 12/24/2024     12/24/2024    CHOL 132 01/19/2024    TRIG 106 01/19/2024    HDL 52 01/19/2024    ALT 22 12/24/2024    AST 26 12/24/2024     (L) 12/24/2024    K 4.2 12/24/2024     12/24/2024    CREATININE 0.7 12/24/2024    BUN 15 12/24/2024    CO2 22 (L) 12/24/2024    TSH 2.547 09/11/2023    INR 1.0 03/18/2015    HGBA1C 6.0 (H) 07/19/2024        ECHOCARDIOGRAM RESULTS  Results for orders placed in visit on 06/08/21    Echo Color Flow Doppler? Yes    Interpretation  Summary  · The estimated PA systolic pressure is 27 mmHg.  · The left ventricle is normal in size with concentric remodeling and normal systolic function.  · The estimated ejection fraction is 60%.  · Indeterminate left ventricular diastolic function.  · Normal right ventricular size with normal right ventricular systolic function.        CURRENT/PREVIOUS VISIT EKG  Results for orders placed or performed during the hospital encounter of 09/11/23   EKG 12-lead    Collection Time: 09/11/23  2:19 PM    Narrative    Test Reason : R07.9,    Vent. Rate : 090 BPM     Atrial Rate : 090 BPM     P-R Int : 248 ms          QRS Dur : 082 ms      QT Int : 406 ms       P-R-T Axes : 071 -38 042 degrees     QTc Int : 496 ms    Sinus rhythm with 1st degree A-V block  Left axis deviation  Anteroseptal infarct (cited on or before 16-AUG-2023)  Abnormal ECG  When compared with ECG of 03-SEP-2023 15:12,  No significant change was found  Confirmed by Jamey JOHN, Vadim Alfaro ([id number]) on 9/18/2023 5:32:56 PM    Referred By: IDRIS   SELF           Confirmed By:Vadim Concepcion MD     No valid procedures specified.   No results found for this or any previous visit.      Physical Exam:  CONSTITUTIONAL: No fever, no chills  HEENT: Normocephalic, atraumatic,pupils reactive to light                 NECK:  No JVD no carotid bruit  CVS: S1S2+, RRR, no murmurs,   LUNGS: Clear  ABDOMEN: Soft, NT, BS+  EXTREMITIES: No cyanosis, edema  : No yung catheter  NEURO: AAO X 3  PSY: Normal affect      Medication List with Changes/Refills   Current Medications    AMLODIPINE (NORVASC) 10 MG TABLET    Take 1 tablet (10 mg total) by mouth once daily.    ASCORBIC ACID, VITAMIN C, (VITAMIN C) 500 MG TABLET    Take 500 mg by mouth once daily.    ASPIRIN (ECOTRIN) 81 MG EC TABLET    Take 81 mg by mouth once daily.    ATORVASTATIN (LIPITOR) 80 MG TABLET    TAKE 1 TABLET DAILY    CLOPIDOGREL (PLAVIX) 75 MG TABLET    Take 1 tablet (75 mg total) by mouth once  daily.    DORZOLAMIDE-TIMOLOL 2-0.5% (COSOPT) 22.3-6.8 MG/ML OPHTHALMIC SOLUTION    Place 1 drop into the left eye 2 (two) times daily.    ESTRADIOL (ESTRACE) 0.01 % (0.1 MG/GRAM) VAGINAL CREAM    Place 1 g vaginally twice a week.    FERROUS SULFATE (FEOSOL) 325 MG (65 MG IRON) TAB TABLET    Take 325 mg by mouth daily with breakfast.    LACTOBACILLUS RHAMNOSUS GG (CULTURELLE) 10 BILLION CELL CAPSULE    Take 1 capsule by mouth once daily.    LATANOPROST 0.005 % OPHTHALMIC SOLUTION    Place 1 drop into both eyes every evening.    METFORMIN (GLUCOPHAGE-XR) 500 MG ER 24HR TABLET    Take 1 tablet (500 mg total) by mouth every evening.    METOPROLOL SUCCINATE (TOPROL-XL) 25 MG 24 HR TABLET    Take 1 tablet (25 mg total) by mouth every evening.    PANTOPRAZOLE (PROTONIX) 40 MG TABLET    Take 1 tablet (40 mg total) by mouth once daily.    PAROXETINE (PAXIL) 10 MG TABLET    Take 1 tablet (10 mg total) by mouth every morning.    PREDNISOLONE ACETATE (PRED FORTE) 1 % DRPS        TIMOLOL MALEATE 0.5% (TIMOPTIC) 0.5 % DROP    Place 1 drop into the left eye 2 (two) times daily.    TRAVATAN Z 0.004 % OPHTHALMIC SOLUTION    1 drop.    VIT C/E/ZN/COPPR/LUTEIN/ZEAXAN (PRESERVISION AREDS 2 ORAL)    Take 1 capsule by mouth 2 (two) times daily.   Discontinued Medications    HYDROXYZINE PAMOATE (VISTARIL) 25 MG CAP    Take 1 capsule (25 mg total) by mouth every 8 (eight) hours as needed (anxiety).             Assessment:       1. Coronary artery disease involving native coronary artery of native heart without angina pectoris    2. Hypertension associated with diabetes    3. Type 2 diabetes mellitus with other circulatory complication, without long-term current use of insulin         Plan:     Problem List Items Addressed This Visit          Unprioritized    Hypertension associated with diabetes    Current Assessment & Plan     Blood pressure well controlled on current regimen.  Continue amlodipine 10 mg p.o. daily, metoprolol succinate  25 mg p.o. daily.         Type 2 diabetes mellitus, dx 4/2012    Current Assessment & Plan     Followed by primary care.  Recommend aggressive diabetes control to reduce risk factors associated with CAD.         CAD (coronary artery disease) - Primary    Current Assessment & Plan     Asymptomatic.  EKG when stable chronic abnormalities without any significant change from previous.           Relevant Orders    IN OFFICE EKG 12-LEAD (to [place])       No follow-ups on file.          Lidia Sweet, NP-C  Department of Cardiology   [facility] [place]

## 2025-02-05 NOTE — ASSESSMENT & PLAN NOTE
Blood pressure well controlled on current regimen.  Continue amlodipine 10 mg p.o. daily, metoprolol succinate 25 mg p.o. daily.

## 2025-02-05 NOTE — ASSESSMENT & PLAN NOTE
Followed by primary care.  Recommend aggressive diabetes control to reduce risk factors associated with CAD.

## 2025-02-07 DIAGNOSIS — N39.0 RECURRENT UTI: Primary | ICD-10-CM

## 2025-02-07 LAB — BACTERIA UR CULT: ABNORMAL

## 2025-02-07 RX ORDER — NITROFURANTOIN 25; 75 MG/1; MG/1
100 CAPSULE ORAL 2 TIMES DAILY
Qty: 14 CAPSULE | Refills: 0 | Status: SHIPPED | OUTPATIENT
Start: 2025-02-07 | End: 2025-02-14

## 2025-02-13 LAB
OHS QRS DURATION: 90 MS
OHS QTC CALCULATION: 457 MS

## 2025-03-13 ENCOUNTER — HOSPITAL ENCOUNTER (OUTPATIENT)
Dept: RADIOLOGY | Facility: HOSPITAL | Age: 89
Discharge: HOME | End: 2025-03-13
Attending: NURSE PRACTITIONER
Payer: MEDICARE

## 2025-03-13 DIAGNOSIS — N39.0 RECURRENT UTI: ICD-10-CM

## 2025-03-13 PROCEDURE — 76770 US EXAM ABDO BACK WALL COMP: CPT | Mod: 26,,, | Performed by: RADIOLOGY

## 2025-03-13 PROCEDURE — 76770 US EXAM ABDO BACK WALL COMP: CPT | Mod: TC

## 2025-03-14 ENCOUNTER — RESULTS FOLLOW-UP (OUTPATIENT)
Dept: UROLOGY | Facility: CLINIC | Age: 89
End: 2025-03-14

## 2025-03-19 ENCOUNTER — TELEPHONE (OUTPATIENT)
Dept: FAMILY MEDICINE | Facility: CLINIC | Age: 89
End: 2025-03-19
Payer: MEDICARE

## 2025-03-19 NOTE — TELEPHONE ENCOUNTER
Ret call to pt , her lipitor was sent to her pharm [facility] on 1-9-2025 90 d supply + an add 90 d refill to call pharm.

## 2025-04-15 ENCOUNTER — OFFICE VISIT (OUTPATIENT)
Dept: FAMILY MEDICINE | Facility: CLINIC | Age: 89
End: 2025-04-15
Payer: MEDICARE

## 2025-04-15 VITALS
BODY MASS INDEX: 23 KG/M2 | WEIGHT: 129.81 LBS | DIASTOLIC BLOOD PRESSURE: 70 MMHG | TEMPERATURE: 97 F | HEART RATE: 81 BPM | HEIGHT: 63 IN | OXYGEN SATURATION: 97 % | SYSTOLIC BLOOD PRESSURE: 120 MMHG

## 2025-04-15 DIAGNOSIS — K22.70 BARRETT'S ESOPHAGUS WITHOUT DYSPLASIA: ICD-10-CM

## 2025-04-15 DIAGNOSIS — Z79.02 VISIT FOR MONITORING PLAVIX THERAPY: ICD-10-CM

## 2025-04-15 DIAGNOSIS — Z99.89 USES WALKER: ICD-10-CM

## 2025-04-15 DIAGNOSIS — E11.59 HYPERTENSION ASSOCIATED WITH DIABETES: ICD-10-CM

## 2025-04-15 DIAGNOSIS — R30.0 DYSURIA: Primary | ICD-10-CM

## 2025-04-15 DIAGNOSIS — I15.2 HYPERTENSION ASSOCIATED WITH DIABETES: ICD-10-CM

## 2025-04-15 DIAGNOSIS — I25.10 CORONARY ARTERY DISEASE INVOLVING NATIVE CORONARY ARTERY OF NATIVE HEART WITHOUT ANGINA PECTORIS: ICD-10-CM

## 2025-04-15 DIAGNOSIS — E78.5 HYPERLIPIDEMIA ASSOCIATED WITH TYPE 2 DIABETES MELLITUS: ICD-10-CM

## 2025-04-15 DIAGNOSIS — K21.9 GASTROESOPHAGEAL REFLUX DISEASE, UNSPECIFIED WHETHER ESOPHAGITIS PRESENT: ICD-10-CM

## 2025-04-15 DIAGNOSIS — I70.0 AORTIC ATHEROSCLEROSIS: ICD-10-CM

## 2025-04-15 DIAGNOSIS — Z85.3 HISTORY OF RIGHT BREAST CANCER: ICD-10-CM

## 2025-04-15 DIAGNOSIS — R09.82 POST-NASAL DRIP: ICD-10-CM

## 2025-04-15 DIAGNOSIS — Z95.5 STENTED CORONARY ARTERY: ICD-10-CM

## 2025-04-15 DIAGNOSIS — E11.69 HYPERLIPIDEMIA ASSOCIATED WITH TYPE 2 DIABETES MELLITUS: ICD-10-CM

## 2025-04-15 DIAGNOSIS — Z79.82 ASPIRIN LONG-TERM USE: ICD-10-CM

## 2025-04-15 DIAGNOSIS — Z86.711 HISTORY OF PULMONARY EMBOLISM: ICD-10-CM

## 2025-04-15 DIAGNOSIS — Z51.81 VISIT FOR MONITORING PLAVIX THERAPY: ICD-10-CM

## 2025-04-15 PROCEDURE — 81003 URINALYSIS AUTO W/O SCOPE: CPT | Mod: PBBFAC,PN

## 2025-04-15 PROCEDURE — 99214 OFFICE O/P EST MOD 30 MIN: CPT | Mod: PBBFAC,PN

## 2025-04-15 PROCEDURE — 99999PBSHW POCT URINALYSIS(INSTRUMENT): Mod: PBBFAC,,,

## 2025-04-15 PROCEDURE — 99214 OFFICE O/P EST MOD 30 MIN: CPT | Mod: S$PBB,,,

## 2025-04-15 PROCEDURE — 99999 PR PBB SHADOW E&M-EST. PATIENT-LVL IV: CPT | Mod: PBBFAC,,,

## 2025-04-15 RX ORDER — LORATADINE 10 MG/1
10 TABLET ORAL DAILY
Qty: 30 TABLET | Refills: 5 | Status: SHIPPED | OUTPATIENT
Start: 2025-04-15 | End: 2026-04-15

## 2025-04-15 RX ORDER — FLUTICASONE PROPIONATE 50 MCG
1 SPRAY, SUSPENSION (ML) NASAL DAILY
Qty: 16 G | Refills: 3 | Status: SHIPPED | OUTPATIENT
Start: 2025-04-15

## 2025-04-15 NOTE — PROGRESS NOTES
Subjective:       Patient ID: Genny Watson is a 89 y.o. female.    Chief Complaint: Follow-up (3 month follow up)    Genny Watson is an 89 y.o. female patient that presents to clinic for a 3 month check up and with concerns of a urinary tract infection.  She is waking up frequently at night to pee.  Also has some burning when she pees.  No fever or chills.  No flank or abdominal pain.   History of Fuchs corneal dystrophy.  Prior corneal transplant which failed.  Very poor vision. Saw Dr Alfaro yesterday.  Hypertension, controlled well.  No chest pain or palpitations.  Hyperlipidemia: last lipids: TC: 132, Tri HDL: 52, LDL: 58.8 on atorvastatin, tolerating well.  CAD with stents, on plavix and daily aspirin.  Has history of PE.  Has incomplete bladder emptying.  Does see Urology.  Type 2 dm with microalbuminuria.  Last HA1C 6.0.  Having a lot of belching at night.  Does have GERD but no burning.  Takes pantoprazole. Has Colin's esophagus, no swallowing difficulty. Has frequent diarrhea.  Was referred to GI in the past and they wanted to do a colonoscopy but she does not want to do this.  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  Ambulates with a walker          Review of patient's allergies indicates:   Allergen Reactions    Sulfa (sulfonamide antibiotics)      Other reaction(s): Itching     Social Drivers of Health     Tobacco Use: Low Risk  (4/15/2025)    Patient History     Smoking Tobacco Use: Never     Smokeless Tobacco Use: Never     Passive Exposure: Not on file   Alcohol Use: Not At Risk (7/15/2024)    AUDIT-C     Frequency of Alcohol Consumption: Never     Average Number of Drinks: Patient does not drink     Frequency of Binge Drinking: Never   Financial Resource Strain: Low Risk  (7/15/2024)    Overall Financial Resource Strain (CARDIA)     Difficulty of Paying Living Expenses: Not hard at all   Food Insecurity: No Food Insecurity (7/15/2024)    Hunger Vital Sign      Worried About Running Out of Food in the Last Year: Never true     Ran Out of Food in the Last Year: Never true   Transportation Needs: No Transportation Needs (7/15/2024)    PRAPARE - Transportation     Lack of Transportation (Medical): No     Lack of Transportation (Non-Medical): No   Physical Activity: Insufficiently Active (7/15/2024)    Exercise Vital Sign     Days of Exercise per Week: 2 days     Minutes of Exercise per Session: 30 min   Stress: No Stress Concern Present (7/15/2024)    [employer] - Occupational Stress Questionnaire     Feeling of Stress : Not at all   Housing Stability: Unknown (7/15/2024)    Housing Stability Vital Sign     Unable to Pay for Housing in the Last Year: No     Number of Times Moved in the Last Year: Not on file     Homeless in the Last Year: No   Depression: Low Risk  (1/9/2025)    Depression     Last PHQ-4: Flowsheet Data: 0   Utilities: Not At Risk (7/15/2024)    [facility] Utilities     Threatened with loss of utilities: No   Health Literacy: Adequate Health Literacy (7/15/2024)     Health Literacy     Frequency of need for help with medical instructions: Never   Social Isolation: Not on file      Past Medical History:   Diagnosis Date    Abnormal CT scan, chest 01/31/2015    Abnormal nuclear stress test 01/26/2015    ALLERGIC RHINITIS 04/30/2012    Arthritis     Gimenez's esophagus     Breast cancer     right, s/p right mastectomy>20yr ago    Cataract     Diabetes mellitus type II     Diverticulitis     Diverticulosis     Fuchs' corneal dystrophy     Glaucoma     Hernia, hiatal     Hyperlipidemia     Hypertension     Hypertensive emergency 09/11/2023    Macular degeneration     Myocardial infarction     Postprocedural hypotension 06/17/2023    Pulmonary embolism     Renal manifestation of secondary diabetes mellitus     Skin cancer of face 12/2014    Dr M Weil     UTI (lower urinary tract infection)       Past Surgical History:   Procedure  Laterality Date    angeogram      APPENDECTOMY      BREAST SURGERY      cardio stent   1/2015    Dr Dodson    COLONOSCOPY  around 2003    COLONOSCOPY N/A 1/13/2016    Procedure: COLONOSCOPY;  Surgeon: Mario Bhakta MD;  Location: [facility];  Service: Endoscopy;  Laterality: N/A; repeat in 5 years for surveillance    CORNEAL TRANSPLANT      CYSTOSCOPY N/A 8/30/2023    Procedure: CYSTOSCOPY;  Surgeon: Mirna Harden MD;  Location: [facility] OR;  Service: Urology;  Laterality: N/A;    EYE SURGERY      HIP ARTHROPLASTY Right 6/16/2023    Procedure: ARTHROPLASTY, HIP;  Surgeon: Josh Douglas II, MD;  Location: [facility] OR;  Service: Orthopedics;  Laterality: Right;    HYSTERECTOMY      ovaries removed    MASTECTOMY      right >20 years ago    STOMACH SURGERY      UPPER GASTROINTESTINAL ENDOSCOPY  01/13/2016    Dr. Bhakta      Social History[1]    Current Medications[2]    Lab Results   Component Value Date    WBC 9.56 12/24/2024    HGB 14.3 12/24/2024    HCT 43.4 12/24/2024     12/24/2024    CHOL 132 01/19/2024    TRIG 106 01/19/2024    HDL 52 01/19/2024    ALT 22 12/24/2024    AST 26 12/24/2024     (L) 12/24/2024    K 4.2 12/24/2024     12/24/2024    CREATININE 0.7 12/24/2024    BUN 15 12/24/2024    CO2 22 (L) 12/24/2024    TSH 2.547 09/11/2023    INR 1.0 03/18/2015    HGBA1C 6.0 (H) 07/19/2024       Review of Systems   Constitutional: Negative.    HENT: Negative.     Respiratory: Negative.     Cardiovascular: Negative.    Gastrointestinal: Negative.  Negative for abdominal pain.   Genitourinary:  Positive for dysuria and nocturia.   Neurological: Negative.    Psychiatric/Behavioral: Negative.         Objective:      Physical Exam  Vitals reviewed.   Constitutional:       Appearance: Normal appearance.   Cardiovascular:      Rate and Rhythm: Normal rate and regular rhythm.      Pulses: Normal pulses.      Heart sounds: Normal heart sounds.   Pulmonary:      Effort: Pulmonary effort is normal.       Breath sounds: Normal breath sounds.   Abdominal:      General: Bowel sounds are normal.      Palpations: Abdomen is soft.      Tenderness: There is no abdominal tenderness. There is no right CVA tenderness or left CVA tenderness.   Musculoskeletal:         General: Normal range of motion.      Cervical back: Normal range of motion.   Lymphadenopathy:      Cervical: No cervical adenopathy.   Skin:     General: Skin is warm and dry.      Capillary Refill: Capillary refill takes less than 2 seconds.   Neurological:      General: No focal deficit present.      Mental Status: She is alert.   Psychiatric:         Mood and Affect: Mood normal.         Behavior: Behavior normal.         Thought Content: Thought content normal.         Judgment: Judgment normal.         Assessment:       1. Dysuria    2. Coronary artery disease involving native coronary artery of native heart without angina pectoris    3. Stented coronary artery    4. Hypertension associated with diabetes    5. Hyperlipidemia associated with type 2 diabetes mellitus    6. Aortic atherosclerosis    7. Visit for monitoring Plavix therapy    8. History of pulmonary embolism    9. Aspirin long-term use    10. History of right breast cancer    11. Gastroesophageal reflux disease, unspecified whether esophagitis present    12. Gimenez's esophagus without dysplasia    13. Uses walker    14. Post-nasal drip        Plan:       Genny was seen today for follow-up.    Diagnoses and all orders for this visit:    Dysuria  -     POCT Urinalysis(Instrument)  -     Urine Culture High Risk    Coronary artery disease involving native coronary artery of native heart without angina pectoris    Stented coronary artery    Hypertension associated with diabetes    Hyperlipidemia associated with type 2 diabetes mellitus    Aortic atherosclerosis    Visit for monitoring Plavix therapy    History of pulmonary embolism    Aspirin long-term use    History of right breast  cancer    Gastroesophageal reflux disease, unspecified whether esophagitis present    Gimenez's esophagus without dysplasia    Uses walker    Post-nasal drip  -     loratadine (CLARITIN) 10 mg tablet; Take 1 tablet (10 mg total) by mouth once daily.  -     fluticasone propionate (FLONASE) 50 mcg/actuation nasal spray; 1 spray (50 mcg total) by Each Nostril route once daily.    Dysuria  - patient unable to provide urine sample today.  Specimen cup provided and she will collect at home and bring back to clinic.    CAD with stents  - continue daily aspirin and plavix 75 mg daily    Hypertension   - controlled  - continue amlodipine 10 mg  - continue metoprolol xl 25 mg    Hyperlipidemia  - controlled  - continue atorvastatin 80 mg  - I recommend a heart healthy diet rich in fiber, fresh vegetables and fruit and low in saturated fats (fried foods, red meat, etc.).  I also recommend regular exercise.       Aortic atherosclerosis  -continue atorvastatin  - low fat diet     GERD  - continue pantoprazole 40 mg  - avoid food triggers    Gimenez's esophagus  - no dysphagia  - follow up with GI for EGDs as recommended.     Post nasal drip  - claritin and flonase as directed    Have lab work previously ordered.  Follow up in 3 months or sooner if needed.               [1]   Social History  Socioeconomic History    Marital status:    [2]   Current Outpatient Medications:     amLODIPine (NORVASC) 10 MG tablet, Take 1 tablet (10 mg total) by mouth once daily., Disp: 90 tablet, Rfl: 1    ascorbic acid, vitamin C, (VITAMIN C) 500 MG tablet, Take 500 mg by mouth once daily., Disp: , Rfl:     aspirin (ECOTRIN) 81 MG EC tablet, Take 81 mg by mouth once daily., Disp: , Rfl:     atorvastatin (LIPITOR) 80 MG tablet, TAKE 1 TABLET DAILY, Disp: 90 tablet, Rfl: 1    clopidogreL (PLAVIX) 75 mg tablet, Take 1 tablet (75 mg total) by mouth once daily., Disp: 90 tablet, Rfl: 1    dorzolamide-timolol 2-0.5% (COSOPT) 22.3-6.8 mg/mL  ophthalmic solution, Place 1 drop into the left eye 2 (two) times daily., Disp: , Rfl:     estradioL (ESTRACE) 0.01 % (0.1 mg/gram) vaginal cream, Place 1 g vaginally twice a week., Disp: 42.5 g, Rfl: 3    ferrous sulfate (FEOSOL) 325 mg (65 mg iron) Tab tablet, Take 325 mg by mouth daily with breakfast., Disp: , Rfl:     latanoprost 0.005 % ophthalmic solution, Place 1 drop into both eyes every evening., Disp: , Rfl:     metFORMIN (GLUCOPHAGE-XR) 500 MG ER 24hr tablet, Take 1 tablet (500 mg total) by mouth every evening., Disp: 90 tablet, Rfl: 1    metoprolol succinate (TOPROL-XL) 25 MG 24 hr tablet, Take 1 tablet (25 mg total) by mouth every evening., Disp: 90 tablet, Rfl: 1    pantoprazole (PROTONIX) 40 MG tablet, Take 1 tablet (40 mg total) by mouth once daily., Disp: 90 tablet, Rfl: 1    paroxetine (PAXIL) 10 MG tablet, Take 1 tablet (10 mg total) by mouth every morning., Disp: 30 tablet, Rfl: 5    prednisoLONE acetate (PRED FORTE) 1 % DrpS, , Disp: , Rfl:     timolol maleate 0.5% (TIMOPTIC) 0.5 % Drop, Place 1 drop into the left eye 2 (two) times daily., Disp: , Rfl:     TRAVATAN Z 0.004 % ophthalmic solution, 1 drop., Disp: , Rfl:     VIT C/E/ZN/COPPR/LUTEIN/ZEAXAN (PRESERVISION AREDS 2 ORAL), Take 1 capsule by mouth 2 (two) times daily., Disp: , Rfl:     fluticasone propionate (FLONASE) 50 mcg/actuation nasal spray, 1 spray (50 mcg total) by Each Nostril route once daily., Disp: 16 g, Rfl: 3    Lactobacillus rhamnosus GG (CULTURELLE) 10 billion cell capsule, Take 1 capsule by mouth once daily. (Patient not taking: Reported on 4/15/2025), Disp: , Rfl:     loratadine (CLARITIN) 10 mg tablet, Take 1 tablet (10 mg total) by mouth once daily., Disp: 30 tablet, Rfl: 5

## 2025-04-16 LAB
BILIRUBIN, UA POC OHS: NEGATIVE
BLOOD, UA POC OHS: ABNORMAL
CLARITY, UA POC OHS: ABNORMAL
COLOR, UA POC OHS: YELLOW
GLUCOSE, UA POC OHS: NEGATIVE
KETONES, UA POC OHS: NEGATIVE
LEUKOCYTES, UA POC OHS: ABNORMAL
NITRITE, UA POC OHS: NEGATIVE
PH, UA POC OHS: 6.5
PROTEIN, UA POC OHS: ABNORMAL
SPECIFIC GRAVITY, UA POC OHS: 1.01
UROBILINOGEN, UA POC OHS: 0.2

## 2025-04-19 ENCOUNTER — PATIENT MESSAGE (OUTPATIENT)
Dept: ADMINISTRATIVE | Facility: HOSPITAL | Age: 89
End: 2025-04-19
Payer: MEDICARE

## 2025-04-19 LAB — BACTERIA UR CULT: ABNORMAL

## 2025-04-21 ENCOUNTER — RESULTS FOLLOW-UP (OUTPATIENT)
Dept: FAMILY MEDICINE | Facility: CLINIC | Age: 89
End: 2025-04-21

## 2025-04-21 NOTE — PROGRESS NOTES
Antibiotic prescribed is effective against the bacteria in your urine.  Finish your prescription and follow up if needed.

## 2025-05-13 ENCOUNTER — LAB VISIT (OUTPATIENT)
Dept: LAB | Facility: HOSPITAL | Age: 89
End: 2025-05-13
Attending: FAMILY MEDICINE
Payer: MEDICARE

## 2025-05-13 ENCOUNTER — RESULTS FOLLOW-UP (OUTPATIENT)
Dept: FAMILY MEDICINE | Facility: CLINIC | Age: 89
End: 2025-05-13

## 2025-05-13 DIAGNOSIS — E78.5 HYPERLIPIDEMIA ASSOCIATED WITH TYPE 2 DIABETES MELLITUS: ICD-10-CM

## 2025-05-13 DIAGNOSIS — E11.69 HYPERLIPIDEMIA ASSOCIATED WITH TYPE 2 DIABETES MELLITUS: ICD-10-CM

## 2025-05-13 DIAGNOSIS — I10 ESSENTIAL HYPERTENSION: ICD-10-CM

## 2025-05-13 LAB
ALBUMIN SERPL BCP-MCNC: 3.8 G/DL (ref 3.5–5.2)
ALP SERPL-CCNC: 102 UNIT/L (ref 40–150)
ALT SERPL W/O P-5'-P-CCNC: 16 UNIT/L (ref 10–44)
ANION GAP (OHS): 10 MMOL/L (ref 8–16)
AST SERPL-CCNC: 21 UNIT/L (ref 11–45)
BILIRUB SERPL-MCNC: 0.8 MG/DL (ref 0.1–1)
BUN SERPL-MCNC: 16 MG/DL (ref 8–23)
CALCIUM SERPL-MCNC: 9.2 MG/DL (ref 8.7–10.5)
CHLORIDE SERPL-SCNC: 104 MMOL/L (ref 95–110)
CHOLEST SERPL-MCNC: 123 MG/DL (ref 120–199)
CHOLEST/HDLC SERPL: 2.3 {RATIO} (ref 2–5)
CO2 SERPL-SCNC: 26 MMOL/L (ref 23–29)
CREAT SERPL-MCNC: 0.7 MG/DL (ref 0.5–1.4)
EAG (OHS): 137 MG/DL (ref 68–131)
GFR SERPLBLD CREATININE-BSD FMLA CKD-EPI: >60 ML/MIN/1.73/M2
GLUCOSE SERPL-MCNC: 137 MG/DL (ref 70–110)
HBA1C MFR BLD: 6.4 % (ref 4–5.6)
HDLC SERPL-MCNC: 53 MG/DL (ref 40–75)
HDLC SERPL: 43.1 % (ref 20–50)
LDLC SERPL CALC-MCNC: 50.2 MG/DL (ref 63–159)
NONHDLC SERPL-MCNC: 70 MG/DL
POTASSIUM SERPL-SCNC: 3.9 MMOL/L (ref 3.5–5.1)
PROT SERPL-MCNC: 7.2 GM/DL (ref 6–8.4)
SODIUM SERPL-SCNC: 140 MMOL/L (ref 136–145)
TRIGL SERPL-MCNC: 99 MG/DL (ref 30–150)

## 2025-05-13 PROCEDURE — 80053 COMPREHEN METABOLIC PANEL: CPT

## 2025-05-13 PROCEDURE — 83036 HEMOGLOBIN GLYCOSYLATED A1C: CPT

## 2025-05-13 PROCEDURE — 36415 COLL VENOUS BLD VENIPUNCTURE: CPT | Mod: PO

## 2025-05-13 PROCEDURE — 80061 LIPID PANEL: CPT

## 2025-06-18 RX ORDER — ATORVASTATIN CALCIUM 80 MG/1
80 TABLET, FILM COATED ORAL
Qty: 90 TABLET | Refills: 1 | Status: SHIPPED | OUTPATIENT
Start: 2025-06-18

## 2025-06-18 NOTE — TELEPHONE ENCOUNTER
Genny Watson  is requesting a refill authorization.  Brief Assessment and Rationale for Refill:  Approve     Medication Therapy Plan:         Comments:     Note composed:11:10 AM 06/18/2025

## 2025-06-18 NOTE — TELEPHONE ENCOUNTER
No care due was identified.  [facility] Embedded Care Due Messages. Reference number: [id number].   6/18/2025 8:09:01 AM CDT

## 2025-06-23 DIAGNOSIS — Z00.00 ENCOUNTER FOR MEDICARE ANNUAL WELLNESS EXAM: ICD-10-CM

## 2025-07-25 DIAGNOSIS — I10 ESSENTIAL HYPERTENSION: ICD-10-CM

## 2025-07-25 RX ORDER — AMLODIPINE BESYLATE 10 MG/1
10 TABLET ORAL DAILY
Qty: 90 TABLET | Refills: 1 | Status: SHIPPED | OUTPATIENT
Start: 2025-07-25

## 2025-07-25 NOTE — TELEPHONE ENCOUNTER
No care due was identified.  [facility] Embedded Care Due Messages. Reference number: [id number].   7/25/2025 2:04:01 PM CDT

## 2025-07-25 NOTE — TELEPHONE ENCOUNTER
Copied from CRM #[id number]. Topic: Medications - Medication Refill  >> Jul 21, 2025  9:38 AM Rebeca wrote:  Type:  RX Refill Request    Who Called: pt  Refill or New Rx:refill  RX Name and Strength:amLODIPine (NORVASC) 10 MG tablet  How is the patient currently taking it? (ex. 1XDay):see chart   Is this a 30 day or 90 day RX:see chart   Preferred Pharmacy with phone number:    [id number] - [place]  [address] [facility] [address]  [facility] [place] [address]  Phone: [phone] Fax: [phone]        Local or Mail Order:local  Ordering Provider:sharp  Would the patient rather a call back or a response via [employer]? call  Best Call Back Number:[phone]    Additional Information:

## 2025-08-04 ENCOUNTER — OFFICE VISIT (OUTPATIENT)
Dept: UROLOGY | Facility: CLINIC | Age: 89
End: 2025-08-04
Payer: MEDICARE

## 2025-08-04 VITALS — BODY MASS INDEX: 23.01 KG/M2 | HEIGHT: 63 IN | WEIGHT: 129.88 LBS

## 2025-08-04 DIAGNOSIS — N39.0 RECURRENT UTI: Primary | ICD-10-CM

## 2025-08-04 PROCEDURE — 99214 OFFICE O/P EST MOD 30 MIN: CPT | Mod: S$PBB,,, | Performed by: NURSE PRACTITIONER

## 2025-08-04 PROCEDURE — 99999 PR PBB SHADOW E&M-EST. PATIENT-LVL IV: CPT | Mod: PBBFAC,,, | Performed by: NURSE PRACTITIONER

## 2025-08-04 PROCEDURE — G2211 COMPLEX E/M VISIT ADD ON: HCPCS | Mod: ,,, | Performed by: NURSE PRACTITIONER

## 2025-08-04 PROCEDURE — 99214 OFFICE O/P EST MOD 30 MIN: CPT | Mod: PBBFAC,PO | Performed by: NURSE PRACTITIONER

## 2025-08-04 NOTE — PATIENT INSTRUCTIONS
UTI precautions:  Avoid constipation.  Drink lots of water  Void every 2-3 hrs regardless of urge  Void soon after urge arises. Do not hold urine once urge occurs.    OVER THE COUNTER supplements:  Cranberry supplement- need 36mg of proanthocyanidins (PAC) in supplement- helps to block bacteria from attaching to bladder wall.  D-mannose- 2 grams daily- blocks bacteria receptors  Probiotic with Lactobacillus    Continue Estrace cream- apply a pea sized amount to vagina 2x weekly at night.

## 2025-08-04 NOTE — PROGRESS NOTES
[facility]  Group: Antione/Lianet/Fina/Dixon  NPs: Janay Garcia/Randi Fitch    Note today written by:Randi Fitch  Date of Service: 08/04/2025      CHIEF COMPLAINT: F/u UTI.    PRESENTING ILLNESS:    Genny Watson is a 89 y.o. female who presents for f/u UTI. Last clinic visit was 2/3/25    8/4/25  Pt presents for f/u UTI  Last UTI was 4/16/25    No complaints today  Denies dysuria, gross hematuria or flank pain  Denies urinary frequency, urgency or incontinence  Using estrace cream when she remembers  Has increased water intake  Denies constipation  Occasional diarrhea    3/13/25 MIRNA: no mass, stone or hydro.       2/3/25  Went to ED 12/24 for urinary frequency, back pain and hallucinations. Diagnosed with UTI based on UA. No culture obtained. Rocephin given in ED and d/c home with ceftin  F/u with PCP 1/9/25. Urine culture 1/9/25, resulted MO  1/27/25 seen at urgent care for urinary frequency. Unable to void. No specimen collected. No antibiotics given    Pt presents today for f/u UTI  Denies UTI symptoms today  No longer having urinary frequency  Denies urinary incontinence.  Voids every 2-3 hours.  Unable to void in clinic  Bladder scan 141 ml    Restarted estrace cream twice a week, restarted last week.  Was not using when UTI occurred last month or in December.    + diarrhea, has not followed up with GI or PCP regarding ongoing diarrhea    8/26/24  No UTIs since last visit  Using estrace cream 2 x per week as ordered, needs refill  Taking probiotic  Voiding every 2 hours  Denies constipation; + occasional diarrhea  Good water intake  Denies UTI symptoms today. Denies dysuria, gross hematuria, flank pain, fever, chills, nausea or vomiting.    2/26/24  Pt presents for f/u UTI  Denies UTI symptoms today. Denies dysuria, gross hematuria, flank pain, fever, chills, nausea or vomiting.   Pt has not been using estrace cream as  prescribed  Taking probiotics  Drinks 6 glasses water per day  Constipation improved per pt  PVR 65 ml    8/30/23 s/p cysto    8/22/23  Patient recently had episodes of gross hematuria in setting of a UTI. Most recently in ED on 8/10. She has undergone a CT which showed only bladder wall thickening.   Culture 8/3/23: E coli      Currently has a yung in place x 2 weeks. No further hematuria.   Has not been using vaginal estrogen cream recently.   Having a BM every 2-4 days.  Not drinking enough water.      2/6/23  Has been using vaginal estrogen cream twice a week.   No UTIs since last visit.   No hematuria or dysuria.   Has a bowel movement every 2-3 days.      cc     8/3/22  Patient seen by NP in March and PVR was <300 cc. Was then seen again in June with inability to urinate and 350 cc drained at that time.   Both times has had a positive urine culture however were asymptomatic.  She has no bothersome urinary complaints.   Her renal function is normal.      Has been using vaginal estrogen cream.      Bladder scan today: 342 cc (unable to void)     11/29/21  Had E coli UTI in August. Unsure if she was having symptoms at that time.   Today her symptoms are frequency. No incontinence. No dysuria. No gross hematuria.   Having a daily bowel movement.   Drinks 4-5 glasses a day of water. Drinks 1 cup of coffee in the am.      Cr 6/29/21: 0.8  PVR today 305 cc        5/27/21  Patient presents for hematuria. She states her urine looked orange, no blood in urine and no clots. She thinks maybe she had bleeding from her hemorrhoids.   She had a UA done on 5/17 which showed 6 RBC, >100 WBC, nitrite negative. Urine culture no growth.   She underwent CT Urogram which showed no hydro, no stones, no filling defects. Her bladder is fairly distended.      She has no significant bothersome urinary complaints.   No history of stones.  She does have UTIs and her symptoms include dysuria. Usually goes to urgent care for this.       She a bowel movement every 3 days.   Drinks 2 bottles of water a day, 1 cup of coffee.   She states she feels like she is emptying her bladder better at home, she has a shy bladder.      UA today: +blood and leuks, nitrite negative  PVR today: 390 cc (catheterized)      Urine cultures:   Lab Results   Component Value Date    LABURIN >100,000 cfu/ml Escherichia coli (A) 04/16/2025    LABURIN ESCHERICHIA COLI  >100,000 cfu/ml   (A) 02/05/2025    LABURIN  01/09/2025     Multiple organisms isolated. None in predominance.  Repeat if    LABURIN clinically necessary. 01/09/2025    LABURIN  06/07/2024     Multiple organisms isolated. None in predominance.  Repeat if    LABURIN clinically necessary. 06/07/2024    LABURIN No growth 01/15/2024    LABURIN No growth 08/10/2023    LABURIN ESCHERICHIA COLI  >100,000 cfu/ml   (A) 08/03/2023    LABURIN No growth 06/16/2023         REVIEW OF SYSTEMS: as stated above in hpi    PATIENT HISTORY:  Past Medical History:   Diagnosis Date    Abnormal CT scan, chest 01/31/2015    Abnormal nuclear stress test 01/26/2015    ALLERGIC RHINITIS 04/30/2012    Arthritis     Gimenez's esophagus     Breast cancer     right, s/p right mastectomy>20yr ago    Cataract     Diabetes mellitus type II     Diverticulitis     Diverticulosis     Fuchs' corneal dystrophy     Glaucoma     Hernia, hiatal     Hyperlipidemia     Hypertension     Hypertensive emergency 09/11/2023    Macular degeneration     Myocardial infarction     Postprocedural hypotension 06/17/2023    Pulmonary embolism     Renal manifestation of secondary diabetes mellitus     Skin cancer of face 12/2014    Dr M Weil     UTI (lower urinary tract infection)        Past Surgical History:   Procedure Laterality Date    angeogram      APPENDECTOMY      BREAST SURGERY      cardio stent   1/2015    Dr Dodson    COLONOSCOPY  around 2003    COLONOSCOPY N/A 1/13/2016    Procedure: COLONOSCOPY;  Surgeon: Mario Bhakta MD;  Location: [facility];   Service: Endoscopy;  Laterality: N/A; repeat in 5 years for surveillance    CORNEAL TRANSPLANT      CYSTOSCOPY N/A 8/30/2023    Procedure: CYSTOSCOPY;  Surgeon: Mirna Harden MD;  Location: [facility] OR;  Service: Urology;  Laterality: N/A;    EYE SURGERY      HIP ARTHROPLASTY Right 6/16/2023    Procedure: ARTHROPLASTY, HIP;  Surgeon: Josh Douglas II, MD;  Location: [facility] OR;  Service: Orthopedics;  Laterality: Right;    HYSTERECTOMY      ovaries removed    MASTECTOMY      right >20 years ago    STOMACH SURGERY      UPPER GASTROINTESTINAL ENDOSCOPY  01/13/2016    Dr. Moya       Allergies:  Sulfa (sulfonamide antibiotics)      PHYSICAL EXAMINATION:    Constitutional: She is oriented to person, place, and time. She appears well-developed and well-nourished.  She is in no apparent distress.  Abdominal:  She exhibits no distension.  There is no CVA tenderness.   Neurological: She is alert and oriented to person, place, and time.   Psych: Cooperative with normal affect.    Physical Exam    LABS:      Lab Results   Component Value Date    CREATININE 0.7 05/13/2025     Lab Results   Component Value Date    HGBA1C 6.4 (H) 05/13/2025       IMPRESSION:    Encounter Diagnoses   Name Primary?    Recurrent UTI Yes     PLAN:  -Continue UTI precautions:  Avoid constipation.  Drink lots of water  Void every 2-3 hrs regardless of urge  Void soon after urge arises. Do not hold urine once urge occurs.  OVER THE COUNTER:  Utiva Cranberry supplement- need 36mg of proanthocyanidins (PAC) in supplement- helps to block bacteria from attaching to bladder wall.  Probiotic with Lactobacillus  Continue Estrace- apply a pea sized amount to vagina 2x weekly at night. No refills needed    -RTC 6 months     I encouraged her or any of her family members to call or email me with questions and/or concerns.    Visit today is associated with current or anticipated ongoing medical care related to this patient's single serious  condition/complex condition, recurrent UTI    30 minutes of total time spent on the encounter, which includes face to face time and non-face to face time preparing to see the patient (eg, review of tests), Obtaining and/or reviewing separately obtained history, Documenting clinical information in the electronic or other health record, Independently interpreting results (not separately reported) and communicating results to the patient/family/caregiver, or Care coordination (not separately reported).

## 2025-08-07 ENCOUNTER — OFFICE VISIT (OUTPATIENT)
Dept: FAMILY MEDICINE | Facility: CLINIC | Age: 89
End: 2025-08-07
Payer: MEDICARE

## 2025-08-07 VITALS
HEIGHT: 63 IN | DIASTOLIC BLOOD PRESSURE: 66 MMHG | OXYGEN SATURATION: 98 % | HEART RATE: 81 BPM | TEMPERATURE: 98 F | WEIGHT: 124 LBS | SYSTOLIC BLOOD PRESSURE: 132 MMHG | BODY MASS INDEX: 21.97 KG/M2

## 2025-08-07 DIAGNOSIS — I10 HYPERTENSION, ESSENTIAL: ICD-10-CM

## 2025-08-07 DIAGNOSIS — Z79.02 VISIT FOR MONITORING PLAVIX THERAPY: ICD-10-CM

## 2025-08-07 DIAGNOSIS — Z95.5 STENTED CORONARY ARTERY: Primary | ICD-10-CM

## 2025-08-07 DIAGNOSIS — B07.0 PLANTAR WART: ICD-10-CM

## 2025-08-07 DIAGNOSIS — E78.5 HYPERLIPIDEMIA, UNSPECIFIED HYPERLIPIDEMIA TYPE: ICD-10-CM

## 2025-08-07 DIAGNOSIS — E11.59 TYPE 2 DIABETES MELLITUS WITH OTHER CIRCULATORY COMPLICATION, WITHOUT LONG-TERM CURRENT USE OF INSULIN: ICD-10-CM

## 2025-08-07 DIAGNOSIS — I25.10 CORONARY ARTERY DISEASE INVOLVING NATIVE CORONARY ARTERY OF NATIVE HEART WITHOUT ANGINA PECTORIS: ICD-10-CM

## 2025-08-07 DIAGNOSIS — Z99.89 USES WALKER: ICD-10-CM

## 2025-08-07 DIAGNOSIS — I10 WHITE COAT SYNDROME WITH DIAGNOSIS OF HYPERTENSION: ICD-10-CM

## 2025-08-07 DIAGNOSIS — H18.513 FUCHS' CORNEAL DYSTROPHY OF BOTH EYES: ICD-10-CM

## 2025-08-07 DIAGNOSIS — Z79.82 ASPIRIN LONG-TERM USE: ICD-10-CM

## 2025-08-07 DIAGNOSIS — Z51.81 VISIT FOR MONITORING PLAVIX THERAPY: ICD-10-CM

## 2025-08-07 DIAGNOSIS — K22.70 BARRETT'S ESOPHAGUS WITHOUT DYSPLASIA: ICD-10-CM

## 2025-08-07 PROCEDURE — G2211 COMPLEX E/M VISIT ADD ON: HCPCS | Mod: ,,, | Performed by: FAMILY MEDICINE

## 2025-08-07 PROCEDURE — 99999 PR PBB SHADOW E&M-EST. PATIENT-LVL V: CPT | Mod: PBBFAC,,, | Performed by: FAMILY MEDICINE

## 2025-08-07 PROCEDURE — 99214 OFFICE O/P EST MOD 30 MIN: CPT | Mod: S$PBB,,, | Performed by: FAMILY MEDICINE

## 2025-08-07 PROCEDURE — 99215 OFFICE O/P EST HI 40 MIN: CPT | Mod: PBBFAC,PN | Performed by: FAMILY MEDICINE

## 2025-08-07 RX ORDER — PANTOPRAZOLE SODIUM 40 MG/1
40 TABLET, DELAYED RELEASE ORAL DAILY
Qty: 90 TABLET | Refills: 3 | Status: SHIPPED | OUTPATIENT
Start: 2025-08-07

## 2025-08-07 RX ORDER — CLOPIDOGREL BISULFATE 75 MG/1
75 TABLET ORAL DAILY
Qty: 90 TABLET | Refills: 3 | Status: SHIPPED | OUTPATIENT
Start: 2025-08-07

## 2025-08-07 RX ORDER — METFORMIN HYDROCHLORIDE 500 MG/1
500 TABLET, EXTENDED RELEASE ORAL NIGHTLY
Qty: 90 TABLET | Refills: 3 | Status: SHIPPED | OUTPATIENT
Start: 2025-08-07

## 2025-08-07 RX ORDER — ATORVASTATIN CALCIUM 80 MG/1
80 TABLET, FILM COATED ORAL DAILY
Qty: 90 TABLET | Refills: 3 | Status: SHIPPED | OUTPATIENT
Start: 2025-08-07

## 2025-08-07 RX ORDER — METOPROLOL SUCCINATE 25 MG/1
25 TABLET, EXTENDED RELEASE ORAL NIGHTLY
Qty: 90 TABLET | Refills: 3 | Status: SHIPPED | OUTPATIENT
Start: 2025-08-07

## 2025-08-07 NOTE — PROGRESS NOTES
SCRIBE #1 NOTE: I, Tima Salazar, am scribing for, and in the presence of,  Jameson Andino III, MD. I have scribed the entire note.     Subjective:       Patient ID: Genny Watson is a 89 y.o. female.    Chief Complaint: Follow-up (3 months//)    Ms. Watson is here for a follow up with her last appointment being here on April 15, 2025 with NP Becky. Uses walker. No falls. Accompanied by relative. Plant wart. She has a hard spot on her right foot for 2-3 weeks. BMI of 21.97. Cardiovascular good. No chest pain. No palpitations. Blood pressure is 132/66. Hypertension controlled. Hyperlipidemia. Cholesterol was 123 in May 2025. HDL was 53. LDL was 50. Type 2 diabetes mellitus. A1C was 6.4. Blood glucose was 137. GFR was >60. No sugar checks. CAD. Stented coronary artery. Using aspirin and Plavix. Gastroesophageal reflux disease. Gimenez's esophagus. No dysphagia. Scoliosis. Fuchs corneal dystrophy. 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 Reports television is blurry. She does use drops 6x a day. Sees Dr. Alfaro.     Review of Systems   Constitutional:  Negative for chills and fever.   HENT:  Negative for congestion and sore throat.    Eyes:  Positive for visual disturbance.   Respiratory:  Negative for chest tightness and shortness of breath.    Cardiovascular:  Negative for chest pain.   Gastrointestinal:  Negative for nausea.   Endocrine: Negative for polydipsia and polyuria.   Genitourinary:  Negative for dysuria and flank pain.   Musculoskeletal:  Negative for back pain, neck pain and neck stiffness.   Skin:  Negative for rash.        Rough spot   Neurological:  Negative for weakness.   Hematological:  Does not bruise/bleed easily.   Psychiatric/Behavioral:  Negative for behavioral problems.    All other systems reviewed and are negative.      Objective:      Physical examination: Vital signs noted. No acute distress. No carotid bruit. Regular heart rate and rhythm. Lungs  clear to auscultation bilaterally. Abdomen bowel sounds positive soft and nontender. Extremities without edema. 2+ pedal pulses. Plantar wart on right heel that measures 1 cm.  In the center of the ball of the foot.      Assessment:       1. Stented coronary artery    2. White coat syndrome with diagnosis of hypertension    3. Visit for monitoring Plavix therapy    4. Aspirin long-term use    5. Uses walker    6. Hypertension, essential    7. Hyperlipidemia, unspecified hyperlipidemia type    8. Coronary artery disease involving native coronary artery of native heart without angina pectoris    9. Gimenez's esophagus without dysplasia    10. Type 2 diabetes mellitus with other circulatory complication, without long-term current use of insulin    11. Plantar wart    12. Fuchs' corneal dystrophy of both eyes        Plan:       Stented coronary artery    White coat syndrome with diagnosis of hypertension    Visit for monitoring Plavix therapy    Aspirin long-term use    Uses walker    Hypertension, essential  -     Comprehensive Metabolic Panel; Future; Expected date: 11/07/2025    Hyperlipidemia, unspecified hyperlipidemia type  -     Lipid Panel; Future; Expected date: 11/07/2025    Coronary artery disease involving native coronary artery of native heart without angina pectoris    Gimenez's esophagus without dysplasia    Type 2 diabetes mellitus with other circulatory complication, without long-term current use of insulin  -     Microalbumin/Creatinine Ratio, Urine; Future; Expected date: 11/07/2025  -     Hemoglobin A1C; Future; Expected date: 11/07/2025    Plantar wart  -     Ambulatory referral/consult to Podiatry; Future; Expected date: 08/14/2025    Fuchs' corneal dystrophy of both eyes    Other orders  -     atorvastatin (LIPITOR) 80 MG tablet; Take 1 tablet (80 mg total) by mouth once daily.  Dispense: 90 tablet; Refill: 3  -     clopidogreL (PLAVIX) 75 mg tablet; Take 1 tablet (75 mg total) by mouth once  daily.  Dispense: 90 tablet; Refill: 3  -     metFORMIN (GLUCOPHAGE-XR) 500 MG ER 24hr tablet; Take 1 tablet (500 mg total) by mouth every evening.  Dispense: 90 tablet; Refill: 3  -     metoprolol succinate (TOPROL-XL) 25 MG 24 hr tablet; Take 1 tablet (25 mg total) by mouth every evening.  Dispense: 90 tablet; Refill: 3  -     pantoprazole (PROTONIX) 40 MG tablet; Take 1 tablet (40 mg total) by mouth once daily.  Dispense: 90 tablet; Refill: 3    Eye exam from Dr. Melton's office it is current.  Check microalbumin.  Refill all medications 90 day supply with 3 refills.  To Podiatry regarding the plantar wart.  Follow-up in 3 months with CMP lipids and A1c.  Hypertension under good control.  Lipids are under good control.  All care gaps addressed or discussed.     I, Jameson Andino III, MD, personally performed the services described in this documentation. All medical record entries made by the scribe were at my direction and in my presence. I have reviewed the chart and agree that the record reflects my personal performance and is accurate and complete.

## 2025-08-09 PROBLEM — H18.513 FUCHS' CORNEAL DYSTROPHY OF BOTH EYES: Status: ACTIVE | Noted: 2025-08-09

## 2025-08-13 ENCOUNTER — PATIENT OUTREACH (OUTPATIENT)
Dept: ADMINISTRATIVE | Facility: HOSPITAL | Age: 89
End: 2025-08-13
Payer: MEDICARE

## (undated) DEVICE — GOWN TOGA SYS PEELWY ZIP 2 XL

## (undated) DEVICE — SPONGE BULKEE II ABSRB 6X6.75

## (undated) DEVICE — GLOVE SURGEONS ULTRA TOUCH 6.5

## (undated) DEVICE — DRESSING AQUACEL AG 3.5X10IN

## (undated) DEVICE — SUT ETHIBOND XTRA 5 V-37 GR

## (undated) DEVICE — DRAPE STERI-DRAPE 1000 17X11IN

## (undated) DEVICE — BNDG COFLEX FOAM LF2 ST 6X5YD

## (undated) DEVICE — ALCOHOL 70% ISOP RUBBING 4OZ

## (undated) DEVICE — Device

## (undated) DEVICE — UNDERGLOVES BIOGEL PI SIZE 8.5

## (undated) DEVICE — ELECTRODE BLD EXT 6.50 ST DISP

## (undated) DEVICE — GLOVE SURG ULTRA TOUCH 8.5

## (undated) DEVICE — SOL POVIDONE PREP IODINE 4 OZ

## (undated) DEVICE — MANIFOLD 4 PORT

## (undated) DEVICE — SUT MONOCRYL 3-0 PS-1

## (undated) DEVICE — DRAPE INCISE IOBAN 2 23X33IN

## (undated) DEVICE — GLOVE BIOGEL PIMICRO INDIC 6.5

## (undated) DEVICE — NDL MAYO 2

## (undated) DEVICE — ELECTRODE REM PLYHSV RETURN 9

## (undated) DEVICE — DRAPE THREE-QTR REINF 53X77IN

## (undated) DEVICE — BLADE SAW SGTTL 4.72X25.4X1.27

## (undated) DEVICE — BLADE SURG CARBON STEEL #10

## (undated) DEVICE — SUT STRATAFIX SPIRAL VIOLET

## (undated) DEVICE — SLEEVE SCD EXPRESS KNEE MEDIUM

## (undated) DEVICE — YANKAUER FLEX NO VENT HI CAP

## (undated) DEVICE — INTERPULSE SET

## (undated) DEVICE — WRAP SHLDR HIP ACCU THRM PACK

## (undated) DEVICE — COVER TABLE 44X90 STERILE

## (undated) DEVICE — SET CYSTO IRR DRP CHMBR 84IN

## (undated) DEVICE — DRAPE ORTH SPLIT 77X108IN

## (undated) DEVICE — SOL NACL IRR 1000ML BTL

## (undated) DEVICE — PACK CUSTOM UNIV BASIN SLI

## (undated) DEVICE — SYS CLSR DERMABOND PRINEO 22CM

## (undated) DEVICE — DRESSING AQUACEL AG ADV 3.5X12

## (undated) DEVICE — DRAPE U SPLIT SHEET 54X76IN

## (undated) DEVICE — SUT STRATAFIX PDS 1 CTX 18IN

## (undated) DEVICE — TOWEL OR DISP STRL BLUE 4/PK

## (undated) DEVICE — SWABSTICK PVP-1 SINGLE 10%

## (undated) DEVICE — GLOVE SURG ULTRA TOUCH 8

## (undated) DEVICE — PACK SIRUS BASIC V SURG STRL

## (undated) DEVICE — DRAPE HIP PCH 112X137X89IN

## (undated) DEVICE — SOCKINETTE IMPERVIOUS 12X48IN

## (undated) DEVICE — TOGA FLYTE PEEL AWAY XLARGE

## (undated) DEVICE — COVER PROXIMA MAYO STAND

## (undated) DEVICE — DRAPE STERI U-SHAPED 47X51IN

## (undated) DEVICE — LINER SUCTION 3000CC

## (undated) DEVICE — APPLICATOR CHLORAPREP ORN 26ML

## (undated) DEVICE — DRAPE INCISE IOBAN 2 23X17IN

## (undated) DEVICE — SOL NACL IRR 3000ML